# Patient Record
Sex: MALE | Race: BLACK OR AFRICAN AMERICAN | Employment: UNEMPLOYED | URBAN - METROPOLITAN AREA
[De-identification: names, ages, dates, MRNs, and addresses within clinical notes are randomized per-mention and may not be internally consistent; named-entity substitution may affect disease eponyms.]

---

## 2019-10-01 ENCOUNTER — HOSPITAL ENCOUNTER (INPATIENT)
Age: 64
LOS: 3 days | Discharge: HOME | DRG: 246 | End: 2019-10-04
Attending: EMERGENCY MEDICINE | Admitting: FAMILY MEDICINE
Payer: COMMERCIAL

## 2019-10-01 ENCOUNTER — APPOINTMENT (OUTPATIENT)
Dept: GENERAL RADIOLOGY | Age: 64
DRG: 246 | End: 2019-10-01
Attending: EMERGENCY MEDICINE
Payer: COMMERCIAL

## 2019-10-01 ENCOUNTER — APPOINTMENT (OUTPATIENT)
Dept: CT IMAGING | Age: 64
DRG: 246 | End: 2019-10-01
Attending: PHYSICIAN ASSISTANT
Payer: COMMERCIAL

## 2019-10-01 ENCOUNTER — APPOINTMENT (OUTPATIENT)
Dept: NON INVASIVE DIAGNOSTICS | Age: 64
DRG: 246 | End: 2019-10-01
Attending: INTERNAL MEDICINE
Payer: COMMERCIAL

## 2019-10-01 ENCOUNTER — OFFICE VISIT (OUTPATIENT)
Dept: FAMILY MEDICINE CLINIC | Age: 64
End: 2019-10-01

## 2019-10-01 VITALS
TEMPERATURE: 96.8 F | HEIGHT: 68 IN | RESPIRATION RATE: 14 BRPM | HEART RATE: 115 BPM | OXYGEN SATURATION: 97 % | DIASTOLIC BLOOD PRESSURE: 121 MMHG | WEIGHT: 200.6 LBS | SYSTOLIC BLOOD PRESSURE: 168 MMHG | BODY MASS INDEX: 30.4 KG/M2

## 2019-10-01 DIAGNOSIS — Z11.59 ENCOUNTER FOR HEPATITIS C SCREENING TEST FOR LOW RISK PATIENT: ICD-10-CM

## 2019-10-01 DIAGNOSIS — R06.02 SHORTNESS OF BREATH: ICD-10-CM

## 2019-10-01 DIAGNOSIS — R06.02 SHORTNESS OF BREATH: Primary | ICD-10-CM

## 2019-10-01 DIAGNOSIS — R53.1 WEAKNESS: ICD-10-CM

## 2019-10-01 DIAGNOSIS — I50.9 ACUTE CONGESTIVE HEART FAILURE, UNSPECIFIED HEART FAILURE TYPE (HCC): Primary | ICD-10-CM

## 2019-10-01 DIAGNOSIS — R03.0 ELEVATED BLOOD PRESSURE READING: ICD-10-CM

## 2019-10-01 DIAGNOSIS — I10 ESSENTIAL HYPERTENSION: ICD-10-CM

## 2019-10-01 LAB
ALBUMIN SERPL-MCNC: 3.7 G/DL (ref 3.5–5)
ALBUMIN/GLOB SERPL: 0.8 {RATIO} (ref 1.1–2.2)
ALP SERPL-CCNC: 102 U/L (ref 45–117)
ALT SERPL-CCNC: 48 U/L (ref 12–78)
ANION GAP SERPL CALC-SCNC: 9 MMOL/L (ref 5–15)
ARTERIAL PATENCY WRIST A: YES
AST SERPL-CCNC: 27 U/L (ref 15–37)
BASE DEFICIT BLD-SCNC: 1 MMOL/L
BASOPHILS # BLD: 0.1 K/UL (ref 0–0.1)
BASOPHILS # BLD: 0.1 K/UL (ref 0–0.1)
BASOPHILS NFR BLD: 1 % (ref 0–1)
BASOPHILS NFR BLD: 1 % (ref 0–1)
BDY SITE: ABNORMAL
BILIRUB SERPL-MCNC: 2.6 MG/DL (ref 0.2–1)
BNP SERPL-MCNC: 4491 PG/ML
BUN SERPL-MCNC: 14 MG/DL (ref 6–20)
BUN/CREAT SERPL: 10 (ref 12–20)
CALCIUM SERPL-MCNC: 9.2 MG/DL (ref 8.5–10.1)
CHLORIDE SERPL-SCNC: 97 MMOL/L (ref 97–108)
CO2 SERPL-SCNC: 27 MMOL/L (ref 21–32)
CREAT SERPL-MCNC: 1.36 MG/DL (ref 0.7–1.3)
DIFFERENTIAL METHOD BLD: ABNORMAL
DIFFERENTIAL METHOD BLD: ABNORMAL
ECHO AV AREA PEAK VELOCITY: 2.3 CM2
ECHO AV PEAK GRADIENT: 3.2 MMHG
ECHO AV PEAK VELOCITY: 89.25 CM/S
ECHO EST RA PRESSURE: 10 MMHG
ECHO LA AREA 4C: 25.2 CM2
ECHO LA VOL 4C: 82.31 ML (ref 18–58)
ECHO LA VOLUME INDEX A4C: 40.26 ML/M2 (ref 16–28)
ECHO LV E' LATERAL VELOCITY: 6.75 CM/S
ECHO LV E' SEPTAL VELOCITY: 3.8 CM/S
ECHO LV EDV A4C: 152.1 ML
ECHO LV EDV INDEX A4C: 74.4 ML/M2
ECHO LV EJECTION FRACTION A4C: 22 %
ECHO LV ESV A4C: 119.3 ML
ECHO LV ESV INDEX A4C: 58.3 ML/M2
ECHO LV INTERNAL DIMENSION DIASTOLIC MMODE: 5.91 CM
ECHO LV INTERNAL DIMENSION SYSTOLIC MMODE: 5.08 CM
ECHO LV IVSD MMODE: 1.16 CM
ECHO LV IVSS MMODE: 1.35 CM
ECHO LV POSTERIOR WALL DIASTOLIC MMODE: 1.16 CM
ECHO LV POSTERIOR WALL SYSTOLIC MMODE: 1.47 CM
ECHO LVOT DIAM: 2.09 CM
ECHO LVOT PEAK GRADIENT: 1.5 MMHG
ECHO LVOT PEAK VELOCITY: 60.21 CM/S
ECHO MV A VELOCITY: 40.43 CM/S
ECHO MV E DECELERATION TIME (DT): 103.8 MS
ECHO MV E VELOCITY: 91.78 CM/S
ECHO MV E/A RATIO: 2.27
ECHO MV E/E' LATERAL: 13.6
ECHO MV E/E' RATIO (AVERAGED): 18.87
ECHO MV E/E' SEPTAL: 24.15
ECHO MV REGURGITANT RADIUS PISA: 0.91 CM
ECHO PULMONARY ARTERY SYSTOLIC PRESSURE (PASP): 60 MMHG
ECHO PV MAX VELOCITY: 43.39 CM/S
ECHO PV PEAK GRADIENT: 0.8 MMHG
ECHO RA AREA 4C: 24.21 CM2
ECHO RIGHT VENTRICULAR SYSTOLIC PRESSURE (RVSP): 60 MMHG
ECHO RV INTERNAL DIMENSION: 4.24 CM
ECHO TV REGURGITANT MAX VELOCITY: 353.51 CM/S
ECHO TV REGURGITANT PEAK GRADIENT: 50 MMHG
EOSINOPHIL # BLD: 0.1 K/UL (ref 0–0.4)
EOSINOPHIL # BLD: 0.1 K/UL (ref 0–0.4)
EOSINOPHIL NFR BLD: 1 % (ref 0–7)
EOSINOPHIL NFR BLD: 1 % (ref 0–7)
ERYTHROCYTE [DISTWIDTH] IN BLOOD BY AUTOMATED COUNT: 18.1 % (ref 11.5–14.5)
ERYTHROCYTE [DISTWIDTH] IN BLOOD BY AUTOMATED COUNT: 18.7 % (ref 11.5–14.5)
GAS FLOW.O2 O2 DELIVERY SYS: ABNORMAL L/MIN
GLOBULIN SER CALC-MCNC: 4.7 G/DL (ref 2–4)
GLUCOSE BLD STRIP.AUTO-MCNC: 187 MG/DL (ref 65–100)
GLUCOSE SERPL-MCNC: 193 MG/DL (ref 65–100)
HCO3 BLD-SCNC: 22.7 MMOL/L (ref 22–26)
HCT VFR BLD AUTO: 49.3 % (ref 36.6–50.3)
HCT VFR BLD AUTO: 49.9 % (ref 36.6–50.3)
HGB BLD-MCNC: 15.9 G/DL (ref 12.1–17)
HGB BLD-MCNC: 16.1 G/DL (ref 12.1–17)
IMM GRANULOCYTES # BLD AUTO: 0.1 K/UL (ref 0–0.04)
IMM GRANULOCYTES # BLD AUTO: 0.1 K/UL (ref 0–0.04)
IMM GRANULOCYTES NFR BLD AUTO: 1 % (ref 0–0.5)
IMM GRANULOCYTES NFR BLD AUTO: 1 % (ref 0–0.5)
LYMPHOCYTES # BLD: 2 K/UL (ref 0.8–3.5)
LYMPHOCYTES # BLD: 2.2 K/UL (ref 0.8–3.5)
LYMPHOCYTES NFR BLD: 31 % (ref 12–49)
LYMPHOCYTES NFR BLD: 33 % (ref 12–49)
MCH RBC QN AUTO: 21.2 PG (ref 26–34)
MCH RBC QN AUTO: 21.9 PG (ref 26–34)
MCHC RBC AUTO-ENTMCNC: 31.9 G/DL (ref 30–36.5)
MCHC RBC AUTO-ENTMCNC: 32.7 G/DL (ref 30–36.5)
MCV RBC AUTO: 66.4 FL (ref 80–99)
MCV RBC AUTO: 67 FL (ref 80–99)
MONOCYTES # BLD: 1.3 K/UL (ref 0–1)
MONOCYTES # BLD: 1.4 K/UL (ref 0–1)
MONOCYTES NFR BLD: 21 % (ref 5–13)
MONOCYTES NFR BLD: 21 % (ref 5–13)
NEUTS SEG # BLD: 2.7 K/UL (ref 1.8–8)
NEUTS SEG # BLD: 2.8 K/UL (ref 1.8–8)
NEUTS SEG NFR BLD: 43 % (ref 32–75)
NEUTS SEG NFR BLD: 45 % (ref 32–75)
NRBC # BLD: 0 K/UL (ref 0–0.01)
NRBC # BLD: 0 K/UL (ref 0–0.01)
NRBC BLD-RTO: 0 PER 100 WBC
NRBC BLD-RTO: 0 PER 100 WBC
PCO2 BLD: 31.5 MMHG (ref 35–45)
PH BLD: 7.47 [PH] (ref 7.35–7.45)
PLATELET # BLD AUTO: 239 K/UL (ref 150–400)
PLATELET # BLD AUTO: 247 K/UL (ref 150–400)
PMV BLD AUTO: 10.2 FL (ref 8.9–12.9)
PMV BLD AUTO: 9.8 FL (ref 8.9–12.9)
PO2 BLD: 74 MMHG (ref 80–100)
POTASSIUM SERPL-SCNC: 4.2 MMOL/L (ref 3.5–5.1)
PROT SERPL-MCNC: 8.4 G/DL (ref 6.4–8.2)
RBC # BLD AUTO: 7.36 M/UL (ref 4.1–5.7)
RBC # BLD AUTO: 7.51 M/UL (ref 4.1–5.7)
RBC MORPH BLD: ABNORMAL
RBC MORPH BLD: ABNORMAL
SAO2 % BLD: 96 % (ref 92–97)
SERVICE CMNT-IMP: ABNORMAL
SODIUM SERPL-SCNC: 133 MMOL/L (ref 136–145)
SPECIMEN TYPE: ABNORMAL
TOTAL RESP. RATE, ITRR: 33
TROPONIN I SERPL-MCNC: 0.56 NG/ML
WBC # BLD AUTO: 6.4 K/UL (ref 4.1–11.1)
WBC # BLD AUTO: 6.6 K/UL (ref 4.1–11.1)

## 2019-10-01 PROCEDURE — 96374 THER/PROPH/DIAG INJ IV PUSH: CPT

## 2019-10-01 PROCEDURE — 93005 ELECTROCARDIOGRAM TRACING: CPT

## 2019-10-01 PROCEDURE — 77030008543 HC TBNG MON PRSS MRTM -A: Performed by: INTERNAL MEDICINE

## 2019-10-01 PROCEDURE — 77030019698 HC SYR ANGI MDLON MRTM -A: Performed by: INTERNAL MEDICINE

## 2019-10-01 PROCEDURE — 74011250636 HC RX REV CODE- 250/636: Performed by: INTERNAL MEDICINE

## 2019-10-01 PROCEDURE — 77030004549 HC CATH ANGI DX PRF MRTM -A: Performed by: INTERNAL MEDICINE

## 2019-10-01 PROCEDURE — 74011000258 HC RX REV CODE- 258: Performed by: INTERNAL MEDICINE

## 2019-10-01 PROCEDURE — 74011000250 HC RX REV CODE- 250: Performed by: INTERNAL MEDICINE

## 2019-10-01 PROCEDURE — C1769 GUIDE WIRE: HCPCS | Performed by: INTERNAL MEDICINE

## 2019-10-01 PROCEDURE — 74011250636 HC RX REV CODE- 250/636: Performed by: PHYSICIAN ASSISTANT

## 2019-10-01 PROCEDURE — 84484 ASSAY OF TROPONIN QUANT: CPT

## 2019-10-01 PROCEDURE — 74011250637 HC RX REV CODE- 250/637: Performed by: INTERNAL MEDICINE

## 2019-10-01 PROCEDURE — 93306 TTE W/DOPPLER COMPLETE: CPT

## 2019-10-01 PROCEDURE — 77030019569 HC BND COMPR RAD TERU -B: Performed by: INTERNAL MEDICINE

## 2019-10-01 PROCEDURE — 74011250636 HC RX REV CODE- 250/636: Performed by: FAMILY MEDICINE

## 2019-10-01 PROCEDURE — 80053 COMPREHEN METABOLIC PANEL: CPT

## 2019-10-01 PROCEDURE — 71046 X-RAY EXAM CHEST 2 VIEWS: CPT

## 2019-10-01 PROCEDURE — 77030018729 HC ELECTRD DEFIB PAD CARD -B: Performed by: INTERNAL MEDICINE

## 2019-10-01 PROCEDURE — 92928 PRQ TCAT PLMT NTRAC ST 1 LES: CPT | Performed by: INTERNAL MEDICINE

## 2019-10-01 PROCEDURE — 93458 L HRT ARTERY/VENTRICLE ANGIO: CPT | Performed by: INTERNAL MEDICINE

## 2019-10-01 PROCEDURE — 77030019697 HC SYR ANGI INFL MRTM -B: Performed by: INTERNAL MEDICINE

## 2019-10-01 PROCEDURE — C1725 CATH, TRANSLUMIN NON-LASER: HCPCS | Performed by: INTERNAL MEDICINE

## 2019-10-01 PROCEDURE — 99153 MOD SED SAME PHYS/QHP EA: CPT | Performed by: INTERNAL MEDICINE

## 2019-10-01 PROCEDURE — 027237Z DILATION OF CORONARY ARTERY, THREE ARTERIES WITH FOUR OR MORE DRUG-ELUTING INTRALUMINAL DEVICES, PERCUTANEOUS APPROACH: ICD-10-PCS | Performed by: INTERNAL MEDICINE

## 2019-10-01 PROCEDURE — 74011636320 HC RX REV CODE- 636/320: Performed by: INTERNAL MEDICINE

## 2019-10-01 PROCEDURE — 99152 MOD SED SAME PHYS/QHP 5/>YRS: CPT | Performed by: INTERNAL MEDICINE

## 2019-10-01 PROCEDURE — 83880 ASSAY OF NATRIURETIC PEPTIDE: CPT

## 2019-10-01 PROCEDURE — C1887 CATHETER, GUIDING: HCPCS | Performed by: INTERNAL MEDICINE

## 2019-10-01 PROCEDURE — 85347 COAGULATION TIME ACTIVATED: CPT

## 2019-10-01 PROCEDURE — 36415 COLL VENOUS BLD VENIPUNCTURE: CPT

## 2019-10-01 PROCEDURE — B2151ZZ FLUOROSCOPY OF LEFT HEART USING LOW OSMOLAR CONTRAST: ICD-10-PCS | Performed by: INTERNAL MEDICINE

## 2019-10-01 PROCEDURE — 99285 EMERGENCY DEPT VISIT HI MDM: CPT

## 2019-10-01 PROCEDURE — 65660000000 HC RM CCU STEPDOWN

## 2019-10-01 PROCEDURE — 77030010221 HC SPLNT WR POS TELE -B: Performed by: INTERNAL MEDICINE

## 2019-10-01 PROCEDURE — 74011636637 HC RX REV CODE- 636/637: Performed by: FAMILY MEDICINE

## 2019-10-01 PROCEDURE — 36600 WITHDRAWAL OF ARTERIAL BLOOD: CPT

## 2019-10-01 PROCEDURE — 85025 COMPLETE CBC W/AUTO DIFF WBC: CPT

## 2019-10-01 PROCEDURE — C1894 INTRO/SHEATH, NON-LASER: HCPCS | Performed by: INTERNAL MEDICINE

## 2019-10-01 PROCEDURE — B2111ZZ FLUOROSCOPY OF MULTIPLE CORONARY ARTERIES USING LOW OSMOLAR CONTRAST: ICD-10-PCS | Performed by: INTERNAL MEDICINE

## 2019-10-01 PROCEDURE — 82962 GLUCOSE BLOOD TEST: CPT

## 2019-10-01 PROCEDURE — 82803 BLOOD GASES ANY COMBINATION: CPT

## 2019-10-01 PROCEDURE — 4A023N7 MEASUREMENT OF CARDIAC SAMPLING AND PRESSURE, LEFT HEART, PERCUTANEOUS APPROACH: ICD-10-PCS | Performed by: INTERNAL MEDICINE

## 2019-10-01 PROCEDURE — C1874 STENT, COATED/COV W/DEL SYS: HCPCS | Performed by: INTERNAL MEDICINE

## 2019-10-01 DEVICE — STENT RONYX35012UX RESOLUTE ONYX 3.50X12
Type: IMPLANTABLE DEVICE | Status: FUNCTIONAL
Brand: RESOLUTE ONYX™

## 2019-10-01 DEVICE — STENT RONYX30022UX RESOLUTE ONYX 3.00X22
Type: IMPLANTABLE DEVICE | Status: FUNCTIONAL
Brand: RESOLUTE ONYX™

## 2019-10-01 DEVICE — STENT RONYX25022UX RESOLUTE ONYX 2.50X22
Type: IMPLANTABLE DEVICE | Status: FUNCTIONAL
Brand: RESOLUTE ONYX™

## 2019-10-01 DEVICE — STENT RONYX30038UX RESOLUTE ONYX 3.00X38
Type: IMPLANTABLE DEVICE | Status: FUNCTIONAL
Brand: RESOLUTE ONYX™

## 2019-10-01 RX ORDER — GUAIFENESIN 100 MG/5ML
LIQUID (ML) ORAL AS NEEDED
Status: DISCONTINUED | OUTPATIENT
Start: 2019-10-01 | End: 2019-10-01 | Stop reason: HOSPADM

## 2019-10-01 RX ORDER — LIDOCAINE HYDROCHLORIDE 10 MG/ML
INJECTION, SOLUTION EPIDURAL; INFILTRATION; INTRACAUDAL; PERINEURAL AS NEEDED
Status: DISCONTINUED | OUTPATIENT
Start: 2019-10-01 | End: 2019-10-01 | Stop reason: HOSPADM

## 2019-10-01 RX ORDER — FENTANYL CITRATE 50 UG/ML
INJECTION, SOLUTION INTRAMUSCULAR; INTRAVENOUS AS NEEDED
Status: DISCONTINUED | OUTPATIENT
Start: 2019-10-01 | End: 2019-10-01 | Stop reason: HOSPADM

## 2019-10-01 RX ORDER — SODIUM CHLORIDE 0.9 % (FLUSH) 0.9 %
5-40 SYRINGE (ML) INJECTION AS NEEDED
Status: DISCONTINUED | OUTPATIENT
Start: 2019-10-01 | End: 2019-10-04 | Stop reason: HOSPADM

## 2019-10-01 RX ORDER — GUAIFENESIN 100 MG/5ML
81 LIQUID (ML) ORAL DAILY
Status: DISCONTINUED | OUTPATIENT
Start: 2019-10-02 | End: 2019-10-04 | Stop reason: HOSPADM

## 2019-10-01 RX ORDER — FUROSEMIDE 40 MG/1
40 TABLET ORAL
Status: DISCONTINUED | OUTPATIENT
Start: 2019-10-02 | End: 2019-10-01

## 2019-10-01 RX ORDER — FUROSEMIDE 10 MG/ML
INJECTION INTRAMUSCULAR; INTRAVENOUS AS NEEDED
Status: DISCONTINUED | OUTPATIENT
Start: 2019-10-01 | End: 2019-10-01 | Stop reason: HOSPADM

## 2019-10-01 RX ORDER — ATORVASTATIN CALCIUM 40 MG/1
80 TABLET, FILM COATED ORAL
Status: DISCONTINUED | OUTPATIENT
Start: 2019-10-01 | End: 2019-10-04 | Stop reason: HOSPADM

## 2019-10-01 RX ORDER — SODIUM CHLORIDE 0.9 % (FLUSH) 0.9 %
10 SYRINGE (ML) INJECTION
Status: ACTIVE | OUTPATIENT
Start: 2019-10-01 | End: 2019-10-02

## 2019-10-01 RX ORDER — FUROSEMIDE 10 MG/ML
20 INJECTION INTRAMUSCULAR; INTRAVENOUS
Status: COMPLETED | OUTPATIENT
Start: 2019-10-01 | End: 2019-10-01

## 2019-10-01 RX ORDER — ZOLPIDEM TARTRATE 5 MG/1
5 TABLET ORAL
Status: DISCONTINUED | OUTPATIENT
Start: 2019-10-01 | End: 2019-10-04 | Stop reason: HOSPADM

## 2019-10-01 RX ORDER — HEPARIN SODIUM 200 [USP'U]/100ML
INJECTION, SOLUTION INTRAVENOUS
Status: COMPLETED | OUTPATIENT
Start: 2019-10-01 | End: 2019-10-01

## 2019-10-01 RX ORDER — ENOXAPARIN SODIUM 100 MG/ML
40 INJECTION SUBCUTANEOUS EVERY 24 HOURS
Status: DISCONTINUED | OUTPATIENT
Start: 2019-10-01 | End: 2019-10-01

## 2019-10-01 RX ORDER — NAPROXEN 500 MG/1
TABLET ORAL
Qty: 180 TAB | Refills: 3 | Status: ON HOLD | OUTPATIENT
Start: 2019-10-01 | End: 2019-10-01

## 2019-10-01 RX ORDER — SODIUM CHLORIDE 0.9 % (FLUSH) 0.9 %
5-40 SYRINGE (ML) INJECTION EVERY 8 HOURS
Status: DISCONTINUED | OUTPATIENT
Start: 2019-10-01 | End: 2019-10-04 | Stop reason: HOSPADM

## 2019-10-01 RX ORDER — MIDAZOLAM HYDROCHLORIDE 1 MG/ML
INJECTION, SOLUTION INTRAMUSCULAR; INTRAVENOUS AS NEEDED
Status: DISCONTINUED | OUTPATIENT
Start: 2019-10-01 | End: 2019-10-01 | Stop reason: HOSPADM

## 2019-10-01 RX ORDER — LISINOPRIL 40 MG/1
40 TABLET ORAL DAILY
Qty: 90 TAB | Refills: 3 | Status: ON HOLD | OUTPATIENT
Start: 2019-10-01 | End: 2019-10-01

## 2019-10-01 RX ORDER — GUAIFENESIN 100 MG/5ML
81 LIQUID (ML) ORAL DAILY
Status: DISCONTINUED | OUTPATIENT
Start: 2019-10-01 | End: 2019-10-01

## 2019-10-01 RX ORDER — LISINOPRIL 20 MG/1
40 TABLET ORAL DAILY
Status: DISCONTINUED | OUTPATIENT
Start: 2019-10-02 | End: 2019-10-01

## 2019-10-01 RX ORDER — HYDROCHLOROTHIAZIDE 25 MG/1
25 TABLET ORAL DAILY
Qty: 90 TAB | Refills: 3 | Status: ON HOLD | OUTPATIENT
Start: 2019-10-01 | End: 2019-10-01

## 2019-10-01 RX ORDER — HYDROCHLOROTHIAZIDE 25 MG/1
25 TABLET ORAL DAILY
Status: DISCONTINUED | OUTPATIENT
Start: 2019-10-02 | End: 2019-10-01

## 2019-10-01 RX ORDER — HEPARIN SODIUM 1000 [USP'U]/ML
INJECTION, SOLUTION INTRAVENOUS; SUBCUTANEOUS AS NEEDED
Status: DISCONTINUED | OUTPATIENT
Start: 2019-10-01 | End: 2019-10-01 | Stop reason: HOSPADM

## 2019-10-01 RX ORDER — VERAPAMIL HYDROCHLORIDE 2.5 MG/ML
INJECTION, SOLUTION INTRAVENOUS AS NEEDED
Status: DISCONTINUED | OUTPATIENT
Start: 2019-10-01 | End: 2019-10-01 | Stop reason: HOSPADM

## 2019-10-01 RX ORDER — MAGNESIUM SULFATE 100 %
4 CRYSTALS MISCELLANEOUS AS NEEDED
Status: DISCONTINUED | OUTPATIENT
Start: 2019-10-01 | End: 2019-10-04 | Stop reason: HOSPADM

## 2019-10-01 RX ORDER — LOSARTAN POTASSIUM 25 MG/1
25 TABLET ORAL DAILY
Status: DISCONTINUED | OUTPATIENT
Start: 2019-10-02 | End: 2019-10-04 | Stop reason: HOSPADM

## 2019-10-01 RX ORDER — INSULIN LISPRO 100 [IU]/ML
INJECTION, SOLUTION INTRAVENOUS; SUBCUTANEOUS
Status: DISCONTINUED | OUTPATIENT
Start: 2019-10-01 | End: 2019-10-03

## 2019-10-01 RX ORDER — METOPROLOL SUCCINATE 25 MG/1
25 TABLET, EXTENDED RELEASE ORAL DAILY
Status: DISCONTINUED | OUTPATIENT
Start: 2019-10-02 | End: 2019-10-02

## 2019-10-01 RX ORDER — FUROSEMIDE 10 MG/ML
40 INJECTION INTRAMUSCULAR; INTRAVENOUS EVERY 12 HOURS
Status: DISCONTINUED | OUTPATIENT
Start: 2019-10-01 | End: 2019-10-03

## 2019-10-01 RX ORDER — DEXTROSE 50 % IN WATER (D50W) INTRAVENOUS SYRINGE
12.5-25 AS NEEDED
Status: DISCONTINUED | OUTPATIENT
Start: 2019-10-01 | End: 2019-10-04 | Stop reason: HOSPADM

## 2019-10-01 RX ADMIN — Medication 10 ML: at 18:09

## 2019-10-01 RX ADMIN — INSULIN LISPRO 2 UNITS: 100 INJECTION, SOLUTION INTRAVENOUS; SUBCUTANEOUS at 18:07

## 2019-10-01 RX ADMIN — NITROGLYCERIN 1 INCH: 20 OINTMENT TOPICAL at 18:00

## 2019-10-01 RX ADMIN — NITROGLYCERIN 1 INCH: 20 OINTMENT TOPICAL at 22:17

## 2019-10-01 RX ADMIN — Medication 10 ML: at 22:18

## 2019-10-01 RX ADMIN — BIVALIRUDIN 1.75 MG/KG/HR: 250 INJECTION, POWDER, LYOPHILIZED, FOR SOLUTION INTRAVENOUS at 16:41

## 2019-10-01 RX ADMIN — FUROSEMIDE 40 MG: 10 INJECTION, SOLUTION INTRAMUSCULAR; INTRAVENOUS at 18:02

## 2019-10-01 RX ADMIN — ATORVASTATIN CALCIUM 80 MG: 40 TABLET, FILM COATED ORAL at 22:17

## 2019-10-01 RX ADMIN — BIVALIRUDIN 1.75 MG/KG/HR: 250 INJECTION, POWDER, LYOPHILIZED, FOR SOLUTION INTRAVENOUS at 16:19

## 2019-10-01 RX ADMIN — FUROSEMIDE 20 MG: 10 INJECTION, SOLUTION INTRAMUSCULAR; INTRAVENOUS at 12:56

## 2019-10-01 NOTE — ED TRIAGE NOTES
The patient reports high blood pressure, shortness of breath, \"cold I cant get rid of\" and \"when I wake up I breathe real hard and I have to sit up to catch my breath\".

## 2019-10-01 NOTE — Clinical Note
Lesion: Located in the Proximal LAD. Stent inserted. Stent deployed. Multiple inflations used. First inflation pressure = 14 aidee; inflation time: 20 sec. Second inflation pressure: 12 aidee; inflation time: 10 sec.

## 2019-10-01 NOTE — Clinical Note
Lesion: Located in the Distal Cx. Stent inserted. Stent deployed. First inflation pressure = 12 aidee; inflation time: 20 sec.

## 2019-10-01 NOTE — Clinical Note
Lesion located in the Proximal LAD. Balloon inserted. Balloon inflated using multiple inflations inflation technique. Pressure = 12 aidee; Duration = 20 sec. Inflation 2: Pressure: 12 aidee; Duration: 8 sec.

## 2019-10-01 NOTE — ED NOTES
Advised that Dr Lucho Gross will be taking pt directly to cath lab. Belongings including clothing, shoes, hat and cell phone given to girlfriend and sister.   Family updated on situation and sent to cath lab waiting area

## 2019-10-01 NOTE — Clinical Note
Balloon inserted. Balloon inflated using multiple inflations inflation technique. Pressure = 14 aidee; Duration = 15 sec. Inflation 2: Pressure: 14 aidee; Duration: 14 sec.

## 2019-10-01 NOTE — PROGRESS NOTES
HISTORY OF PRESENT ILLNESS  Valery Brooks is a 60 y.o. male. HPI Pt. Comes in for blood pressure check. ONe month hx exertional dyspnea, even walking 50 yards. Can also wake up at night with orthopnea. MOderate cough. Cough minimally productive. No fever, chills. Quit smoking at age 14. Some second hand smoke exposure. No chest pains, edema. Father ded of unknown causes. No family hx of heart disease. NO abdominal pain, bloody stools, melena. Has been off of all meds. ROS    Physical Exam   Constitutional: He appears well-developed and well-nourished. HENT:   Right Ear: External ear normal.   Left Ear: External ear normal.   Mouth/Throat: Oropharynx is clear and moist.   Neck: No thyromegaly present. Cardiovascular: Normal rate, regular rhythm, normal heart sounds and intact distal pulses. Pulmonary/Chest: Effort normal and breath sounds normal. No respiratory distress. He has no wheezes. Decreased breath sounds rll. Abdominal: Soft. Bowel sounds are normal. He exhibits no distension and no mass. There is no tenderness. There is no guarding. Musculoskeletal: Normal range of motion. He exhibits no edema. Lymphadenopathy:     He has no cervical adenopathy. Nursing note and vitals reviewed. ASSESSMENT and PLAN  Orders Placed This Encounter    XR CHEST PA LAT    AMB POC COMPLETE CBC, AUTOMATED    AMB POC EKG ROUTINE W/ 12 LEADS, INTER & REP    naproxen (NAPROSYN) 500 mg tablet    hydroCHLOROthiazide (HYDRODIURIL) 25 mg tablet    lisinopril (PRINIVIL, ZESTRIL) 40 mg tablet     Diagnoses and all orders for this visit:    1. Shortness of breath  -     AMB POC EKG ROUTINE W/ 12 LEADS, INTER & REP  -     XR CHEST PA LAT; Future    2. Essential hypertension    3. Weakness  -     AMB POC COMPLETE CBC, AUTOMATED    4.  Encounter for hepatitis C screening test for low risk patient    Other orders  -     naproxen (NAPROSYN) 500 mg tablet; TAKE 1 TABLET BY MOUTH TWICE DAILY WITH FOOD FOR ARTHIRITIS  -     hydroCHLOROthiazide (HYDRODIURIL) 25 mg tablet; Take 1 Tab by mouth daily. For blood pressure  -     lisinopril (PRINIVIL, ZESTRIL) 40 mg tablet; Take 1 Tab by mouth daily.  For blood pressure

## 2019-10-01 NOTE — CONSULTS
Consult    NAME: Frahana Sorensen   :  1955   MRN:  [id number]     Date/Time:  10/1/2019 3:49 PM    Patient PCP: Latonia Calvert MD  ________________________________________________________________________     Assessment:     1. SOB, cannot exclude [place]  2. Elevated TnI; cannot exclude NSTEMI  3. Severely reduced LVEF w/ anterior and anterolateral HK  4. Hypertension  5. Hyperlipidemia  6. Diabetes  7. Tobacco abuse  8. Six pack of beer daily  9. 1 child, ret'd city [place]        Plan: TnI 0.6  EKG:  ST w/ anterolateral TWI    Echo as above    1. NPO  2. I have recommended diagnostic cath for definitive evaluation of the patient's coronary anatomy and PCI if appropriate. All risks, benefits, and alternatives were discussed and the patient wishes to proceed. 3. ASA now  4. Continue lisinopril  5. Will need a beta blocker  6. Will need a statin  7. Remainder of recs pending outcome of cath  8. If cath is negative, can consider going forward w/ CTA of the chest, for now would hold off    Thank you for this consult and allowing me to take part in this patients care. Please call with questions. [x]        High complexity decision making was performed        Subjective:   CHIEF COMPLAINT: SOB    HISTORY OF PRESENT ILLNESS:     Katie Barrientos is a 60 y.o.  male who \"presents ambulatory to the ED with cc of LILLY, nighttime orthopnea, minimally productive cough x 1 month. Pt states that he can walk about 50 yards but becomes extremely SOB and \"needs to gasp and take breaks\" before he can resume activity. Pt states that he used to take antihypertensive medication, but stopped these medications 7 years ago because \"they weren't doing anything. \" Pt denies any CP, hemoptysis, palpitations, leg swelling, weight gain. \"      We were asked to consult for work up and evaluation of the above problems.      Past Medical History:   Diagnosis Date    DM (diabetes mellitus) ([facility] [address] 3/30/2010  HTN (hypertension) 3/30/2010    Hypercholesterolemia       Past Surgical History:   Procedure Laterality Date    COLONOSCOPY  1-11    diverticulosis- Dr. Sreedhar Bingham     Allergies   Allergen Reactions    Glipizide Other (comments)     dizziness      Meds:  See below  Social History     Tobacco Use    Smoking status: Former Smoker    Smokeless tobacco: Never Used   Substance Use Topics    Alcohol use: Yes     Alcohol/week: 16.7 standard drinks     Types: 20 Cans of beer per week      Family History   Problem Relation Age of Onset    Cancer Father        REVIEW OF SYSTEMS:     []         Unable to obtain  ROS due to ---   [x]         Total of 12 systems reviewed as follows:    Constitutional: negative fever, negative chills, negative weight loss  Eyes:   negative visual changes  ENT:   negative sore throat, tongue or lip swelling  Respiratory:  negative cough, negative dyspnea  Cards:  negative for chest pain, palpitations, lower extremity edema  GI:   negative for nausea, vomiting, diarrhea, and abdominal pain  Genitourinary: negative for frequency, dysuria  Integument:  negative for rash   Hematologic:  negative for easy bruising and gum/nose bleeding  Musculoskel: negative for myalgias,  back pain  Neurological:  negative for headaches, dizziness, vertigo, weakness  Behavl/Psych: negative for feelings of anxiety, depression     Pertinent Positives include :    Objective:      Physical Exam:    Last 24hrs VS reviewed since prior progress note. Most recent are:    Visit Vitals  /87   Pulse (!) 103   Temp 97.7 °F (36.5 °C)   Resp (!) 33   Ht 5' 8\" (1.727 m)   Wt 90.8 kg (200 lb 2.8 oz)   SpO2 96%   BMI 30.44 kg/m²     No intake or output data in the 24 hours ending 10/01/19 8674     General Appearance: Well developed, well nourished, alert & oriented x 3,    no acute distress. Ears/Nose/Mouth/Throat: Pupils equal and round, Hearing grossly normal.  Neck: Supple. JVP within normal limits.  Carotids good upstrokes, with no bruit. Chest: Lungs clear to auscultation bilaterally. Cardiovascular: Regular rate and rhythm, S1S2 normal, no murmur, rubs, gallops. Abdomen: Soft, non-tender, bowel sounds are active. No organomegaly. Extremities: No edema bilaterally. Femoral pulses +2, Distal Pulses +1. Skin: Warm and dry. Neuro: CN II-XII grossly intact, Strength and sensation grossly intact. []         Post-cath site without hematoma, bruit, tenderness, or thrill. Distal pulses intact. Data:      Telemetry:  SR    EKG:  SR, anterolateral TWI  []  No new EKG for review. Prior to Admission medications    Medication Sig Start Date End Date Taking? Authorizing Provider   naproxen (NAPROSYN) 500 mg tablet TAKE 1 TABLET BY MOUTH TWICE DAILY WITH FOOD FOR ARTHIRITIS 10/1/19   Estefania Ellison MD   hydroCHLOROthiazide (HYDRODIURIL) 25 mg tablet Take 1 Tab by mouth daily. For blood pressure 10/1/19   Estefania Ellison MD   lisinopril (PRINIVIL, ZESTRIL) 40 mg tablet Take 1 Tab by mouth daily. For blood pressure 10/1/19   Estefania Ellison MD   verapamil 240 mg CR tablet Take 240 mg by mouth two (2) times a day.  12/18/12   Estefania Ellison MD       Recent Results (from the past 24 hour(s))   EKG, 12 LEAD, INITIAL    Collection Time: 10/01/19 11:29 AM   Result Value Ref Range    Ventricular Rate 113 BPM    Atrial Rate 113 BPM    P-R Interval 152 ms    QRS Duration 84 ms    Q-T Interval 316 ms    QTC Calculation (Bezet) 433 ms    Calculated P Axis 54 degrees    Calculated R Axis 57 degrees    Calculated T Axis 90 degrees    Diagnosis       Sinus tachycardia  Left atrial enlargement  T wave abnormality, consider lateral ischemia  No previous ECGs available     CBC WITH AUTOMATED DIFF    Collection Time: 10/01/19 11:50 AM   Result Value Ref Range    WBC 6.6 4.1 - 11.1 K/uL    RBC 7.36 (H) 4.10 - 5.70 M/uL    HGB 16.1 12.1 - 17.0 g/dL    HCT 49.3 36.6 - 50.3 %    MCV 67.0 (L) 80.0 - 99.0 FL    MCH 21.9 (L) 26.0 - 34.0 PG    MCHC 32.7 30.0 - 36.5 g/dL    RDW 18.1 (H) 11.5 - 14.5 %    PLATELET 926 340 - 700 K/uL    MPV 10.2 8.9 - 12.9 FL    NRBC 0.0 0  WBC    ABSOLUTE NRBC 0.00 0.00 - 0.01 K/uL    NEUTROPHILS 43 32 - 75 %    LYMPHOCYTES 33 12 - 49 %    MONOCYTES 21 (H) 5 - 13 %    EOSINOPHILS 1 0 - 7 %    BASOPHILS 1 0 - 1 %    IMMATURE GRANULOCYTES 1 (H) 0.0 - 0.5 %    ABS. NEUTROPHILS 2.7 1.8 - 8.0 K/UL    ABS. LYMPHOCYTES 2.2 0.8 - 3.5 K/UL    ABS. MONOCYTES 1.4 (H) 0.0 - 1.0 K/UL    ABS. EOSINOPHILS 0.1 0.0 - 0.4 K/UL    ABS. BASOPHILS 0.1 0.0 - 0.1 K/UL    ABS. IMM. GRANS. 0.1 (H) 0.00 - 0.04 K/UL    DF AUTOMATED      RBC COMMENTS MICROCYTOSIS  2+       METABOLIC PANEL, COMPREHENSIVE    Collection Time: 10/01/19 11:50 AM   Result Value Ref Range    Sodium 133 (L) 136 - 145 mmol/L    Potassium 4.2 3.5 - 5.1 mmol/L    Chloride 97 97 - 108 mmol/L    CO2 27 21 - 32 mmol/L    Anion gap 9 5 - 15 mmol/L    Glucose 193 (H) 65 - 100 mg/dL    BUN 14 6 - 20 MG/DL    Creatinine 1.36 (H) 0.70 - 1.30 MG/DL    BUN/Creatinine ratio 10 (L) 12 - 20      GFR est AA >60 >60 ml/min/1.73m2    GFR est non-AA 53 (L) >60 ml/min/1.73m2    Calcium 9.2 8.5 - 10.1 MG/DL    Bilirubin, total 2.6 (H) 0.2 - 1.0 MG/DL    ALT (SGPT) 48 12 - 78 U/L    AST (SGOT) 27 15 - 37 U/L    Alk.  phosphatase 102 45 - 117 U/L    Protein, total 8.4 (H) 6.4 - 8.2 g/dL    Albumin 3.7 3.5 - 5.0 g/dL    Globulin 4.7 (H) 2.0 - 4.0 g/dL    A-G Ratio 0.8 (L) 1.1 - 2.2     TROPONIN I    Collection Time: 10/01/19 11:50 AM   Result Value Ref Range    Troponin-I, Qt. 0.56 (H) <0.05 ng/mL   NT-PRO BNP    Collection Time: 10/01/19 11:50 AM   Result Value Ref Range    NT pro-BNP 4,491 (H) <125 PG/ML   POC G3 - PUL    Collection Time: 10/01/19  2:47 PM   Result Value Ref Range    pH (POC) 7.466 (H) 7.35 - 7.45      pCO2 (POC) 31.5 (L) 35.0 - 45.0 MMHG    pO2 (POC) 74 (L) 80 - 100 MMHG    HCO3 (POC) 22.7 22 - 26 MMOL/L    sO2 (POC) 96 92 - 97 %    Base deficit (POC) 1 mmol/L Site LEFT RADIAL      Device: ROOM AIR      Allens test (POC) YES      Specimen type (POC) ARTERIAL      Total resp.  rate 33     ECHO ADULT COMPLETE    Collection Time: 10/01/19  3:23 PM   Result Value Ref Range    Right Atrial Area 4C 24.21 cm2    LV E' Lateral Velocity 6.75 cm/s    LV E' Septal Velocity 3.80 cm/s    Aortic Valve Systolic Peak Velocity 69.14 cm/s    Aortic Valve Area by Continuity of Peak Velocity 2.3 cm2    AoV PG 3.2 mmHg    LV ES Vol A4C 119.3 mL    LVOT d 2.09 cm    LVOT Peak Velocity 60.21 cm/s    LVOT Peak Gradient 1.5 mmHg    MV A Goran 40.43 cm/s    MV E Goran 91.78 cm/s    MV E/A 2.30     RVIDd 4.24 cm    LV Ejection Fraction MOD 4C 22 %    LA Vol 4C 82.31 (A) 18 - 58 mL    LA Area 4C 25.2 cm2    E/E' lateral 13.60     E/E' septal 24.15     RVSP 60.0 mmHg    LVIDs (M-mode) 5.08 cm    LVIDd (M-mode) 5.91 (A) cm    LVPWs (M-mode) 1.47 cm    LVPWd (M-mode) 1.16 (A) cm    IVSs (M-mode) 1.35 cm    IVSd (M-mode) 1.16 (A) cm    E/E' ratio (averaged) 18.87     LV ED Vol A4C 152.1 mL    Est. RA Pressure 10.0 mmHg    Mitral Valve E Wave Deceleration Time 103.8 ms    Triscuspid Valve Regurgitation Peak Gradient 50.0 mmHg    Pulmonic Valve Max Velocity 43.39 cm/s    TR Max Velocity 353.51 cm/s    PISA MR Rad 0.91 cm    PASP 60.0 mmHg    LA Vol Index 40.26 16 - 28 ml/m2    LVED Vol Index A4C 74.4 mL/m2    LVES Vol Index A4C 58.3 mL/m2    PV peak gradient 0.8 mmHg        Sunny Reyez III, DO

## 2019-10-01 NOTE — ED PROVIDER NOTES
EMERGENCY DEPARTMENT HISTORY AND PHYSICAL EXAM      Date: 10/1/2019  Patient Name: Wing Quach    Please note that this dictation was completed with [employer], the computer voice recognition software. Quite often unanticipated grammatical, syntax, homophones, and other interpretive errors are inadvertently transcribed by the computer software. Please disregard these errors. Please excuse any errors that have escaped final proofreading. History of Presenting Illness     Chief Complaint   Patient presents with    Hypertension    Shortness of Breath       History Provided By: Patient    HPI: Wing Quach, 60 y.o. male with PMHx significant for DM, HTN, HLD, presents ambulatory to the ED with cc of LILLY, nighttime orthopnea, minimally productive cough x 1 month. Pt states that he can walk about 50 yards but becomes extremely SOB and \"needs to gasp and take breaks\" before he can resume activity. Pt states that he used to take antihypertensive medication, but stopped these medications 7 years ago because \"they weren't doing anything. \" Pt denies any CP, hemoptysis, palpitations, leg swelling, weight gain. PCP: Jeanine Hannon MD    There are no other complaints, changes, or physical findings at this time.     Current Facility-Administered Medications   Medication Dose Route Frequency Provider Last Rate Last Dose    sodium chloride (NS) flush 10 mL  10 mL IntraVENous RAD Paul Cid MD   Stopped at 10/01/19 1424    iopamidol (ISOVUE-370) 76 % injection 100 mL  100 mL IntraVENous RAD ONCE Evaline MD Ngozi        sodium chloride (NS) flush 5-40 mL  5-40 mL IntraVENous Q8H Jeanine Hannon MD   10 mL at 10/01/19 1809    sodium chloride (NS) flush 5-40 mL  5-40 mL IntraVENous PRN Jeanine Hannon MD        zolpidem [facility]) tablet 5 mg  5 mg Oral QHS PRN Jeanine Hannon MD        furosemide (LASIX) injection 40 mg  40 mg IntraVENous Q12H Jeanine Hannon MD   40 mg at 10/01/19 1802    insulin lispro (HUMALOG) injection   SubCUTAneous Nkechi Watts MD   2 Units at 10/01/19 1807    glucose chewable tablet 16 g  4 Tab Oral PRN Duarte Mar MD        dextrose (D50W) injection syrg 12.5-25 g  12.5-25 g IntraVENous PRN Duarte Mar MD        glucagon [facility]) injection 1 mg  1 mg IntraMUSCular PRN Duarte Mar MD        nitroglycerin (NITROBID) 2 % ointment 1 Inch  1 Inch Topical Q6H [facility] III, DO   1 Inch at 10/01/19 1800    [START ON 10/2/2019] metoprolol succinate (TOPROL-XL) XL tablet 25 mg  25 mg Oral DAILY Gurnee Miroslava III, DO        atorvastatin (LIPITOR) tablet 80 mg  80 mg Oral QHS CartagenaIsaiah  III, DO        [START ON 10/2/2019] losartan (COZAAR) tablet 25 mg  25 mg Oral DAILY Shaw Miroslava III, DO        sodium chloride (NS) flush 5-40 mL  5-40 mL IntraVENous Q8H Isaiah Cartagena III, DO   10 mL at 10/01/19 1809    sodium chloride (NS) flush 5-40 mL  5-40 mL IntraVENous PRN [facility] III, DO        [START ON 10/2/2019] ticagrelor (BRILINTA) tablet 90 mg  90 mg Oral Q12H Cartagena [facility] III, DO        [START ON 10/2/2019] aspirin chewable tablet 81 mg  81 mg Oral DAILY Gurnee Glenwood III, DO           Past History     Past Medical History:  Past Medical History:   Diagnosis Date    DM (diabetes mellitus) ([facility] [address] 3/30/2010    HTN (hypertension) 3/30/2010    Hypercholesterolemia        Past Surgical History:  Past Surgical History:   Procedure Laterality Date    COLONOSCOPY  1-11    diverticulosis- Dr. Evan Ryan       Family History:  Family History   Problem Relation Age of Onset    Cancer Father        Social History:  Social History     Tobacco Use    Smoking status: Former Smoker    Smokeless tobacco: Never Used   Substance Use Topics    Alcohol use: Yes     Alcohol/week: 16.7 standard drinks     Types: 20 Cans of beer per week    Drug use: No       Allergies:   Allergies   Allergen Reactions    Glipizide Other (comments)     dizziness         Review of Systems   Review of Systems   Constitutional: Negative. Negative for activity change, appetite change, chills and fever. HENT: Negative for rhinorrhea and sore throat. Eyes: Negative for pain and visual disturbance. Respiratory: Positive for cough and shortness of breath. Negative for choking and stridor. +Orthopnea   Cardiovascular: Negative for chest pain and palpitations. Gastrointestinal: Negative for abdominal pain, diarrhea, nausea and vomiting. Musculoskeletal: Negative for arthralgias and myalgias. Skin: Negative for color change, rash and wound. Neurological: Negative for dizziness, numbness and headaches. All other systems reviewed and are negative. Physical Exam   Physical Exam   Constitutional: He is oriented to person, place, and time. He appears well-developed and well-nourished. No distress. 60 y.o. male in NAD  Communicates appropriately and in full sentences   HENT:   Head: Normocephalic and atraumatic. Eyes: Pupils are equal, round, and reactive to light. Conjunctivae are normal. Right eye exhibits no discharge. Left eye exhibits no discharge. Neck: Normal range of motion. Neck supple. No nuchal rigidity   Cardiovascular: Regular rhythm and intact distal pulses. Tachycardia present. Pulmonary/Chest: Tachypnea noted. No respiratory distress. He has no wheezes. He has rales (lower lung bases). Abdominal: Soft. He exhibits no distension. There is no tenderness. Musculoskeletal: Normal range of motion. He exhibits no tenderness or deformity. No neurologic or vascular compromise on exam.    Neurological: He is alert and oriented to person, place, and time. Skin: Skin is warm and dry. No rash noted. He is not diaphoretic. No erythema. Psychiatric: He has a normal mood and affect. Nursing note and vitals reviewed.         Diagnostic Study Results     Labs -     Recent Results (from the past 12 hour(s)) EKG, 12 LEAD, INITIAL    Collection Time: 10/01/19 11:29 AM   Result Value Ref Range    Ventricular Rate 113 BPM    Atrial Rate 113 BPM    P-R Interval 152 ms    QRS Duration 84 ms    Q-T Interval 316 ms    QTC Calculation (Bezet) 433 ms    Calculated P Axis 54 degrees    Calculated R Axis 57 degrees    Calculated T Axis 90 degrees    Diagnosis       Sinus tachycardia  Left atrial enlargement  T wave abnormality, consider lateral ischemia  No previous ECGs available     CBC WITH AUTOMATED DIFF    Collection Time: 10/01/19 11:50 AM   Result Value Ref Range    WBC 6.6 4.1 - 11.1 K/uL    RBC 7.36 (H) 4.10 - 5.70 M/uL    HGB 16.1 12.1 - 17.0 g/dL    HCT 49.3 36.6 - 50.3 %    MCV 67.0 (L) 80.0 - 99.0 FL    MCH 21.9 (L) 26.0 - 34.0 PG    MCHC 32.7 30.0 - 36.5 g/dL    RDW 18.1 (H) 11.5 - 14.5 %    PLATELET 716 296 - 487 K/uL    MPV 10.2 8.9 - 12.9 FL    NRBC 0.0 0  WBC    ABSOLUTE NRBC 0.00 0.00 - 0.01 K/uL    NEUTROPHILS 43 32 - 75 %    LYMPHOCYTES 33 12 - 49 %    MONOCYTES 21 (H) 5 - 13 %    EOSINOPHILS 1 0 - 7 %    BASOPHILS 1 0 - 1 %    IMMATURE GRANULOCYTES 1 (H) 0.0 - 0.5 %    ABS. NEUTROPHILS 2.7 1.8 - 8.0 K/UL    ABS. LYMPHOCYTES 2.2 0.8 - 3.5 K/UL    ABS. MONOCYTES 1.4 (H) 0.0 - 1.0 K/UL    ABS. EOSINOPHILS 0.1 0.0 - 0.4 K/UL    ABS. BASOPHILS 0.1 0.0 - 0.1 K/UL    ABS. IMM.  GRANS. 0.1 (H) 0.00 - 0.04 K/UL    DF AUTOMATED      RBC COMMENTS MICROCYTOSIS  2+       METABOLIC PANEL, COMPREHENSIVE    Collection Time: 10/01/19 11:50 AM   Result Value Ref Range    Sodium 133 (L) 136 - 145 mmol/L    Potassium 4.2 3.5 - 5.1 mmol/L    Chloride 97 97 - 108 mmol/L    CO2 27 21 - 32 mmol/L    Anion gap 9 5 - 15 mmol/L    Glucose 193 (H) 65 - 100 mg/dL    BUN 14 6 - 20 MG/DL    Creatinine 1.36 (H) 0.70 - 1.30 MG/DL    BUN/Creatinine ratio 10 (L) 12 - 20      GFR est AA >60 >60 ml/min/1.73m2    GFR est non-AA 53 (L) >60 ml/min/1.73m2    Calcium 9.2 8.5 - 10.1 MG/DL    Bilirubin, total 2.6 (H) 0.2 - 1.0 MG/DL    ALT (SGPT) 48 12 - 78 U/L    AST (SGOT) 27 15 - 37 U/L    Alk. phosphatase 102 45 - 117 U/L    Protein, total 8.4 (H) 6.4 - 8.2 g/dL    Albumin 3.7 3.5 - 5.0 g/dL    Globulin 4.7 (H) 2.0 - 4.0 g/dL    A-G Ratio 0.8 (L) 1.1 - 2.2     TROPONIN I    Collection Time: 10/01/19 11:50 AM   Result Value Ref Range    Troponin-I, Qt. 0.56 (H) <0.05 ng/mL   NT-PRO BNP    Collection Time: 10/01/19 11:50 AM   Result Value Ref Range    NT pro-BNP 4,491 (H) <125 PG/ML   POC G3 - PUL    Collection Time: 10/01/19  2:47 PM   Result Value Ref Range    pH (POC) 7.466 (H) 7.35 - 7.45      pCO2 (POC) 31.5 (L) 35.0 - 45.0 MMHG    pO2 (POC) 74 (L) 80 - 100 MMHG    HCO3 (POC) 22.7 22 - 26 MMOL/L    sO2 (POC) 96 92 - 97 %    Base deficit (POC) 1 mmol/L    Site LEFT RADIAL      Device: ROOM AIR      Allens test (POC) YES      Specimen type (POC) ARTERIAL      Total resp.  rate 33     ECHO ADULT COMPLETE    Collection Time: 10/01/19  3:23 PM   Result Value Ref Range    Right Atrial Area 4C 24.21 cm2    LV E' Lateral Velocity 6.75 cm/s    LV E' Septal Velocity 3.80 cm/s    Aortic Valve Systolic Peak Velocity 07.55 cm/s    Aortic Valve Area by Continuity of Peak Velocity 2.3 cm2    AoV PG 3.2 mmHg    LV ES Vol A4C 119.3 mL    LVOT d 2.09 cm    LVOT Peak Velocity 60.21 cm/s    LVOT Peak Gradient 1.5 mmHg    MV A Goran 40.43 cm/s    MV E Goran 91.78 cm/s    MV E/A 2.27     RVIDd 4.24 cm    LV Ejection Fraction MOD 4C 22 %    LA Vol 4C 82.31 (A) 18 - 58 mL    LA Area 4C 25.2 cm2    E/E' lateral 13.60     E/E' septal 24.15     RVSP 60.0 mmHg    LVIDs (M-mode) 5.08 cm    LVIDd (M-mode) 5.91 (A) cm    LVPWs (M-mode) 1.47 cm    LVPWd (M-mode) 1.16 (A) cm    IVSs (M-mode) 1.35 cm    IVSd (M-mode) 1.16 (A) cm    E/E' ratio (averaged) 18.87     LV ED Vol A4C 152.1 mL    Est. RA Pressure 10.0 mmHg    Mitral Valve E Wave Deceleration Time 103.8 ms    Triscuspid Valve Regurgitation Peak Gradient 50.0 mmHg    Pulmonic Valve Max Velocity 43.39 cm/s    TR Max Velocity 353.51 cm/s    PISA MR Rad 0.91 cm    PASP 60.0 mmHg    LA Vol Index 40.26 16 - 28 ml/m2    LVED Vol Index A4C 74.4 mL/m2    LVES Vol Index A4C 58.3 mL/m2    PV peak gradient 0.8 mmHg   GLUCOSE, POC    Collection Time: 10/01/19  5:27 PM   Result Value Ref Range    Glucose (POC) 187 (H) 65 - 100 mg/dL    Performed by Sammie Neville    EKG, 12 LEAD, SUBSEQUENT    Collection Time: 10/01/19  5:29 PM   Result Value Ref Range    Ventricular Rate 101 BPM    Atrial Rate 101 BPM    P-R Interval 158 ms    QRS Duration 90 ms    Q-T Interval 386 ms    QTC Calculation (Bezet) 500 ms    Calculated P Axis 57 degrees    Calculated R Axis 28 degrees    Calculated T Axis 134 degrees    Diagnosis       Sinus tachycardia  Left atrial enlargement  T wave abnormality, consider anterolateral ischemia  When compared with ECG of 01-OCT-2019 11:29,  MANUAL COMPARISON REQUIRED, DATA IS UNCONFIRMED     CBC WITH AUTOMATED DIFF    Collection Time: 10/01/19  6:10 PM   Result Value Ref Range    WBC 6.4 4.1 - 11.1 K/uL    RBC 7.51 (H) 4.10 - 5.70 M/uL    HGB 15.9 12.1 - 17.0 g/dL    HCT 49.9 36.6 - 50.3 %    MCV 66.4 (L) 80.0 - 99.0 FL    MCH 21.2 (L) 26.0 - 34.0 PG    MCHC 31.9 30.0 - 36.5 g/dL    RDW 18.7 (H) 11.5 - 14.5 %    PLATELET 924 520 - 984 K/uL    MPV 9.8 8.9 - 12.9 FL    NRBC 0.0 0  WBC    ABSOLUTE NRBC 0.00 0.00 - 0.01 K/uL    NEUTROPHILS 45 32 - 75 %    LYMPHOCYTES 31 12 - 49 %    MONOCYTES 21 (H) 5 - 13 %    EOSINOPHILS 1 0 - 7 %    BASOPHILS 1 0 - 1 %    IMMATURE GRANULOCYTES 1 (H) 0.0 - 0.5 %    ABS. NEUTROPHILS 2.8 1.8 - 8.0 K/UL    ABS. LYMPHOCYTES 2.0 0.8 - 3.5 K/UL    ABS. MONOCYTES 1.3 (H) 0.0 - 1.0 K/UL    ABS. EOSINOPHILS 0.1 0.0 - 0.4 K/UL    ABS. BASOPHILS 0.1 0.0 - 0.1 K/UL    ABS. IMM. GRANS. 0.1 (H) 0.00 - 0.04 K/UL    DF AUTOMATED      RBC COMMENTS MICROCYTOSIS  PRESENT           Radiologic Studies -   XR CHEST PA LAT   Final Result   IMPRESSION:   1. Findings consistent with congestive heart failure.  Follow-up to resolution is   suggested. CT Results  (Last 48 hours)    None        CXR Results  (Last 48 hours)               10/01/19 1220  XR CHEST PA LAT Final result    Impression:  IMPRESSION:   1. Findings consistent with congestive heart failure. Follow-up to resolution is   suggested. Narrative:  Exam:  2 view chest       Indication: Shortness of breath, cough. COMPARISON: 10/1/2019 at 10:14 AM       PA and lateral views demonstrate persistent cardiomegaly. There are bilateral   pleural effusions right greater than left with bibasilar atelectasis. There is   pulmonary vascular congestion and pulmonary edema. These findings are consistent   with congestive heart failure. Visualized osseous structures are unremarkable. 10/01/19 1019  XR CHEST PA LAT Final result    Impression:  IMPRESSION:   1. Findings consistent with congestive heart failure. Follow-up to resolution is   suggested. Dr. Lan Kat office was telephoned. Narrative:  Exam:  2 view chest       Indication: Dyspnea and cough. COMPARISON: None       PA and lateral views demonstrate mild cardiomegaly. There are small bilateral   pleural effusions. There is pulmonary vascular congestion mild pulmonary edema. Is also mild atelectasis at the right lung base. These findings are consistent   with congestive heart failure. Visualized osseous structures are unremarkable. There are post traumatic changes   of the distal clavicle at the level of the left coracoclavicular joint. Medical Decision Making   I am the first provider for this patient. I reviewed the vital signs, available nursing notes, past medical history, past surgical history, family history and social history. Vital Signs-Reviewed the patient's vital signs.   Patient Vitals for the past 12 hrs:   Temp Pulse Resp BP SpO2   10/01/19 1845 -- -- -- (!) 163/113 --   10/01/19 1830 -- (!) 112 -- (!) 158/119 --   10/01/19 1800 -- -- -- Shae Griffin 149/112 --   10/01/19 1745 -- (!) 101 -- (!) 143/107 100 %   10/01/19 1730 -- (!) 101 -- (!) 147/105 94 %   10/01/19 1715 -- -- -- 134/90 --   10/01/19 1707 98.4 °F (36.9 °C) 98 18 (!) 144/102 93 %   10/01/19 1522 -- -- -- 136/87 --   10/01/19 1400 -- (!) 103 (!) 33 126/86 96 %   10/01/19 1330 -- (!) 106 29 (!) 133/91 92 %   10/01/19 1300 -- (!) 105 (!) 34 136/87 96 %   10/01/19 1256 -- (!) 105 -- 150/40 --   10/01/19 1126 97.7 °F (36.5 °C) (!) 114 18 (!) 158/120 99 %         Records Reviewed: Nursing Notes and Old Medical Records    Provider Notes (Medical Decision Making):   DDX: acute CHF, HTN, ACS, arrythmia, PNA, atelectasis, dehydration, electrolyte abNL. 62-year-old male with past medical history of hypertension and diabetes who has not taken medication in over 7 years presents the emergency department from his prior office, Dr. Erendira Davies, with complaints of dyspnea on exertion, shortness of breath, and nighttime orthopnea. On initial exam, patient was tachycardic and tachypneic though he is able to speak in full sentences. Reviewed x-ray from primary care doctor's office which revealed new onset CHF. Will treat patient with IV Lasix. Discussed with attending who suggested possibly getting a CTA of chest based on his symptoms and elevated troponin. Per PCP, cardiology has already been consulted. ECHO ordered. Pt taken to cath lab and admitted by PCP. ED Course:   Initial assessment performed. The patients presenting problems have been discussed, and they are in agreement with the care plan formulated and outlined with them. I have encouraged them to ask questions as they arise throughout their visit.     CRITICAL CARE NOTE :    IMPENDING DETERIORATION -Respiratory and Cardiovascular    ASSOCIATED RISK FACTORS - Metabolic changes and Dehydration    MANAGEMENT- Bedside Assessment    INTERPRETATION -  Xrays, CT Scan, Blood Gases, ECG and Cardiac Output Measures     INTERVENTIONS - hemodynamic mngmt and vascular control    CASE REVIEW - Medical Sub-Specialist and PCP    TREATMENT RESPONSE -Stable    PERFORMED BY - Self    NOTES   :    I have spent 45 minutes of critical care time involved in lab review, consultations with specialist, family decision- making, bedside attention and documentation. During this entire length of time I was immediately available to the patient . Toña Morton [address]    Progress Note:  2:16 PM  Discussed pt with attending. He suggests ordering a CTA. Will continue to monitor. CONSULT NOTE:  3:04 PM  Cheryl Antoine PA-C spoke with Dr. Guevara Bruno. Specialty: Internal Medicine  Discussed pt's hx, disposition, and available diagnostic and imaging results. Reviewed care plans. Consultant states that he will admit the patient, since he saw the patient earlier in his office. He has already discussed the patient with cardiology, Dr. Scott Garcia. 3:04 PM  I have just informed the patient that I discussed their history, physical exam and results with the admitting physician, Dr. Guevara Bruno. I have conveyed that their emergency room evaluation has now finished and that they are going to be admitted to the hospital, and they are now waiting for their admitting physician to evaluate them. We have additionally expressed that while they wait in the ER, we will provide them any other medical care we are capable of. All of their questions have been answered and they verbally state their understanding. Diagnosis     Clinical Impression:   1. Acute congestive heart failure, unspecified heart failure type (HCC)    2. Elevated blood pressure reading    3. Essential hypertension    4. Shortness of breath            This note will not be viewable in [employer].

## 2019-10-01 NOTE — Clinical Note
Lesion: Located in the Proximal LAD. Stent inserted. Stent deployed. First inflation pressure = 14 aidee; inflation time: 20 sec.

## 2019-10-01 NOTE — Clinical Note
TRANSFER - OUT REPORT:  
 
Verbal report given to: jennifer maguire. Report consisted of patient's Situation, Background, Assessment and  
Recommendations(SBAR). Opportunity for questions and clarification was provided. Patient transported with a Registered Nurse and [address] [place] [address] / [facility]. Patient transported to: [facility].

## 2019-10-01 NOTE — PROGRESS NOTES
Chief Complaint   Patient presents with    Establish Care    Sleep Apnea     pt gasping middle of night    Cough     dry    Fatigue    Shortness of Breath     upon exertion     1. Have you been to the ER, urgent care clinic since your last visit? Hospitalized since your last visit? No    2. Have you seen or consulted any other health care providers outside of the [address] [place] [address] since your last visit? Include any pap smears or colon screening.  No     Health Maintenance Due   Topic Date Due    Hepatitis C Screening  1955    Pneumococcal 0-64 years (1 of 1 - PPSV23) 01/14/1963    FOOT EXAM Q1  01/14/1967    MICROALBUMIN Q1  01/14/1967    EYE EXAM RETINAL OR DILATED  01/14/1967    DTaP/Tdap/Td series (1 - Tdap) 01/14/1978    Shingrix Vaccine Age 50> (1 of 2) 01/14/2007    FOBT Q 1 YEAR AGE 50-75  01/14/2007    HEMOGLOBIN A1C Q6M  06/18/2013    LIPID PANEL Q1  12/18/2013

## 2019-10-01 NOTE — PROGRESS NOTES
Brief Procedure Note    Patient: Jeny Cedeno MRN: [id number]  SSN: [id number]    YOB: 1955  Age: 60 y.o. Sex: male      Date of Procedure: 10/1/2019     Pre-procedure Diagnosis: NSTEMi, Ac systolic heart failure    Post-procedure Diagnosis: 3v CAD, severely elevated LVEDP    Procedure: LHC, cors, PTCA/PCI of LAD, RCA, and LCx    Performed By: Ilia Madrigal III, DO     Anesthesia: Moderate Sedation    Estimated Blood Loss: Less than 10 mL      Specimens:  None    Findings: as above    Complications: None    Implants: 4 Jabier SAMANTHA    Recommendations: Continue medical therapy.     Signed By: Nessa Rivero DO     October 1, 2019

## 2019-10-02 ENCOUNTER — HOME HEALTH ADMISSION (OUTPATIENT)
Dept: HOME HEALTH SERVICES | Facility: HOME HEALTH | Age: 64
End: 2019-10-02

## 2019-10-02 LAB
ANION GAP SERPL CALC-SCNC: 9 MMOL/L (ref 5–15)
ATRIAL RATE: 101 BPM
ATRIAL RATE: 113 BPM
BASOPHILS # BLD: 0.1 K/UL (ref 0–0.1)
BASOPHILS NFR BLD: 1 % (ref 0–1)
BUN SERPL-MCNC: 12 MG/DL (ref 6–20)
BUN/CREAT SERPL: 10 (ref 12–20)
CALCIUM SERPL-MCNC: 8.4 MG/DL (ref 8.5–10.1)
CALCULATED P AXIS, ECG09: 54 DEGREES
CALCULATED P AXIS, ECG09: 57 DEGREES
CALCULATED R AXIS, ECG10: 28 DEGREES
CALCULATED R AXIS, ECG10: 57 DEGREES
CALCULATED T AXIS, ECG11: 134 DEGREES
CALCULATED T AXIS, ECG11: 90 DEGREES
CHLORIDE SERPL-SCNC: 96 MMOL/L (ref 97–108)
CO2 SERPL-SCNC: 27 MMOL/L (ref 21–32)
CREAT SERPL-MCNC: 1.21 MG/DL (ref 0.7–1.3)
DIAGNOSIS, 93000: NORMAL
DIAGNOSIS, 93000: NORMAL
DIFFERENTIAL METHOD BLD: ABNORMAL
EOSINOPHIL # BLD: 0.1 K/UL (ref 0–0.4)
EOSINOPHIL NFR BLD: 1 % (ref 0–7)
ERYTHROCYTE [DISTWIDTH] IN BLOOD BY AUTOMATED COUNT: 17.6 % (ref 11.5–14.5)
EST. AVERAGE GLUCOSE BLD GHB EST-MCNC: 160 MG/DL
GLUCOSE BLD STRIP.AUTO-MCNC: 121 MG/DL (ref 65–100)
GLUCOSE BLD STRIP.AUTO-MCNC: 132 MG/DL (ref 65–100)
GLUCOSE BLD STRIP.AUTO-MCNC: 140 MG/DL (ref 65–100)
GLUCOSE SERPL-MCNC: 113 MG/DL (ref 65–100)
HBA1C MFR BLD: 7.2 % (ref 4.2–6.3)
HCT VFR BLD AUTO: 47.4 % (ref 36.6–50.3)
HGB BLD-MCNC: 15.7 G/DL (ref 12.1–17)
IMM GRANULOCYTES # BLD AUTO: 0.1 K/UL (ref 0–0.04)
IMM GRANULOCYTES NFR BLD AUTO: 1 % (ref 0–0.5)
LYMPHOCYTES # BLD: 1.6 K/UL (ref 0.8–3.5)
LYMPHOCYTES NFR BLD: 22 % (ref 12–49)
MCH RBC QN AUTO: 21.4 PG (ref 26–34)
MCHC RBC AUTO-ENTMCNC: 33.1 G/DL (ref 30–36.5)
MCV RBC AUTO: 64.5 FL (ref 80–99)
MONOCYTES # BLD: 1.8 K/UL (ref 0–1)
MONOCYTES NFR BLD: 25 % (ref 5–13)
NEUTS SEG # BLD: 3.5 K/UL (ref 1.8–8)
NEUTS SEG NFR BLD: 50 % (ref 32–75)
NRBC # BLD: 0 K/UL (ref 0–0.01)
NRBC BLD-RTO: 0 PER 100 WBC
P-R INTERVAL, ECG05: 152 MS
P-R INTERVAL, ECG05: 158 MS
PLATELET # BLD AUTO: 227 K/UL (ref 150–400)
PMV BLD AUTO: 10 FL (ref 8.9–12.9)
POTASSIUM SERPL-SCNC: 3.2 MMOL/L (ref 3.5–5.1)
Q-T INTERVAL, ECG07: 316 MS
Q-T INTERVAL, ECG07: 386 MS
QRS DURATION, ECG06: 84 MS
QRS DURATION, ECG06: 90 MS
QTC CALCULATION (BEZET), ECG08: 433 MS
QTC CALCULATION (BEZET), ECG08: 500 MS
RBC # BLD AUTO: 7.35 M/UL (ref 4.1–5.7)
RBC MORPH BLD: ABNORMAL
RBC MORPH BLD: ABNORMAL
SERVICE CMNT-IMP: ABNORMAL
SODIUM SERPL-SCNC: 132 MMOL/L (ref 136–145)
VENTRICULAR RATE, ECG03: 101 BPM
VENTRICULAR RATE, ECG03: 113 BPM
WBC # BLD AUTO: 7.2 K/UL (ref 4.1–11.1)

## 2019-10-02 PROCEDURE — 74011636637 HC RX REV CODE- 636/637: Performed by: FAMILY MEDICINE

## 2019-10-02 PROCEDURE — 65660000000 HC RM CCU STEPDOWN

## 2019-10-02 PROCEDURE — 74011250636 HC RX REV CODE- 250/636: Performed by: FAMILY MEDICINE

## 2019-10-02 PROCEDURE — 85025 COMPLETE CBC W/AUTO DIFF WBC: CPT

## 2019-10-02 PROCEDURE — 80048 BASIC METABOLIC PNL TOTAL CA: CPT

## 2019-10-02 PROCEDURE — 36415 COLL VENOUS BLD VENIPUNCTURE: CPT

## 2019-10-02 PROCEDURE — 83036 HEMOGLOBIN GLYCOSYLATED A1C: CPT

## 2019-10-02 PROCEDURE — 82962 GLUCOSE BLOOD TEST: CPT

## 2019-10-02 PROCEDURE — 74011250637 HC RX REV CODE- 250/637: Performed by: INTERNAL MEDICINE

## 2019-10-02 RX ORDER — METOPROLOL SUCCINATE 50 MG/1
50 TABLET, EXTENDED RELEASE ORAL DAILY
Status: DISCONTINUED | OUTPATIENT
Start: 2019-10-03 | End: 2019-10-04 | Stop reason: HOSPADM

## 2019-10-02 RX ORDER — POTASSIUM CHLORIDE 750 MG/1
40 TABLET, FILM COATED, EXTENDED RELEASE ORAL
Status: COMPLETED | OUTPATIENT
Start: 2019-10-02 | End: 2019-10-02

## 2019-10-02 RX ADMIN — ATORVASTATIN CALCIUM 80 MG: 40 TABLET, FILM COATED ORAL at 23:43

## 2019-10-02 RX ADMIN — NITROGLYCERIN 1 INCH: 20 OINTMENT TOPICAL at 23:42

## 2019-10-02 RX ADMIN — Medication 10 ML: at 23:43

## 2019-10-02 RX ADMIN — NITROGLYCERIN 1 INCH: 20 OINTMENT TOPICAL at 05:07

## 2019-10-02 RX ADMIN — TICAGRELOR 90 MG: 90 TABLET ORAL at 05:07

## 2019-10-02 RX ADMIN — INSULIN LISPRO 2 UNITS: 100 INJECTION, SOLUTION INTRAVENOUS; SUBCUTANEOUS at 08:03

## 2019-10-02 RX ADMIN — TICAGRELOR 90 MG: 90 TABLET ORAL at 17:04

## 2019-10-02 RX ADMIN — FUROSEMIDE 40 MG: 10 INJECTION, SOLUTION INTRAMUSCULAR; INTRAVENOUS at 08:03

## 2019-10-02 RX ADMIN — NITROGLYCERIN 1 INCH: 20 OINTMENT TOPICAL at 11:01

## 2019-10-02 RX ADMIN — Medication 10 ML: at 13:03

## 2019-10-02 RX ADMIN — ASPIRIN 81 MG CHEWABLE TABLET 81 MG: 81 TABLET CHEWABLE at 08:02

## 2019-10-02 RX ADMIN — FUROSEMIDE 40 MG: 10 INJECTION, SOLUTION INTRAMUSCULAR; INTRAVENOUS at 17:04

## 2019-10-02 RX ADMIN — NITROGLYCERIN 1 INCH: 20 OINTMENT TOPICAL at 17:04

## 2019-10-02 RX ADMIN — METOPROLOL SUCCINATE 25 MG: 25 TABLET, EXTENDED RELEASE ORAL at 08:02

## 2019-10-02 RX ADMIN — LOSARTAN POTASSIUM 25 MG: 25 TABLET ORAL at 08:03

## 2019-10-02 RX ADMIN — POTASSIUM CHLORIDE 40 MEQ: 750 TABLET, FILM COATED, EXTENDED RELEASE ORAL at 08:02

## 2019-10-02 RX ADMIN — Medication 10 ML: at 05:07

## 2019-10-02 NOTE — PROGRESS NOTES
Progress Note      10/2/2019 7:04 AM  NAME: Reece Sanford   MRN:  [id number]   Admit Diagnosis: CHF (congestive heart failure) ([facility] [address] [I50.9]      Problem List:     1. Acute systolic heart failure  2. NSTEMI  3. Coronary artery disease s/p cath 10/1/19 w/ 3v CAD s/p 1 SAMANTHA to RCA, 1 SAMANTHA to LCx, and 2 SAMANTHA to LAD  4. Ischemic cardiomyopathy; EF 20-25%  5. Moderate mitral regurgitation  6. Hypertension  7. Hyperlipidemia  8. Diabetes  9. Tobacco abuse  10. EtOH abuse; 6pk daily  11. 1 child, ret'd [facility]/rec     Assessment/Plan:   K 3.2  Na 133  sCr 1.2    1. Continue diuresis as tolerated  2. Replete K  3. Continue NTG paste for now; change to ISMN closer to discharge  4. Continue ASA  5. Continue ticagrelor  6. Continue Topol XL 25mg  7. Continue losartan 25mg  8. Continue statin         [x]       High complexity decision making was performed in this patient at high risk for decompensation with multiple organ involvement. Subjective:     Reece Sanford denies chest pain, dyspnea. Discussed with RN events overnight. Review of Systems:    Symptom Y/N Comments  Symptom Y/N Comments   Fever/Chills N   Chest Pain N    Poor Appetite N   Edema N    Cough N   Abdominal Pain N    Sputum N   Joint Pain N    SOB/LILLY N   Pruritis/Rash N    Nausea/vomit N   Tolerating PT/OT Y    Diarrhea N   Tolerating Diet Y    Constipation N   Other       Could NOT obtain due to:      Objective:      Physical Exam:    Last 24hrs VS reviewed since prior progress note.  Most recent are:    Visit Vitals  /82 (BP 1 Location: Left arm, BP Patient Position: At rest)   Pulse 99   Temp 98 °F (36.7 °C)   Resp 17   Ht 5' 8\" (1.727 m)   Wt 85.3 kg (188 lb 0.8 oz)   SpO2 98%   BMI 28.59 kg/m²       Intake/Output Summary (Last 24 hours) at 10/2/2019 0704  Last data filed at 10/2/2019 7690  Gross per 24 hour   Intake 480 ml   Output 1275 ml   Net -795 ml        General Appearance: Well developed, well nourished, alert & oriented x 3,    no acute distress. Ears/Nose/Mouth/Throat: Hearing grossly normal.  Neck: Supple. Chest: Lungs clear to auscultation bilaterally. Cardiovascular: Regular rate and rhythm, S1S2 normal, no murmur. Abdomen: Soft, non-tender, bowel sounds are active. Extremities: No edema bilaterally. Skin: Warm and dry. [x]         Post-cath site without hematoma, bruit, tenderness, or thrill. Distal pulses intact. PMH/ reviewed - no change compared to H&P    Data Review    Telemetry: st    EKG:   [x]  No new EKG for review    Lab Data Personally Reviewed:    Recent Labs     10/02/19  0457 10/01/19  1810   WBC 7.2 6.4   HGB 15.7 15.9   HCT 47.4 49.9    239     No results for input(s): INR, PTP, APTT, INREXT in the last 72 hours. Recent Labs     10/02/19  0457 10/01/19  1150   * 133*   K 3.2* 4.2   CL 96* 97   CO2 27 27   BUN 12 14   CREA 1.21 1.36*   * 193*   CA 8.4* 9.2     Recent Labs     10/01/19  1150   TROIQ 0.56*     Lab Results   Component Value Date/Time    Cholesterol, total 193 12/18/2012 12:38 PM    HDL Cholesterol 24 (L) 12/18/2012 12:38 PM    LDL, calculated 123 (H) 12/18/2012 12:38 PM    Triglyceride 228 (H) 12/18/2012 12:38 PM       Recent Labs     10/01/19  1150   SGOT 27      TP 8.4*   ALB 3.7   GLOB 4.7*     No results for input(s): PH, PCO2, PO2 in the last 72 hours.     Medications Personally Reviewed:    Current Facility-Administered Medications   Medication Dose Route Frequency    sodium chloride (NS) flush 5-40 mL  5-40 mL IntraVENous Q8H    sodium chloride (NS) flush 5-40 mL  5-40 mL IntraVENous PRN    zolpidem (AMBIEN) tablet 5 mg  5 mg Oral QHS PRN    furosemide (LASIX) injection 40 mg  40 mg IntraVENous Q12H    insulin lispro (HUMALOG) injection   SubCUTAneous TIDAC    glucose chewable tablet 16 g  4 Tab Oral PRN    dextrose (D50W) injection syrg 12.5-25 g  12.5-25 g IntraVENous PRN    glucagon (GLUCAGEN) injection 1 mg  1 mg IntraMUSCular PRN  nitroglycerin (NITROBID) 2 % ointment 1 Inch  1 Inch Topical Q6H    metoprolol succinate (TOPROL-XL) XL tablet 25 mg  25 mg Oral DAILY    atorvastatin (LIPITOR) tablet 80 mg  80 mg Oral QHS    losartan (COZAAR) tablet 25 mg  25 mg Oral DAILY    sodium chloride (NS) flush 5-40 mL  5-40 mL IntraVENous Q8H    sodium chloride (NS) flush 5-40 mL  5-40 mL IntraVENous PRN    ticagrelor (BRILINTA) tablet 90 mg  90 mg Oral Q12H    aspirin chewable tablet 81 mg  81 mg Oral DAILY         Alvy Agata III, DO

## 2019-10-02 NOTE — PROGRESS NOTES
Reason for Admission:  CHF                  RRAT Score: 13 - low/moderate risk             Do you (patient/family) have any concerns for transition/discharge? Pt didn't pinpoint any barriers that concern CM at this time. Pt appears to have adequate familial support in place. Plan for utilizing home health: Pt doesn't appear like he will need [facility] intervention at the time of d/c. Current Advanced Directive/Advance Care Plan:  Pt is a full code status with no advanced care plan on file. Pt identified his sister Mikhail Olvera as his medical decision maker in the event he is unable to make his own medical decisions. Pt was unable to secure contact information for pt's sister; pt's phone is dead and he doesn't have anyone's number memorized. Transition of Care Plan:          * Home with [facility] visit, f/u appts, & family support    > CM will make copy of pt's insurance card and place in chart before d/c    CM met with pt at bedside to conduct initial assessment. Pt was alert & oriented x4, actively enraged in dialogue, and successfully verified demographic information without any issue. Although pt is listed as \"self-pay,\" he reported having insurance coverage provided by [facility]. CM asked pt if he had copy of insurance card; pt asked CM to come back tomorrow to retrieve card as he is \"not sure where it is at the moment. \" Pt is active with his PCP and reported last seeing him 10/1/19. Pt lives with his g/f Sujatha Atkinson) in a two-level apartment on the first floor of a complex; pt reported there are 13 stairs leading to the entrance of his apartment. Pt actively drives but stated his niece Negrita Maine) will be transporting him home at the time of d/c. Pt is independent with ADL's at baseline, reported no DME in the home, and stated he has no barriers related to accessing/paying for medication. Pt's preferred pharmacy is [facility].     Pt reported no past hx of utilizing [facility], [facility], or in-pt rehab services. Pt was unable to provide phone numbers for an emergency contact, his niece Bentley Sheikh), or his sister Katerin Cruz due to his phone being dead. CM will f/u with pt in effort to receive updated contact information to place in his chart. CM informed pt of [facility] service per qualifying diagnosis; pt stated he would like to utilize service at the time of d/c. CM sent request via [facility] to [facility] for [facility] service. 4:27 PM: CM received confirmation that [facility] service was authorized through [facility]; details reflected in pt's AVS.    CM will continue to follow patient for discharge planning needs and arrange for services as deemed necessary. Care Management Interventions  PCP Verified by CM: Yes  Last Visit to PCP: 10/01/19  Mode of Transport at Discharge:  Other (see comment)(pt's niece Bentley Sheikh) will be transporting pt at d/c)  Transition of Care Consult (CM Consult): Discharge Planning  MyChart Signup: No  Discharge Durable Medical Equipment: No  Physical Therapy Consult: No  Occupational Therapy Consult: No  Speech Therapy Consult: No  Current Support Network: Own Home, Other(pt lives with his g/f Cruz Harrington)  Confirm Follow Up Transport: Self  Plan discussed with Pt/Family/Caregiver: Yes  Discharge Location  Discharge Placement: Home with family assistance    Sumeet Tse, [address] [place] [address], [facility]

## 2019-10-02 NOTE — CARDIO/PULMONARY
Cardiac Rehab Note: chart review     Smoking history assessed. Patient is a former smoker. EF 21-25%  on 10/1/2019 per echo    CAD education folder, with heart heathy diet, warning signs, heart facts, catheterization brochure, and out patient cardiac rehab program provided to Oketo Ped on 10/2/2019                                              Educated using teach back method. Reviewed CAD diagnosis definition and purpose of intervention. Discussed risk factors for CAD to include the following: family history, elevated BMI, hyperlipidemia, hypertension, diabetes, and stress. Discussed Heart Healthy/Low Sodium (less than 2000 mg) diet. Reviewed the importance of medication compliance(Brilinta), follow up appointments with cardiologist, signs and symptoms of angina, and what to report to physician after discharge. Emphasized the value of cardiac rehab. Discussed Cardiac Rehab Program format, benefits, and encouraged enrollment to assist with risk modification and management. Pt interested in OP Cardiac Rehab and [facility] sent pt contact information to contact him post discharge as he states he lives closer to that facility. Current inpatient diet: DIET REGULAR     \"Living with Heart Failure\" book with daily weight calendar and s/s of heart failure magnet provided to Jennieby Ped on 10/2/2019                                              Educated using teach back method. Instruction given on s/s of CHF, checking weight every am and calling MD if weight is up 2-3 lbs in a day or 5 lbs in a week (or as directed by the physician), fluid/Na restrictions, s/s of worsening CHF and when to call MD.     Reviewed activity as tolerated with frequent rest periods as needed, taking medications as prescribed(Lasix), and the importance of follow up visits with physician. Oketo Ped verbalized understanding with questions answered.   Rosario Matthews RN

## 2019-10-02 NOTE — PROGRESS NOTES
General Daily Progress Note    Admit Date: 10/1/2019  Hospital day 2    Subjective:     Patient has no complaint of shortness of breath or chest pain. ..   Medication side effects: none    Current Facility-Administered Medications   Medication Dose Route Frequency    potassium chloride SR (KLOR-CON 10) tablet 40 mEq  40 mEq Oral NOW    sodium chloride (NS) flush 5-40 mL  5-40 mL IntraVENous Q8H    sodium chloride (NS) flush 5-40 mL  5-40 mL IntraVENous PRN    zolpidem (AMBIEN) tablet 5 mg  5 mg Oral QHS PRN    furosemide (LASIX) injection 40 mg  40 mg IntraVENous Q12H    insulin lispro (HUMALOG) injection   SubCUTAneous TIDAC    glucose chewable tablet 16 g  4 Tab Oral PRN    dextrose (D50W) injection syrg 12.5-25 g  12.5-25 g IntraVENous PRN    glucagon (GLUCAGEN) injection 1 mg  1 mg IntraMUSCular PRN    nitroglycerin (NITROBID) 2 % ointment 1 Inch  1 Inch Topical Q6H    metoprolol succinate (TOPROL-XL) XL tablet 25 mg  25 mg Oral DAILY    atorvastatin (LIPITOR) tablet 80 mg  80 mg Oral QHS    losartan (COZAAR) tablet 25 mg  25 mg Oral DAILY    sodium chloride (NS) flush 5-40 mL  5-40 mL IntraVENous Q8H    sodium chloride (NS) flush 5-40 mL  5-40 mL IntraVENous PRN    ticagrelor (BRILINTA) tablet 90 mg  90 mg Oral Q12H    aspirin chewable tablet 81 mg  81 mg Oral DAILY        Review of Systems  Pertinent items are noted in HPI. Objective:     Patient Vitals for the past 8 hrs:   BP Temp Pulse Resp SpO2 Weight   10/02/19 0508 -- -- -- -- -- 188 lb 0.8 oz (85.3 kg)   10/02/19 0400 125/82 98 °F (36.7 °C) 99 17 98 % --   10/02/19 0300 (!) 144/98 -- 98 -- -- --   10/02/19 0200 (!) 133/93 -- 95 -- 90 % --   10/02/19 0100 (!) 141/100 -- 97 -- -- --   10/02/19 0030 (!) 142/102 -- (!) 102 -- 97 % --   10/02/19 0000 (!) 141/95 -- 98 -- -- --   10/01/19 2330 114/71 -- 97 -- 95 % --     No intake/output data recorded.   09/30 1901 - 10/02 0700  In: 480 [P.O.:480]  Out: Lei [Urine:1275]    Physical Exam: Lungs: clear to auscultation bilaterally  Heart: regular rate and rhythm, S1, S2 normal, no murmur, click, rub or gallop  Abdomen: soft, non-tender. Bowel sounds normal. No masses,  no organomegaly  Extremities: extremities normal, atraumatic, no cyanosis or edema      ECG: normal sinus rhythm     Data Review   Recent Results (from the past 24 hour(s))   EKG, 12 LEAD, INITIAL    Collection Time: 10/01/19 11:29 AM   Result Value Ref Range    Ventricular Rate 113 BPM    Atrial Rate 113 BPM    P-R Interval 152 ms    QRS Duration 84 ms    Q-T Interval 316 ms    QTC Calculation (Bezet) 433 ms    Calculated P Axis 54 degrees    Calculated R Axis 57 degrees    Calculated T Axis 90 degrees    Diagnosis       Sinus tachycardia  Left atrial enlargement  T wave abnormality, consider lateral ischemia  No previous ECGs available     CBC WITH AUTOMATED DIFF    Collection Time: 10/01/19 11:50 AM   Result Value Ref Range    WBC 6.6 4.1 - 11.1 K/uL    RBC 7.36 (H) 4.10 - 5.70 M/uL    HGB 16.1 12.1 - 17.0 g/dL    HCT 49.3 36.6 - 50.3 %    MCV 67.0 (L) 80.0 - 99.0 FL    MCH 21.9 (L) 26.0 - 34.0 PG    MCHC 32.7 30.0 - 36.5 g/dL    RDW 18.1 (H) 11.5 - 14.5 %    PLATELET 543 451 - 911 K/uL    MPV 10.2 8.9 - 12.9 FL    NRBC 0.0 0  WBC    ABSOLUTE NRBC 0.00 0.00 - 0.01 K/uL    NEUTROPHILS 43 32 - 75 %    LYMPHOCYTES 33 12 - 49 %    MONOCYTES 21 (H) 5 - 13 %    EOSINOPHILS 1 0 - 7 %    BASOPHILS 1 0 - 1 %    IMMATURE GRANULOCYTES 1 (H) 0.0 - 0.5 %    ABS. NEUTROPHILS 2.7 1.8 - 8.0 K/UL    ABS. LYMPHOCYTES 2.2 0.8 - 3.5 K/UL    ABS. MONOCYTES 1.4 (H) 0.0 - 1.0 K/UL    ABS. EOSINOPHILS 0.1 0.0 - 0.4 K/UL    ABS. BASOPHILS 0.1 0.0 - 0.1 K/UL    ABS. IMM.  GRANS. 0.1 (H) 0.00 - 0.04 K/UL    DF AUTOMATED      RBC COMMENTS MICROCYTOSIS  2+       METABOLIC PANEL, COMPREHENSIVE    Collection Time: 10/01/19 11:50 AM   Result Value Ref Range    Sodium 133 (L) 136 - 145 mmol/L    Potassium 4.2 3.5 - 5.1 mmol/L    Chloride 97 97 - 108 mmol/L CO2 27 21 - 32 mmol/L    Anion gap 9 5 - 15 mmol/L    Glucose 193 (H) 65 - 100 mg/dL    BUN 14 6 - 20 MG/DL    Creatinine 1.36 (H) 0.70 - 1.30 MG/DL    BUN/Creatinine ratio 10 (L) 12 - 20      GFR est AA >60 >60 ml/min/1.73m2    GFR est non-AA 53 (L) >60 ml/min/1.73m2    Calcium 9.2 8.5 - 10.1 MG/DL    Bilirubin, total 2.6 (H) 0.2 - 1.0 MG/DL    ALT (SGPT) 48 12 - 78 U/L    AST (SGOT) 27 15 - 37 U/L    Alk. phosphatase 102 45 - 117 U/L    Protein, total 8.4 (H) 6.4 - 8.2 g/dL    Albumin 3.7 3.5 - 5.0 g/dL    Globulin 4.7 (H) 2.0 - 4.0 g/dL    A-G Ratio 0.8 (L) 1.1 - 2.2     TROPONIN I    Collection Time: 10/01/19 11:50 AM   Result Value Ref Range    Troponin-I, Qt. 0.56 (H) <0.05 ng/mL   NT-PRO BNP    Collection Time: 10/01/19 11:50 AM   Result Value Ref Range    NT pro-BNP 4,491 (H) <125 PG/ML   POC G3 - PUL    Collection Time: 10/01/19  2:47 PM   Result Value Ref Range    pH (POC) 7.466 (H) 7.35 - 7.45      pCO2 (POC) 31.5 (L) 35.0 - 45.0 MMHG    pO2 (POC) 74 (L) 80 - 100 MMHG    HCO3 (POC) 22.7 22 - 26 MMOL/L    sO2 (POC) 96 92 - 97 %    Base deficit (POC) 1 mmol/L    Site LEFT RADIAL      Device: ROOM AIR      Allens test (POC) YES      Specimen type (POC) ARTERIAL      Total resp.  rate 33     ECHO ADULT COMPLETE    Collection Time: 10/01/19  3:23 PM   Result Value Ref Range    Right Atrial Area 4C 24.21 cm2    LV E' Lateral Velocity 6.75 cm/s    LV E' Septal Velocity 3.80 cm/s    Aortic Valve Systolic Peak Velocity 84.94 cm/s    Aortic Valve Area by Continuity of Peak Velocity 2.3 cm2    AoV PG 3.2 mmHg    LV ES Vol A4C 119.3 mL    LVOT d 2.09 cm    LVOT Peak Velocity 60.21 cm/s    LVOT Peak Gradient 1.5 mmHg    MV A Goran 40.43 cm/s    MV E Goran 91.78 cm/s    MV E/A 2.27     RVIDd 4.24 cm    LV Ejection Fraction MOD 4C 22 %    LA Vol 4C 82.31 (A) 18 - 58 mL    LA Area 4C 25.2 cm2    E/E' lateral 13.60     E/E' septal 24.15     RVSP 60.0 mmHg    LVIDs (M-mode) 5.08 cm    LVIDd (M-mode) 5.91 (A) cm    LVPWs (M-mode) 1.47 cm    LVPWd (M-mode) 1.16 (A) cm    IVSs (M-mode) 1.35 cm    IVSd (M-mode) 1.16 (A) cm    E/E' ratio (averaged) 18.87     LV ED Vol A4C 152.1 mL    Est. RA Pressure 10.0 mmHg    Mitral Valve E Wave Deceleration Time 103.8 ms    Triscuspid Valve Regurgitation Peak Gradient 50.0 mmHg    Pulmonic Valve Max Velocity 43.39 cm/s    TR Max Velocity 353.51 cm/s    PISA MR Rad 0.91 cm    PASP 60.0 mmHg    LA Vol Index 40.26 16 - 28 ml/m2    LVED Vol Index A4C 74.4 mL/m2    LVES Vol Index A4C 58.3 mL/m2    PV peak gradient 0.8 mmHg   GLUCOSE, POC    Collection Time: 10/01/19  5:27 PM   Result Value Ref Range    Glucose (POC) 187 (H) 65 - 100 mg/dL    Performed by Maco Castaneda    EKG, 12 LEAD, SUBSEQUENT    Collection Time: 10/01/19  5:29 PM   Result Value Ref Range    Ventricular Rate 101 BPM    Atrial Rate 101 BPM    P-R Interval 158 ms    QRS Duration 90 ms    Q-T Interval 386 ms    QTC Calculation (Bezet) 500 ms    Calculated P Axis 57 degrees    Calculated R Axis 28 degrees    Calculated T Axis 134 degrees    Diagnosis       Sinus tachycardia  Left atrial enlargement  T wave abnormality, consider anterolateral ischemia  When compared with ECG of 01-OCT-2019 11:29,  MANUAL COMPARISON REQUIRED, DATA IS UNCONFIRMED     CBC WITH AUTOMATED DIFF    Collection Time: 10/01/19  6:10 PM   Result Value Ref Range    WBC 6.4 4.1 - 11.1 K/uL    RBC 7.51 (H) 4.10 - 5.70 M/uL    HGB 15.9 12.1 - 17.0 g/dL    HCT 49.9 36.6 - 50.3 %    MCV 66.4 (L) 80.0 - 99.0 FL    MCH 21.2 (L) 26.0 - 34.0 PG    MCHC 31.9 30.0 - 36.5 g/dL    RDW 18.7 (H) 11.5 - 14.5 %    PLATELET 237 621 - 274 K/uL    MPV 9.8 8.9 - 12.9 FL    NRBC 0.0 0  WBC    ABSOLUTE NRBC 0.00 0.00 - 0.01 K/uL    NEUTROPHILS 45 32 - 75 %    LYMPHOCYTES 31 12 - 49 %    MONOCYTES 21 (H) 5 - 13 %    EOSINOPHILS 1 0 - 7 %    BASOPHILS 1 0 - 1 %    IMMATURE GRANULOCYTES 1 (H) 0.0 - 0.5 %    ABS. NEUTROPHILS 2.8 1.8 - 8.0 K/UL    ABS.  LYMPHOCYTES 2.0 0.8 - 3.5 K/UL ABS. MONOCYTES 1.3 (H) 0.0 - 1.0 K/UL    ABS. EOSINOPHILS 0.1 0.0 - 0.4 K/UL    ABS. BASOPHILS 0.1 0.0 - 0.1 K/UL    ABS. IMM. GRANS. 0.1 (H) 0.00 - 0.04 K/UL    DF AUTOMATED      RBC COMMENTS MICROCYTOSIS  PRESENT       CBC WITH AUTOMATED DIFF    Collection Time: 10/02/19  4:57 AM   Result Value Ref Range    WBC 7.2 4.1 - 11.1 K/uL    RBC 7.35 (H) 4.10 - 5.70 M/uL    HGB 15.7 12.1 - 17.0 g/dL    HCT 47.4 36.6 - 50.3 %    MCV 64.5 (L) 80.0 - 99.0 FL    MCH 21.4 (L) 26.0 - 34.0 PG    MCHC 33.1 30.0 - 36.5 g/dL    RDW 17.6 (H) 11.5 - 14.5 %    PLATELET 170 506 - 509 K/uL    MPV 10.0 8.9 - 12.9 FL    NRBC 0.0 0  WBC    ABSOLUTE NRBC 0.00 0.00 - 0.01 K/uL    NEUTROPHILS 50 32 - 75 %    LYMPHOCYTES 22 12 - 49 %    MONOCYTES 25 (H) 5 - 13 %    EOSINOPHILS 1 0 - 7 %    BASOPHILS 1 0 - 1 %    IMMATURE GRANULOCYTES 1 (H) 0.0 - 0.5 %    ABS. NEUTROPHILS 3.5 1.8 - 8.0 K/UL    ABS. LYMPHOCYTES 1.6 0.8 - 3.5 K/UL    ABS. MONOCYTES 1.8 (H) 0.0 - 1.0 K/UL    ABS. EOSINOPHILS 0.1 0.0 - 0.4 K/UL    ABS. BASOPHILS 0.1 0.0 - 0.1 K/UL    ABS. IMM. GRANS. 0.1 (H) 0.00 - 0.04 K/UL    DF SMEAR SCANNED      RBC COMMENTS MICROCYTOSIS  2+        RBC COMMENTS HYPOCHROMIA  1+       METABOLIC PANEL, BASIC    Collection Time: 10/02/19  4:57 AM   Result Value Ref Range    Sodium 132 (L) 136 - 145 mmol/L    Potassium 3.2 (L) 3.5 - 5.1 mmol/L    Chloride 96 (L) 97 - 108 mmol/L    CO2 27 21 - 32 mmol/L    Anion gap 9 5 - 15 mmol/L    Glucose 113 (H) 65 - 100 mg/dL    BUN 12 6 - 20 MG/DL    Creatinine 1.21 0.70 - 1.30 MG/DL    BUN/Creatinine ratio 10 (L) 12 - 20      GFR est AA >60 >60 ml/min/1.73m2    GFR est non-AA >60 >60 ml/min/1.73m2    Calcium 8.4 (L) 8.5 - 10.1 MG/DL   GLUCOSE, POC    Collection Time: 10/02/19  7:04 AM   Result Value Ref Range    Glucose (POC) 140 (H) 65 - 100 mg/dL    Performed by Zach Denis            Assessment:     Active Problems:    [facility] (10[facility]1/2019)        Plan:        1.   CAD- sp placement of 4 stents  2. CHF- symptomatically much better  3. Ischemic cardiomyopathy- now on lasix, metoprolol and losartan. 4. Accelerated hypertension- much better. 5. Hypokalemia- being replaced  5. Diabetes- stable- on sliding scale. Check A1C.

## 2019-10-03 LAB
ANION GAP SERPL CALC-SCNC: 11 MMOL/L (ref 5–15)
ANION GAP SERPL CALC-SCNC: 8 MMOL/L (ref 5–15)
BUN SERPL-MCNC: 13 MG/DL (ref 6–20)
BUN SERPL-MCNC: 14 MG/DL (ref 6–20)
BUN/CREAT SERPL: 11 (ref 12–20)
BUN/CREAT SERPL: 11 (ref 12–20)
CALCIUM SERPL-MCNC: 8.5 MG/DL (ref 8.5–10.1)
CALCIUM SERPL-MCNC: 9 MG/DL (ref 8.5–10.1)
CHLORIDE SERPL-SCNC: 93 MMOL/L (ref 97–108)
CHLORIDE SERPL-SCNC: 93 MMOL/L (ref 97–108)
CO2 SERPL-SCNC: 26 MMOL/L (ref 21–32)
CO2 SERPL-SCNC: 27 MMOL/L (ref 21–32)
CREAT SERPL-MCNC: 1.19 MG/DL (ref 0.7–1.3)
CREAT SERPL-MCNC: 1.27 MG/DL (ref 0.7–1.3)
GLUCOSE BLD STRIP.AUTO-MCNC: 130 MG/DL (ref 65–100)
GLUCOSE BLD STRIP.AUTO-MCNC: 134 MG/DL (ref 65–100)
GLUCOSE BLD STRIP.AUTO-MCNC: 165 MG/DL (ref 65–100)
GLUCOSE SERPL-MCNC: 104 MG/DL (ref 65–100)
GLUCOSE SERPL-MCNC: 150 MG/DL (ref 65–100)
MAGNESIUM SERPL-MCNC: 1.7 MG/DL (ref 1.6–2.4)
POTASSIUM SERPL-SCNC: 2.9 MMOL/L (ref 3.5–5.1)
POTASSIUM SERPL-SCNC: 4.5 MMOL/L (ref 3.5–5.1)
SERVICE CMNT-IMP: ABNORMAL
SODIUM SERPL-SCNC: 128 MMOL/L (ref 136–145)
SODIUM SERPL-SCNC: 130 MMOL/L (ref 136–145)

## 2019-10-03 PROCEDURE — 74011250637 HC RX REV CODE- 250/637: Performed by: FAMILY MEDICINE

## 2019-10-03 PROCEDURE — 74011250637 HC RX REV CODE- 250/637: Performed by: INTERNAL MEDICINE

## 2019-10-03 PROCEDURE — 74011250636 HC RX REV CODE- 250/636: Performed by: FAMILY MEDICINE

## 2019-10-03 PROCEDURE — 82962 GLUCOSE BLOOD TEST: CPT

## 2019-10-03 PROCEDURE — 80048 BASIC METABOLIC PNL TOTAL CA: CPT

## 2019-10-03 PROCEDURE — 74011250636 HC RX REV CODE- 250/636: Performed by: INTERNAL MEDICINE

## 2019-10-03 PROCEDURE — 65660000000 HC RM CCU STEPDOWN

## 2019-10-03 PROCEDURE — 36415 COLL VENOUS BLD VENIPUNCTURE: CPT

## 2019-10-03 PROCEDURE — 83735 ASSAY OF MAGNESIUM: CPT

## 2019-10-03 RX ORDER — POTASSIUM CHLORIDE 7.45 MG/ML
10 INJECTION INTRAVENOUS ONCE
Status: COMPLETED | OUTPATIENT
Start: 2019-10-03 | End: 2019-10-03

## 2019-10-03 RX ORDER — POTASSIUM CHLORIDE 750 MG/1
20 TABLET, FILM COATED, EXTENDED RELEASE ORAL 3 TIMES DAILY
Status: DISCONTINUED | OUTPATIENT
Start: 2019-10-03 | End: 2019-10-04 | Stop reason: HOSPADM

## 2019-10-03 RX ORDER — ISOSORBIDE MONONITRATE 30 MG/1
30 TABLET, EXTENDED RELEASE ORAL DAILY
Status: DISCONTINUED | OUTPATIENT
Start: 2019-10-03 | End: 2019-10-04 | Stop reason: HOSPADM

## 2019-10-03 RX ORDER — METFORMIN HYDROCHLORIDE 500 MG/1
500 TABLET ORAL 2 TIMES DAILY WITH MEALS
Status: DISCONTINUED | OUTPATIENT
Start: 2019-10-03 | End: 2019-10-04 | Stop reason: HOSPADM

## 2019-10-03 RX ORDER — POTASSIUM CHLORIDE 750 MG/1
40 TABLET, FILM COATED, EXTENDED RELEASE ORAL
Status: COMPLETED | OUTPATIENT
Start: 2019-10-03 | End: 2019-10-03

## 2019-10-03 RX ORDER — POTASSIUM CHLORIDE 7.45 MG/ML
10 INJECTION INTRAVENOUS
Status: COMPLETED | OUTPATIENT
Start: 2019-10-03 | End: 2019-10-03

## 2019-10-03 RX ORDER — FUROSEMIDE 40 MG/1
80 TABLET ORAL DAILY
Status: DISCONTINUED | OUTPATIENT
Start: 2019-10-03 | End: 2019-10-04 | Stop reason: HOSPADM

## 2019-10-03 RX ADMIN — METFORMIN HYDROCHLORIDE 500 MG: 500 TABLET ORAL at 17:12

## 2019-10-03 RX ADMIN — Medication 10 ML: at 14:00

## 2019-10-03 RX ADMIN — POTASSIUM CHLORIDE 20 MEQ: 750 TABLET, FILM COATED, EXTENDED RELEASE ORAL at 21:29

## 2019-10-03 RX ADMIN — POTASSIUM CHLORIDE 10 MEQ: 10 INJECTION, SOLUTION INTRAVENOUS at 09:00

## 2019-10-03 RX ADMIN — LOSARTAN POTASSIUM 25 MG: 25 TABLET ORAL at 08:49

## 2019-10-03 RX ADMIN — ASPIRIN 81 MG CHEWABLE TABLET 81 MG: 81 TABLET CHEWABLE at 08:49

## 2019-10-03 RX ADMIN — METFORMIN HYDROCHLORIDE 500 MG: 500 TABLET ORAL at 08:49

## 2019-10-03 RX ADMIN — METOPROLOL SUCCINATE 50 MG: 50 TABLET, EXTENDED RELEASE ORAL at 08:49

## 2019-10-03 RX ADMIN — TICAGRELOR 90 MG: 90 TABLET ORAL at 17:12

## 2019-10-03 RX ADMIN — POTASSIUM CHLORIDE 40 MEQ: 750 TABLET, FILM COATED, EXTENDED RELEASE ORAL at 08:48

## 2019-10-03 RX ADMIN — ISOSORBIDE MONONITRATE 30 MG: 30 TABLET, EXTENDED RELEASE ORAL at 08:48

## 2019-10-03 RX ADMIN — TICAGRELOR 90 MG: 90 TABLET ORAL at 05:13

## 2019-10-03 RX ADMIN — Medication 10 ML: at 05:07

## 2019-10-03 RX ADMIN — FUROSEMIDE 80 MG: 40 TABLET ORAL at 08:49

## 2019-10-03 RX ADMIN — POTASSIUM CHLORIDE 10 MEQ: 7.46 INJECTION, SOLUTION INTRAVENOUS at 12:58

## 2019-10-03 RX ADMIN — ATORVASTATIN CALCIUM 80 MG: 40 TABLET, FILM COATED ORAL at 21:29

## 2019-10-03 RX ADMIN — POTASSIUM CHLORIDE 20 MEQ: 750 TABLET, FILM COATED, EXTENDED RELEASE ORAL at 17:12

## 2019-10-03 RX ADMIN — Medication 10 ML: at 21:30

## 2019-10-03 RX ADMIN — POTASSIUM CHLORIDE 10 MEQ: 10 INJECTION, SOLUTION INTRAVENOUS at 10:18

## 2019-10-03 RX ADMIN — NITROGLYCERIN 1 INCH: 20 OINTMENT TOPICAL at 05:13

## 2019-10-03 NOTE — PROGRESS NOTES
Progress Note      10/3/2019 7:04 AM  NAME: Roberto Huff   MRN:  [id number]   Admit Diagnosis: CHF (congestive heart failure) ([facility] [I50.9]      Problem List:     1. Acute systolic heart failure  2. NSTEMI  3. Coronary artery disease s/p cath 10/1/19 w/ 3v CAD s/p 1 SAMANTHA to RCA, 1 SAMANTHA to LCx, and 2 SAMANTHA to LAD  4. Ischemic cardiomyopathy; EF 20-25%  5. Moderate mitral regurgitation  6. Hypertension  7. Hyperlipidemia  8. Diabetes  9. Tobacco abuse  10. EtOH abuse; 6pk daily  11. 1 child, ret'd [facility]/rec     Assessment/Plan:   K 2.8  Na 130  sCr 1.2    Walking halls/up stairs w/o issues    1. Continue diuresis as tolerated; change to 80mg oral lasix daily  2. Replete K; 40 IV/40 PO  3. Stop NTGp, change to ISMN 30mg  4. Continue ASA  5. Continue ticagrelor  6. Continue Topol XL; inc to 50mg  7. Continue losartan 25mg; may need to increase to 50mg  8. Continue statin  9. Cardiac rehab  10. Repeat BMP/Mg @ 3PM today  11. CM to see if can afford ticagrelor  12. Probably home in the AM then follow w/ me in 2 wks         [x]       High complexity decision making was performed in this patient at high risk for decompensation with multiple organ involvement. Subjective:     Roberto Huff denies chest pain, dyspnea. Discussed with RN events overnight. Review of Systems:    Symptom Y/N Comments  Symptom Y/N Comments   Fever/Chills N   Chest Pain N    Poor Appetite N   Edema N    Cough N   Abdominal Pain N    Sputum N   Joint Pain N    SOB/LILLY N   Pruritis/Rash N    Nausea/vomit N   Tolerating PT/OT Y    Diarrhea N   Tolerating Diet Y    Constipation N   Other       Could NOT obtain due to:      Objective:      Physical Exam:    Last 24hrs VS reviewed since prior progress note.  Most recent are:    Visit Vitals  BP (!) 137/91 (BP 1 Location: Left arm, BP Patient Position: At rest)   Pulse (!) 103   Temp 97.8 °F (36.6 °C)   Resp 18   Ht 5' 8\" (1.727 m)   Wt 84.6 kg (186 lb 8.2 oz)   SpO2 97%   BMI 28.36 kg/m²       Intake/Output Summary (Last 24 hours) at 10/3/2019 0746  Last data filed at 10/2/2019 2348  Gross per 24 hour   Intake 1480 ml   Output 2200 ml   Net -720 ml        General Appearance: Well developed, well nourished, alert & oriented x 3,    no acute distress. Ears/Nose/Mouth/Throat: Hearing grossly normal.  Neck: Supple. Chest: Lungs clear to auscultation bilaterally. Cardiovascular: Regular rate and rhythm, S1S2 normal, no murmur. Abdomen: Soft, non-tender, bowel sounds are active. Extremities: No edema bilaterally. Skin: Warm and dry. [x]         Post-cath site without hematoma, bruit, tenderness, or thrill. Distal pulses intact. PMH/ reviewed - no change compared to H&P    Data Review    Telemetry: st    EKG:   [x]  No new EKG for review    Lab Data Personally Reviewed:    Recent Labs     10/02/19  0457 10/01/19  1810   WBC 7.2 6.4   HGB 15.7 15.9   HCT 47.4 49.9    239     No results for input(s): INR, PTP, APTT, INREXT, INREXT in the last 72 hours. Recent Labs     10/03/19  0504 10/02/19  0457 10/01/19  1150   * 132* 133*   K 2.9* 3.2* 4.2   CL 93* 96* 97   CO2 26 27 27   BUN 13 12 14   CREA 1.19 1.21 1.36*   * 113* 193*   CA 9.0 8.4* 9.2     Recent Labs     10/01/19  1150   TROIQ 0.56*     Lab Results   Component Value Date/Time    Cholesterol, total 193 12/18/2012 12:38 PM    HDL Cholesterol 24 (L) 12/18/2012 12:38 PM    LDL, calculated 123 (H) 12/18/2012 12:38 PM    Triglyceride 228 (H) 12/18/2012 12:38 PM       Recent Labs     10/01/19  1150   SGOT 27      TP 8.4*   ALB 3.7   GLOB 4.7*     No results for input(s): PH, PCO2, PO2 in the last 72 hours.     Medications Personally Reviewed:    Current Facility-Administered Medications   Medication Dose Route Frequency    isosorbide mononitrate ER (IMDUR) tablet 30 mg  30 mg Oral DAILY    furosemide (LASIX) tablet 80 mg  80 mg Oral DAILY    potassium chloride SR (KLOR-CON 10) tablet 40 mEq  40 mEq Oral NOW    potassium chloride 10 mEq in 50 ml IVPB  10 mEq IntraVENous Q1H    metoprolol succinate (TOPROL-XL) XL tablet 50 mg  50 mg Oral DAILY    sodium chloride (NS) flush 5-40 mL  5-40 mL IntraVENous Q8H    sodium chloride (NS) flush 5-40 mL  5-40 mL IntraVENous PRN    zolpidem (AMBIEN) tablet 5 mg  5 mg Oral QHS PRN    insulin lispro (HUMALOG) injection   SubCUTAneous TIDAC    glucose chewable tablet 16 g  4 Tab Oral PRN    dextrose (D50W) injection syrg 12.5-25 g  12.5-25 g IntraVENous PRN    glucagon (GLUCAGEN) injection 1 mg  1 mg IntraMUSCular PRN    atorvastatin (LIPITOR) tablet 80 mg  80 mg Oral QHS    losartan (COZAAR) tablet 25 mg  25 mg Oral DAILY    sodium chloride (NS) flush 5-40 mL  5-40 mL IntraVENous Q8H    sodium chloride (NS) flush 5-40 mL  5-40 mL IntraVENous PRN    ticagrelor (BRILINTA) tablet 90 mg  90 mg Oral Q12H    aspirin chewable tablet 81 mg  81 mg Oral DAILY         [facility] III, DO

## 2019-10-03 NOTE — PROGRESS NOTES
Problem: Falls - Risk of  Goal: *Absence of Falls  Description  Document Agnes Chacon Fall Risk and appropriate interventions in the flowsheet.    10/3/2019 0205 by Pacheco Gurrola RN  Outcome: Progressing Towards Goal  Note:   Fall Risk Interventions:            Medication Interventions: Patient to call before getting OOB                10/3/2019 0205 by Pacheco Gurrola RN  Reactivated

## 2019-10-03 NOTE — PROGRESS NOTES
General Daily Progress Note    Admit Date: 10/1/2019  Hospital day 3    Subjective:     Patient has no complaint of shortness of breath or chest pain. Walked in [place] yesterday. ..   Medication side effects: none    Current Facility-Administered Medications   Medication Dose Route Frequency    isosorbide mononitrate ER (IMDUR) tablet 30 mg  30 mg Oral DAILY    furosemide (LASIX) tablet 80 mg  80 mg Oral DAILY    potassium chloride SR (KLOR-CON 10) tablet 40 mEq  40 mEq Oral NOW    potassium chloride 10 mEq in 100 ml IVPB  10 mEq IntraVENous Q1H    metoprolol succinate (TOPROL-XL) XL tablet 50 mg  50 mg Oral DAILY    sodium chloride (NS) flush 5-40 mL  5-40 mL IntraVENous Q8H    sodium chloride (NS) flush 5-40 mL  5-40 mL IntraVENous PRN    zolpidem (AMBIEN) tablet 5 mg  5 mg Oral QHS PRN    insulin lispro (HUMALOG) injection   SubCUTAneous TIDAC    glucose chewable tablet 16 g  4 Tab Oral PRN    dextrose (D50W) injection syrg 12.5-25 g  12.5-25 g IntraVENous PRN    glucagon (GLUCAGEN) injection 1 mg  1 mg IntraMUSCular PRN    atorvastatin (LIPITOR) tablet 80 mg  80 mg Oral QHS    losartan (COZAAR) tablet 25 mg  25 mg Oral DAILY    sodium chloride (NS) flush 5-40 mL  5-40 mL IntraVENous Q8H    sodium chloride (NS) flush 5-40 mL  5-40 mL IntraVENous PRN    ticagrelor (BRILINTA) tablet 90 mg  90 mg Oral Q12H    aspirin chewable tablet 81 mg  81 mg Oral DAILY        Review of Systems  Pertinent items are noted in HPI. Objective:     Patient Vitals for the past 8 hrs:   BP Temp Pulse Resp SpO2 Weight   10/03/19 0734 (!) 137/91 97.8 °F (36.6 °C) (!) 103 18 97 % --   10/03/19 0506 -- -- -- -- -- 186 lb 8.2 oz (84.6 kg)     No intake/output data recorded.   10/01 1901 - 10/03 0700  In: 7925 [P.O.:1720]  Out: 4244 [Urine:2925]    Physical Exam:   Visit Vitals  BP (!) 137/91 (BP 1 Location: Left arm, BP Patient Position: At rest)   Pulse (!) 103   Temp 97.8 °F (36.6 °C)   Resp 18   Ht 5' 8\" (1.727 m)   Wt 186 lb 8.2 oz (84.6 kg)   SpO2 97%   BMI 28.36 kg/m²     Lungs: clear to auscultation bilaterally  Heart: regular rate and rhythm, S1, S2 normal, no murmur, click, rub or gallop  Abdomen: soft, non-tender. Bowel sounds normal. No masses,  no organomegaly  Extremities: extremities normal, atraumatic, no cyanosis or edema      ECG: sinus tachycardia     Data Review   Recent Results (from the past 24 hour(s))   GLUCOSE, POC    Collection Time: 10/02/19 10:49 AM   Result Value Ref Range    Glucose (POC) 132 (H) 65 - 100 mg/dL    Performed by [employer], POC    Collection Time: 10/02/19  4:38 PM   Result Value Ref Range    Glucose (POC) 121 (H) 65 - 100 mg/dL    Performed by [employer]    METABOLIC PANEL, BASIC    Collection Time: 10/03/19  5:04 AM   Result Value Ref Range    Sodium 130 (L) 136 - 145 mmol/L    Potassium 2.9 (L) 3.5 - 5.1 mmol/L    Chloride 93 (L) 97 - 108 mmol/L    CO2 26 21 - 32 mmol/L    Anion gap 11 5 - 15 mmol/L    Glucose 104 (H) 65 - 100 mg/dL    BUN 13 6 - 20 MG/DL    Creatinine 1.19 0.70 - 1.30 MG/DL    BUN/Creatinine ratio 11 (L) 12 - 20      GFR est AA >60 >60 ml/min/1.73m2    GFR est non-AA >60 >60 ml/min/1.73m2    Calcium 9.0 8.5 - 10.1 MG/DL           Assessment:     Active Problems:    [facility]est[facility] ([facility] (10/1/2019)        Plan:     1. CAD- sp placement of 4 stents  2. CHF- symptomatically much better. I/O 1480/2200 yesterday,.   3. Ischemic cardiomyopathy- now on lasix, metoprolol and losartan. 4. Accelerated hypertension- much better. 5. Hypokalemia- will replace  5. Diabetes- stable- on sliding scale. A1C 7.2- start metformin.

## 2019-10-03 NOTE — CARDIO/PULMONARY
Cardiac Rehab Note: chart review     Consult has been acknowledged     Please refer to 10/2 note by Landry Hatfield has been taught and referred to [facility] and they will be calling pt to set up appt.

## 2019-10-04 VITALS
OXYGEN SATURATION: 98 % | HEIGHT: 68 IN | TEMPERATURE: 97.6 F | RESPIRATION RATE: 20 BRPM | DIASTOLIC BLOOD PRESSURE: 101 MMHG | BODY MASS INDEX: 28.3 KG/M2 | SYSTOLIC BLOOD PRESSURE: 119 MMHG | WEIGHT: 186.73 LBS | HEART RATE: 100 BPM

## 2019-10-04 LAB
ANION GAP SERPL CALC-SCNC: 8 MMOL/L (ref 5–15)
BUN SERPL-MCNC: 12 MG/DL (ref 6–20)
BUN/CREAT SERPL: 11 (ref 12–20)
CALCIUM SERPL-MCNC: 8.9 MG/DL (ref 8.5–10.1)
CHLORIDE SERPL-SCNC: 96 MMOL/L (ref 97–108)
CO2 SERPL-SCNC: 25 MMOL/L (ref 21–32)
CREAT SERPL-MCNC: 1.08 MG/DL (ref 0.7–1.3)
GLUCOSE BLD STRIP.AUTO-MCNC: 139 MG/DL (ref 65–100)
GLUCOSE SERPL-MCNC: 125 MG/DL (ref 65–100)
POTASSIUM SERPL-SCNC: 3.6 MMOL/L (ref 3.5–5.1)
SERVICE CMNT-IMP: ABNORMAL
SODIUM SERPL-SCNC: 129 MMOL/L (ref 136–145)

## 2019-10-04 PROCEDURE — 82962 GLUCOSE BLOOD TEST: CPT

## 2019-10-04 PROCEDURE — 36415 COLL VENOUS BLD VENIPUNCTURE: CPT

## 2019-10-04 PROCEDURE — 80048 BASIC METABOLIC PNL TOTAL CA: CPT

## 2019-10-04 PROCEDURE — 74011250637 HC RX REV CODE- 250/637: Performed by: INTERNAL MEDICINE

## 2019-10-04 PROCEDURE — 74011250637 HC RX REV CODE- 250/637: Performed by: FAMILY MEDICINE

## 2019-10-04 RX ORDER — POTASSIUM CHLORIDE 1500 MG/1
20 TABLET, FILM COATED, EXTENDED RELEASE ORAL 2 TIMES DAILY
Qty: 180 TAB | Refills: 3 | Status: SHIPPED | OUTPATIENT
Start: 2019-10-04 | End: 2019-10-07 | Stop reason: SDUPTHER

## 2019-10-04 RX ORDER — FUROSEMIDE 40 MG/1
TABLET ORAL
Qty: 90 TAB | Refills: 3 | Status: SHIPPED | OUTPATIENT
Start: 2019-10-04 | End: 2019-10-09 | Stop reason: SDUPTHER

## 2019-10-04 RX ORDER — LOSARTAN POTASSIUM 25 MG/1
25 TABLET ORAL DAILY
Qty: 90 TAB | Refills: 3 | Status: SHIPPED | OUTPATIENT
Start: 2019-10-04 | End: 2019-10-07 | Stop reason: SDUPTHER

## 2019-10-04 RX ORDER — GUAIFENESIN 100 MG/5ML
81 LIQUID (ML) ORAL DAILY
Qty: 90 TAB | Refills: 3 | Status: SHIPPED | OUTPATIENT
Start: 2019-10-04

## 2019-10-04 RX ORDER — ISOSORBIDE MONONITRATE 30 MG/1
30 TABLET, EXTENDED RELEASE ORAL DAILY
Qty: 90 TAB | Refills: 3 | Status: SHIPPED | OUTPATIENT
Start: 2019-10-04 | End: 2019-10-07 | Stop reason: SDUPTHER

## 2019-10-04 RX ORDER — METOPROLOL SUCCINATE 50 MG/1
50 TABLET, EXTENDED RELEASE ORAL DAILY
Qty: 90 TAB | Refills: 3 | Status: SHIPPED | OUTPATIENT
Start: 2019-10-04 | End: 2019-10-07 | Stop reason: SDUPTHER

## 2019-10-04 RX ORDER — ATORVASTATIN CALCIUM 80 MG/1
80 TABLET, FILM COATED ORAL
Qty: 90 TAB | Refills: 3 | Status: SHIPPED | OUTPATIENT
Start: 2019-10-04 | End: 2021-01-13 | Stop reason: SDUPTHER

## 2019-10-04 RX ORDER — METFORMIN HYDROCHLORIDE 500 MG/1
500 TABLET ORAL 2 TIMES DAILY WITH MEALS
Qty: 180 TAB | Refills: 3 | Status: SHIPPED | OUTPATIENT
Start: 2019-10-04 | End: 2019-10-07 | Stop reason: SDUPTHER

## 2019-10-04 RX ADMIN — FUROSEMIDE 80 MG: 40 TABLET ORAL at 08:56

## 2019-10-04 RX ADMIN — ISOSORBIDE MONONITRATE 30 MG: 30 TABLET, EXTENDED RELEASE ORAL at 08:56

## 2019-10-04 RX ADMIN — LOSARTAN POTASSIUM 25 MG: 25 TABLET ORAL at 08:56

## 2019-10-04 RX ADMIN — TICAGRELOR 90 MG: 90 TABLET ORAL at 05:32

## 2019-10-04 RX ADMIN — Medication 10 ML: at 05:32

## 2019-10-04 RX ADMIN — METOPROLOL SUCCINATE 50 MG: 50 TABLET, EXTENDED RELEASE ORAL at 08:56

## 2019-10-04 RX ADMIN — ASPIRIN 81 MG CHEWABLE TABLET 81 MG: 81 TABLET CHEWABLE at 08:56

## 2019-10-04 RX ADMIN — POTASSIUM CHLORIDE 20 MEQ: 750 TABLET, FILM COATED, EXTENDED RELEASE ORAL at 08:56

## 2019-10-04 RX ADMIN — METFORMIN HYDROCHLORIDE 500 MG: 500 TABLET ORAL at 08:56

## 2019-10-04 NOTE — PROGRESS NOTES
General Daily Progress Note    Admit Date: 10/1/2019  Hospital day 4    Subjective:     Patient has no complaint of shortness of breath. Walking in [place] ok. ..   Medication side effects: none    Current Facility-Administered Medications   Medication Dose Route Frequency    isosorbide mononitrate ER (IMDUR) tablet 30 mg  30 mg Oral DAILY    furosemide (LASIX) tablet 80 mg  80 mg Oral DAILY    potassium chloride SR (KLOR-CON 10) tablet 20 mEq  20 mEq Oral TID    metFORMIN (GLUCOPHAGE) tablet 500 mg  500 mg Oral BID WITH MEALS    metoprolol succinate (TOPROL-XL) XL tablet 50 mg  50 mg Oral DAILY    sodium chloride (NS) flush 5-40 mL  5-40 mL IntraVENous Q8H    sodium chloride (NS) flush 5-40 mL  5-40 mL IntraVENous PRN    zolpidem (AMBIEN) tablet 5 mg  5 mg Oral QHS PRN    glucose chewable tablet 16 g  4 Tab Oral PRN    dextrose (D50W) injection syrg 12.5-25 g  12.5-25 g IntraVENous PRN    glucagon (GLUCAGEN) injection 1 mg  1 mg IntraMUSCular PRN    atorvastatin (LIPITOR) tablet 80 mg  80 mg Oral QHS    losartan (COZAAR) tablet 25 mg  25 mg Oral DAILY    sodium chloride (NS) flush 5-40 mL  5-40 mL IntraVENous Q8H    sodium chloride (NS) flush 5-40 mL  5-40 mL IntraVENous PRN    ticagrelor (BRILINTA) tablet 90 mg  90 mg Oral Q12H    aspirin chewable tablet 81 mg  81 mg Oral DAILY        Review of Systems  Pertinent items are noted in HPI.     Objective:     Patient Vitals for the past 8 hrs:   BP Temp Pulse Resp SpO2 Height Weight   10/04/19 0401 -- -- -- -- -- 5' 8\" (1.727 m) 186 lb 11.7 oz (84.7 kg)   10/04/19 0327 105/72 97.5 °F (36.4 °C) 100 18 97 % -- --   10/03/19 2335 99/64 98.7 °F (37.1 °C) 100 16 97 % -- --     10/03 1901 - 10/04 0700  In: 300 [P.O.:300]  Out: 800 [Urine:800]  10/02 0701 - 10/03 1900  In: 2200 [P.O.:2200]  Out: 2200 [Urine:2200]    Physical Exam:   Visit Vitals  /72 (BP Patient Position: Post activity)   Pulse 100   Temp 97.5 °F (36.4 °C)   Resp 18   Ht 5' 8\" (1.727 m) Wt 186 lb 11.7 oz (84.7 kg)   SpO2 97%   BMI 28.39 kg/m²     Lungs: clear to auscultation bilaterally  Heart: regular rate and rhythm, S1, S2 normal, no murmur, click, rub or gallop  Abdomen: soft, non-tender.  Bowel sounds normal. No masses,  no organomegaly  Extremities: extremities normal, atraumatic, no cyanosis or edema      ECG: normal sinus rhythm     Data Review   Recent Results (from the past 24 hour(s))   GLUCOSE, POC    Collection Time: 10/03/19  7:57 AM   Result Value Ref Range    Glucose (POC) 134 (H) 65 - 100 mg/dL    Performed by Jose Zuniga RN    GLUCOSE, POC    Collection Time: 10/03/19 11:54 AM   Result Value Ref Range    Glucose (POC) 165 (H) 65 - 100 mg/dL    Performed by Jose Zuniga RN    METABOLIC PANEL, BASIC    Collection Time: 10/03/19  3:38 PM   Result Value Ref Range    Sodium 128 (L) 136 - 145 mmol/L    Potassium 4.5 3.5 - 5.1 mmol/L    Chloride 93 (L) 97 - 108 mmol/L    CO2 27 21 - 32 mmol/L    Anion gap 8 5 - 15 mmol/L    Glucose 150 (H) 65 - 100 mg/dL    BUN 14 6 - 20 MG/DL    Creatinine 1.27 0.70 - 1.30 MG/DL    BUN/Creatinine ratio 11 (L) 12 - 20      GFR est AA >60 >60 ml/min/1.73m2    GFR est non-AA 57 (L) >60 ml/min/1.73m2    Calcium 8.5 8.5 - 10.1 MG/DL   MAGNESIUM    Collection Time: 10/03/19  3:38 PM   Result Value Ref Range    Magnesium 1.7 1.6 - 2.4 mg/dL   GLUCOSE, POC    Collection Time: 10/03/19  9:28 PM   Result Value Ref Range    Glucose (POC) 130 (H) 65 - 100 mg/dL    Performed by Lazaro Null    METABOLIC PANEL, BASIC    Collection Time: 10/04/19  3:24 AM   Result Value Ref Range    Sodium 129 (L) 136 - 145 mmol/L    Potassium 3.6 3.5 - 5.1 mmol/L    Chloride 96 (L) 97 - 108 mmol/L    CO2 25 21 - 32 mmol/L    Anion gap 8 5 - 15 mmol/L    Glucose 125 (H) 65 - 100 mg/dL    BUN 12 6 - 20 MG/DL    Creatinine 1.08 0.70 - 1.30 MG/DL    BUN/Creatinine ratio 11 (L) 12 - 20      GFR est AA >60 >60 ml/min/1.73m2    GFR est non-AA >60 >60 ml/min/1.73m2    Calcium 8.9 8.5 - 10.1 MG/DL           Assessment:     Active Problems:    [facility] (10[facility]1/2019)        Plan:     Home this am if ok with Dr. Matty Altman. Have put in some discharge orders and printed up his prescriptions. Will defer to Dr. Matty Altman if he wants to change any of these.

## 2019-10-04 NOTE — PROGRESS NOTES
Orders received to discharge patient. AVS, medications, follow up visits and post hospital stay care reviewed with patient. Patient has no questions at this time. PIV and all monitors removed. Patient taken to front entrance.

## 2019-10-04 NOTE — PROGRESS NOTES
BECCA Plan:    * Home with [facility] visit, f/u appts, & family support  > Pt's insurance has been updated in his file    CM reviewed pt's chart before moving forward with d/c planning. CM noted potential d/c for today. CM sent request to CM specialist to secure f/u appt with pt's PCP; details regarding date/time reflected in pt's AVS. CM conducted room visit to provide pt with discount card for Brilinta. No further CM needs identified. CM notified pt's nurse of d/c. Care Management Interventions  PCP Verified by CM: Yes  Last Visit to PCP: 10/01/19  Mode of Transport at Discharge: Other (see comment)(pt's son [facility]) is transporting pt home at d/c)  Transition of Care Consult (CM Consult):  Other(H2H visit & f/u appts)  MyChart Signup: No  Discharge Durable Medical Equipment: No  Physical Therapy Consult: No  Occupational Therapy Consult: No  Speech Therapy Consult: No  Current Support Network: Own Home, Other(pt lives with his g/f Roman Saldana)  Confirm Follow Up Transport: Self  Plan discussed with Pt/Family/Caregiver: Yes  Discharge Location  Discharge Placement: Home with family assistance(H2H visit & f/u appts)      Mila Villela, [address] [facility], [address] [facility]

## 2019-10-04 NOTE — PROGRESS NOTES
Progress Note      10/4/2019 7:04 AM  NAME: Jeny Cedeno   MRN:  [id number]   Admit Diagnosis: CHF (congestive heart failure) ([facility] [I50.9]      Problem List:     1. Acute systolic heart failure; resolved  2. NSTEMI  3. Coronary artery disease s/p cath 10/1/19 w/ 3v CAD s/p 1 SAMANTHA to RCA, 1 SAMANTHA to LCx, and 2 SAMANTHA to LAD  4. Ischemic cardiomyopathy; EF 20-25%  5. Moderate mitral regurgitation  6. Hypertension  7. Hyperlipidemia  8. Diabetes  9. Tobacco abuse  10. EtOH abuse; 6pk daily  11. 1 child, ret'd [facility] ragland/rec     Assessment/Plan:   K 3.6  Na up to 129  sCr 1.1    Walking halls/up stairs w/o issues. Feels great. 1. Continue lasix 80mg daily  2. Continue ISMN 30mg  3. Continue ASA  4. Continue ticagrelor  5. Continue Topol XL 50mg  6. Continue losartan 25mg  7. Continue statin  8. Cardiac rehab  9. CM to see if can afford ticagrelor. If he cant then will need to switch to plavix. 10. I think he can go home today; he will need labs w/ Dr. Nazia Livingston next week to see where his Na is going. [x]       High complexity decision making was performed in this patient at high risk for decompensation with multiple organ involvement. Subjective:     Jeny Cedeno denies chest pain, dyspnea. Discussed with RN events overnight. Review of Systems:    Symptom Y/N Comments  Symptom Y/N Comments   Fever/Chills N   Chest Pain N    Poor Appetite N   Edema N    Cough N   Abdominal Pain N    Sputum N   Joint Pain N    SOB/LILLY N   Pruritis/Rash N    Nausea/vomit N   Tolerating PT/OT Y    Diarrhea N   Tolerating Diet Y    Constipation N   Other       Could NOT obtain due to:      Objective:      Physical Exam:    Last 24hrs VS reviewed since prior progress note.  Most recent are:    Visit Vitals  BP (!) 119/101 (BP 1 Location: Left arm, BP Patient Position: At rest)   Pulse 100   Temp 97.6 °F (36.4 °C)   Resp 20   Ht 5' 8\" (1.727 m)   Wt 84.7 kg (186 lb 11.7 oz)   SpO2 98%   BMI 28.39 kg/m² Intake/Output Summary (Last 24 hours) at 10/4/2019 8819  Last data filed at 10/3/2019 2335  Gross per 24 hour   Intake 1020 ml   Output 800 ml   Net 220 ml        General Appearance: Well developed, well nourished, alert & oriented x 3,    no acute distress. Ears/Nose/Mouth/Throat: Hearing grossly normal.  Neck: Supple. Chest: Lungs clear to auscultation bilaterally. Cardiovascular: Regular rate and rhythm, S1S2 normal, no murmur. Abdomen: Soft, non-tender, bowel sounds are active. Extremities: No edema bilaterally. Skin: Warm and dry. [x]         Post-cath site without hematoma, bruit, tenderness, or thrill. Distal pulses intact. PMH/ reviewed - no change compared to H&P    Data Review    Telemetry: st    EKG:   [x]  No new EKG for review    Lab Data Personally Reviewed:    Recent Labs     10/02/19  0457 10/01/19  1810   WBC 7.2 6.4   HGB 15.7 15.9   HCT 47.4 49.9    239     No results for input(s): INR, PTP, APTT, INREXT, INREXT in the last 72 hours. Recent Labs     10/04/19  0324 10/03/19  1538 10/03/19  0504   * 128* 130*   K 3.6 4.5 2.9*   CL 96* 93* 93*   CO2 25 27 26   BUN 12 14 13   CREA 1.08 1.27 1.19   * 150* 104*   CA 8.9 8.5 9.0   MG  --  1.7  --      Recent Labs     10/01/19  1150   TROIQ 0.56*     Lab Results   Component Value Date/Time    Cholesterol, total 193 12/18/2012 12:38 PM    HDL Cholesterol 24 (L) 12/18/2012 12:38 PM    LDL, calculated 123 (H) 12/18/2012 12:38 PM    Triglyceride 228 (H) 12/18/2012 12:38 PM       Recent Labs     10/01/19  1150   SGOT 27      TP 8.4*   ALB 3.7   GLOB 4.7*     No results for input(s): PH, PCO2, PO2 in the last 72 hours.     Medications Personally Reviewed:    Current Facility-Administered Medications   Medication Dose Route Frequency    isosorbide mononitrate ER (IMDUR) tablet 30 mg  30 mg Oral DAILY    furosemide (LASIX) tablet 80 mg  80 mg Oral DAILY    potassium chloride SR (KLOR-CON 10) tablet 20 mEq  20 mEq Oral TID    metFORMIN (GLUCOPHAGE) tablet 500 mg  500 mg Oral BID WITH MEALS    metoprolol succinate (TOPROL-XL) XL tablet 50 mg  50 mg Oral DAILY    sodium chloride (NS) flush 5-40 mL  5-40 mL IntraVENous Q8H    sodium chloride (NS) flush 5-40 mL  5-40 mL IntraVENous PRN    zolpidem (AMBIEN) tablet 5 mg  5 mg Oral QHS PRN    glucose chewable tablet 16 g  4 Tab Oral PRN    dextrose (D50W) injection syrg 12.5-25 g  12.5-25 g IntraVENous PRN    glucagon (GLUCAGEN) injection 1 mg  1 mg IntraMUSCular PRN    atorvastatin (LIPITOR) tablet 80 mg  80 mg Oral QHS    losartan (COZAAR) tablet 25 mg  25 mg Oral DAILY    sodium chloride (NS) flush 5-40 mL  5-40 mL IntraVENous Q8H    sodium chloride (NS) flush 5-40 mL  5-40 mL IntraVENous PRN    ticagrelor (BRILINTA) tablet 90 mg  90 mg Oral Q12H    aspirin chewable tablet 81 mg  81 mg Oral DAILY         Mariola Toribio III, DO

## 2019-10-04 NOTE — PROGRESS NOTES
PCP BECCA appt scheduled with Dr. Jolene Randall on 10/9/2019 at 11:15am Appt added to AVS. CATERINA Solares CM Specialist

## 2019-10-05 NOTE — PROGRESS NOTES
Patient prescriptions found by night nursing staff. This RN attempted to reach patient at all 3 phone numbers listed in chart. No answer at any phone number. Will leave voice message for return call.

## 2019-10-07 ENCOUNTER — PATIENT OUTREACH (OUTPATIENT)
Dept: FAMILY MEDICINE CLINIC | Age: 64
End: 2019-10-07

## 2019-10-07 NOTE — PROGRESS NOTES
Hospital Discharge Follow-Up      Date/Time:  10/9/2019 3:32 PM    Patient was admitted to [facility] on 10/1/19 and discharged on 10/4/19 for CHF. The physician discharge summary was not available at the time of outreach. Patient was contacted within 1 business day of discharge. Top Challenges reviewed with the provider   - Pt reported does not have any of new meds prescribed @ d/c w/ exception of Atorvastatin. Also has HCTZ, Lisinopril, & Naproxen. Needs Rxs for new meds. Inform of status of HCTZ, Lisinopril, & Naproxen    - Currently not checking daily wt. Has no scale. Reported would purchase- please ask @ ov    - Pt declined having received any HF education in hosp even though documentation in EMR from Cardiac Rehab nurse on 10/2/19- please discuss Cardiac rehab recommendation    - Per pt Cardio f/u 19    Advance Care Planning:   Does patient have an Advance Directive:  not on file        Method of communication with provider :chart routing    Inpatient RRAT score: 9  Was this a readmission? no   Patient stated reason for the readmission: n/a    Care Transition Nurse (CTN) contacted the patient by telephone to perform post hospital discharge assessment. Verified name and  with patient as identifiers. Provided introduction to self, and explanation of the CTN role. Patient received hospital discharge instructions. CTN reviewed discharge instructions and red flags with patient who verbalized understanding. Parent given an opportunity to ask questions and does not have any further questions or concerns at this time. The patient agrees to contact the PCP office for questions related to their healthcare. CTN provided contact information for future reference.     Disease Specific:   CHF    Patients top risk factors for readmission:  financial, functional cognitive ability, lack of knowledge about disease, medical condition, medication management, multi health system providers    Home Health orders at discharge: [address] [facility]: [facility]  Date of initial visit: TBD    Durable Medical Equipment ordered at discharge: none  [address]: none  Durable Medical Equipment received: none    Medication(s):   New Medications at Discharge:   -Aspirin 81 mg chewable tablet, Take 1 Tab by mouth daily.  - Atorvastatin 80 mg tablet, Take 1 Tab by  mouth nightly.   -Furosemide 40 mg tablet, One daily  - Isosorbide mononitrate ER 30 mg tablet, Take 1 Tab by  mouth daily  -losartan 25 mg tablet  -metFORMIN 500 mg tablet  -metoprolol succinate 50 mg XL tablet  -potassium chloride SR 20 mEq tablet  - ticagrelor 90 mg tablet  Changed Medications at Discharge: none listed on d/c med list  Discontinued Medications at Discharge: none listed on d/c med list      Medication reconciliation was performed with patient, who verbalizes understanding of administration of home medications. There were barriers to obtaining medications identified at this time. Referral to Pharm D needed: TBD     Current Outpatient Medications   Medication Sig    atorvastatin (LIPITOR) 80 mg tablet Take 1 Tab by mouth nightly. For cholesterol (to keep arteries open)    ticagrelor (BRILINTA) 90 mg tablet Take 1 Tab by mouth two (2) times a day. To keep stents open    potassium chloride SR (K-TAB) 20 mEq tablet Take 1 Tab by mouth two (2) times a day.  metoprolol succinate (TOPROL-XL) 50 mg XL tablet Take 1 Tab by mouth daily. For heart    metFORMIN (GLUCOPHAGE) 500 mg tablet Take 1 Tab by mouth two (2) times daily (with meals). For diabetes    losartan (COZAAR) 25 mg tablet Take 1 Tab by mouth daily. For heart    isosorbide mononitrate ER (IMDUR) 30 mg tablet Take 1 Tab by mouth daily. For heart    aspirin 81 mg chewable tablet Take 1 Tab by mouth daily.  furosemide (LASIX) 40 mg tablet One daily for heart failure     No current facility-administered medications for this visit. There are no discontinued medications. [facility] follow up appointment(s):   Future Appointments   Date Time Provider Elier English   10/9/2019 11:15 AM Lennox Schmitt MD [address]      Non-[facility] follow up appointment(s): Cardio w/ Dr Charmayne Dun 11/13/19 as per pt  [facility]:  not discussed during call       Goals      Prevent complications post hospitalization. 10/8/ 19- pt contacted. Pt reported feeling ok. Denied SOB, CP, pedal edema. During phone conversation pt able to speak in full sentences w/ any difficulty. Noaudible SOB. Med rec- pt reported did not have any of new meds listed on d/c summary w/ exception of Atorvastatin. Pt reported has HCTZ, Lisinopril, Naproxen all meds pt was taking prior to hospitalization. Pt reported does not monitor daily wt. Does not have a scale. Can p/u this evening. CTN reviewed technique for daily wt check. CTN attempted to review HF information provided by inpt Cardiac Rehab nurse on 10/2/19. Pt reported hadn't been given information. CTN inquired about Cardiac Rehab. Pt reported hadn't been given any information. CTN inquired about NA intake. Pt reported does not add Na. CTN reviewed foods w/ high Na content. CTN reviewed HF Zones. Inquired which zone pt in at present time. Pt reported \"in the good zone. I guess the green one\". CTN inquired re contact from [address] [place] [address] for [facility] visit. Pt reported no calls. Pt confirmed PCP f/u on 10/11/19. Pt reported Cardio f/u on 11/13/19 w/ Dr Charmayne Dun. Plan- pt to attend scheduled appts. Pt to bring all current med bottles to 10/9/19 PCP f/u. Pt to purchase scale. Pt to start daily wt check w/ log. Pt to monitor Na intake. Pt to attend Cardiac Rehab @ [facility] as per inpt Cardiac Rehab nurse note. CTN noted messages left from [facility] re scheduling [facility] visit. CTN sent e-mail providing best contact # for pt is Cell # [phone]. CTN message to PCP re pt's meds. Next CTN f/u ~ 7-10 days-ID. 10/7/19- attempted to contact pt. Message left-ID.

## 2019-10-07 NOTE — TELEPHONE ENCOUNTER
He needs an Rx written for the ticagrelor 90 mg tablet   Rx written for  Potassium chloride Sr 20 mEq tablet   Rx written for Metoprolol succinate 50 mg    Rx.  Written for MetFormin 500 mg    Rx [facility] for lasartan 25 mg   Rx written for isorbide mononitrate ER 30 mg     Mr Keenan Wesley  [phone]

## 2019-10-08 RX ORDER — LOSARTAN POTASSIUM 25 MG/1
25 TABLET ORAL DAILY
Qty: 90 TAB | Refills: 3 | Status: SHIPPED | OUTPATIENT
Start: 2019-10-08 | End: 2020-02-12

## 2019-10-08 RX ORDER — POTASSIUM CHLORIDE 1500 MG/1
20 TABLET, FILM COATED, EXTENDED RELEASE ORAL 2 TIMES DAILY
Qty: 180 TAB | Refills: 3 | Status: SHIPPED | OUTPATIENT
Start: 2019-10-08 | End: 2021-01-13 | Stop reason: SDUPTHER

## 2019-10-08 RX ORDER — METFORMIN HYDROCHLORIDE 500 MG/1
500 TABLET ORAL 2 TIMES DAILY WITH MEALS
Qty: 180 TAB | Refills: 3 | Status: SHIPPED | OUTPATIENT
Start: 2019-10-08 | End: 2021-01-13 | Stop reason: SDUPTHER

## 2019-10-08 RX ORDER — METOPROLOL SUCCINATE 50 MG/1
50 TABLET, EXTENDED RELEASE ORAL DAILY
Qty: 90 TAB | Refills: 3 | Status: SHIPPED | OUTPATIENT
Start: 2019-10-08 | End: 2020-02-12

## 2019-10-08 RX ORDER — ISOSORBIDE MONONITRATE 30 MG/1
30 TABLET, EXTENDED RELEASE ORAL DAILY
Qty: 90 TAB | Refills: 3 | Status: SHIPPED | OUTPATIENT
Start: 2019-10-08 | End: 2021-01-13 | Stop reason: SDUPTHER

## 2019-10-09 ENCOUNTER — OFFICE VISIT (OUTPATIENT)
Dept: FAMILY MEDICINE CLINIC | Age: 64
End: 2019-10-09

## 2019-10-09 VITALS
OXYGEN SATURATION: 99 % | RESPIRATION RATE: 16 BRPM | HEIGHT: 68 IN | HEART RATE: 92 BPM | BODY MASS INDEX: 30.28 KG/M2 | WEIGHT: 199.8 LBS | SYSTOLIC BLOOD PRESSURE: 122 MMHG | TEMPERATURE: 96.9 F | DIASTOLIC BLOOD PRESSURE: 86 MMHG

## 2019-10-09 DIAGNOSIS — I50.21 ACUTE SYSTOLIC CONGESTIVE HEART FAILURE (HCC): Primary | ICD-10-CM

## 2019-10-09 RX ORDER — LISINOPRIL 40 MG/1
40 TABLET ORAL DAILY
Refills: 3 | COMMUNITY
Start: 2019-10-01 | End: 2019-10-09 | Stop reason: SDUPTHER

## 2019-10-09 RX ORDER — NAPROXEN 500 MG/1
500 TABLET ORAL
Refills: 3 | COMMUNITY
Start: 2019-10-01 | End: 2021-01-13 | Stop reason: ALTCHOICE

## 2019-10-09 RX ORDER — HYDROCHLOROTHIAZIDE 25 MG/1
25 TABLET ORAL DAILY
Refills: 3 | COMMUNITY
Start: 2019-10-01 | End: 2021-01-13 | Stop reason: ALTCHOICE

## 2019-10-09 RX ORDER — FUROSEMIDE 40 MG/1
TABLET ORAL
Qty: 90 TAB | Refills: 3 | Status: SHIPPED | OUTPATIENT
Start: 2019-10-09 | End: 2021-01-13

## 2019-10-09 NOTE — PROGRESS NOTES
Chief Complaint   Patient presents with   [facility] Follow Up     [facility] for [facility]F     Patient jus picked up most of his prescriptions this morning from the pharm. Patient declined flu vaccine. 1. Have you been to the ER, urgent care clinic since your last visit? Hospitalized since your last visit? Yes [facility] 10[facility]1/2019 for CHF    2. Have you seen or consulted any other health care providers outside of the [address] [place] [address] since your last visit? Include any pap smears or colon screening.

## 2019-10-09 NOTE — PROGRESS NOTES
HISTORY OF PRESENT ILLNESS  Ranulfo Villalba is a 60 y.o. male. HPI In for [facility] visit. Was discharged from [facility] on 10-4  After stay for CHF, CAD, accelerated hypertension. Had 4 stents placed. NO chest pains, shortness of breath. hasnt been that active since discharge. Just picked up several meds today. Past Medical History:   Diagnosis Date    Congestive heart failure ([facility] 10/2019    DM (diabetes mellitus) ([facility] 3/30/2010    HTN (hypertension) 3/30/2010    Hypercholesterolemia        Social History     Socioeconomic History    Marital status:      Spouse name: Not on file    Number of children: 2    Years of education: Not on file    Highest education level: Not on file   Occupational History    Occupation: [employer]     Employer: [employer]   Tobacco Use    Smoking status: Former Smoker    Smokeless tobacco: Never Used   Substance and Sexual Activity    Alcohol use: Yes     Alcohol/week: 16.7 standard drinks     Types: 20 Cans of beer per week    Drug use: No    Sexual activity: Yes     Partners: Female   Social History Narrative    Single, 2 children, works for [employer] as [employer]. Retired 2012. Current Outpatient Medications   Medication Sig Dispense Refill    naproxen (NAPROSYN) 500 mg tablet Take 500 mg by mouth two (2) times daily as needed. 3    hydroCHLOROthiazide (HYDRODIURIL) 25 mg tablet Take 25 mg by mouth daily. 3    furosemide (LASIX) 40 mg tablet One daily for heart failure 90 Tab 3    ticagrelor (BRILINTA) 90 mg tablet Take 1 Tab by mouth two (2) times a day. To keep stents open 180 Tab 3    potassium chloride SR (K-TAB) 20 mEq tablet Take 1 Tab by mouth two (2) times a day. 180 Tab 3    metoprolol succinate (TOPROL-XL) 50 mg XL tablet Take 1 Tab by mouth daily. For heart 90 Tab 3    metFORMIN (GLUCOPHAGE) 500 mg tablet Take 1 Tab by mouth two (2) times daily (with meals).  For diabetes 180 Tab 3    losartan (COZAAR) 25 mg tablet Take 1 Tab by mouth daily. For heart 90 Tab 3    isosorbide mononitrate ER (IMDUR) 30 mg tablet Take 1 Tab by mouth daily. For heart 90 Tab 3    aspirin 81 mg chewable tablet Take 1 Tab by mouth daily. 90 Tab 3    atorvastatin (LIPITOR) 80 mg tablet Take 1 Tab by mouth nightly. For cholesterol (to keep arteries open) 90 Tab 3         ROS    Physical Exam   Constitutional: He appears well-developed and well-nourished. HENT:   Right Ear: External ear normal.   Left Ear: External ear normal.   Mouth/Throat: Oropharynx is clear and moist.   Neck: No thyromegaly present. Cardiovascular: Normal rate, regular rhythm, normal heart sounds and intact distal pulses. Pulmonary/Chest: Effort normal and breath sounds normal. No respiratory distress. He has no wheezes. Abdominal: Soft. Bowel sounds are normal. He exhibits no distension and no mass. There is no tenderness. There is no guarding. Musculoskeletal: Normal range of motion. He exhibits no edema. Lymphadenopathy:     He has no cervical adenopathy. Nursing note and vitals reviewed. ASSESSMENT and PLAN  Orders Placed This Encounter    METABOLIC PANEL, BASIC    furosemide (LASIX) 40 mg tablet     Diagnoses and all orders for this visit:    1. Acute systolic congestive heart failure (HCC)  -     METABOLIC PANEL, BASIC    Other orders  -     furosemide (LASIX) 40 mg tablet; One daily for heart failure      Follow-up and Dispositions    · Return in about 3 months (around 1/9/2020). Reviewed meds. dced lisinopril as is on losartan now.

## 2019-10-09 NOTE — DISCHARGE SUMMARY
[address] SUMMARY    Name:  Jeanette Adrian  MR#:  [id number]  :  1955  ACCOUNT #:  [de-identified]  ADMIT DATE:  10/01/2019  DISCHARGE DATE:  10/04/2019    FINAL DIAGNOSES:  1. Congestive heart failure. 2.  Multivessel coronary artery disease, status post placement of four stents. 3.  Ischemic cardiomyopathy. 4.  Accelerated hypertension. 5.  Hypokalemia. 6.  Diabetes. CONSULTATIONS:  Cardiology, Dr. Todd Taylor. PROCEDURES:  Cardiac cath, the patient had 4 stents placed. FOLLOWUP:  Follow up with me in my office next week. DISCHARGE MEDICATIONS:  Brilinta 90 mg twice daily, potassium chloride 20 mEq b.i.d., metoprolol 50 mg daily, metformin 500 mg b.i.d., losartan 25 mg daily, isosorbide 30 mg daily, aspirin 81 mg daily, atorvastatin 80 mg daily, furosemide 40 mg one daily. HISTORY OF PRESENT ILLNESS:  The patient is a very pleasant 72-year-old [facility] male admitted for evaluation and treatment of congestive heart failure. The patient has past history of hypertension, hypercholesterolemia when I had seen him 7 years ago. He stopped taking all those medicines, came in today with one month history of cough, progressive shortness of breath. He denied any fever, chills, chest pain, sputum production, pedal edema, palpitations. The patient is a nonsmoker and no prior history pulmonary disease. Father is  of unknown cause, mother is with hypertension. No known history of coronary artery disease in the family. The patient was seen in the office, pulse 115, respiratory rate was 30. Chest x-rays showed heart failure and right pleural effusion. He was sent to the hospital for further evaluation. PAST MEDICAL HISTORY:  Significant for hypercholesterolemia, hypertension, diabetes. PAST SURGICAL HISTORY:  Includes colonoscopy in  by Dr. Lawerance Dandy and diverticulosis. No other surgeries.     MEDICATIONS:  Seven years ago was on hydrochlorothiazide, lisinopril, verapamil, simvastatin,     HABITS:  Smoking is negative. SOCIAL HISTORY:  The patient is single. He has 2 children. Retired from , worked as a , retired in 2012. REVIEW OF SYSTEMS:  Please see HPI. PHYSICAL EXAMINATION:  GENERAL:  The patient is awake, alert, oriented, pleasant, and cooperative. A 57-year-old [facility] American male. VITAL SIGNS:  Weight 200, blood pressure 168/121, pulse 115, temperature 96.8, O2 saturation 97%, BMI 30.5. NECK:  Supple, without bruits or thyromegaly. LUNGS:  Decreased breath sounds in the right base. CARDIOVASCULAR:  Regular rate. No murmurs, thrills, rubs, or gallop. ABDOMEN:  Soft without masses, tenderness, or organomegaly. EXTREMITIES:  Without edema. Distal pulses are 1+ and symmetrical.  No femoral bruits. HOSPITAL COURSE:  The patient was admitted. Initial troponin level was 0.56, pro-BNP 4491, hemoglobin A1c 11.2, glucose 193, BUN 14, creatinine 1.36. Seen in consultation by Dr. Madai Cornelius. Had an echocardiogram done, this showed mildly dilated left ventricle, ejection fraction of 21%-25%, left ventricular diastolic dysfunction, hypokinetic, multiple segments of hypokinesis, moderately dilated left atrium, mild right atrium. Pulmonary artery systolic pressure of 60. Moderate mitral regurge, trace aortic regurge and significant pericardial effusion. The patient had a cath done the day of admission by Dr. Madai Cornelius, had three vessel coronary artery disease, two stents placed in an LAD, one stent in the left circumflex, and one stent in the right coronary artery. The patient did well postop, instilled with IV Lasix, started on losartan and metoprolol. He was up and walking. The patient was doing much better. He was stable enough to be discharged home on 10/04. DISCHARGE INSTRUCTIONS:  Followup in my office next week. Followup with Dr. Madai Cornelius next week also.         CHRISTO COSTA, MD [id number]  D:  10/08/2019 18:10  T:  10/09/2019 12:57  JOB #:  [id number]

## 2019-10-10 ENCOUNTER — HOME CARE VISIT (OUTPATIENT)
Dept: SCHEDULING | Facility: HOME HEALTH | Age: 64
End: 2019-10-10

## 2019-10-10 LAB
BUN SERPL-MCNC: 15 MG/DL (ref 8–27)
BUN/CREAT SERPL: 12 (ref 10–24)
CALCIUM SERPL-MCNC: 9.5 MG/DL (ref 8.6–10.2)
CHLORIDE SERPL-SCNC: 95 MMOL/L (ref 96–106)
CO2 SERPL-SCNC: 23 MMOL/L (ref 20–29)
CREAT SERPL-MCNC: 1.23 MG/DL (ref 0.76–1.27)
GLUCOSE SERPL-MCNC: 99 MG/DL (ref 65–99)
POTASSIUM SERPL-SCNC: 4.4 MMOL/L (ref 3.5–5.2)
SODIUM SERPL-SCNC: 135 MMOL/L (ref 134–144)

## 2019-10-10 PROCEDURE — G0299 HHS/HOSPICE OF RN EA 15 MIN: HCPCS

## 2019-10-24 ENCOUNTER — HOSPITAL ENCOUNTER (OUTPATIENT)
Dept: CARDIAC REHAB | Age: 64
Discharge: HOME | End: 2019-10-24
Payer: COMMERCIAL

## 2019-10-24 VITALS — BODY MASS INDEX: 28.84 KG/M2 | WEIGHT: 190.31 LBS | HEIGHT: 68 IN

## 2019-10-24 PROCEDURE — 93798 PHYS/QHP OP CAR RHAB W/ECG: CPT

## 2019-10-24 NOTE — CARDIO/PULMONARY
Karen Lilly  1955 presented for cardiac rehab orientation and exercise tolerance test today with a primary diagnosis of NSTEMI and PCI with SAMANTHA X4- LAD x2, LCx, RCA . Pt has history of HTN, however has been non-compliant with his medication for over 5-7 years. LVEF is 21-25% . Cardiac risk factors include: age, gender, HTN, HLD, DM, and sedentary lifestyle. CAD risk factors were reviewed with patient. Pt is not . He has a girlfriend. He has 2 children who live locally. He is a retired  for [address] [place] [address]. Depression score PHQ9 is 0 and this is considered normal. The result was discussed with patient who affirms score to be accurate. Pt states he just never feels depressed or stressed. Patient denied chest pain or SOB during 6 minute walk on treadmill at 2.2 mph, with RPE 11-12. Cardiac rhythm was ST with multiple PVCs. Exercise plan was developed. Pt will attend cardiac rehab 2-3times per week. Cardiac Rehab Book reviewed and given to patient.   Loretta Ulrich

## 2019-10-25 ENCOUNTER — HOME CARE VISIT (OUTPATIENT)
Dept: HOME HEALTH SERVICES | Facility: HOME HEALTH | Age: 64
End: 2019-10-25

## 2019-11-20 LAB — ACT BLD: 356 SECS (ref 79–138)

## 2020-01-08 NOTE — CARDIO/PULMONARY
[address] [place] [address] Cardiopulmonary Rehab:  Pt presented for his Cardiac Rehab Intake appointment on 10/24/19. Education initiated regarding: goals/objectives of cardiac rehab, risk factors, definition of heart failure, nutrition to include alcohol and salt. Pt scheduled 2 future appointments at the end of the Intake session. Pt was a no call/ no show for his exercise appointments. Voice mail messages left to contact department after each missed visit. Additional voice mail messages (when his mailbox allowed) to please contact rehab. No phone messages were returned. Due to the absence of greater than 2 months and no contact with patient, pt has been discharged from [address] [place] [address] Cardiopulmonary Rehab.   Loyda Macias RN

## 2020-02-12 ENCOUNTER — OFFICE VISIT (OUTPATIENT)
Dept: FAMILY MEDICINE CLINIC | Age: 65
End: 2020-02-12

## 2020-02-12 VITALS
DIASTOLIC BLOOD PRESSURE: 87 MMHG | HEART RATE: 110 BPM | HEIGHT: 68 IN | RESPIRATION RATE: 20 BRPM | TEMPERATURE: 97.3 F | SYSTOLIC BLOOD PRESSURE: 171 MMHG | BODY MASS INDEX: 30.13 KG/M2 | OXYGEN SATURATION: 99 % | WEIGHT: 198.8 LBS

## 2020-02-12 DIAGNOSIS — E78.00 HYPERCHOLESTEREMIA: ICD-10-CM

## 2020-02-12 DIAGNOSIS — E11.9 CONTROLLED TYPE 2 DIABETES MELLITUS WITHOUT COMPLICATION, WITHOUT LONG-TERM CURRENT USE OF INSULIN (HCC): ICD-10-CM

## 2020-02-12 DIAGNOSIS — Z12.5 PROSTATE CANCER SCREENING: ICD-10-CM

## 2020-02-12 DIAGNOSIS — I10 ESSENTIAL HYPERTENSION: Primary | ICD-10-CM

## 2020-02-12 DIAGNOSIS — I25.10 CORONARY ARTERY DISEASE INVOLVING NATIVE CORONARY ARTERY OF NATIVE HEART, ANGINA PRESENCE UNSPECIFIED: ICD-10-CM

## 2020-02-12 LAB — HBA1C MFR BLD HPLC: 6.5 %

## 2020-02-12 RX ORDER — LOSARTAN POTASSIUM 50 MG/1
50 TABLET ORAL DAILY
Qty: 90 TAB | Refills: 3 | Status: SHIPPED | OUTPATIENT
Start: 2020-02-12 | End: 2021-01-13

## 2020-02-12 RX ORDER — METOPROLOL SUCCINATE 100 MG/1
100 TABLET, EXTENDED RELEASE ORAL DAILY
Qty: 90 TAB | Refills: 3 | Status: SHIPPED | OUTPATIENT
Start: 2020-02-12 | End: 2021-01-13

## 2020-02-12 NOTE — PROGRESS NOTES
HISTORY OF PRESENT ILLNESS  Subhash Clark is a 63 y.o. male. HPI In for CAD followup. Had 4 stents on 10-1-2019. No chest pains, dyspnea. Sees Dr. Jonas Stauffer next week. Past Medical History:   Diagnosis Date    CAD (coronary artery disease)     NSTEMI with PCI of LAD (2 stents), LCx, RCA    Congestive heart failure ([facility] 10/2019    DM (diabetes mellitus) ([facility] [facility] 3/30/2010    HTN (hypertension) 3/30/2010    Hypercholesterolemia        Social History     Socioeconomic History    Marital status:      Spouse name: Not on file    Number of children: 2    Years of education: Not on file    Highest education level: High school graduate   Occupational History    Occupation:      Employer: [address] [place] [address] Financial resource strain: Not very hard    Food insecurity:     Worry: Never true     Inability: Never true    Transportation needs:     Medical: No     Non-medical: No   Tobacco Use    Smoking status: Former Smoker    Smokeless tobacco: Never Used   Substance and Sexual Activity    Alcohol use:  Yes     Alcohol/week: 16.7 standard drinks     Types: 20 Cans of beer per week     Comment: drinks 6pk/day    Drug use: Yes     Types: Marijuana     Comment: Marijuana     Sexual activity: Yes     Partners: Female   Lifestyle    Physical activity:     Days per week: 0 days     Minutes per session: 0 min    Stress: Not at all   Relationships    Social connections:     Talks on phone: Once a week     Gets together: Once a week     Attends Zoroastrianism service: 1 to 4 times per year     Active member of club or organization: No     Attends meetings of clubs or organizations: Never     Relationship status:    Other Topics Concern     Service No    Blood Transfusions No    Caffeine Concern No    Occupational Exposure No    Hobby Hazards No    Sleep Concern No    Stress Concern No    Weight Concern No    Special Diet No    Back Care No    Exercise No    Bike Helmet No    Seat Belt No    Self-Exams No   Social History Narrative    Single, 2 children, works for [employer] as . Retired 2012. Current Outpatient Medications   Medication Sig Dispense Refill    metoprolol succinate (TOPROL-XL) 100 mg tablet Take 1 Tab by mouth daily. For heart and blood pressure 90 Tab 3    losartan (COZAAR) 50 mg tablet Take 1 Tab by mouth daily. For heart and blood pressure 90 Tab 3    naproxen (NAPROSYN) 500 mg tablet Take 500 mg by mouth two (2) times daily as needed. 3    hydroCHLOROthiazide (HYDRODIURIL) 25 mg tablet Take 25 mg by mouth daily. 3    furosemide (LASIX) 40 mg tablet One daily for heart failure 90 Tab 3    ticagrelor (BRILINTA) 90 mg tablet Take 1 Tab by mouth two (2) times a day. To keep stents open 180 Tab 3    potassium chloride SR (K-TAB) 20 mEq tablet Take 1 Tab by mouth two (2) times a day. 180 Tab 3    metFORMIN (GLUCOPHAGE) 500 mg tablet Take 1 Tab by mouth two (2) times daily (with meals). For diabetes 180 Tab 3    isosorbide mononitrate ER (IMDUR) 30 mg tablet Take 1 Tab by mouth daily. For heart 90 Tab 3    aspirin 81 mg chewable tablet Take 1 Tab by mouth daily. 90 Tab 3    atorvastatin (LIPITOR) 80 mg tablet Take 1 Tab by mouth nightly. For cholesterol (to keep arteries open) 90 Tab 3         ROS    Physical Exam  Vitals signs and nursing note reviewed. Constitutional:       Appearance: He is well-developed. HENT:      Right Ear: External ear normal.      Left Ear: External ear normal.   Neck:      Thyroid: No thyromegaly. Cardiovascular:      Rate and Rhythm: Normal rate and regular rhythm. Heart sounds: Normal heart sounds. Pulmonary:      Effort: Pulmonary effort is normal. No respiratory distress. Breath sounds: Normal breath sounds. No wheezing. Abdominal:      General: Bowel sounds are normal. There is no distension. Palpations: Abdomen is soft. There is no mass. Tenderness:  There is no abdominal tenderness. There is no guarding. Musculoskeletal: Normal range of motion. Lymphadenopathy:      Cervical: No cervical adenopathy. ASSESSMENT and PLAN  Orders Placed This Encounter    CBC WITH AUTOMATED DIFF    METABOLIC PANEL, COMPREHENSIVE    LIPID PANEL    PROSTATE SPECIFIC AG    AMB POC HEMOGLOBIN A1C    metoprolol succinate (TOPROL-XL) 100 mg tablet    losartan (COZAAR) 50 mg tablet     Diagnoses and all orders for this visit:    1. Essential hypertension  -     CBC WITH AUTOMATED DIFF  -     METABOLIC PANEL, COMPREHENSIVE    2. Hypercholesteremia    3. Coronary artery disease involving native coronary artery of native heart, angina presence unspecified  -     LIPID PANEL    4. Prostate cancer screening  -     PSA, DIAGNOSTIC (PROSTATE SPECIFIC AG)    5. Controlled type 2 diabetes mellitus without complication, without long-term current use of insulin (HCC)  -     AMB POC HEMOGLOBIN A1C    Other orders  -     metoprolol succinate (TOPROL-XL) 100 mg tablet; Take 1 Tab by mouth daily. For heart and blood pressure  -     losartan (COZAAR) 50 mg tablet; Take 1 Tab by mouth daily. For heart and blood pressure      Follow-up and Dispositions    · Return in about 3 months (around 5/12/2020).

## 2020-02-12 NOTE — PROGRESS NOTES
Chief Complaint   Patient presents with    Follow-up         1. Have you been to the ER, urgent care clinic since your last visit? Hospitalized since your last visit? No    2. Have you seen or consulted any other health care providers outside of the [address] [place] [address] since your last visit? Include any pap smears or colon screening.  No

## 2020-02-13 LAB
ALBUMIN SERPL-MCNC: 4.6 G/DL (ref 3.8–4.8)
ALBUMIN/GLOB SERPL: 1.4 {RATIO} (ref 1.2–2.2)
ALP SERPL-CCNC: 93 IU/L (ref 39–117)
ALT SERPL-CCNC: 23 IU/L (ref 0–44)
AST SERPL-CCNC: 25 IU/L (ref 0–40)
BASOPHILS # BLD AUTO: 0.1 X10E3/UL (ref 0–0.2)
BASOPHILS NFR BLD AUTO: 1 %
BILIRUB SERPL-MCNC: 0.6 MG/DL (ref 0–1.2)
BUN SERPL-MCNC: 8 MG/DL (ref 8–27)
BUN/CREAT SERPL: 8 (ref 10–24)
CALCIUM SERPL-MCNC: 9.6 MG/DL (ref 8.6–10.2)
CHLORIDE SERPL-SCNC: 91 MMOL/L (ref 96–106)
CHOLEST SERPL-MCNC: 131 MG/DL (ref 100–199)
CO2 SERPL-SCNC: 27 MMOL/L (ref 20–29)
CREAT SERPL-MCNC: 1 MG/DL (ref 0.76–1.27)
EOSINOPHIL # BLD AUTO: 0.1 X10E3/UL (ref 0–0.4)
EOSINOPHIL NFR BLD AUTO: 1 %
ERYTHROCYTE [DISTWIDTH] IN BLOOD BY AUTOMATED COUNT: 18.6 % (ref 11.6–15.4)
GLOBULIN SER CALC-MCNC: 3.4 G/DL (ref 1.5–4.5)
GLUCOSE SERPL-MCNC: 186 MG/DL (ref 65–99)
HCT VFR BLD AUTO: 39.8 % (ref 37.5–51)
HDLC SERPL-MCNC: 38 MG/DL
HGB BLD-MCNC: 13.3 G/DL (ref 13–17.7)
IMM GRANULOCYTES # BLD AUTO: 0.1 X10E3/UL (ref 0–0.1)
IMM GRANULOCYTES NFR BLD AUTO: 1 %
INTERPRETATION, 910389: NORMAL
LDLC SERPL CALC-MCNC: 74 MG/DL (ref 0–99)
LYMPHOCYTES # BLD AUTO: 2.8 X10E3/UL (ref 0.7–3.1)
LYMPHOCYTES NFR BLD AUTO: 42 %
MCH RBC QN AUTO: 22.4 PG (ref 26.6–33)
MCHC RBC AUTO-ENTMCNC: 33.4 G/DL (ref 31.5–35.7)
MCV RBC AUTO: 67 FL (ref 79–97)
MONOCYTES # BLD AUTO: 1.4 X10E3/UL (ref 0.1–0.9)
MONOCYTES NFR BLD AUTO: 20 %
NEUTROPHILS # BLD AUTO: 2.4 X10E3/UL (ref 1.4–7)
NEUTROPHILS NFR BLD AUTO: 35 %
PLATELET # BLD AUTO: 246 X10E3/UL (ref 150–450)
POTASSIUM SERPL-SCNC: 4.1 MMOL/L (ref 3.5–5.2)
PROT SERPL-MCNC: 8 G/DL (ref 6–8.5)
PSA SERPL-MCNC: 0.4 NG/ML (ref 0–4)
RBC # BLD AUTO: 5.94 X10E6/UL (ref 4.14–5.8)
SODIUM SERPL-SCNC: 133 MMOL/L (ref 134–144)
TRIGL SERPL-MCNC: 95 MG/DL (ref 0–149)
VLDLC SERPL CALC-MCNC: 19 MG/DL (ref 5–40)
WBC # BLD AUTO: 6.7 X10E3/UL (ref 3.4–10.8)

## 2021-01-13 ENCOUNTER — OFFICE VISIT (OUTPATIENT)
Dept: FAMILY MEDICINE CLINIC | Age: 66
End: 2021-01-13
Payer: COMMERCIAL

## 2021-01-13 VITALS
TEMPERATURE: 97.6 F | HEIGHT: 68 IN | SYSTOLIC BLOOD PRESSURE: 149 MMHG | RESPIRATION RATE: 16 BRPM | WEIGHT: 197 LBS | HEART RATE: 107 BPM | BODY MASS INDEX: 29.86 KG/M2 | DIASTOLIC BLOOD PRESSURE: 82 MMHG

## 2021-01-13 DIAGNOSIS — I50.23 ACUTE ON CHRONIC SYSTOLIC CONGESTIVE HEART FAILURE (HCC): ICD-10-CM

## 2021-01-13 DIAGNOSIS — E11.9 CONTROLLED TYPE 2 DIABETES MELLITUS WITHOUT COMPLICATION, WITHOUT LONG-TERM CURRENT USE OF INSULIN (HCC): Primary | ICD-10-CM

## 2021-01-13 PROCEDURE — 99213 OFFICE O/P EST LOW 20 MIN: CPT | Performed by: FAMILY MEDICINE

## 2021-01-13 RX ORDER — ATORVASTATIN CALCIUM 80 MG/1
80 TABLET, FILM COATED ORAL
Qty: 90 TAB | Refills: 3 | Status: SHIPPED | OUTPATIENT
Start: 2021-01-13 | End: 2022-03-17 | Stop reason: SDUPTHER

## 2021-01-13 RX ORDER — LOSARTAN POTASSIUM 50 MG/1
25 TABLET ORAL DAILY
Qty: 90 TAB | Refills: 1
Start: 2021-01-13 | End: 2021-01-13 | Stop reason: SDUPTHER

## 2021-01-13 RX ORDER — METOPROLOL SUCCINATE 25 MG/1
25 TABLET, EXTENDED RELEASE ORAL DAILY
Qty: 90 TAB | Refills: 3 | Status: SHIPPED | OUTPATIENT
Start: 2021-01-13 | End: 2021-07-23 | Stop reason: SDUPTHER

## 2021-01-13 RX ORDER — SPIRONOLACTONE 25 MG/1
TABLET ORAL
Qty: 90 TAB | Refills: 3 | Status: SHIPPED | OUTPATIENT
Start: 2021-01-13 | End: 2022-03-17 | Stop reason: SDUPTHER

## 2021-01-13 RX ORDER — FUROSEMIDE 40 MG/1
40 TABLET ORAL 2 TIMES DAILY
Qty: 180 TAB | Refills: 3 | Status: SHIPPED | OUTPATIENT
Start: 2021-01-13 | End: 2021-07-07 | Stop reason: ALTCHOICE

## 2021-01-13 RX ORDER — LOSARTAN POTASSIUM 50 MG/1
25 TABLET ORAL DAILY
Qty: 90 TAB | Refills: 1 | Status: SHIPPED | OUTPATIENT
Start: 2021-01-13 | End: 2021-07-14

## 2021-01-13 RX ORDER — ISOSORBIDE MONONITRATE 30 MG/1
30 TABLET, EXTENDED RELEASE ORAL DAILY
Qty: 90 TAB | Refills: 3 | Status: SHIPPED | OUTPATIENT
Start: 2021-01-13 | End: 2022-03-17 | Stop reason: ALTCHOICE

## 2021-01-13 RX ORDER — FUROSEMIDE 40 MG/1
40 TABLET ORAL 2 TIMES DAILY
Qty: 90 TAB | Refills: 0
Start: 2021-01-13 | End: 2021-01-13 | Stop reason: SDUPTHER

## 2021-01-13 RX ORDER — POTASSIUM CHLORIDE 1500 MG/1
20 TABLET, FILM COATED, EXTENDED RELEASE ORAL 2 TIMES DAILY
Qty: 180 TAB | Refills: 3 | Status: SHIPPED | OUTPATIENT
Start: 2021-01-13 | End: 2022-02-08

## 2021-01-13 RX ORDER — METFORMIN HYDROCHLORIDE 500 MG/1
500 TABLET ORAL 2 TIMES DAILY WITH MEALS
Qty: 180 TAB | Refills: 3 | Status: SHIPPED | OUTPATIENT
Start: 2021-01-13 | End: 2022-02-14

## 2021-01-13 RX ORDER — METOPROLOL SUCCINATE 25 MG/1
25 TABLET, EXTENDED RELEASE ORAL DAILY
Qty: 30 TAB | Refills: 1
Start: 2021-01-13 | End: 2021-01-13 | Stop reason: SDUPTHER

## 2021-01-13 RX ORDER — SPIRONOLACTONE 25 MG/1
TABLET ORAL
COMMUNITY
Start: 2021-01-02 | End: 2021-01-13 | Stop reason: SDUPTHER

## 2021-01-13 NOTE — PROGRESS NOTES
HISTORY OF PRESENT ILLNESS  Georgie Soni is a 63 y.o. male. HPI was in [facility]  to  for dyspnea. Says that breathing is doing much better. Has appt with me, Dr. Maggie Sharif (cardiology) and Dr. Kendrick Abraham (gastro). No chest pains, dyspnea. meds had  prior to admission. Had been off all meds for 2 months prior to admission. Back on meds now. ROS    Physical Exam  Vitals signs and nursing note reviewed. Constitutional:       Appearance: He is well-developed. HENT:      Right Ear: External ear normal.      Left Ear: External ear normal.   Neck:      Thyroid: No thyromegaly. Cardiovascular:      Rate and Rhythm: Normal rate and regular rhythm. Heart sounds: Normal heart sounds. Pulmonary:      Effort: Pulmonary effort is normal. No respiratory distress. Breath sounds: Normal breath sounds. No wheezing. Abdominal:      General: Bowel sounds are normal. There is no distension. Palpations: Abdomen is soft. There is no mass. Tenderness: There is no abdominal tenderness. There is no guarding. Musculoskeletal: Normal range of motion. Lymphadenopathy:      Cervical: No cervical adenopathy. ASSESSMENT and PLAN  Orders Placed This Encounter    DISCONTD: furosemide (LASIX) 40 mg tablet    DISCONTD: losartan (COZAAR) 50 mg tablet    DISCONTD: metoprolol succinate (TOPROL-XL) 25 mg XL tablet    spironolactone (ALDACTONE) 25 mg tablet    losartan (COZAAR) 50 mg tablet    furosemide (LASIX) 40 mg tablet    metoprolol succinate (TOPROL-XL) 25 mg XL tablet    potassium chloride SR (K-TAB) 20 mEq tablet    metFORMIN (GLUCOPHAGE) 500 mg tablet    isosorbide mononitrate ER (IMDUR) 30 mg tablet    atorvastatin (LIPITOR) 80 mg tablet     Diagnoses and all orders for this visit:    1. Controlled type 2 diabetes mellitus without complication, without long-term current use of insulin ([address]    2.  Acute on chronic systolic congestive heart failure ([address]    Other orders  -     spironolactone (ALDACTONE) 25 mg tablet; TAKE 1 TABLET BY MOUTH EVERY MORNING  -     losartan (COZAAR) 50 mg tablet; Take 0.5 Tabs by mouth daily. For heart and blood pressure  -     furosemide (LASIX) 40 mg tablet; Take 1 Tab by mouth two (2) times a day. One daily for heart failure  -     metoprolol succinate (TOPROL-XL) 25 mg XL tablet; Take 1 Tab by mouth daily. For heart and blood pressure  -     potassium chloride SR (K-TAB) 20 mEq tablet; Take 1 Tab by mouth two (2) times a day. -     metFORMIN (GLUCOPHAGE) 500 mg tablet; Take 1 Tab by mouth two (2) times daily (with meals). For diabetes  -     isosorbide mononitrate ER (IMDUR) 30 mg tablet; Take 1 Tab by mouth daily. For heart  -     atorvastatin (LIPITOR) 80 mg tablet; Take 1 Tab by mouth nightly. For cholesterol (to keep arteries open)      Follow-up and Dispositions    · Return in about 6 weeks (around 2/24/2021). Sounds like symptoms were precipitated by not taking meds. complinace on meds stressed.

## 2021-01-13 NOTE — PROGRESS NOTES
Chief Complaint   Patient presents with   [facility] Follow Up     1. Have you been to the ER, urgent care clinic since your last visit? Hospitalized since your last visit? Yes [facility]    2. Have you seen or consulted any other health care providers outside of the [address] [place] [address] since your last visit? Include any pap smears or colon screening.  No

## 2021-01-15 ENCOUNTER — TELEPHONE (OUTPATIENT)
Dept: FAMILY MEDICINE CLINIC | Age: 66
End: 2021-01-15

## 2021-01-15 NOTE — TELEPHONE ENCOUNTER
Lynette sent fax asking for clarification on Furosemide. Lynette would like to know if patient is supposed to be taking 1 or 2 tablets daily.      [phone]    Thanks,  Radha Lehman

## 2021-01-22 ENCOUNTER — TELEPHONE (OUTPATIENT)
Dept: FAMILY MEDICINE CLINIC | Age: 66
End: 2021-01-22

## 2021-01-22 NOTE — TELEPHONE ENCOUNTER
Faxed Treatment and Self Management Plan Request Form to Frieda at [phone], patient will  copy left at .

## 2021-04-19 NOTE — Clinical Note
Lesion located in the Distal Cx. Balloon inserted. Pressure = 10 aidee; Duration = 12 sec. Pt voided 200 cc, PVR 15cc.

## 2021-07-07 ENCOUNTER — TELEPHONE (OUTPATIENT)
Dept: FAMILY MEDICINE CLINIC | Age: 66
End: 2021-07-07

## 2021-07-07 ENCOUNTER — OFFICE VISIT (OUTPATIENT)
Dept: FAMILY MEDICINE CLINIC | Age: 66
End: 2021-07-07
Payer: COMMERCIAL

## 2021-07-07 VITALS
BODY MASS INDEX: 31.37 KG/M2 | SYSTOLIC BLOOD PRESSURE: 114 MMHG | DIASTOLIC BLOOD PRESSURE: 81 MMHG | RESPIRATION RATE: 16 BRPM | HEIGHT: 68 IN | WEIGHT: 207 LBS | TEMPERATURE: 97 F | HEART RATE: 117 BPM | OXYGEN SATURATION: 99 %

## 2021-07-07 DIAGNOSIS — R06.01 ORTHOPNEA: Primary | ICD-10-CM

## 2021-07-07 DIAGNOSIS — R60.0 LOCALIZED EDEMA: ICD-10-CM

## 2021-07-07 DIAGNOSIS — E78.00 HYPERCHOLESTEREMIA: ICD-10-CM

## 2021-07-07 DIAGNOSIS — E11.9 CONTROLLED TYPE 2 DIABETES MELLITUS WITHOUT COMPLICATION, WITHOUT LONG-TERM CURRENT USE OF INSULIN (HCC): ICD-10-CM

## 2021-07-07 DIAGNOSIS — I10 ESSENTIAL HYPERTENSION: ICD-10-CM

## 2021-07-07 LAB — HBA1C MFR BLD HPLC: 6.7 %

## 2021-07-07 PROCEDURE — 83036 HEMOGLOBIN GLYCOSYLATED A1C: CPT | Performed by: FAMILY MEDICINE

## 2021-07-07 PROCEDURE — 99213 OFFICE O/P EST LOW 20 MIN: CPT | Performed by: FAMILY MEDICINE

## 2021-07-07 RX ORDER — FERROUS SULFATE TAB 325 MG (65 MG ELEMENTAL FE) 325 (65 FE) MG
TAB ORAL
COMMUNITY
Start: 2021-06-06 | End: 2022-03-17 | Stop reason: ALTCHOICE

## 2021-07-07 RX ORDER — BUMETANIDE 2 MG/1
2 TABLET ORAL 2 TIMES DAILY
Qty: 60 TABLET | Refills: 5 | Status: SHIPPED | OUTPATIENT
Start: 2021-07-07 | End: 2022-03-17 | Stop reason: SDUPTHER

## 2021-07-07 RX ORDER — BUMETANIDE 1 MG/1
TABLET ORAL 2 TIMES DAILY
COMMUNITY
End: 2021-07-07

## 2021-07-07 NOTE — TELEPHONE ENCOUNTER
----- Message from [facility] sent at 7/7/2021  9:15 AM EDT -----  Regarding: Dr. Cam Garza  Appointment not available    Caller's first and last name and relationship to patient (if not the patient):  Ronen Sutherland (Girlfriend)      Best contact number:  [phone]      Preferred date and time:  ASAP      Scheduled appointment date and time:  7/28/21 at 8:00a.m. Reason for appointment:  Acute visit      Details to clarify the request:  Ronen Sutherland is requesting a call back to see if patient can be seen any sooner due to the swelling and tightness in his right leg. He is reporting no pain but just a lot of tightness and swelling. He was previously seen at the [facility] back in June, and this has been an ongoing issue for him.       [facility]

## 2021-07-07 NOTE — PROGRESS NOTES
Chief Complaint   Patient presents with    Peripheral Edema     1. Have you been to the ER, urgent care clinic since your last visit? Hospitalized since your last visit? Yes [facility] - 6/6/21- lasix stopped and bumex started    2. Have you seen or consulted any other health care providers outside of the [address] [place] [address] since your last visit? Include any pap smears or colon screening. No     Patient here for bilateral edema. Pitting noted on right foot.

## 2021-07-07 NOTE — PROGRESS NOTES
HISTORY OF PRESENT ILLNESS  Harjit Babcock is a 61 y.o. male. HPIIn for followup. Both feet started swelling up one month ago. Breathing has been doing ok. Can wake up at night panting at times, but can usually go back to sleep. Hasnt been that active during the day. Walks 1/2 mile every day, - no dyspnea, chest tightness or dizziness. ROS    Physical Exam  Vitals and nursing note reviewed. Constitutional:       Appearance: He is well-developed. HENT:      Right Ear: External ear normal.      Left Ear: External ear normal.   Neck:      Thyroid: No thyromegaly. Cardiovascular:      Rate and Rhythm: Normal rate and regular rhythm. Heart sounds: Normal heart sounds. Pulmonary:      Effort: Pulmonary effort is normal. No respiratory distress. Breath sounds: Normal breath sounds. No wheezing. Abdominal:      General: Bowel sounds are normal. There is no distension. Palpations: Abdomen is soft. There is no mass. Tenderness: There is no abdominal tenderness. There is no guarding. Musculoskeletal:         General: Normal range of motion. Comments: 2-3+ edema bilaterally   Lymphadenopathy:      Cervical: No cervical adenopathy. ASSESSMENT and PLAN  Orders Placed This Encounter    CBC WITH AUTOMATED DIFF    METABOLIC PANEL, COMPREHENSIVE    LIPID PANEL    AMB POC HEMOGLOBIN A1C    bumetanide (BUMEX) 2 mg tablet     Diagnoses and all orders for this visit:    1. Orthopnea    2. Hypercholesteremia  -     LIPID PANEL; Future    3. Essential hypertension  -     CBC WITH AUTOMATED DIFF; Future  -     METABOLIC PANEL, COMPREHENSIVE; Future    4. Controlled type 2 diabetes mellitus without complication, without long-term current use of insulin (HCC)  -     AMB POC HEMOGLOBIN A1C    5. Localized edema    Other orders  -     bumetanide (BUMEX) 2 mg tablet; Take 1 Tablet by mouth two (2) times a day.  For fluid      Follow-up and Dispositions    · Return in about 1 week (around 7/14/2021). Will actually have take bumex 4 mg in am and 2 mg in pm for 3 days, then decrease to 2 mg bid. To ER if becomes dyspneic.

## 2021-07-07 NOTE — TELEPHONE ENCOUNTER
Unable to speak with girlfriend Debbies NOT ON HIPPA LIST. Patient to be seen in office per schedule today. Silverio Crenshaw

## 2021-07-09 LAB
ALBUMIN SERPL-MCNC: 4.2 G/DL (ref 3.8–4.8)
ALBUMIN/GLOB SERPL: 1.1 {RATIO} (ref 1.2–2.2)
ALP SERPL-CCNC: 129 IU/L (ref 48–121)
ALT SERPL-CCNC: 12 IU/L (ref 0–44)
AST SERPL-CCNC: 14 IU/L (ref 0–40)
BASOPHILS # BLD AUTO: 0.1 X10E3/UL (ref 0–0.2)
BASOPHILS NFR BLD AUTO: 1 %
BILIRUB SERPL-MCNC: 2.2 MG/DL (ref 0–1.2)
BUN SERPL-MCNC: 15 MG/DL (ref 8–27)
BUN/CREAT SERPL: 15 (ref 10–24)
CALCIUM SERPL-MCNC: 9.5 MG/DL (ref 8.6–10.2)
CHLORIDE SERPL-SCNC: 94 MMOL/L (ref 96–106)
CHOLEST SERPL-MCNC: 82 MG/DL (ref 100–199)
CO2 SERPL-SCNC: 24 MMOL/L (ref 20–29)
CREAT SERPL-MCNC: 1.03 MG/DL (ref 0.76–1.27)
EOSINOPHIL # BLD AUTO: 0.1 X10E3/UL (ref 0–0.4)
EOSINOPHIL NFR BLD AUTO: 1 %
ERYTHROCYTE [DISTWIDTH] IN BLOOD BY AUTOMATED COUNT: 26.5 % (ref 11.6–15.4)
GLOBULIN SER CALC-MCNC: 4 G/DL (ref 1.5–4.5)
GLUCOSE SERPL-MCNC: 124 MG/DL (ref 65–99)
HCT VFR BLD AUTO: 42.3 % (ref 37.5–51)
HDLC SERPL-MCNC: 31 MG/DL
HGB BLD-MCNC: 11.6 G/DL (ref 13–17.7)
IMM GRANULOCYTES # BLD AUTO: 0 X10E3/UL (ref 0–0.1)
IMM GRANULOCYTES NFR BLD AUTO: 1 %
IMP & REVIEW OF LAB RESULTS: NORMAL
LDLC SERPL CALC-MCNC: 35 MG/DL (ref 0–99)
LYMPHOCYTES # BLD AUTO: 2.3 X10E3/UL (ref 0.7–3.1)
LYMPHOCYTES NFR BLD AUTO: 38 %
MCH RBC QN AUTO: 17.8 PG (ref 26.6–33)
MCHC RBC AUTO-ENTMCNC: 27.4 G/DL (ref 31.5–35.7)
MCV RBC AUTO: 65 FL (ref 79–97)
MONOCYTES # BLD AUTO: 1.1 X10E3/UL (ref 0.1–0.9)
MONOCYTES NFR BLD AUTO: 17 %
MORPHOLOGY BLD-IMP: ABNORMAL
NEUTROPHILS # BLD AUTO: 2.6 X10E3/UL (ref 1.4–7)
NEUTROPHILS NFR BLD AUTO: 42 %
PLATELET # BLD AUTO: 240 X10E3/UL (ref 150–450)
POTASSIUM SERPL-SCNC: 4.3 MMOL/L (ref 3.5–5.2)
PROT SERPL-MCNC: 8.2 G/DL (ref 6–8.5)
RBC # BLD AUTO: 6.5 X10E6/UL (ref 4.14–5.8)
SODIUM SERPL-SCNC: 133 MMOL/L (ref 134–144)
TRIGL SERPL-MCNC: 76 MG/DL (ref 0–149)
VLDLC SERPL CALC-MCNC: 16 MG/DL (ref 5–40)
WBC # BLD AUTO: 6.2 X10E3/UL (ref 3.4–10.8)

## 2021-07-14 ENCOUNTER — OFFICE VISIT (OUTPATIENT)
Dept: FAMILY MEDICINE CLINIC | Age: 66
End: 2021-07-14
Payer: COMMERCIAL

## 2021-07-14 VITALS
SYSTOLIC BLOOD PRESSURE: 109 MMHG | HEIGHT: 68 IN | RESPIRATION RATE: 16 BRPM | OXYGEN SATURATION: 98 % | BODY MASS INDEX: 29.86 KG/M2 | HEART RATE: 115 BPM | WEIGHT: 197 LBS | TEMPERATURE: 97.8 F | DIASTOLIC BLOOD PRESSURE: 74 MMHG

## 2021-07-14 DIAGNOSIS — I50.23 ACUTE ON CHRONIC SYSTOLIC CONGESTIVE HEART FAILURE (HCC): Primary | ICD-10-CM

## 2021-07-14 DIAGNOSIS — Z12.11 COLON CANCER SCREENING: ICD-10-CM

## 2021-07-14 DIAGNOSIS — D50.9 IRON DEFICIENCY ANEMIA, UNSPECIFIED IRON DEFICIENCY ANEMIA TYPE: ICD-10-CM

## 2021-07-14 DIAGNOSIS — D64.9 ANEMIA, UNSPECIFIED TYPE: ICD-10-CM

## 2021-07-14 PROCEDURE — 99213 OFFICE O/P EST LOW 20 MIN: CPT | Performed by: FAMILY MEDICINE

## 2021-07-14 RX ORDER — LOSARTAN POTASSIUM 50 MG/1
50 TABLET ORAL 2 TIMES DAILY
Qty: 60 TABLET | Refills: 12 | Status: SHIPPED | OUTPATIENT
Start: 2021-07-14 | End: 2022-09-16 | Stop reason: SDUPTHER

## 2021-07-14 NOTE — PROGRESS NOTES
Chief Complaint   Patient presents with    Follow-up     one week      1. Have you been to the ER, urgent care clinic since your last visit? Hospitalized since your last visit? No    2. Have you seen or consulted any other health care providers outside of the [address] [place] [address] since your last visit? Include any pap smears or colon screening.  No

## 2021-07-14 NOTE — PROGRESS NOTES
HISTORY OF PRESENT ILLNESS  Harini Mcghee is a 61 y.o. male. HPI In for followup. Has lost 10 lbs since last seen, and feels that breathing is better. NO chest pains. No orthopnea. Swelling in legs is better. Has appt with cardiology next week. NO abdominal pains, no blood in stools, melena. GERD. Lab last week showed borderline anemia, hypochromic microcytic. GI has just sent him a letter to come in for colonoscopy. ROS    Physical Exam  Vitals and nursing note reviewed. Constitutional:       Appearance: He is well-developed. HENT:      Right Ear: External ear normal.      Left Ear: External ear normal.   Neck:      Thyroid: No thyromegaly. Cardiovascular:      Rate and Rhythm: Normal rate and regular rhythm. Heart sounds: Normal heart sounds. Pulmonary:      Effort: Pulmonary effort is normal. No respiratory distress. Breath sounds: Normal breath sounds. No wheezing. Abdominal:      General: Bowel sounds are normal. There is no distension. Palpations: Abdomen is soft. There is no mass. Tenderness: There is no abdominal tenderness. There is no guarding. Musculoskeletal:         General: Normal range of motion. Lymphadenopathy:      Cervical: No cervical adenopathy. ASSESSMENT and PLAN  Orders Placed This Encounter    CBC WITH AUTOMATED DIFF    METABOLIC PANEL, BASIC    IRON PROFILE    FERRITIN    [facility]    losartan (COZAAR) 50 mg tablet     Diagnoses and all orders for this visit:    1. Acute on chronic systolic congestive heart failure (HCC)  -     METABOLIC PANEL, BASIC; Future    2. Anemia, unspecified type  -     CBC WITH AUTOMATED DIFF; Future  -     IRON PROFILE; Future  -     FERRITIN; Future    3. Colon cancer screening  -     REFERRAL TO GASTROENTEROLOGY    4. Iron deficiency anemia, unspecified iron deficiency anemia type  -     REFERRAL TO GASTROENTEROLOGY    Other orders  -     losartan (COZAAR) 50 mg tablet;  Take 1 Tablet by mouth two (2) times a day. For heart and blood pressure      Follow-up and Dispositions    · Return in about 6 weeks (around 8/25/2021).

## 2021-07-15 LAB
BASOPHILS # BLD AUTO: 0.1 X10E3/UL (ref 0–0.2)
BASOPHILS NFR BLD AUTO: 1 %
BUN SERPL-MCNC: 12 MG/DL (ref 8–27)
BUN/CREAT SERPL: 11 (ref 10–24)
CALCIUM SERPL-MCNC: 9.5 MG/DL (ref 8.6–10.2)
CHLORIDE SERPL-SCNC: 93 MMOL/L (ref 96–106)
CO2 SERPL-SCNC: 26 MMOL/L (ref 20–29)
CREAT SERPL-MCNC: 1.07 MG/DL (ref 0.76–1.27)
EOSINOPHIL # BLD AUTO: 0.1 X10E3/UL (ref 0–0.4)
EOSINOPHIL NFR BLD AUTO: 2 %
ERYTHROCYTE [DISTWIDTH] IN BLOOD BY AUTOMATED COUNT: 26.5 % (ref 11.6–15.4)
FERRITIN SERPL-MCNC: 150 NG/ML (ref 30–400)
GLUCOSE SERPL-MCNC: 133 MG/DL (ref 65–99)
HCT VFR BLD AUTO: 41.4 % (ref 37.5–51)
HGB BLD-MCNC: 12.1 G/DL (ref 13–17.7)
IMM GRANULOCYTES # BLD AUTO: 0.1 X10E3/UL (ref 0–0.1)
IMM GRANULOCYTES NFR BLD AUTO: 1 %
IRON SATN MFR SERPL: 16 % (ref 15–55)
IRON SERPL-MCNC: 65 UG/DL (ref 38–169)
LYMPHOCYTES # BLD AUTO: 2.6 X10E3/UL (ref 0.7–3.1)
LYMPHOCYTES NFR BLD AUTO: 42 %
MCH RBC QN AUTO: 18.8 PG (ref 26.6–33)
MCHC RBC AUTO-ENTMCNC: 29.2 G/DL (ref 31.5–35.7)
MCV RBC AUTO: 65 FL (ref 79–97)
MONOCYTES # BLD AUTO: 0.8 X10E3/UL (ref 0.1–0.9)
MONOCYTES NFR BLD AUTO: 13 %
MORPHOLOGY BLD-IMP: ABNORMAL
NEUTROPHILS # BLD AUTO: 2.6 X10E3/UL (ref 1.4–7)
NEUTROPHILS NFR BLD AUTO: 41 %
PLATELET # BLD AUTO: 282 X10E3/UL (ref 150–450)
POTASSIUM SERPL-SCNC: 3.8 MMOL/L (ref 3.5–5.2)
RBC # BLD AUTO: 6.42 X10E6/UL (ref 4.14–5.8)
SODIUM SERPL-SCNC: 133 MMOL/L (ref 134–144)
TIBC SERPL-MCNC: 407 UG/DL (ref 250–450)
UIBC SERPL-MCNC: 342 UG/DL (ref 111–343)
WBC # BLD AUTO: 6.2 X10E3/UL (ref 3.4–10.8)

## 2021-07-23 ENCOUNTER — HOSPITAL ENCOUNTER (OUTPATIENT)
Dept: NON INVASIVE DIAGNOSTICS | Age: 66
Discharge: HOME | End: 2021-07-23
Payer: COMMERCIAL

## 2021-07-23 VITALS
DIASTOLIC BLOOD PRESSURE: 62 MMHG | OXYGEN SATURATION: 98 % | HEART RATE: 111 BPM | HEIGHT: 69 IN | RESPIRATION RATE: 20 BRPM | WEIGHT: 198 LBS | SYSTOLIC BLOOD PRESSURE: 108 MMHG | BODY MASS INDEX: 29.33 KG/M2

## 2021-07-23 DIAGNOSIS — I48.91 ATRIAL FIBRILLATION, UNSPECIFIED TYPE (HCC): ICD-10-CM

## 2021-07-23 LAB
ANION GAP SERPL CALC-SCNC: 7 MMOL/L (ref 5–15)
BUN SERPL-MCNC: 20 MG/DL (ref 6–20)
BUN/CREAT SERPL: 17 (ref 12–20)
CALCIUM SERPL-MCNC: 9 MG/DL (ref 8.5–10.1)
CHLORIDE SERPL-SCNC: 100 MMOL/L (ref 97–108)
CO2 SERPL-SCNC: 26 MMOL/L (ref 21–32)
CREAT SERPL-MCNC: 1.21 MG/DL (ref 0.7–1.3)
ERYTHROCYTE [DISTWIDTH] IN BLOOD BY AUTOMATED COUNT: 26.3 % (ref 11.5–14.5)
GLUCOSE SERPL-MCNC: 187 MG/DL (ref 65–100)
HCT VFR BLD AUTO: 38.8 % (ref 36.6–50.3)
HGB BLD-MCNC: 12.1 G/DL (ref 12.1–17)
MCH RBC QN AUTO: 19.3 PG (ref 26–34)
MCHC RBC AUTO-ENTMCNC: 31.2 G/DL (ref 30–36.5)
MCV RBC AUTO: 61.8 FL (ref 80–99)
NRBC # BLD: 0 K/UL (ref 0–0.01)
NRBC BLD-RTO: 0 PER 100 WBC
PLATELET # BLD AUTO: 235 K/UL (ref 150–400)
PMV BLD AUTO: ABNORMAL FL (ref 8.9–12.9)
POTASSIUM SERPL-SCNC: 4.9 MMOL/L (ref 3.5–5.1)
RBC # BLD AUTO: 6.28 M/UL (ref 4.1–5.7)
SODIUM SERPL-SCNC: 133 MMOL/L (ref 136–145)
WBC # BLD AUTO: 6.5 K/UL (ref 4.1–11.1)

## 2021-07-23 PROCEDURE — 99152 MOD SED SAME PHYS/QHP 5/>YRS: CPT

## 2021-07-23 PROCEDURE — 80048 BASIC METABOLIC PNL TOTAL CA: CPT

## 2021-07-23 PROCEDURE — 74011250636 HC RX REV CODE- 250/636: Performed by: INTERNAL MEDICINE

## 2021-07-23 PROCEDURE — 36415 COLL VENOUS BLD VENIPUNCTURE: CPT

## 2021-07-23 PROCEDURE — 99153 MOD SED SAME PHYS/QHP EA: CPT

## 2021-07-23 PROCEDURE — 93005 ELECTROCARDIOGRAM TRACING: CPT

## 2021-07-23 PROCEDURE — 77030018729 HC ELECTRD DEFIB PAD CARD -B

## 2021-07-23 PROCEDURE — 85027 COMPLETE CBC AUTOMATED: CPT

## 2021-07-23 PROCEDURE — 74011000250 HC RX REV CODE- 250: Performed by: INTERNAL MEDICINE

## 2021-07-23 PROCEDURE — 93312 ECHO TRANSESOPHAGEAL: CPT

## 2021-07-23 RX ORDER — FENTANYL CITRATE 50 UG/ML
12.5-5 INJECTION, SOLUTION INTRAMUSCULAR; INTRAVENOUS
Status: DISCONTINUED | OUTPATIENT
Start: 2021-07-23 | End: 2021-07-23

## 2021-07-23 RX ORDER — MIDAZOLAM HYDROCHLORIDE 1 MG/ML
.5-2 INJECTION, SOLUTION INTRAMUSCULAR; INTRAVENOUS
Status: DISCONTINUED | OUTPATIENT
Start: 2021-07-23 | End: 2021-07-23

## 2021-07-23 RX ORDER — LIDOCAINE HYDROCHLORIDE 20 MG/ML
15 SOLUTION OROPHARYNGEAL AS NEEDED
Status: DISCONTINUED | OUTPATIENT
Start: 2021-07-23 | End: 2021-07-23

## 2021-07-23 RX ORDER — METOPROLOL SUCCINATE 50 MG/1
75 TABLET, EXTENDED RELEASE ORAL DAILY
Qty: 45 TABLET | Refills: 11 | Status: SHIPPED | OUTPATIENT
Start: 2021-07-23 | End: 2022-09-01 | Stop reason: SDUPTHER

## 2021-07-23 RX ADMIN — BENZOCAINE, BUTAMBEN, AND TETRACAINE HYDROCHLORIDE 1 SPRAY: .028; .004; .004 AEROSOL, SPRAY TOPICAL at 08:47

## 2021-07-23 RX ADMIN — LIDOCAINE HYDROCHLORIDE 15 ML: 20 SOLUTION ORAL at 08:47

## 2021-07-23 RX ADMIN — FENTANYL CITRATE 25 MCG: 50 INJECTION, SOLUTION INTRAMUSCULAR; INTRAVENOUS at 08:55

## 2021-07-23 RX ADMIN — MIDAZOLAM HYDROCHLORIDE 2 MG: 1 INJECTION, SOLUTION INTRAMUSCULAR; INTRAVENOUS at 08:51

## 2021-07-23 RX ADMIN — MIDAZOLAM HYDROCHLORIDE 1 MG: 1 INJECTION, SOLUTION INTRAMUSCULAR; INTRAVENOUS at 08:55

## 2021-07-23 RX ADMIN — MIDAZOLAM HYDROCHLORIDE 1 MG: 1 INJECTION, SOLUTION INTRAMUSCULAR; INTRAVENOUS at 09:08

## 2021-07-23 RX ADMIN — MIDAZOLAM HYDROCHLORIDE 1 MG: 1 INJECTION, SOLUTION INTRAMUSCULAR; INTRAVENOUS at 09:06

## 2021-07-23 RX ADMIN — FENTANYL CITRATE 50 MCG: 50 INJECTION, SOLUTION INTRAMUSCULAR; INTRAVENOUS at 08:51

## 2021-07-23 RX ADMIN — MIDAZOLAM HYDROCHLORIDE 1 MG: 1 INJECTION, SOLUTION INTRAMUSCULAR; INTRAVENOUS at 09:00

## 2021-07-23 NOTE — PROGRESS NOTES
Pt sedated with 4mg Versed and 75mcg Fentanyl for CARLOS (monitored sedation from 0846 to 0920)    Pt sedated with an additional 2mg Versed , given 1 synchronized shock via device [employer] at 35 joules, 1 synchronized shock(s) at 360 Joules, & over drive pacing via device at 140, 150, & 140 SVT converted to ST with PVCs back to SVT at TE=457. (monitored sedation from 0846 to 0920)

## 2021-07-23 NOTE — PROGRESS NOTES
Brief Procedure Note    Patient: oCrbin Cleveland MRN: [id number]  SSN: [id number]    YOB: 1955  Age: 61 y.o.   Sex: male      Date of Procedure: 7/23/2021     Pre-procedure Diagnosis: SVT/AFL    Post-procedure Diagnosis: Sev red LVEF, mild MR, no NBA thrombus, unsucc CV    Procedure: CARLSO/DCCV (external and internal), overdrive pacing    Performed By: Yancy Bills III, DO     Anesthesia: Moderate Sedation    Estimated Blood Loss: Less than 10 mL      Specimens:  None    Findings: as above    Complications: None    Implants: None    Recommendations: Discuss with EP    Signed By: Brett Leal DO     July 23, 2021

## 2021-07-23 NOTE — Clinical Note
ID band present and verified. Family is not present. Niece Giovanna Donahue will  patient [phone].

## 2021-07-23 NOTE — PROGRESS NOTES
Patient arrived to Non-Invasive Cardiology Lab for Out Patient CARLOS/DCC Procedure. Staff introduced to patient. Patient identifiers verified with Name and Date of Birth. Procedure verified with patient. Consent forms reviewed and signed by patient or authorized representative and verified. Allergies verified. Patient informed of procedure and plan of care. Questions answered with review. Patient on cardiac monitor, non-invasive blood pressure, SPO2 monitor. On RA. Patient is A&Ox3. Patient reports no complaints. Patient on stretcher, in low position, with side rails up. Patient instructed to call for assistance as needed.     Breanna Pathak will  patient [phone]

## 2021-07-23 NOTE — PROGRESS NOTES
Discharge instructions reviewed with patient and Breanna Wilson. Allowed adequate time to ask questions, all questions answered. Printed copy of AVS given to patient. All belongings gathered, IV and tele discontinued. Transported via wheelchair to main entrance and into care of family.

## 2021-07-23 NOTE — PROGRESS NOTES
Received report from Darwin Schulz, pt transferred to recovery room on 2L nasal cannula sating 98%. Dr. Vijay Lino discussed case w/ Dr. Best Guerra. Dr. Best Guerra made further changes to pacer settings, HR now 114. Dr. Vijay Lino aware, okay for pt to be discharged once more awake.

## 2021-07-24 LAB
ATRIAL RATE: 147 BPM
CALCULATED P AXIS, ECG09: -10 DEGREES
CALCULATED R AXIS, ECG10: 16 DEGREES
CALCULATED R AXIS, ECG10: 88 DEGREES
CALCULATED T AXIS, ECG11: 168 DEGREES
CALCULATED T AXIS, ECG11: 95 DEGREES
DIAGNOSIS, 93000: NORMAL
DIAGNOSIS, 93000: NORMAL
P-R INTERVAL, ECG05: 166 MS
Q-T INTERVAL, ECG07: 238 MS
Q-T INTERVAL, ECG07: 340 MS
QRS DURATION, ECG06: 84 MS
QRS DURATION, ECG06: 86 MS
QTC CALCULATION (BEZET), ECG08: 372 MS
QTC CALCULATION (BEZET), ECG08: 533 MS
VENTRICULAR RATE, ECG03: 147 BPM
VENTRICULAR RATE, ECG03: 148 BPM

## 2021-10-22 ENCOUNTER — HOSPITAL ENCOUNTER (OUTPATIENT)
Dept: PREADMISSION TESTING | Age: 66
Discharge: HOME | End: 2021-10-22
Payer: COMMERCIAL

## 2021-10-22 PROCEDURE — U0005 INFEC AGEN DETEC AMPLI PROBE: HCPCS

## 2021-10-24 LAB
SARS-COV-2, XPLCVT: NOT DETECTED
SOURCE, COVRS: NORMAL

## 2021-10-27 ENCOUNTER — HOSPITAL ENCOUNTER (OUTPATIENT)
Age: 66
Setting detail: OUTPATIENT SURGERY
Discharge: HOME | End: 2021-10-27
Attending: INTERNAL MEDICINE | Admitting: INTERNAL MEDICINE
Payer: COMMERCIAL

## 2021-10-27 ENCOUNTER — ANESTHESIA (OUTPATIENT)
Dept: ENDOSCOPY | Age: 66
End: 2021-10-27
Payer: COMMERCIAL

## 2021-10-27 ENCOUNTER — ANESTHESIA EVENT (OUTPATIENT)
Dept: ENDOSCOPY | Age: 66
End: 2021-10-27
Payer: COMMERCIAL

## 2021-10-27 VITALS
OXYGEN SATURATION: 100 % | TEMPERATURE: 97.7 F | SYSTOLIC BLOOD PRESSURE: 124 MMHG | HEIGHT: 68 IN | RESPIRATION RATE: 18 BRPM | HEART RATE: 92 BPM | BODY MASS INDEX: 29.4 KG/M2 | DIASTOLIC BLOOD PRESSURE: 76 MMHG | WEIGHT: 194 LBS

## 2021-10-27 PROCEDURE — 88305 TISSUE EXAM BY PATHOLOGIST: CPT

## 2021-10-27 PROCEDURE — 74011250636 HC RX REV CODE- 250/636: Performed by: NURSE ANESTHETIST, CERTIFIED REGISTERED

## 2021-10-27 PROCEDURE — 2709999900 HC NON-CHARGEABLE SUPPLY: Performed by: INTERNAL MEDICINE

## 2021-10-27 PROCEDURE — 74011000250 HC RX REV CODE- 250: Performed by: NURSE ANESTHETIST, CERTIFIED REGISTERED

## 2021-10-27 PROCEDURE — 76060000031 HC ANESTHESIA FIRST 0.5 HR: Performed by: INTERNAL MEDICINE

## 2021-10-27 PROCEDURE — 76040000019: Performed by: INTERNAL MEDICINE

## 2021-10-27 PROCEDURE — 74011250636 HC RX REV CODE- 250/636: Performed by: INTERNAL MEDICINE

## 2021-10-27 PROCEDURE — 77030019988 HC FCPS ENDOSC DISP BSC -B: Performed by: INTERNAL MEDICINE

## 2021-10-27 RX ORDER — SODIUM CHLORIDE 9 MG/ML
25 INJECTION, SOLUTION INTRAVENOUS CONTINUOUS
Status: DISCONTINUED | OUTPATIENT
Start: 2021-10-27 | End: 2021-10-27 | Stop reason: HOSPADM

## 2021-10-27 RX ORDER — NALOXONE HYDROCHLORIDE 0.4 MG/ML
0.4 INJECTION, SOLUTION INTRAMUSCULAR; INTRAVENOUS; SUBCUTANEOUS
Status: DISCONTINUED | OUTPATIENT
Start: 2021-10-27 | End: 2021-10-27 | Stop reason: HOSPADM

## 2021-10-27 RX ORDER — PROPOFOL 10 MG/ML
INJECTION, EMULSION INTRAVENOUS AS NEEDED
Status: DISCONTINUED | OUTPATIENT
Start: 2021-10-27 | End: 2021-10-27 | Stop reason: HOSPADM

## 2021-10-27 RX ORDER — EPINEPHRINE 0.1 MG/ML
1 INJECTION INTRACARDIAC; INTRAVENOUS
Status: DISCONTINUED | OUTPATIENT
Start: 2021-10-27 | End: 2021-10-27 | Stop reason: HOSPADM

## 2021-10-27 RX ORDER — ATROPINE SULFATE 0.1 MG/ML
0.5 INJECTION INTRAVENOUS
Status: DISCONTINUED | OUTPATIENT
Start: 2021-10-27 | End: 2021-10-27 | Stop reason: HOSPADM

## 2021-10-27 RX ORDER — FLUMAZENIL 0.1 MG/ML
0.2 INJECTION INTRAVENOUS
Status: DISCONTINUED | OUTPATIENT
Start: 2021-10-27 | End: 2021-10-27 | Stop reason: HOSPADM

## 2021-10-27 RX ORDER — GLYCOPYRROLATE 0.2 MG/ML
INJECTION INTRAMUSCULAR; INTRAVENOUS AS NEEDED
Status: DISCONTINUED | OUTPATIENT
Start: 2021-10-27 | End: 2021-10-27 | Stop reason: HOSPADM

## 2021-10-27 RX ORDER — SODIUM CHLORIDE 0.9 % (FLUSH) 0.9 %
5-40 SYRINGE (ML) INJECTION EVERY 8 HOURS
Status: DISCONTINUED | OUTPATIENT
Start: 2021-10-27 | End: 2021-10-27 | Stop reason: HOSPADM

## 2021-10-27 RX ORDER — SODIUM CHLORIDE 9 MG/ML
INJECTION, SOLUTION INTRAVENOUS
Status: DISCONTINUED | OUTPATIENT
Start: 2021-10-27 | End: 2021-10-27 | Stop reason: HOSPADM

## 2021-10-27 RX ORDER — SODIUM CHLORIDE 0.9 % (FLUSH) 0.9 %
5-40 SYRINGE (ML) INJECTION AS NEEDED
Status: DISCONTINUED | OUTPATIENT
Start: 2021-10-27 | End: 2021-10-27 | Stop reason: HOSPADM

## 2021-10-27 RX ORDER — ETOMIDATE 2 MG/ML
INJECTION INTRAVENOUS AS NEEDED
Status: DISCONTINUED | OUTPATIENT
Start: 2021-10-27 | End: 2021-10-27

## 2021-10-27 RX ORDER — DEXTROMETHORPHAN/PSEUDOEPHED 2.5-7.5/.8
1.2 DROPS ORAL
Status: DISCONTINUED | OUTPATIENT
Start: 2021-10-27 | End: 2021-10-27 | Stop reason: HOSPADM

## 2021-10-27 RX ORDER — PANTOPRAZOLE SODIUM 40 MG/1
40 TABLET, DELAYED RELEASE ORAL 2 TIMES DAILY
Qty: 60 TABLET | Refills: 3 | Status: SHIPPED | OUTPATIENT
Start: 2021-10-27 | End: 2021-11-26

## 2021-10-27 RX ORDER — LIDOCAINE HYDROCHLORIDE 20 MG/ML
INJECTION, SOLUTION EPIDURAL; INFILTRATION; INTRACAUDAL; PERINEURAL AS NEEDED
Status: DISCONTINUED | OUTPATIENT
Start: 2021-10-27 | End: 2021-10-27 | Stop reason: HOSPADM

## 2021-10-27 RX ADMIN — PROPOFOL 50 MG: 10 INJECTION, EMULSION INTRAVENOUS at 10:42

## 2021-10-27 RX ADMIN — LIDOCAINE HYDROCHLORIDE 100 MG: 20 INJECTION, SOLUTION EPIDURAL; INFILTRATION; INTRACAUDAL; PERINEURAL at 10:43

## 2021-10-27 RX ADMIN — SODIUM CHLORIDE 25 ML/HR: 9 INJECTION, SOLUTION INTRAVENOUS at 10:28

## 2021-10-27 RX ADMIN — PROPOFOL 50 MG: 10 INJECTION, EMULSION INTRAVENOUS at 10:47

## 2021-10-27 RX ADMIN — SODIUM CHLORIDE: 900 INJECTION, SOLUTION INTRAVENOUS at 10:42

## 2021-10-27 RX ADMIN — PROPOFOL 50 MG: 10 INJECTION, EMULSION INTRAVENOUS at 10:44

## 2021-10-27 RX ADMIN — PROPOFOL 50 MG: 10 INJECTION, EMULSION INTRAVENOUS at 10:50

## 2021-10-27 RX ADMIN — GLYCOPYRROLATE 0.2 MG: 0.2 INJECTION, SOLUTION INTRAMUSCULAR; INTRAVENOUS at 10:43

## 2021-10-27 NOTE — ANESTHESIA POSTPROCEDURE EVALUATION
Procedure(s):  ENDOSCOPY (EGD),  ESOPHAGOGASTRODUODENAL (EGD) BIOPSY. total IV anesthesia    Anesthesia Post Evaluation        Patient location during evaluation: PACU  Note status: Adequate. Level of consciousness: responsive to verbal stimuli and sleepy but conscious  Pain management: satisfactory to patient  Airway patency: patent  Anesthetic complications: no  Cardiovascular status: acceptable  Respiratory status: acceptable  Hydration status: acceptable  Comments: +Post-Anesthesia Evaluation and Assessment    Patient: Jordyn Marin MRN: [id number]  SSN: [id number]   YOB: 1955  Age: 61 y.o. Sex: male      Cardiovascular Function/Vital Signs    BP (!) 102/49   Pulse 90   Temp 37.1 °C (98.7 °F)   Resp 12   Ht 5' 8\" (1.727 m)   Wt 88 kg (194 lb)   SpO2 98%   BMI 29.50 kg/m²     Patient is status post Procedure(s):  ENDOSCOPY (EGD),  ESOPHAGOGASTRODUODENAL (EGD) BIOPSY. Nausea/Vomiting: Controlled. Postoperative hydration reviewed and adequate. Pain:  Pain Scale 1: Numeric (0 - 10) (10/27/21 1057)  Pain Intensity 1: 0 (10/27/21 1057)   Managed. Neurological Status: At baseline. Mental Status and Level of Consciousness: Arousable. Pulmonary Status:   O2 Device: Nasal cannula (10/27/21 1058)   Adequate oxygenation and airway patent. Complications related to anesthesia: None    Post-anesthesia assessment completed. No concerns.     Signed By: Bob Councilman, MD    10/27/2021  Post anesthesia nausea and vomiting:  controlled      INITIAL Post-op Vital signs:   Vitals Value Taken Time   /49 10/27/21 1058   Temp     Pulse 90 10/27/21 1058   Resp 12 10/27/21 1058   SpO2 98 % 10/27/21 1058

## 2021-10-27 NOTE — PROCEDURES
NAME:  Tate Parker   :   1955   MRN:   [id number]     Date/Time:  10/27/2021 10:55 AM    Esophagogastroduodenoscopy (EGD) Procedure Note    Procedure: Esophagogastroduodenoscopy with biopsy    Indication: CHARLOTTE (Scheduled for colonoscopy also, but did not  bowel preparation from pharmacy)  Pre-operative Diagnosis: see indication above  Post-operative Diagnosis: see findings below  :  Duarte Stevens MD  Referring Provider:   Ashby Boas, MD    Exam:  Airway: clear, no airway problems anticipated  Heart: RRR, without gallops or rubs  Lungs: clear bilaterally without wheezes, crackles, or rhonchi  Abdomen: soft, nontender, nondistended, bowel sounds present  Mental Status: awake, alert and oriented to person, place and time     Anethesia/Sedation:  MAC anesthesia Propofol  Procedure Details   After informed consent was obtained for the procedure, with all risks and benefits of procedure explained the patient was taken to the endoscopy suite and placed in the left lateral decubitus position. Following sequential administration of sedation as per above, the [id number] gastroscope was inserted into the mouth and advanced under direct vision to third portion of the duodenum. A careful inspection was made as the gastroscope was withdrawn, including a retroflexed view of the proximal stomach; findings and interventions are described below. Findings:   OROPHARYNX: Cords and pyriform recesses normal.   ESOPHAGUS: The esophagus is normal. The proximal, mid, and distal portions are normal. The Z-Line is intact. STOMACH:  -- at least three distinct, chronic appearing ulcers in the stomach with scant oozing, the largest 1.5 cm in the gastric body, and two smaller ones in antrum under 1 cm each. -- rest of the gastric mucosa with diffuse erythema, edema  -- mucosa and ulcer biopsied.   DUODENUM: The bulb, second and third portions are normal.    Therapies:  biopsy of stomach body, antrum    Specimens: gastric    EBL:  None. Complications:   None; patient tolerated the procedure well. Impression:  -- multiple gastric ulcers, question ischemia vs severe gastritis from NSAIDs  -- diffuse edematous gastritis can also be seen with portal hypertensive gastropathy, but appearance was not typical for this.  Biopsied  -- otherwise, normal EGD  -- suspect CHARLOTTE from ulcers and hemorrhagic gastritis, but still needs to get scheduled for colonoscopy    Recommendations:  -- PPI BID  -- await pathology  -- Avoid NSAIDs as possible  -- follow up in office  -- still needs to schedule colonoscopy    Discharge disposition:  Home in the company of  when able to ambulate    Cori Colmenares MD

## 2021-10-27 NOTE — ROUTINE PROCESS
Nevada Hatchet  1955  [id number]    Situation:  Verbal report received from: Karin Llanos  Procedure: Procedure(s):  ENDOSCOPY (EGD),  ESOPHAGOGASTRODUODENAL (EGD) BIOPSY    Background:    Preoperative diagnosis: IRON DEFICIENCY ANEMIA  Postoperative diagnosis: Multiple Gastric Ulcers, Gastritis    :  Dr. Claudia Reilly  Assistant(s): Endoscopy Technician-1: Kwame Kent  Endoscopy RN-1: Chelsea Pacheco    Specimens:   ID Type Source Tests Collected by Time Destination   1 : biopsy Preservative Gastric  Meghana Romero MD 10/27/2021 1048 Pathology     H. Pylori  no    Assessment:  Intra-procedure medications     Anesthesia gave intra-procedure sedation and medications, see anesthesia flow sheet yes    Intravenous fluids: NS@ KVO     Vital signs stable yes    Abdominal assessment: round and soft yes    Recommendation:

## 2021-10-27 NOTE — PERIOP NOTES
Endoscope was pre-cleaned at bedside immediately following procedure by KAROLINE Boudreaux     Medications     glycopyrrolate 0.2 mg/mL (mg)     Date/Time   Rate/Dose/Volume Action Route Admin User Audit   10/27/21  1043  0.2 mg Given IntraVENous Riaz Hench, CRNA              lidocaine (PF) 2% (mg)     Date/Time   Rate/Dose/Volume Action Route Admin User Audit   10/27/21  1043  100 mg Given IntraVENous Riaz Hench, CRNA              propofol 10 mg/mL (mg)     Date/Time   Rate/Dose/Volume Action Route Admin User Audit   10/27/21  1042  50 mg Given IntraVENous Waubay Hench, CRNA     1044  50 mg Given IntraVENous Riaz Hench, CRNA     1047  50 mg Given IntraVENous Riaz Hench, CRNA     1050  50 mg Given IntraVENous Waubay Hench, CRNA              NS (mL)     Date/Time   Rate/Dose/Volume Action Route [address] User Audit   10/27/21  [address] IntraVENous Riaz Hench, CRNA

## 2021-10-27 NOTE — H&P
Gastroenterology Outpatient History and Physical    Patient: Codie Otto    Physician: Jose Pereira MD    Chief Complaint: CHARLOTTE  History of Present Illness: 62 yo M with CHARLOTTE. Here for EGD/Colonoscopy, but he did not drink bowel prep. History:  Past Medical History:   Diagnosis Date    AICD (automatic cardioverter/defibrillator) present 03/2021    put in at [facility].     Arrhythmia     CAD (coronary artery disease)     NSTEMI with PCI of LAD (2 stents), LCx, RCA    Congestive heart failure ([facility] 10/2019    DM (diabetes mellitus) ([facility] [facility] 3/30/2010    HTN (hypertension) 3/30/2010    Hypercholesterolemia       Past Surgical History:   Procedure Laterality Date    COLONOSCOPY  1-11    diverticulosis- Dr. Irwin Course  10/2019    4x stents      Social History     Socioeconomic History    Marital status:      Spouse name: Not on file    Number of children: 2    Years of education: Not on file    Highest education level: High school graduate   Occupational History    Occupation:      Employer: [employer]   Tobacco Use    Smoking status: Never Smoker    Smokeless tobacco: Never Used   Vaping Use    Vaping Use: Never used   Substance and Sexual Activity    Alcohol use: Yes     Alcohol/week: 16.7 standard drinks     Types: 20 Cans of beer per week     Comment: drinks 6pk/day    Drug use: Yes     Types: Marijuana     Comment: last used 10/23/2021    Sexual activity: Yes     Partners: Female   Other Topics Concern     Service No    Blood Transfusions No    Caffeine Concern No    Occupational Exposure No    Hobby Hazards No    Sleep Concern No    Stress Concern No    Weight Concern No    Special Diet No    Back Care No    Exercise No    Bike Helmet No    Seat Belt No    Self-Exams No   Social History Narrative    Single, 2 children, 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      Social Determinants of Health     Financial Resource Strain:     Difficulty of Paying Living Expenses:    Food Insecurity:     Worried About Running Out of Food in the Last Year:     [address] in the Last Year:    Transportation Needs:     Lack of Transportation (Medical):  Lack of Transportation (Non-Medical):    Physical Activity:     Days of Exercise per Week:     Minutes of Exercise per Session:    Stress:     Feeling of Stress :    Social Connections:     Frequency of Communication with Friends and Family:     Frequency of Social Gatherings with Friends and Family:     Attends Orthodoxy Services:     Active Member of Clubs or Organizations:     Attends Club or Organization Meetings:     Marital Status:       Family History   Problem Relation Age of Onset    Hypertension Mother     Cancer Father         unknown      Patient Active Problem List   Diagnosis Code    DM (diabetes mellitus) ([facility] E11.9    HTN (hypertension) I10    CHF [facility]congestive heart failure) ([facility] I50.9       Allergies: Allergies   Allergen Reactions    Glipizide Other (comments)     dizziness     Medications:   Prior to Admission medications    Medication Sig Start Date End Date Taking? Authorizing Provider   metoprolol succinate (TOPROL-XL) 50 mg XL tablet Take 1.5 Tablets by mouth daily. For heart and blood pressure 7/23/21  Yes Sarabjit Cartagena III, DO   losartan (COZAAR) 50 mg tablet Take 1 Tablet by mouth two (2) times a day. For heart and blood pressure 7/14/21  Yes Vishnu Boyd MD   bumetanide (BUMEX) 2 mg tablet Take 1 Tablet by mouth two (2) times a day. For fluid 7/7/21  Yes Vishnu Boyd MD   spironolactone (ALDACTONE) 25 mg tablet TAKE 1 TABLET BY MOUTH EVERY MORNING  Patient taking differently: Take 25 mg by mouth two (2) times a day. 1/13/21  Yes Vishnu Boyd MD   potassium chloride SR (K-TAB) 20 mEq tablet Take 1 Tab by mouth two (2) times a day.  1/13/21  Yes Vishnu Boyd MD   metFORMIN (GLUCOPHAGE) 500 mg tablet Take 1 Tab by mouth two (2) times daily (with meals). For diabetes 1/13/21  Yes Lucio Zamudio MD   isosorbide mononitrate ER (IMDUR) 30 mg tablet Take 1 Tab by mouth daily. For heart 1/13/21  Yes Lucio Zamudio MD   atorvastatin (LIPITOR) 80 mg tablet Take 1 Tab by mouth nightly. For cholesterol (to keep arteries open) 1/13/21  Yes Lucio Zamudio MD   aspirin 81 mg chewable tablet Take 1 Tab by mouth daily. 10/4/19  Yes Lucio Zamudio MD   FeroSuL 325 mg (65 mg iron) tablet TAKE 1 TABLET BY MOUTH EVERY OTHER DAY 6/6/21   Provider, Historical     Physical Exam:   Vital Signs: Blood pressure (!) 164/85, pulse (!) 106, temperature 98.7 °F (37.1 °C), resp. rate 21, height 5' 8\" (1.727 m), weight 88 kg (194 lb), SpO2 100 %.   General: well developed, well nourished   HEENT: unremarkable   Heart: regular rhythm no mumur    Lungs: clear   Abdominal:  benign   Neurological: unremarkable   Extremities: no edema     Findings/Diagnosis: CHARLOTTE  Plan of Care/Planned Procedure: EGD/Colonoscopy with conscious/deep sedation    Signed:  Roger Waters MD 10/27/2021

## 2021-10-27 NOTE — ANESTHESIA PREPROCEDURE EVALUATION
Relevant Problems   CARDIOVASCULAR   (+) CHF (congestive heart failure) (HCC)   (+) HTN (hypertension)      ENDOCRINE   (+) DM (diabetes mellitus) (HCC)       Anesthetic History   No history of anesthetic complications            Review of Systems / Medical History  Patient summary reviewed, nursing notes reviewed and pertinent labs reviewed    Pulmonary  Within defined limits                 Neuro/Psych   Within defined limits           Cardiovascular    Hypertension      CHF  Dysrhythmias   Pacemaker, past MI, CAD and hyperlipidemia    Exercise tolerance: <4 METS  Comments: NSTEMI with PCI of LAD (2 stents), LCx, RCA  AICD     GI/Hepatic/Renal                Endo/Other    Diabetes    Obesity and anemia     Other Findings   Comments: Marijuana use           Physical Exam    Airway  Mallampati: I    Neck ROM: normal range of motion   Mouth opening: Normal     Cardiovascular  Regular rate and rhythm,  S1 and S2 normal,  no murmur, click, rub, or gallop             Dental  No notable dental hx       Pulmonary  Breath sounds clear to auscultation               Abdominal  GI exam deferred       Other Findings            Anesthetic Plan    ASA: 4  Anesthesia type: total IV anesthesia          Induction: Intravenous  Anesthetic plan and risks discussed with: Patient

## 2021-10-27 NOTE — DISCHARGE INSTRUCTIONS
Sabino Benedict  [id number]  1955    EGD DISCHARGE INSTRUCTIONS  Discomfort:  Sore throat- throat lozenges or warm salt water gargle  redness at IV site- apply warm compress to area; if redness or soreness persist- contact your physician  Gaseous discomfort- walking, belching will help relieve any discomfort  You may not operate a vehicle for 12 hours  You may not engage in an occupation involving machinery or appliances for rest of today  You may not drink alcoholic beverages for at least 12 hours  Avoid making any critical decisions for at least 24 hour  DIET  You may have minimal sips at this time-- do not eat or drink for two hours. You may eat and drink after you wake up  You may resume your regular diet - however -  remember your colon is empty and a heavy meal will produce gas. Avoid these foods:  vegetables, fried / greasy foods, carbonated drinks    MEDICATIONS:  (See attached)    ACTIVITY  You may resume your normal daily activities until tomorrow AM;  Spend the remainder of the day resting -  avoid any strenuous activity. CALL M.D. ANY SIGN OF   Increasing pain, nausea, vomiting  Abdominal distension (swelling)  New increased bleeding (oral or rectal)  Fever (chills)  Pain in chest area  Bloody discharge from nose or mouth  Shortness of breath    You should NOT take any Advil, Aspirin, Ibuprofen, Motrin, Aleve, or Goodys for 10 days, ONLY  Tylenol as needed for pain. IMPRESSION:  -- stomach ULCERS and irritation throughout the stomach. This is definitely contributing to low iron.     Follow-up Instructions:   Call Dr. Rain Sy for the results of procedure / biopsy in 7-10 days   Telephone # [phone]  Appointment in 4-6 weeks in office  You need to call and get on the schedule for your colonoscopy     Gio Estrada MD

## 2022-02-08 RX ORDER — POTASSIUM CHLORIDE 1500 MG/1
TABLET, FILM COATED, EXTENDED RELEASE ORAL
Qty: 180 TABLET | Refills: 3 | Status: SHIPPED | OUTPATIENT
Start: 2022-02-08

## 2022-02-14 RX ORDER — METFORMIN HYDROCHLORIDE 500 MG/1
TABLET ORAL
Qty: 180 TABLET | Refills: 3 | Status: SHIPPED | OUTPATIENT
Start: 2022-02-14 | End: 2022-09-27

## 2022-03-17 ENCOUNTER — OFFICE VISIT (OUTPATIENT)
Dept: FAMILY MEDICINE CLINIC | Age: 67
End: 2022-03-17
Payer: COMMERCIAL

## 2022-03-17 VITALS
HEART RATE: 90 BPM | BODY MASS INDEX: 29.1 KG/M2 | SYSTOLIC BLOOD PRESSURE: 117 MMHG | HEIGHT: 68 IN | OXYGEN SATURATION: 98 % | RESPIRATION RATE: 16 BRPM | TEMPERATURE: 97 F | WEIGHT: 192 LBS | DIASTOLIC BLOOD PRESSURE: 77 MMHG

## 2022-03-17 DIAGNOSIS — E78.00 HYPERCHOLESTEROLEMIA: ICD-10-CM

## 2022-03-17 DIAGNOSIS — Z12.11 COLON CANCER SCREENING: ICD-10-CM

## 2022-03-17 DIAGNOSIS — E11.9 DIABETES MELLITUS WITHOUT COMPLICATION (HCC): ICD-10-CM

## 2022-03-17 DIAGNOSIS — I10 ESSENTIAL HYPERTENSION: Primary | ICD-10-CM

## 2022-03-17 DIAGNOSIS — Z12.5 PROSTATE CANCER SCREENING: ICD-10-CM

## 2022-03-17 PROCEDURE — 99213 OFFICE O/P EST LOW 20 MIN: CPT | Performed by: FAMILY MEDICINE

## 2022-03-17 RX ORDER — ATORVASTATIN CALCIUM 80 MG/1
80 TABLET, FILM COATED ORAL
Qty: 90 TABLET | Refills: 3 | Status: SHIPPED | OUTPATIENT
Start: 2022-03-17

## 2022-03-17 RX ORDER — SPIRONOLACTONE 25 MG/1
TABLET ORAL
Qty: 90 TABLET | Refills: 3 | Status: SHIPPED | OUTPATIENT
Start: 2022-03-17

## 2022-03-17 RX ORDER — BUMETANIDE 2 MG/1
2 TABLET ORAL 2 TIMES DAILY
Qty: 60 TABLET | Refills: 12 | Status: SHIPPED | OUTPATIENT
Start: 2022-03-17

## 2022-03-17 NOTE — PROGRESS NOTES
HISTORY OF PRESENT ILLNESS  Samantha Yates is a 74 y.o. male. HPI In for followup of anemia and heart condition. Goes bowling, - no chest tightness, dyspnea. No abdominal pains, melena. Declines covid vaccine. ROS    Physical Exam  Vitals and nursing note reviewed. Constitutional:       Appearance: He is well-developed. HENT:      Right Ear: External ear normal.      Left Ear: External ear normal.   Neck:      Thyroid: No thyromegaly. Cardiovascular:      Rate and Rhythm: Normal rate and regular rhythm. Heart sounds: Normal heart sounds. Pulmonary:      Effort: Pulmonary effort is normal. No respiratory distress. Breath sounds: Normal breath sounds. No wheezing. Abdominal:      General: Bowel sounds are normal. There is no distension. Palpations: Abdomen is soft. There is no mass. Tenderness: There is no abdominal tenderness. There is no guarding. Musculoskeletal:         General: Normal range of motion. Lymphadenopathy:      Cervical: No cervical adenopathy. ASSESSMENT and PLAN  Orders Placed This Encounter    LIPID PANEL    METABOLIC PANEL, COMPREHENSIVE    HEMOGLOBIN A1C WITH EAG    CBC WITH AUTOMATED DIFF    PSA SCREENING (SCREENING)    [facility]    atorvastatin (LIPITOR) 80 mg tablet    spironolactone (ALDACTONE) 25 mg tablet    bumetanide (BUMEX) 2 mg tablet     Diagnoses and all orders for this visit:    1. Essential hypertension  -     METABOLIC PANEL, COMPREHENSIVE; Future  -     CBC WITH AUTOMATED DIFF; Future    2. Colon cancer screening  -     REFERRAL TO GASTROENTEROLOGY    3. Prostate cancer screening  -     PSA SCREENING (SCREENING); Future    4. Hypercholesterolemia  -     LIPID PANEL; Future    5. Diabetes mellitus without complication ([address]  -     HEMOGLOBIN A1C WITH EAG; Future    Other orders  -     atorvastatin (LIPITOR) 80 mg tablet; Take 1 Tablet by mouth nightly.  For cholesterol (to keep arteries open)  -     spironolactone (ALDACTONE) 25 mg tablet; TAKE 1 TABLET BY MOUTH EVERY MORNING  -     bumetanide (BUMEX) 2 mg tablet; Take 1 Tablet by mouth two (2) times a day. For fluid      Follow-up and Dispositions    · Return in about 6 months (around 9/17/2022).

## 2022-03-17 NOTE — PROGRESS NOTES
Ava Pichardo is a 74 y.o. male  1. \"Have you been to the ER, no  urgent care clinic since your last visit?  no  Hospitalized since your last visit? \" no    2. \"Have you seen or consulted any other health care providers outside of the [address] [place] [address] since your last visit? \"  Yes [facility] 2/14/22    3. For patients aged 39-70: Has the patient had a colonoscopy / FIT/ Cologuard?   Need Colonoscopy     Chief Complaint   Patient presents with    Follow-up

## 2022-03-18 LAB
ALBUMIN SERPL-MCNC: 4.4 G/DL (ref 3.8–4.8)
ALBUMIN/GLOB SERPL: 1 {RATIO} (ref 1.2–2.2)
ALP SERPL-CCNC: 110 IU/L (ref 44–121)
ALT SERPL-CCNC: 6 IU/L (ref 0–44)
AST SERPL-CCNC: 15 IU/L (ref 0–40)
BASOPHILS # BLD AUTO: 0.1 X10E3/UL (ref 0–0.2)
BASOPHILS NFR BLD AUTO: 1 %
BILIRUB SERPL-MCNC: 0.9 MG/DL (ref 0–1.2)
BUN SERPL-MCNC: 14 MG/DL (ref 8–27)
BUN/CREAT SERPL: 10 (ref 10–24)
CALCIUM SERPL-MCNC: 9.7 MG/DL (ref 8.6–10.2)
CHLORIDE SERPL-SCNC: 92 MMOL/L (ref 96–106)
CHOLEST SERPL-MCNC: 210 MG/DL (ref 100–199)
CO2 SERPL-SCNC: 24 MMOL/L (ref 20–29)
CREAT SERPL-MCNC: 1.36 MG/DL (ref 0.76–1.27)
EGFR: 58 ML/MIN/1.73
EOSINOPHIL # BLD AUTO: 0.1 X10E3/UL (ref 0–0.4)
EOSINOPHIL NFR BLD AUTO: 1 %
ERYTHROCYTE [DISTWIDTH] IN BLOOD BY AUTOMATED COUNT: 19.6 % (ref 11.6–15.4)
EST. AVERAGE GLUCOSE BLD GHB EST-MCNC: 160 MG/DL
GLOBULIN SER CALC-MCNC: 4.2 G/DL (ref 1.5–4.5)
GLUCOSE SERPL-MCNC: 152 MG/DL (ref 65–99)
HBA1C MFR BLD: 7.2 % (ref 4.8–5.6)
HCT VFR BLD AUTO: 47 % (ref 37.5–51)
HDLC SERPL-MCNC: 39 MG/DL
HGB BLD-MCNC: 15.1 G/DL (ref 13–17.7)
IMM GRANULOCYTES # BLD AUTO: 0.1 X10E3/UL (ref 0–0.1)
IMM GRANULOCYTES NFR BLD AUTO: 1 %
IMP & REVIEW OF LAB RESULTS: NORMAL
LDLC SERPL CALC-MCNC: 146 MG/DL (ref 0–99)
LYMPHOCYTES # BLD AUTO: 2.3 X10E3/UL (ref 0.7–3.1)
LYMPHOCYTES NFR BLD AUTO: 41 %
MCH RBC QN AUTO: 22 PG (ref 26.6–33)
MCHC RBC AUTO-ENTMCNC: 32.1 G/DL (ref 31.5–35.7)
MCV RBC AUTO: 68 FL (ref 79–97)
MONOCYTES # BLD AUTO: 0.9 X10E3/UL (ref 0.1–0.9)
MONOCYTES NFR BLD AUTO: 16 %
NEUTROPHILS # BLD AUTO: 2.2 X10E3/UL (ref 1.4–7)
NEUTROPHILS NFR BLD AUTO: 40 %
PLATELET # BLD AUTO: 304 X10E3/UL (ref 150–450)
POTASSIUM SERPL-SCNC: 4.2 MMOL/L (ref 3.5–5.2)
PROT SERPL-MCNC: 8.6 G/DL (ref 6–8.5)
PSA SERPL-MCNC: 0.4 NG/ML (ref 0–4)
RBC # BLD AUTO: 6.87 X10E6/UL (ref 4.14–5.8)
SODIUM SERPL-SCNC: 135 MMOL/L (ref 134–144)
TRIGL SERPL-MCNC: 135 MG/DL (ref 0–149)
VLDLC SERPL CALC-MCNC: 25 MG/DL (ref 5–40)
WBC # BLD AUTO: 5.6 X10E3/UL (ref 3.4–10.8)

## 2022-03-19 PROBLEM — I50.9 CHF (CONGESTIVE HEART FAILURE) (HCC): Status: ACTIVE | Noted: 2019-10-01

## 2022-03-28 ENCOUNTER — TELEPHONE (OUTPATIENT)
Dept: FAMILY MEDICINE CLINIC | Age: 67
End: 2022-03-28

## 2022-09-01 RX ORDER — METOPROLOL SUCCINATE 50 MG/1
75 TABLET, EXTENDED RELEASE ORAL DAILY
Qty: 45 TABLET | Refills: 11 | Status: SHIPPED | OUTPATIENT
Start: 2022-09-01 | End: 2022-09-16 | Stop reason: SDUPTHER

## 2022-09-01 NOTE — TELEPHONE ENCOUNTER
Barrie benítez  [birth year] stopped by and needs metoprolol er succinate 50 mg  [address] [address] [phone]

## 2022-09-08 ENCOUNTER — TELEPHONE (OUTPATIENT)
Dept: FAMILY MEDICINE CLINIC | Age: 67
End: 2022-09-08

## 2022-09-16 ENCOUNTER — OFFICE VISIT (OUTPATIENT)
Dept: FAMILY MEDICINE CLINIC | Age: 67
End: 2022-09-16
Payer: COMMERCIAL

## 2022-09-16 VITALS
SYSTOLIC BLOOD PRESSURE: 118 MMHG | OXYGEN SATURATION: 98 % | HEART RATE: 90 BPM | DIASTOLIC BLOOD PRESSURE: 80 MMHG | TEMPERATURE: 98.2 F | HEIGHT: 68 IN | BODY MASS INDEX: 30.01 KG/M2 | WEIGHT: 198 LBS | RESPIRATION RATE: 18 BRPM

## 2022-09-16 DIAGNOSIS — I10 ESSENTIAL HYPERTENSION: ICD-10-CM

## 2022-09-16 DIAGNOSIS — I50.23 ACUTE ON CHRONIC SYSTOLIC CONGESTIVE HEART FAILURE (HCC): ICD-10-CM

## 2022-09-16 DIAGNOSIS — E11.9 DIABETES MELLITUS WITHOUT COMPLICATION (HCC): ICD-10-CM

## 2022-09-16 DIAGNOSIS — E78.00 HYPERCHOLESTEROLEMIA: Primary | ICD-10-CM

## 2022-09-16 PROBLEM — N18.30 CHRONIC RENAL DISEASE, STAGE III (HCC): Status: ACTIVE | Noted: 2022-09-16

## 2022-09-16 PROBLEM — E11.22 TYPE 2 DIABETES MELLITUS WITH CHRONIC KIDNEY DISEASE (HCC): Status: ACTIVE | Noted: 2022-09-16

## 2022-09-16 PROCEDURE — 1123F ACP DISCUSS/DSCN MKR DOCD: CPT | Performed by: FAMILY MEDICINE

## 2022-09-16 PROCEDURE — 99214 OFFICE O/P EST MOD 30 MIN: CPT | Performed by: FAMILY MEDICINE

## 2022-09-16 PROCEDURE — 3051F HG A1C>EQUAL 7.0%<8.0%: CPT | Performed by: FAMILY MEDICINE

## 2022-09-16 RX ORDER — LOSARTAN POTASSIUM 50 MG/1
50 TABLET ORAL 2 TIMES DAILY
Qty: 60 TABLET | Refills: 12 | Status: SHIPPED | OUTPATIENT
Start: 2022-09-16

## 2022-09-16 RX ORDER — METOPROLOL SUCCINATE 50 MG/1
75 TABLET, EXTENDED RELEASE ORAL DAILY
Qty: 45 TABLET | Refills: 11 | Status: SHIPPED | OUTPATIENT
Start: 2022-09-16

## 2022-09-16 NOTE — PROGRESS NOTES
Patient identified with two identification factors, Name and Date of Birth. Chief Complaint   Patient presents with    Hypertension     6 month follow-up. Visit Vitals  /80 (BP 1 Location: Right arm, BP Patient Position: Sitting, BP Cuff Size: Adult)   Pulse 90   Temp 98.2 °F (36.8 °C) (Oral)   Resp 18   Ht 5' 8\" (1.727 m)   Wt 198 lb (89.8 kg)   SpO2 98%   BMI 30.11 kg/m²       Health Maintenance Due   Topic    Hepatitis C Screening     COVID-19 Vaccine (1)    Pneumococcal 65+ years (1 - PCV)    Foot Exam Q1     MICROALBUMIN Q1     Eye Exam Retinal or Dilated     DTaP/Tdap/Td series (1 - Tdap)    Colorectal Cancer Screening Combo     Shingrix Vaccine Age 50> (1 of 2)    Flu Vaccine (1)      Fall Risk Assessment, last 12 mths 9/16/2022   Able to walk? Yes   Fall in past 12 months? 0   Do you feel unsteady? 0   Are you worried about falling 0       1. \"Have you been to the ER, urgent care clinic since your last visit? Hospitalized since your last visit? \" No    2. \"Have you seen or consulted any other health care providers outside of the [address] [place] [address] since your last visit? \" No     3. For patients aged 39-70: Has the patient had a colonoscopy / FIT/ Cologuard? Yes - no Care Gap present      If the patient is female:    4. For patients aged 41-77: Has the patient had a mammogram within the past 2 years? NA - based on age or sex      11. For patients aged 21-65: Has the patient had a pap smear?  NA - based on age or sex

## 2022-09-16 NOTE — PROGRESS NOTES
HISTORY OF PRESENT ILLNESS  Vandana Xavier is a 74 y.o. male. HPI In for blood pressure and cholesterol check. No complaints of chest pain, shortness of breath, TIAs, claudication or edema. Not interested in vaccinations. ROS    Physical Exam  Vitals and nursing note reviewed. Constitutional:       Appearance: He is well-developed. HENT:      Right Ear: External ear normal.      Left Ear: External ear normal.   Neck:      Thyroid: No thyromegaly. Cardiovascular:      Rate and Rhythm: Normal rate and regular rhythm. Heart sounds: Normal heart sounds. Comments: Absent distal pulses  Pulmonary:      Effort: Pulmonary effort is normal. No respiratory distress. Breath sounds: Normal breath sounds. No wheezing. Abdominal:      General: Bowel sounds are normal. There is no distension. Palpations: Abdomen is soft. There is no mass. Tenderness: There is no abdominal tenderness. There is no guarding. Musculoskeletal:         General: Normal range of motion. Lymphadenopathy:      Cervical: No cervical adenopathy. ASSESSMENT and PLAN  Orders Placed This Encounter    LIPID PANEL    CBC WITH AUTOMATED DIFF    HEMOGLOBIN A1C WITH EAG    METABOLIC PANEL, COMPREHENSIVE    metoprolol succinate (TOPROL-XL) 50 mg XL tablet    losartan (COZAAR) 50 mg tablet     Diagnoses and all orders for this visit:    1. Hypercholesterolemia  -     LIPID PANEL; Future    2. Essential hypertension  -     CBC WITH AUTOMATED DIFF; Future  -     METABOLIC PANEL, COMPREHENSIVE; Future    3. Diabetes mellitus without complication ([facility] [address]  -     HEMOGLOBIN A1C WITH EAG; Future    4. Acute on chronic systolic congestive heart failure (HCC)    Other orders  -     metoprolol succinate (TOPROL-XL) 50 mg XL tablet; Take 1.5 Tablets by mouth daily. For heart and blood pressure  -     losartan (COZAAR) 50 mg tablet; Take 1 Tablet by mouth two (2) times a day.  For heart and blood pressure      Follow-up and Dispositions Return in about 6 months (around 3/16/2023).

## 2022-09-17 LAB
ALBUMIN SERPL-MCNC: 4.3 G/DL (ref 3.8–4.8)
ALBUMIN/GLOB SERPL: 1.2 {RATIO} (ref 1.2–2.2)
ALP SERPL-CCNC: 145 IU/L (ref 44–121)
ALT SERPL-CCNC: 11 IU/L (ref 0–44)
AST SERPL-CCNC: 16 IU/L (ref 0–40)
BASOPHILS # BLD AUTO: 0 X10E3/UL (ref 0–0.2)
BASOPHILS NFR BLD AUTO: 0 %
BILIRUB SERPL-MCNC: 0.5 MG/DL (ref 0–1.2)
BUN SERPL-MCNC: 5 MG/DL (ref 8–27)
BUN/CREAT SERPL: 4 (ref 10–24)
CALCIUM SERPL-MCNC: 9.9 MG/DL (ref 8.6–10.2)
CHLORIDE SERPL-SCNC: 93 MMOL/L (ref 96–106)
CHOLEST SERPL-MCNC: 186 MG/DL (ref 100–199)
CO2 SERPL-SCNC: 25 MMOL/L (ref 20–29)
CREAT SERPL-MCNC: 1.14 MG/DL (ref 0.76–1.27)
EGFR: 71 ML/MIN/1.73
EOSINOPHIL # BLD AUTO: 0.1 X10E3/UL (ref 0–0.4)
EOSINOPHIL NFR BLD AUTO: 1 %
ERYTHROCYTE [DISTWIDTH] IN BLOOD BY AUTOMATED COUNT: 17.9 % (ref 11.6–15.4)
EST. AVERAGE GLUCOSE BLD GHB EST-MCNC: 186 MG/DL
GLOBULIN SER CALC-MCNC: 3.6 G/DL (ref 1.5–4.5)
GLUCOSE SERPL-MCNC: 183 MG/DL (ref 65–99)
HBA1C MFR BLD: 8.1 % (ref 4.8–5.6)
HCT VFR BLD AUTO: 42.8 % (ref 37.5–51)
HDLC SERPL-MCNC: 28 MG/DL
HGB BLD-MCNC: 13.6 G/DL (ref 13–17.7)
IMM GRANULOCYTES # BLD AUTO: 0.1 X10E3/UL (ref 0–0.1)
IMM GRANULOCYTES NFR BLD AUTO: 1 %
IMP & REVIEW OF LAB RESULTS: NORMAL
LDLC SERPL CALC-MCNC: 121 MG/DL (ref 0–99)
LYMPHOCYTES # BLD AUTO: 3.1 X10E3/UL (ref 0.7–3.1)
LYMPHOCYTES NFR BLD AUTO: 33 %
MCH RBC QN AUTO: 22.1 PG (ref 26.6–33)
MCHC RBC AUTO-ENTMCNC: 31.8 G/DL (ref 31.5–35.7)
MCV RBC AUTO: 70 FL (ref 79–97)
MONOCYTES # BLD AUTO: 1.2 X10E3/UL (ref 0.1–0.9)
MONOCYTES NFR BLD AUTO: 12 %
NEUTROPHILS # BLD AUTO: 5 X10E3/UL (ref 1.4–7)
NEUTROPHILS NFR BLD AUTO: 53 %
PLATELET # BLD AUTO: 325 X10E3/UL (ref 150–450)
POTASSIUM SERPL-SCNC: 4 MMOL/L (ref 3.5–5.2)
PROT SERPL-MCNC: 7.9 G/DL (ref 6–8.5)
RBC # BLD AUTO: 6.16 X10E6/UL (ref 4.14–5.8)
SODIUM SERPL-SCNC: 134 MMOL/L (ref 134–144)
TRIGL SERPL-MCNC: 207 MG/DL (ref 0–149)
VLDLC SERPL CALC-MCNC: 37 MG/DL (ref 5–40)
WBC # BLD AUTO: 9.5 X10E3/UL (ref 3.4–10.8)

## 2022-09-27 RX ORDER — EZETIMIBE 10 MG/1
10 TABLET ORAL DAILY
Qty: 90 TABLET | Refills: 3 | Status: SHIPPED | OUTPATIENT
Start: 2022-09-27

## 2022-09-27 RX ORDER — METFORMIN HYDROCHLORIDE 1000 MG/1
1000 TABLET ORAL 2 TIMES DAILY WITH MEALS
Qty: 180 TABLET | Refills: 5 | Status: SHIPPED | OUTPATIENT
Start: 2022-09-27

## 2023-08-21 ENCOUNTER — OFFICE VISIT (OUTPATIENT)
Age: 68
End: 2023-08-21
Payer: MEDICARE

## 2023-08-21 VITALS
RESPIRATION RATE: 16 BRPM | TEMPERATURE: 97.8 F | HEIGHT: 68 IN | DIASTOLIC BLOOD PRESSURE: 77 MMHG | HEART RATE: 103 BPM | SYSTOLIC BLOOD PRESSURE: 130 MMHG | OXYGEN SATURATION: 93 % | BODY MASS INDEX: 28.19 KG/M2 | WEIGHT: 186 LBS

## 2023-08-21 DIAGNOSIS — E11.9 TYPE 2 DIABETES MELLITUS WITHOUT COMPLICATION, WITHOUT LONG-TERM CURRENT USE OF INSULIN (HCC): ICD-10-CM

## 2023-08-21 DIAGNOSIS — I34.0 SEVERE MITRAL REGURGITATION: ICD-10-CM

## 2023-08-21 DIAGNOSIS — I10 ESSENTIAL (PRIMARY) HYPERTENSION: Primary | ICD-10-CM

## 2023-08-21 DIAGNOSIS — Z12.5 PROSTATE CANCER SCREENING: ICD-10-CM

## 2023-08-21 DIAGNOSIS — E78.00 PURE HYPERCHOLESTEROLEMIA, UNSPECIFIED: ICD-10-CM

## 2023-08-21 DIAGNOSIS — I50.23 ACUTE ON CHRONIC SYSTOLIC CONGESTIVE HEART FAILURE (HCC): ICD-10-CM

## 2023-08-21 PROCEDURE — 1123F ACP DISCUSS/DSCN MKR DOCD: CPT | Performed by: FAMILY MEDICINE

## 2023-08-21 PROCEDURE — 3075F SYST BP GE 130 - 139MM HG: CPT | Performed by: FAMILY MEDICINE

## 2023-08-21 PROCEDURE — 99213 OFFICE O/P EST LOW 20 MIN: CPT | Performed by: FAMILY MEDICINE

## 2023-08-21 PROCEDURE — 3078F DIAST BP <80 MM HG: CPT | Performed by: FAMILY MEDICINE

## 2023-08-21 RX ORDER — SPIRONOLACTONE 25 MG/1
25 TABLET ORAL EVERY MORNING
Qty: 90 TABLET | Refills: 2 | Status: SHIPPED | OUTPATIENT
Start: 2023-08-21

## 2023-08-21 RX ORDER — ATORVASTATIN CALCIUM 80 MG/1
80 TABLET, FILM COATED ORAL DAILY
Qty: 90 TABLET | Refills: 2 | Status: SHIPPED | OUTPATIENT
Start: 2023-08-21

## 2023-08-21 RX ORDER — ASPIRIN 81 MG/1
81 TABLET, CHEWABLE ORAL DAILY
Qty: 90 TABLET | Refills: 2 | Status: SHIPPED | OUTPATIENT
Start: 2023-08-21

## 2023-08-21 RX ORDER — DAPAGLIFLOZIN 10 MG/1
10 TABLET, FILM COATED ORAL DAILY
COMMUNITY
Start: 2023-08-15 | End: 2023-08-21 | Stop reason: SDUPTHER

## 2023-08-21 RX ORDER — BUMETANIDE 2 MG/1
2 TABLET ORAL 2 TIMES DAILY
Qty: 180 TABLET | Refills: 2 | Status: SHIPPED | OUTPATIENT
Start: 2023-08-21

## 2023-08-21 RX ORDER — METOPROLOL SUCCINATE 50 MG/1
50 TABLET, EXTENDED RELEASE ORAL DAILY
Qty: 90 TABLET | Refills: 2 | Status: SHIPPED | OUTPATIENT
Start: 2023-08-21

## 2023-08-21 RX ORDER — DAPAGLIFLOZIN 10 MG/1
10 TABLET, FILM COATED ORAL DAILY
Qty: 90 TABLET | Refills: 2 | Status: SHIPPED | OUTPATIENT
Start: 2023-08-21

## 2023-08-21 RX ORDER — POTASSIUM CHLORIDE 20 MEQ/1
20 TABLET, EXTENDED RELEASE ORAL 2 TIMES DAILY
Qty: 180 TABLET | Refills: 2 | Status: SHIPPED | OUTPATIENT
Start: 2023-08-21

## 2023-08-21 RX ORDER — LOSARTAN POTASSIUM 50 MG/1
50 TABLET ORAL 2 TIMES DAILY
Qty: 180 TABLET | Refills: 2 | Status: SHIPPED | OUTPATIENT
Start: 2023-08-21

## 2023-08-21 RX ORDER — EZETIMIBE 10 MG/1
10 TABLET ORAL DAILY
Qty: 90 TABLET | Refills: 2 | Status: SHIPPED | OUTPATIENT
Start: 2023-08-21

## 2023-08-21 NOTE — PROGRESS NOTES
No chief complaint on file. 1. \"Have you been to the ER, urgent care clinic since your last visit? Hospitalized since your last visit? \" YES - [facility] -Shortness of    2. \"Have you seen or consulted any other health care providers outside of the [address] [place] [address] since your last visit? \" no     3. For patients aged 43-73: Has the patient had a colonoscopy / FIT/ Cologuard? NO      If the patient is female:    4. For patients aged 43-66: Has the patient had a mammogram within the past 2 years? na      5.  For patients aged 21-65: Has the patient had a pap smear? na      Health Maintenance Due   Topic Date Due    COVID-19 Vaccine (1) Never done    Pneumococcal 65+ years Vaccine (1 - PCV) Never done    Diabetic foot exam  Never done    Diabetic Alb to Cr ratio (uACR) test  Never done    Diabetic retinal exam  Never done    Hepatitis C screen  Never done    DTaP/Tdap/Td vaccine (1 - Tdap) Never done    Colorectal Cancer Screen  Never done    Shingles vaccine (1 of 2) Never done    Flu vaccine (1) Never done    A1C test (Diabetic or Prediabetic)  09/16/2023    Lipids  09/16/2023    Depression Screen  09/16/2023    GFR test (Diabetes, CKD 3-4, OR last GFR 15-59)  09/16/2023

## 2023-08-21 NOTE — PROGRESS NOTES
Matilde Harrington (:  1955) is a 79 y.o. male,Established patient, here for evaluation of the following chief complaint(s):  Follow-up         ASSESSMENT/PLAN:  1. Essential (primary) hypertension  -     Comprehensive Metabolic Panel; Future  -     CBC with Auto Differential; Future  2. Type 2 diabetes mellitus without complication, without long-term current use of insulin (HCC)  -     Hemoglobin A1C; Future  3. Pure hypercholesterolemia, unspecified  -     Lipid Panel; Future  4. Prostate cancer screening  -     PSA Screening; Future  5. Acute on chronic systolic congestive heart failure ([address])  6. Severe mitral regurgitation    Strongly encouraged to keep appt with dr. Issac Esquivel. No follow-ups on file. Subjective   SUBJECTIVE/OBJECTIVE:  HPI was in [facility] for 3 days, dischareged one week ago. Admitted with [facility]. Had been admitted for dyspnea on exertion and CHF. Wasn't having any chest pains. Had several stents put in 2019. They wanted to do a cath at [facility] however, pt wanted to wait until he saw Dr. Kiran Manrique on . Has been feelihng fairly good since being at home. Able to climb stairs better. Had been out of meds for 2 months prior to being admitted to [facility]. Has been fairly active since going home, walking, bowling. Review of Systems       Objective   Physical Exam  Cardiovascular:      Rate and Rhythm: Normal rate and regular rhythm. Heart sounds: Normal heart sounds. No murmur heard. Comments: Absent distal pulses. Pulmonary:      Effort: Pulmonary effort is normal.      Breath sounds: Normal breath sounds. Abdominal:      General: Abdomen is flat. Bowel sounds are normal.      Palpations: Abdomen is soft. Musculoskeletal:      Right lower leg: No edema. Left lower leg: No edema. An electronic signature was used to authenticate this note.     --Yvonne Olsen MD

## 2023-08-22 LAB
ALBUMIN SERPL-MCNC: 4.1 G/DL (ref 3.5–5)
ALBUMIN/GLOB SERPL: 0.9 (ref 1.1–2.2)
ALP SERPL-CCNC: 96 U/L (ref 45–117)
ALT SERPL-CCNC: 17 U/L (ref 12–78)
ANION GAP SERPL CALC-SCNC: 4 MMOL/L (ref 5–15)
AST SERPL-CCNC: 16 U/L (ref 15–37)
BASOPHILS # BLD: 0.1 K/UL (ref 0–0.1)
BASOPHILS NFR BLD: 1 % (ref 0–1)
BILIRUB SERPL-MCNC: 1.3 MG/DL (ref 0.2–1)
BUN SERPL-MCNC: 12 MG/DL (ref 6–20)
BUN/CREAT SERPL: 9 (ref 12–20)
CALCIUM SERPL-MCNC: 10.1 MG/DL (ref 8.5–10.1)
CHLORIDE SERPL-SCNC: 99 MMOL/L (ref 97–108)
CHOLEST SERPL-MCNC: 116 MG/DL
CO2 SERPL-SCNC: 29 MMOL/L (ref 21–32)
CREAT SERPL-MCNC: 1.4 MG/DL (ref 0.7–1.3)
DIFFERENTIAL METHOD BLD: ABNORMAL
EOSINOPHIL # BLD: 0.1 K/UL (ref 0–0.4)
EOSINOPHIL NFR BLD: 1 % (ref 0–7)
ERYTHROCYTE [DISTWIDTH] IN BLOOD BY AUTOMATED COUNT: 17.2 % (ref 11.5–14.5)
GLOBULIN SER CALC-MCNC: 4.8 G/DL (ref 2–4)
GLUCOSE SERPL-MCNC: 112 MG/DL (ref 65–100)
HCT VFR BLD AUTO: 43.9 % (ref 36.6–50.3)
HDLC SERPL-MCNC: 43 MG/DL
HDLC SERPL: 2.7 (ref 0–5)
HGB BLD-MCNC: 13.6 G/DL (ref 12.1–17)
IMM GRANULOCYTES # BLD AUTO: 0 K/UL (ref 0–0.04)
IMM GRANULOCYTES NFR BLD AUTO: 1 % (ref 0–0.5)
LDLC SERPL CALC-MCNC: 54.2 MG/DL (ref 0–100)
LYMPHOCYTES # BLD: 2.8 K/UL (ref 0.8–3.5)
LYMPHOCYTES NFR BLD: 41 % (ref 12–49)
MCH RBC QN AUTO: 23.2 PG (ref 26–34)
MCHC RBC AUTO-ENTMCNC: 31 G/DL (ref 30–36.5)
MCV RBC AUTO: 75 FL (ref 80–99)
MONOCYTES # BLD: 0.9 K/UL (ref 0–1)
MONOCYTES NFR BLD: 13 % (ref 5–13)
NEUTS SEG # BLD: 2.9 K/UL (ref 1.8–8)
NEUTS SEG NFR BLD: 43 % (ref 32–75)
NRBC # BLD: 0 K/UL (ref 0–0.01)
NRBC BLD-RTO: 0 PER 100 WBC
PLATELET # BLD AUTO: 361 K/UL (ref 150–400)
PMV BLD AUTO: 10.2 FL (ref 8.9–12.9)
POTASSIUM SERPL-SCNC: 4.2 MMOL/L (ref 3.5–5.1)
PROT SERPL-MCNC: 8.9 G/DL (ref 6.4–8.2)
PSA SERPL-MCNC: 0.5 NG/ML (ref 0.01–4)
RBC # BLD AUTO: 5.85 M/UL (ref 4.1–5.7)
SODIUM SERPL-SCNC: 132 MMOL/L (ref 136–145)
TRIGL SERPL-MCNC: 94 MG/DL
VLDLC SERPL CALC-MCNC: 18.8 MG/DL
WBC # BLD AUTO: 6.9 K/UL (ref 4.1–11.1)

## 2023-08-23 LAB
EST. AVERAGE GLUCOSE BLD GHB EST-MCNC: 131 MG/DL
HBA1C MFR BLD: 6.2 % (ref 4–5.6)

## 2023-09-13 NOTE — TELEPHONE ENCOUNTER
[phone] Patient is requesting a refill on Farxiga 10 mg and Potassium CL 20 MEQ ER. [facility].

## 2023-09-14 RX ORDER — DAPAGLIFLOZIN 10 MG/1
10 TABLET, FILM COATED ORAL DAILY
Qty: 90 TABLET | Refills: 2 | Status: SHIPPED | OUTPATIENT
Start: 2023-09-14

## 2023-09-14 RX ORDER — POTASSIUM CHLORIDE 20 MEQ/1
20 TABLET, EXTENDED RELEASE ORAL 2 TIMES DAILY
Qty: 180 TABLET | Refills: 2 | Status: SHIPPED | OUTPATIENT
Start: 2023-09-14

## 2023-09-19 ENCOUNTER — HOSPITAL ENCOUNTER (OUTPATIENT)
Facility: HOSPITAL | Age: 68
Discharge: HOME | End: 2023-09-19
Attending: INTERNAL MEDICINE | Admitting: INTERNAL MEDICINE
Payer: MEDICARE

## 2023-09-19 VITALS
DIASTOLIC BLOOD PRESSURE: 103 MMHG | BODY MASS INDEX: 27.28 KG/M2 | WEIGHT: 180 LBS | TEMPERATURE: 98 F | HEIGHT: 68 IN | HEART RATE: 86 BPM | OXYGEN SATURATION: 98 % | RESPIRATION RATE: 24 BRPM | SYSTOLIC BLOOD PRESSURE: 141 MMHG

## 2023-09-19 DIAGNOSIS — I25.118 ATHSCL HEART DISEASE OF NATIVE COR ART W OTH ANG PCTRS (HCC): ICD-10-CM

## 2023-09-19 LAB
ECHO BSA: 1.98 M2
GLUCOSE BLD STRIP.AUTO-MCNC: 189 MG/DL (ref 65–117)
SERVICE CMNT-IMP: ABNORMAL

## 2023-09-19 PROCEDURE — 6360000004 HC RX CONTRAST MEDICATION: Performed by: INTERNAL MEDICINE

## 2023-09-19 PROCEDURE — 2709999900 HC NON-CHARGEABLE SUPPLY: Performed by: INTERNAL MEDICINE

## 2023-09-19 PROCEDURE — 99152 MOD SED SAME PHYS/QHP 5/>YRS: CPT | Performed by: INTERNAL MEDICINE

## 2023-09-19 PROCEDURE — C1894 INTRO/SHEATH, NON-LASER: HCPCS | Performed by: INTERNAL MEDICINE

## 2023-09-19 PROCEDURE — 99153 MOD SED SAME PHYS/QHP EA: CPT | Performed by: INTERNAL MEDICINE

## 2023-09-19 PROCEDURE — 6360000002 HC RX W HCPCS: Performed by: INTERNAL MEDICINE

## 2023-09-19 PROCEDURE — C1725 CATH, TRANSLUMIN NON-LASER: HCPCS | Performed by: INTERNAL MEDICINE

## 2023-09-19 PROCEDURE — 2500000003 HC RX 250 WO HCPCS: Performed by: INTERNAL MEDICINE

## 2023-09-19 PROCEDURE — 93458 L HRT ARTERY/VENTRICLE ANGIO: CPT | Performed by: INTERNAL MEDICINE

## 2023-09-19 PROCEDURE — 82962 GLUCOSE BLOOD TEST: CPT

## 2023-09-19 PROCEDURE — C1769 GUIDE WIRE: HCPCS | Performed by: INTERNAL MEDICINE

## 2023-09-19 RX ORDER — HEPARIN SODIUM 10000 [USP'U]/ML
INJECTION, SOLUTION INTRAVENOUS; SUBCUTANEOUS PRN
Status: DISCONTINUED | OUTPATIENT
Start: 2023-09-19 | End: 2023-09-19 | Stop reason: HOSPADM

## 2023-09-19 RX ORDER — LIDOCAINE HYDROCHLORIDE 10 MG/ML
INJECTION, SOLUTION INFILTRATION; PERINEURAL PRN
Status: DISCONTINUED | OUTPATIENT
Start: 2023-09-19 | End: 2023-09-19 | Stop reason: HOSPADM

## 2023-09-19 RX ORDER — ACETAMINOPHEN 325 MG/1
650 TABLET ORAL EVERY 4 HOURS PRN
OUTPATIENT
Start: 2023-09-19

## 2023-09-19 RX ORDER — FENTANYL CITRATE 50 UG/ML
INJECTION, SOLUTION INTRAMUSCULAR; INTRAVENOUS PRN
Status: DISCONTINUED | OUTPATIENT
Start: 2023-09-19 | End: 2023-09-19 | Stop reason: HOSPADM

## 2023-09-19 RX ORDER — HEPARIN SODIUM 1000 [USP'U]/ML
INJECTION, SOLUTION INTRAVENOUS; SUBCUTANEOUS PRN
Status: DISCONTINUED | OUTPATIENT
Start: 2023-09-19 | End: 2023-09-19 | Stop reason: HOSPADM

## 2023-09-19 RX ORDER — ISOSORBIDE MONONITRATE 30 MG/1
30 TABLET, EXTENDED RELEASE ORAL DAILY
COMMUNITY

## 2023-09-19 RX ORDER — VERAPAMIL HYDROCHLORIDE 2.5 MG/ML
INJECTION, SOLUTION INTRAVENOUS PRN
Status: DISCONTINUED | OUTPATIENT
Start: 2023-09-19 | End: 2023-09-19 | Stop reason: HOSPADM

## 2023-09-19 NOTE — PROGRESS NOTES
Cardiac Cath Lab Recovery Arrival Note:      Anna Taylor arrived to Cardiac Cath Lab, Recovery Area. Staff introduced to patient. Patient identifiers verified with NAME and DATE OF BIRTH. Procedure verified with patient. Consent forms reviewed and signed by patient or authorized representative and verified. Allergies verified. Patient and family oriented to department. Patient and family informed of procedure and plan of care. Questions answered with review. Patient prepped for procedure, per orders from physician, prior to arrival.    Patient on cardiac monitor, non-invasive blood pressure, SPO2 monitor. On RA. Patient is A&Ox 4. Patient reports no pain. Patient in stretcher, in low position, with side rails up, call bell within reach, patient instructed to call if assistance as needed. Patient prep in: [address] [place] [address] 3. Patient family has pager # n/a  Family in: [place].    Prep by: Ale Crane RN

## 2023-09-19 NOTE — CARDIO/PULMONARY
Chart reviewed: Patient is 79 y.o. 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

## 2023-09-19 NOTE — PROGRESS NOTES
Patent walked the radford of recovery area. No signs or symptoms of SOB or distress. I have reviewed discharge instructions with the patient. The patient verbalized understanding. Discharge medications reviewed with patient and appropriate educational materials regarding medications and side effects teaching were provided. Site care instructions reviewed with patient. Pain management teaching completed. Patient instructed to make follow up appointments per discharge instructions. Patient belongings packed up and accounted for with patient and family. All patient belongings sent home with patient. Telemetry monitor and wires removed      Patient signed discharge instructions after reviewing them, and duplicate copy placed in chart.

## 2023-11-01 RX ORDER — EZETIMIBE 10 MG/1
10 TABLET ORAL DAILY
Qty: 90 TABLET | Refills: 3 | OUTPATIENT
Start: 2023-11-01

## 2023-11-01 RX ORDER — METOPROLOL SUCCINATE 50 MG/1
TABLET, EXTENDED RELEASE ORAL
Qty: 135 TABLET | Refills: 3 | OUTPATIENT
Start: 2023-11-01

## 2023-11-01 RX ORDER — LOSARTAN POTASSIUM 50 MG/1
TABLET ORAL
Qty: 180 TABLET | Refills: 3 | OUTPATIENT
Start: 2023-11-01

## 2023-11-01 NOTE — TELEPHONE ENCOUNTER
Cozaar, Zetia and Glucophage were sent on 8/21/23 for a 90 d/s with 2 refills        For Pharmacy Admin Tracking Only    Program: Medication Refill  CPA in place:    Recommendation Provided To:    Intervention Detail: New Rx: 3, reason: Patient Preference  Intervention Accepted By:   Nancy Bars Closed?:    Time Spent (min): 5

## 2023-11-07 NOTE — TELEPHONE ENCOUNTER
Last appointment: 8/21/23  Next appointment: 2/21/24    Requested Prescriptions     Pending Prescriptions Disp Refills    metFORMIN (GLUCOPHAGE) 1000 MG tablet [Pharmacy Med Name: METFORMIN 1000MG TABLETS] 180 tablet 3     Sig: TAKE 1 TABLET BY MOUTH TWICE DAILY WITH MEALS         For Pharmacy Admin Tracking Only    Program: Medication Refill  CPA in place:    Recommendation Provided To:    Intervention Detail: New Rx: 1, reason: Patient Preference  Intervention Accepted By:   Vonda Price Closed?:    Time Spent (min): 5

## 2023-11-28 ENCOUNTER — TELEPHONE (OUTPATIENT)
Dept: PHARMACY | Facility: CLINIC | Age: 68
End: 2023-11-28

## 2023-11-28 NOTE — TELEPHONE ENCOUNTER
[facility] CLINICAL PHARMACY: ADHERENCE REVIEW  Identified care gap per [employer]: fills at [facility]: Diabetes adherence    Patient also appears to be prescribed: ACE/ARB and Statin    ASSESSMENT    ACE/ARB ADHERENCE    Insurance Records claims through  23  (Prior Year  [address] = not reported; YTD PDC = 100% - PASSED):   LOSARTAN POT TAB 25 MG last filled on 08.15.23 for 90 day supply. Next refill due: 23    Prescribed si tablet/capsule daily    Per Insurer Portal: last filled on 08.15.23 for 90 day supply. Per med list, pt is now taking LOSARTAN POT TAB 50 MG, Take 1 tablet by mouth 2 times daily. BP Readings from Last 3 Encounters:   23 (!) 141/103   23 130/77   22 118/80     Estimated Creatinine Clearance: 50 mL/min (A) (based on SCr of 1.4 mg/dL (H)). Lab Results   Component Value Date    CREATININE 1.40 (H) 2023     Lab Results   Component Value Date    K 4.2 2023     DIABETES ADHERENCE    Insurance Records claims through  23  (Prior Year  [address] = not reported; YTD [address] [place] [address] = 78%; Potential Fail Date: 23):   METFORMIN TAB 1000 MG last filled on 23 for 90 day supply. Next refill due: 23 -PAST DUE 25 DAYS     Prescribed si tablet/capsule twice daily    Per Insurer Portal: last filled on 23 for 90 day supply. Per [facility]: last picked up on 23 for 90 day supply. will get 90 day supply ready to  since past due. Billed through [place] ([facility] 3 refills remaining. Insurance Records claims through 23 (Prior Year  [address] = not reported; YTD [address] [place] [address] = 78%; Potential Fail Date: 23):   FARXIGA TAB 10 MG last filled on 08.15.23 for 30 day supply. Next refill due: 23 -PAST DUE 25 DAYS     Prescribed si tablet/capsule daily    Per Insurer Portal: last filled on 08.15.23 for 30 day supply. Per [facility]: last picked up on 08.15.23 for 30 day supply.  will get 30 day supply ready to  since

## 2024-02-14 ENCOUNTER — ENROLLMENT (OUTPATIENT)
Dept: PHARMACY | Facility: CLINIC | Age: 69
End: 2024-02-14

## 2024-03-27 ENCOUNTER — TELEPHONE (OUTPATIENT)
Dept: PHARMACY | Facility: CLINIC | Age: 69
End: 2024-03-27

## 2024-03-27 RX ORDER — ATORVASTATIN CALCIUM 80 MG/1
80 TABLET, FILM COATED ORAL DAILY
Qty: 90 TABLET | Refills: 2 | Status: SHIPPED | OUTPATIENT
Start: 2024-03-27

## 2024-03-27 NOTE — TELEPHONE ENCOUNTER
Last office visit with pcp 8/21/23.  Next office visit  with pcp NONE SCHEDULED. Appt reminder letter sent.

## 2024-03-27 NOTE — TELEPHONE ENCOUNTER
to  since past due.    Insurance Records claims through 24 (Prior Year PDC = 83% - PASSED; YTD PDC = 72%; Potential Fail Date: 24):   FARXIGA TAB 10 MG last filled on 24 for 30 day supply. Next refill due: 24    Prescribed si tablet/capsule daily    Per Insurer Portal: last filled on 24 for 30 day supply.     Per [facility]: last picked up on 24 for 30 day supply. Billed through [employer]. 2 refills remaining. will get 30 day supply ready to  since past due. Per [facility] do you roll[facility], Dapagliflozin 10 MG #30 has a $100 co pay. I informed them to bill FARXIGA TAB 10 MG #30, per [place], co pay is $47.00. She is going to have pharmacist change it to brand. [facility] , brand name is $47.00.    Lab Results   Component Value Date    LABA1C 6.2 (H) 2023    LABA1C 8.1 (H) 2022    LABA1C 7.2 (H) 2022       STATIN ADHERENCE    Insurance Records claims through  24  (Prior Year PDC = 100% - PASSED; YTD PDC = FIRST FILL;; Potential Fail Date: 24):   ATORVASTATIN TAB 80 MG last filled on 24 for 90 day supply. Next refill due: 24    Prescribed si tablet/capsule daily    Per Insurer Portal: last filled on 24 for 90 day supply.     Per [facility]: last picked up on 24 for 90 day supply. Billed through [employer]. 0 refills remaining.    Lab Results   Component Value Date    CHOL 116 2023    TRIG 94 2023    HDL 43 2023    LDLCALC 54.2 2023     ALT   Date Value Ref Range Status   2023 17 12 - 78 U/L Final     AST   Date Value Ref Range Status   2023 16 15 - 37 U/L Final     The ASCVD Risk score (Flower MOSER, et al., 2019) failed to calculate for the following reasons:    The valid total cholesterol range is 130 to 320 mg/dL     PLAN  The following are interventions that have been identified:   Patient overdue refilling LOSARTAN 50 MG, METFORMIN TAB 1000 MG & FARXIGA TAB 10 MG  and

## 2024-05-24 RX ORDER — BUMETANIDE 2 MG/1
2 TABLET ORAL 2 TIMES DAILY
Qty: 180 TABLET | Refills: 0 | Status: SHIPPED | OUTPATIENT
Start: 2024-05-24

## 2024-05-24 NOTE — TELEPHONE ENCOUNTER
Last appointment: 8/21/23  Next appointment: no show 3/19/24  Previous refill encounter(s): 8/21/23 #180 with 2 refills    Requested Prescriptions     Pending Prescriptions Disp Refills    bumetanide (BUMEX) 2 MG tablet [Pharmacy Med Name: BUMETANIDE 2MG TABLETS] 180 tablet 0     Sig: Take 1 tablet by mouth 2 times daily Patient needs an appointment for further refills         For Pharmacy Admin Tracking Only    Program: Medication Refill  CPA in place:    Recommendation Provided To:   Intervention Detail: New Rx: 1, reason: Patient Preference  Intervention Accepted By:   Gap Closed?:    Time Spent (min): 5  
No
Satisfactory

## 2024-07-11 NOTE — TELEPHONE ENCOUNTER
Madi Anand MD     Galdino Melissa has been flagged for Adherence by [place].     FARXIGA 10 MG last filled on 5/29/2024 for 30 / 30 day supply. Next refill due: 6/28/2024  0 refills reamin    Prescriber: Madi Anand MD   Pharmacy: [id number] [address] - P [phone] - F [phone]      I have pended refills for your approval . Please let me know if there is anything I can help with.    LOV:8/21/2023  NOV:NA     Thank you,  Lamar Hernandez, PharmD, BCACP  Population Health Pharmacist  [facility]   Department, toll free: [phone] Option 1    Requested Prescriptions     Pending Prescriptions Disp Refills    FARXIGA 10 MG tablet 90 tablet 0     Sig: Take 1 tablet by mouth daily        ================================================================================    POPULATION HEALTH CLINICAL PHARMACY: ADHERENCE REVIEW  Identified care gap per [place] fills with [facility]: Statin adherence    Last Visit:  8/21/2023  Next Visit: NA    Patient also appears to be prescribed:ACE/ARB and DIABETES  Medicare Advant[employer] - [facility]  [facility]    ASSESSMENT  ACE/ARB ADHERENCE    Insurance Records claims through 07/18/24  (Prior Year PDC = 100% - PASSED ; YTD PDC = 100%; Potential Fail Date: 11/18/2024):   LOSARTAN 50 MG last filled on 6/23/2024 for 180 / 90 day supply. Next refill due: 9/23/2024    Per [employer]  Medication List/ Patient profile/ Care everywhere (Epic):   1 refills remaining.  Picked up on 6/28/2024  BP Readings from Last 3 Encounters:   09/19/23 (!) 141/103   08/21/23 130/77   09/16/22 118/80      Lab Results   Component Value Date/Time    CREATININE 1.40 08/21/2023 12:14 PM    LABGLOM 55 08/21/2023 12:14 PM    LABGLOM 71 09/16/2022 12:00 AM    K 4.2 08/21/2023 12:14 PM       DIABETES ADHERENCE    Insurance Records claims through 07/18/24 (Prior Year PDC = 83% - PASSED ; YTD PDC = 87%; Potential Fail Date: 11/11/2024):   FARXIGA 10 MG

## 2024-07-18 RX ORDER — DAPAGLIFLOZIN 10 MG/1
10 TABLET, FILM COATED ORAL DAILY
Qty: 90 TABLET | Refills: 0 | Status: SHIPPED | OUTPATIENT
Start: 2024-07-18

## 2024-09-03 RX ORDER — BUMETANIDE 2 MG/1
2 TABLET ORAL 2 TIMES DAILY
Qty: 180 TABLET | Refills: 0 | Status: SHIPPED | OUTPATIENT
Start: 2024-09-03

## 2024-09-03 NOTE — TELEPHONE ENCOUNTER
Last office visit with pcp 8/21/23.  Next office visit  with pcp NONE SCHEDULED.  APPT LETTER SENT.

## 2024-09-17 RX ORDER — EZETIMIBE 10 MG/1
10 TABLET ORAL DAILY
Qty: 90 TABLET | Refills: 0 | Status: SHIPPED | OUTPATIENT
Start: 2024-09-17

## 2024-09-23 RX ORDER — LOSARTAN POTASSIUM 50 MG/1
50 TABLET ORAL 2 TIMES DAILY
Qty: 180 TABLET | Refills: 0 | Status: SHIPPED | OUTPATIENT
Start: 2024-09-23

## 2024-09-26 RX ORDER — POTASSIUM CHLORIDE 1500 MG/1
20 TABLET, EXTENDED RELEASE ORAL 2 TIMES DAILY
Qty: 180 TABLET | Refills: 0 | Status: SHIPPED | OUTPATIENT
Start: 2024-09-26

## 2024-10-15 ENCOUNTER — TELEPHONE (OUTPATIENT)
Dept: PHARMACY | Facility: CLINIC | Age: 69
End: 2024-10-15

## 2024-10-15 NOTE — TELEPHONE ENCOUNTER
[facility] CLINICAL PHARMACY: ADHERENCE REVIEW  [facility]    Last Visit:  8/21/2023  Next Visit: NA    Patient also appears to be prescribed:ACE/ARB and DIABETES  [facility]    ASSESSMENT  ACE/ARB ADHERENCE    Insurance Records claims through 10/15/24  (Prior Year PDC = 100% - PASSED ; YTD PDC = 100%; Potential Fail Date: 2025):     LOSARTAN 50 MG  last filled on 9/23/2024 for 180 / 90 day supply. Next refill due: 12/22/2024    Prescriber: AIDA NELSON   [x] SRINIVASAN [] Outside Provider     Per Medication List/ Patient profile/ Care everywhere (Epic):  0 refills remaining.    [] Crital fill [] CURRENT refill [] FUTURE refill [] RX CHANGE    BP Readings from Last 3 Encounters:   09/19/23 (!) 141/103   08/21/23 130/77   09/16/22 118/80      Lab Results   Component Value Date/Time    CREATININE 1.40 08/21/2023 12:14 PM    LABGLOM 55 08/21/2023 12:14 PM    LABGLOM 71 09/16/2022 12:00 AM    K 4.2 08/21/2023 12:14 PM       DIABETES ADHERENCE    Insurance Records claims through 10/15/24 (Prior Year PDC = 83% - PASSED ; YTD PDC = 91%; Potential Fail Date: 2025):     FARXIGA 10 MG last filled on 5/29/2024 for 30 / 30 day supply. Next refill due: 6/28/2024    METFORMIN 1000 MG  last filled on 9/22/2024 for 180 / 90 day supply. Next refill due: 12/21/2024    Prescriber: AIDA NELSON   [seymour] SRINIVASAN [] Outside Provider     Per Medication List/ Patient profile/ Care everywhere (Epic):  0 refills remaining.    [] Crital fill [] CURRENT refill [] FUTURE refill [] RX CHANGE []SUPD      Hemoglobin A1C   Date Value Ref Range Status   08/21/2023 6.2 (H) 4.0 - 5.6 % Final      NOTE: A1c <9%      STATIN ADHERENCE    Insurance Records claims through 10/15/24 (Prior Year PDC = 100% - PASSED ; YTD PDC = 75%; Potential Fail Date: 10/20/2024):     ATORVASTATIN 80 MG last filled on 7/18/2024 for 90 / 90 day supply. Next refill due:

## 2024-12-11 ENCOUNTER — OFFICE VISIT (OUTPATIENT)
Age: 69
End: 2024-12-11
Payer: MEDICARE

## 2024-12-11 VITALS
HEIGHT: 68 IN | DIASTOLIC BLOOD PRESSURE: 50 MMHG | OXYGEN SATURATION: 95 % | HEART RATE: 80 BPM | WEIGHT: 185.8 LBS | BODY MASS INDEX: 28.16 KG/M2 | TEMPERATURE: 97 F | RESPIRATION RATE: 16 BRPM | SYSTOLIC BLOOD PRESSURE: 110 MMHG

## 2024-12-11 DIAGNOSIS — L30.9 ECZEMA, UNSPECIFIED TYPE: ICD-10-CM

## 2024-12-11 DIAGNOSIS — I49.9 IRREGULAR HEART RATE: ICD-10-CM

## 2024-12-11 DIAGNOSIS — N18.31 TYPE 2 DIABETES MELLITUS WITH STAGE 3A CHRONIC KIDNEY DISEASE, WITHOUT LONG-TERM CURRENT USE OF INSULIN (HCC): ICD-10-CM

## 2024-12-11 DIAGNOSIS — I25.118 ATHSCL HEART DISEASE OF NATIVE COR ART W OTH ANG PCTRS (HCC): ICD-10-CM

## 2024-12-11 DIAGNOSIS — I50.23 ACUTE ON CHRONIC SYSTOLIC CONGESTIVE HEART FAILURE (HCC): ICD-10-CM

## 2024-12-11 DIAGNOSIS — Z00.00 ENCOUNTER FOR MEDICARE ANNUAL WELLNESS EXAM: Primary | ICD-10-CM

## 2024-12-11 DIAGNOSIS — I10 ESSENTIAL HYPERTENSION: ICD-10-CM

## 2024-12-11 DIAGNOSIS — E11.22 TYPE 2 DIABETES MELLITUS WITH STAGE 3A CHRONIC KIDNEY DISEASE, WITHOUT LONG-TERM CURRENT USE OF INSULIN (HCC): ICD-10-CM

## 2024-12-11 DIAGNOSIS — Z12.11 COLON CANCER SCREENING: ICD-10-CM

## 2024-12-11 DIAGNOSIS — N18.31 STAGE 3A CHRONIC KIDNEY DISEASE (HCC): ICD-10-CM

## 2024-12-11 DIAGNOSIS — E78.00 HYPERCHOLESTEROLEMIA: ICD-10-CM

## 2024-12-11 DIAGNOSIS — Z12.5 PROSTATE CANCER SCREENING: ICD-10-CM

## 2024-12-11 PROCEDURE — 93005 ELECTROCARDIOGRAM TRACING: CPT | Performed by: FAMILY MEDICINE

## 2024-12-11 PROCEDURE — 99213 OFFICE O/P EST LOW 20 MIN: CPT | Performed by: FAMILY MEDICINE

## 2024-12-11 RX ORDER — PREDNISONE 20 MG/1
TABLET ORAL
Qty: 10 TABLET | Refills: 0 | Status: SHIPPED | OUTPATIENT
Start: 2024-12-11

## 2024-12-11 RX ORDER — TRIAMCINOLONE ACETONIDE 1 MG/G
CREAM TOPICAL
Qty: 454 G | Refills: 2 | Status: SHIPPED | OUTPATIENT
Start: 2024-12-11

## 2024-12-11 SDOH — ECONOMIC STABILITY: FOOD INSECURITY: WITHIN THE PAST 12 MONTHS, YOU WORRIED THAT YOUR FOOD WOULD RUN OUT BEFORE YOU GOT MONEY TO BUY MORE.: NEVER TRUE

## 2024-12-11 SDOH — ECONOMIC STABILITY: INCOME INSECURITY: HOW HARD IS IT FOR YOU TO PAY FOR THE VERY BASICS LIKE FOOD, HOUSING, MEDICAL CARE, AND HEATING?: SOMEWHAT HARD

## 2024-12-11 SDOH — ECONOMIC STABILITY: FOOD INSECURITY: WITHIN THE PAST 12 MONTHS, THE FOOD YOU BOUGHT JUST DIDN'T LAST AND YOU DIDN'T HAVE MONEY TO GET MORE.: NEVER TRUE

## 2024-12-11 ASSESSMENT — LIFESTYLE VARIABLES
HOW OFTEN DURING THE LAST YEAR HAVE YOU BEEN UNABLE TO REMEMBER WHAT HAPPENED THE NIGHT BEFORE BECAUSE YOU HAD BEEN DRINKING: NEVER
HOW OFTEN DURING THE LAST YEAR HAVE YOU FOUND THAT YOU WERE NOT ABLE TO STOP DRINKING ONCE YOU HAD STARTED: NEVER
HOW OFTEN DURING THE LAST YEAR HAVE YOU NEEDED AN ALCOHOLIC DRINK FIRST THING IN THE MORNING TO GET YOURSELF GOING AFTER A NIGHT OF HEAVY DRINKING: NEVER
HAS A RELATIVE, FRIEND, DOCTOR, OR ANOTHER HEALTH PROFESSIONAL EXPRESSED CONCERN ABOUT YOUR DRINKING OR SUGGESTED YOU CUT DOWN: NO
HAS A RELATIVE, FRIEND, DOCTOR, OR ANOTHER HEALTH PROFESSIONAL EXPRESSED CONCERN ABOUT YOUR DRINKING OR SUGGESTED YOU CUT DOWN: NO
HOW OFTEN DURING THE LAST YEAR HAVE YOU BEEN UNABLE TO REMEMBER WHAT HAPPENED THE NIGHT BEFORE BECAUSE YOU HAD BEEN DRINKING: NEVER
HAVE YOU OR SOMEONE ELSE BEEN INJURED AS A RESULT OF YOUR DRINKING: NO
HOW OFTEN DURING THE LAST YEAR HAVE YOU FAILED TO DO WHAT WAS NORMALLY EXPECTED FROM YOU BECAUSE OF DRINKING: NEVER
HOW OFTEN DO YOU HAVE A DRINK CONTAINING ALCOHOL: 2-3 TIMES A WEEK
HOW OFTEN DURING THE LAST YEAR HAVE YOU NEEDED AN ALCOHOLIC DRINK FIRST THING IN THE MORNING TO GET YOURSELF GOING AFTER A NIGHT OF HEAVY DRINKING: NEVER
HOW OFTEN DURING THE LAST YEAR HAVE YOU FAILED TO DO WHAT WAS NORMALLY EXPECTED FROM YOU BECAUSE OF DRINKING: NEVER
HOW OFTEN DURING THE LAST YEAR HAVE YOU HAD A FEELING OF GUILT OR REMORSE AFTER DRINKING: NEVER
HOW OFTEN DURING THE LAST YEAR HAVE YOU HAD A FEELING OF GUILT OR REMORSE AFTER DRINKING: NEVER
HOW MANY STANDARD DRINKS CONTAINING ALCOHOL DO YOU HAVE ON A TYPICAL DAY: 1 OR 2
HOW MANY STANDARD DRINKS CONTAINING ALCOHOL DO YOU HAVE ON A TYPICAL DAY: 1 OR 2
HOW OFTEN DO YOU HAVE A DRINK CONTAINING ALCOHOL: 2-3 TIMES A WEEK
HOW OFTEN DURING THE LAST YEAR HAVE YOU FOUND THAT YOU WERE NOT ABLE TO STOP DRINKING ONCE YOU HAD STARTED: NEVER
HAVE YOU OR SOMEONE ELSE BEEN INJURED AS A RESULT OF YOUR DRINKING: NO

## 2024-12-11 ASSESSMENT — PATIENT HEALTH QUESTIONNAIRE - PHQ9
SUM OF ALL RESPONSES TO PHQ QUESTIONS 1-9: 0
SUM OF ALL RESPONSES TO PHQ9 QUESTIONS 1 & 2: 0
SUM OF ALL RESPONSES TO PHQ QUESTIONS 1-9: 0
1. LITTLE INTEREST OR PLEASURE IN DOING THINGS: NOT AT ALL
SUM OF ALL RESPONSES TO PHQ QUESTIONS 1-9: 0
2. FEELING DOWN, DEPRESSED OR HOPELESS: NOT AT ALL
SUM OF ALL RESPONSES TO PHQ QUESTIONS 1-9: 0

## 2024-12-11 NOTE — PROGRESS NOTES
Galdino Melissa (:  1955) is a 69 y.o. 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 Continue folowup with Dr. Walker.          Type 2 diabetes mellitus with stage 3a chronic kidney disease, without long-term current use of insulin (HCC)       Orders:    AMB POC HEMOGLOBIN A1C    AMB POC GLUCOSE BLOOD, BY GLUCOSE MONITORING DEVICE    Stage 3a chronic kidney disease (HCC)            Colon cancer screening       Orders:    Brittney Sharma MD, Gastroenterology, [place]    Hypercholesterolemia       Orders:    Lipid Panel; Future    Lipid Panel; Future    Lipid Panel    Lipid Panel    Prostate cancer screening       Orders:    PSA Screening; Future    PSA Screening    Irregular heart rate       Orders:    EKG 12 lead; Future    EKG 12 lead    Essential hypertension       Orders:    CBC with Auto Differential; Future    Comprehensive Metabolic Panel; Future    Comprehensive Metabolic Panel    CBC with Auto Differential    Eczema, unspecified type       Orders:    triamcinolone (KENALOG) 0.1 % cream; Apply topically 2 times daily.    predniSONE (DELTASONE) 20 MG tablet; 2 tabs po daily- days 1-4, then 1 tab po daily days 5-8    Encounter for Medicare annual wellness exam              No follow-ups on file.       Subjective   HPI has had a rash for several moths, mainly on margaret and legs, mildly pruritic at times. Breathing doing well. No chest pains. Has appt with cardiology in Feb. Needs blood pressure and cholesterol checked.   Also needs medicare wellness exam. Sees Dr. Walker. Needs colonoscopy. Declines all immunizations. Would want praneeth Christopher to be his mPOA if he became incapacitated.   Review of Systems   HENT:  Negative for hearing loss.    Neurological:

## 2024-12-11 NOTE — PROGRESS NOTES
Chief Complaint   Patient presents with    Medicare AWV       \"Have you been to the ER, urgent care clinic since your last visit?  Hospitalized since your last visit?\"    NO    “Have you seen or consulted any other health care providers outside of [facility] since your last visit?”    NO            Click Here for Release of Records Request       Vitals:    24 1136   BP: (!) 110/50   Pulse: 80   Resp: 16   Temp: 97 °F (36.1 °C)   SpO2: 95%      Health Maintenance Due   Topic Date Due    Pneumococcal 65+ years Vaccine (1 of 2 - PCV) Never done    Diabetic foot exam  Never done    Diabetic Alb to Cr ratio (uACR) test  Never done    Diabetic retinal exam  Never done    Hepatitis C screen  Never done    DTaP/Tdap/Td vaccine (1 - Tdap) Never done    Shingles vaccine (1 of 2) Never done    Respiratory Syncytial Virus (RSV) Pregnant or age 60 yrs+ (1 - Risk 60-74 years 1-dose series) Never done    A1C test (Diabetic or Prediabetic)  2024    Lipids  2024    GFR test (Diabetes, CKD 3-4, OR last GFR 15-59)  2024    Depression Screen  2025        The patient, Galdino Melissa, identity was verified by name and .

## 2024-12-12 LAB
ALBUMIN SERPL-MCNC: 3.6 G/DL (ref 3.5–5)
ALBUMIN/GLOB SERPL: 0.8 (ref 1.1–2.2)
ALP SERPL-CCNC: 93 U/L (ref 45–117)
ALT SERPL-CCNC: 10 U/L (ref 12–78)
ANION GAP SERPL CALC-SCNC: 7 MMOL/L (ref 2–12)
AST SERPL-CCNC: 15 U/L (ref 15–37)
BASOPHILS # BLD: 0.1 K/UL (ref 0–0.1)
BASOPHILS NFR BLD: 1 % (ref 0–1)
BILIRUB SERPL-MCNC: 1.1 MG/DL (ref 0.2–1)
BUN SERPL-MCNC: 16 MG/DL (ref 6–20)
BUN/CREAT SERPL: 11 (ref 12–20)
CALCIUM SERPL-MCNC: 9.4 MG/DL (ref 8.5–10.1)
CHLORIDE SERPL-SCNC: 104 MMOL/L (ref 97–108)
CHOLEST SERPL-MCNC: 72 MG/DL
CO2 SERPL-SCNC: 27 MMOL/L (ref 21–32)
CREAT SERPL-MCNC: 1.52 MG/DL (ref 0.7–1.3)
DIFFERENTIAL METHOD BLD: ABNORMAL
EOSINOPHIL # BLD: 1.1 K/UL (ref 0–0.4)
EOSINOPHIL NFR BLD: 12 % (ref 0–7)
ERYTHROCYTE [DISTWIDTH] IN BLOOD BY AUTOMATED COUNT: 18.8 % (ref 11.5–14.5)
GLOBULIN SER CALC-MCNC: 4.3 G/DL (ref 2–4)
GLUCOSE SERPL-MCNC: 98 MG/DL (ref 65–100)
HCT VFR BLD AUTO: 37.5 % (ref 36.6–50.3)
HDLC SERPL-MCNC: 31 MG/DL
HDLC SERPL: 2.3 (ref 0–5)
HGB BLD-MCNC: 11.7 G/DL (ref 12.1–17)
IMM GRANULOCYTES # BLD AUTO: 0 K/UL (ref 0–0.04)
IMM GRANULOCYTES NFR BLD AUTO: 0 % (ref 0–0.5)
LDLC SERPL CALC-MCNC: 27.8 MG/DL (ref 0–100)
LYMPHOCYTES # BLD: 2.4 K/UL (ref 0.8–3.5)
LYMPHOCYTES NFR BLD: 26 % (ref 12–49)
MCH RBC QN AUTO: 21.4 PG (ref 26–34)
MCHC RBC AUTO-ENTMCNC: 31.2 G/DL (ref 30–36.5)
MCV RBC AUTO: 68.7 FL (ref 80–99)
MONOCYTES # BLD: 1.3 K/UL (ref 0–1)
MONOCYTES NFR BLD: 14 % (ref 5–13)
NEUTS SEG # BLD: 4.5 K/UL (ref 1.8–8)
NEUTS SEG NFR BLD: 47 % (ref 32–75)
NRBC # BLD: 0 K/UL (ref 0–0.01)
NRBC BLD-RTO: 0 PER 100 WBC
PLATELET # BLD AUTO: 302 K/UL (ref 150–400)
PMV BLD AUTO: 10.1 FL (ref 8.9–12.9)
POTASSIUM SERPL-SCNC: 3.8 MMOL/L (ref 3.5–5.1)
PROT SERPL-MCNC: 7.9 G/DL (ref 6.4–8.2)
PSA SERPL-MCNC: 0.6 NG/ML (ref 0.01–4)
RBC # BLD AUTO: 5.46 M/UL (ref 4.1–5.7)
RBC MORPH BLD: ABNORMAL
SODIUM SERPL-SCNC: 138 MMOL/L (ref 136–145)
TRIGL SERPL-MCNC: 66 MG/DL
VLDLC SERPL CALC-MCNC: 13.2 MG/DL
WBC # BLD AUTO: 9.4 K/UL (ref 4.1–11.1)

## 2024-12-16 RX ORDER — EZETIMIBE 10 MG/1
10 TABLET ORAL DAILY
Qty: 90 TABLET | Refills: 3 | Status: SHIPPED | OUTPATIENT
Start: 2024-12-16

## 2024-12-23 RX ORDER — LOSARTAN POTASSIUM 50 MG/1
50 TABLET ORAL 2 TIMES DAILY
Qty: 180 TABLET | Refills: 3 | Status: SHIPPED | OUTPATIENT
Start: 2024-12-23

## 2024-12-23 NOTE — TELEPHONE ENCOUNTER
Last appointment: 12/11/24  Next appointment: 6/11/25  Previous refill encounter(s): 9/23/24 #180    Requested Prescriptions     Pending Prescriptions Disp Refills    losartan (COZAAR) 50 MG tablet [Pharmacy Med Name: LOSARTAN 50MG TABLETS] 180 tablet 3     Sig: TAKE 1 TABLET BY MOUTH TWICE DAILY         For Pharmacy Admin Tracking Only    Program: Medication Refill  CPA in place:    Recommendation Provided To:   Intervention Detail: New Rx: 1, reason: Patient Preference  Intervention Accepted By:   Gap Closed?:    Time Spent (min): 5

## 2025-01-20 ENCOUNTER — OFFICE VISIT (OUTPATIENT)
Age: 70
End: 2025-01-20
Payer: MEDICARE

## 2025-01-20 VITALS
HEIGHT: 68 IN | DIASTOLIC BLOOD PRESSURE: 73 MMHG | SYSTOLIC BLOOD PRESSURE: 102 MMHG | BODY MASS INDEX: 29.19 KG/M2 | WEIGHT: 192.6 LBS | RESPIRATION RATE: 16 BRPM

## 2025-01-20 DIAGNOSIS — I49.9 IRREGULAR HEART RATE: ICD-10-CM

## 2025-01-20 DIAGNOSIS — N18.31 TYPE 2 DIABETES MELLITUS WITH STAGE 3A CHRONIC KIDNEY DISEASE, WITHOUT LONG-TERM CURRENT USE OF INSULIN (HCC): ICD-10-CM

## 2025-01-20 DIAGNOSIS — E78.00 HYPERCHOLESTEROLEMIA: ICD-10-CM

## 2025-01-20 DIAGNOSIS — R06.02 SHORTNESS OF BREATH: ICD-10-CM

## 2025-01-20 DIAGNOSIS — N18.31 TYPE 2 DIABETES MELLITUS WITH STAGE 3A CHRONIC KIDNEY DISEASE, WITHOUT LONG-TERM CURRENT USE OF INSULIN (HCC): Primary | ICD-10-CM

## 2025-01-20 DIAGNOSIS — E11.22 TYPE 2 DIABETES MELLITUS WITH STAGE 3A CHRONIC KIDNEY DISEASE, WITHOUT LONG-TERM CURRENT USE OF INSULIN (HCC): ICD-10-CM

## 2025-01-20 DIAGNOSIS — E11.22 TYPE 2 DIABETES MELLITUS WITH STAGE 3A CHRONIC KIDNEY DISEASE, WITHOUT LONG-TERM CURRENT USE OF INSULIN (HCC): Primary | ICD-10-CM

## 2025-01-20 DIAGNOSIS — I48.3 TYPICAL ATRIAL FLUTTER (HCC): ICD-10-CM

## 2025-01-20 LAB
GLUCOSE, POC: 131 MG/DL
HBA1C MFR BLD: 7.1 %

## 2025-01-20 PROCEDURE — 99214 OFFICE O/P EST MOD 30 MIN: CPT | Performed by: FAMILY MEDICINE

## 2025-01-20 PROCEDURE — 83036 HEMOGLOBIN GLYCOSYLATED A1C: CPT | Performed by: FAMILY MEDICINE

## 2025-01-20 PROCEDURE — 82962 GLUCOSE BLOOD TEST: CPT | Performed by: FAMILY MEDICINE

## 2025-01-20 RX ORDER — BUMETANIDE 2 MG/1
2 TABLET ORAL 2 TIMES DAILY
Qty: 180 TABLET | Refills: 3 | Status: SHIPPED | OUTPATIENT
Start: 2025-01-20

## 2025-01-20 RX ORDER — SPIRONOLACTONE 25 MG/1
25 TABLET ORAL EVERY MORNING
Qty: 90 TABLET | Refills: 3 | Status: SHIPPED | OUTPATIENT
Start: 2025-01-20

## 2025-01-20 RX ORDER — POTASSIUM CHLORIDE 1500 MG/1
20 TABLET, EXTENDED RELEASE ORAL 2 TIMES DAILY
Qty: 180 TABLET | Refills: 3 | Status: SHIPPED | OUTPATIENT
Start: 2025-01-20

## 2025-01-20 RX ORDER — DAPAGLIFLOZIN 10 MG/1
10 TABLET, FILM COATED ORAL DAILY
Qty: 90 TABLET | Refills: 3 | Status: SHIPPED | OUTPATIENT
Start: 2025-01-20

## 2025-01-20 RX ORDER — METOPROLOL SUCCINATE 50 MG/1
50 TABLET, EXTENDED RELEASE ORAL DAILY
Qty: 90 TABLET | Refills: 3 | Status: SHIPPED | OUTPATIENT
Start: 2025-01-20

## 2025-01-20 SDOH — ECONOMIC STABILITY: FOOD INSECURITY: WITHIN THE PAST 12 MONTHS, YOU WORRIED THAT YOUR FOOD WOULD RUN OUT BEFORE YOU GOT MONEY TO BUY MORE.: NEVER TRUE

## 2025-01-20 SDOH — ECONOMIC STABILITY: FOOD INSECURITY: WITHIN THE PAST 12 MONTHS, THE FOOD YOU BOUGHT JUST DIDN'T LAST AND YOU DIDN'T HAVE MONEY TO GET MORE.: NEVER TRUE

## 2025-01-20 ASSESSMENT — LIFESTYLE VARIABLES
HOW OFTEN DURING THE LAST YEAR HAVE YOU FOUND THAT YOU WERE NOT ABLE TO STOP DRINKING ONCE YOU HAD STARTED: NEVER
HOW OFTEN DURING THE LAST YEAR HAVE YOU BEEN UNABLE TO REMEMBER WHAT HAPPENED THE NIGHT BEFORE BECAUSE YOU HAD BEEN DRINKING: NEVER
HAS A RELATIVE, FRIEND, DOCTOR, OR ANOTHER HEALTH PROFESSIONAL EXPRESSED CONCERN ABOUT YOUR DRINKING OR SUGGESTED YOU CUT DOWN: NO
HOW MANY STANDARD DRINKS CONTAINING ALCOHOL DO YOU HAVE ON A TYPICAL DAY: 1 OR 2
HAVE YOU OR SOMEONE ELSE BEEN INJURED AS A RESULT OF YOUR DRINKING: NO
HOW OFTEN DO YOU HAVE A DRINK CONTAINING ALCOHOL: 2-3 TIMES A WEEK
HOW OFTEN DURING THE LAST YEAR HAVE YOU NEEDED AN ALCOHOLIC DRINK FIRST THING IN THE MORNING TO GET YOURSELF GOING AFTER A NIGHT OF HEAVY DRINKING: NEVER
HOW OFTEN DURING THE LAST YEAR HAVE YOU HAD A FEELING OF GUILT OR REMORSE AFTER DRINKING: NEVER
HOW OFTEN DURING THE LAST YEAR HAVE YOU FAILED TO DO WHAT WAS NORMALLY EXPECTED FROM YOU BECAUSE OF DRINKING: NEVER

## 2025-01-20 ASSESSMENT — PATIENT HEALTH QUESTIONNAIRE - PHQ9
SUM OF ALL RESPONSES TO PHQ QUESTIONS 1-9: 0
SUM OF ALL RESPONSES TO PHQ QUESTIONS 1-9: 0
2. FEELING DOWN, DEPRESSED OR HOPELESS: NOT AT ALL
SUM OF ALL RESPONSES TO PHQ QUESTIONS 1-9: 0
1. LITTLE INTEREST OR PLEASURE IN DOING THINGS: NOT AT ALL
SUM OF ALL RESPONSES TO PHQ QUESTIONS 1-9: 0
SUM OF ALL RESPONSES TO PHQ9 QUESTIONS 1 & 2: 0

## 2025-01-20 NOTE — ASSESSMENT & PLAN NOTE
Orders:    AMB POC GLUCOSE BLOOD, BY GLUCOSE MONITORING DEVICE    AMB POC HEMOGLOBIN A1C    HM DIABETES FOOT EXAM    Albumin/Creatinine Ratio, Urine; Future    metFORMIN (GLUCOPHAGE) 1000 MG tablet; Take 1 tablet by mouth with breakfast and with evening meal    FARXIGA 10 MG tablet; Take 1 tablet by mouth daily For diabetes and heart.

## 2025-01-20 NOTE — PROGRESS NOTES
Chief Complaint   Patient presents with    Medicare AWV    Foot Swelling     Bilateral pedal edema       \"Have you been to the ER, urgent care clinic since your last visit?  Hospitalized since your last visit?\"        “Have you seen or consulted any other health care providers outside of [facility] since your last visit?”                Click Here for Release of Records Request       There were no vitals filed for this visit.   Health Maintenance Due   Topic Date Due    Pneumococcal 65+ years Vaccine (1 of 2 - PCV) Never done    Diabetic foot exam  Never done    Diabetic Alb to Cr ratio (uACR) test  Never done    Hepatitis C screen  Never done    DTaP/Tdap/Td vaccine (1 - Tdap) Never done    Shingles vaccine (1 of 2) Never done    Respiratory Syncytial Virus (RSV) Pregnant or age 60 yrs+ (1 - Risk 60-74 years 1-dose series) Never done    A1C test (Diabetic or Prediabetic)  2024    Annual Wellness Visit (Medicare Advantage)  2025        The patient, Galdino Melissa, identity was verified by name and .

## 2025-01-20 NOTE — PROGRESS NOTES
Galdino Melissa (:  1955) is a 70 y.o. male,Established patient, here for evaluation of the following chief complaint(s):  Medicare AWV and Foot Swelling (Bilateral pedal edema)         Assessment & Plan  Type 2 diabetes mellitus with stage 3a chronic kidney disease, without long-term current use of insulin (HCC)       Orders:    AMB POC GLUCOSE BLOOD, BY GLUCOSE MONITORING DEVICE    AMB POC HEMOGLOBIN A1C    HM DIABETES FOOT EXAM    Albumin/Creatinine Ratio, Urine; Future    metFORMIN (GLUCOPHAGE) 1000 MG tablet; Take 1 tablet by mouth with breakfast and with evening meal    FARXIGA 10 MG tablet; Take 1 tablet by mouth daily For diabetes and heart.    Irregular heart rate       Orders:    AMB POC EKG ROUTINE W/ 12 LEADS, SCREEN (-INITIAL PREV EXAM)    Comprehensive Metabolic Panel; Future    Hypercholesterolemia       Orders:    Lipid Panel; Future    Shortness of breath       Orders:    CBC with Auto Differential; Future    Typical atrial flutter (HCC)       Orders:    bumetanide (BUMEX) 2 MG tablet; Take 1 tablet by mouth 2 times daily For swelling in feet    metoprolol succinate (TOPROL XL) 50 MG extended release tablet; Take 1 tablet by mouth daily To slow heart rate down.    apixaban (ELIQUIS) 5 MG TABS tablet; Take 1 tablet by mouth 2 times daily To prevent stroke.    potassium chloride (KLOR-CON M) 20 MEQ extended release tablet; Take 1 tablet by mouth 2 times daily    spironolactone (ALDACTONE) 25 MG tablet; Take 1 tablet by mouth every morning    TOBIAS - Rickie Walker III, DO Cardiology, [place]      Return in about 4 weeks (around 2025).       Subjective   HPI Has been haviong swelling in both feet for the last 4 weeks. Gets tired walking thru house. No chest pains. No orthopnea PND. Also has insomnia. No chest tightness, cough, fever, chills, abdominal pain.     Review of Systems       Objective   Physical Exam  Cardiovascular:      Rate and Rhythm: Normal rate and regular

## 2025-01-20 NOTE — PROGRESS NOTES
Chief Complaint   Patient presents with    Medicare AWV    Foot Swelling     Bilateral pedal edema       \"Have you been to the ER, urgent care clinic since your last visit?  Hospitalized since your last visit?\"    NO    “Have you seen or consulted any other health care providers outside of [facility] since your last visit?”    NO            Click Here for Release of Records Request       Vitals:    25 1422   BP: 102/73   Resp: 16      Health Maintenance Due   Topic Date Due    Pneumococcal 65+ years Vaccine (1 of 2 - PCV) Never done    Diabetic foot exam  Never done    Diabetic Alb to Cr ratio (uACR) test  Never done    Hepatitis C screen  Never done    DTaP/Tdap/Td vaccine (1 - Tdap) Never done    Shingles vaccine (1 of 2) Never done    Respiratory Syncytial Virus (RSV) Pregnant or age 60 yrs+ (1 - Risk 60-74 years 1-dose series) Never done    A1C test (Diabetic or Prediabetic)  2024    Annual Wellness Visit (Medicare Advantage)  2025        The patient, Galdino Melissa, identity was verified by name and .

## 2025-01-21 LAB
ALBUMIN SERPL-MCNC: 3.5 G/DL (ref 3.5–5)
ALBUMIN/GLOB SERPL: 0.8 (ref 1.1–2.2)
ALP SERPL-CCNC: 110 U/L (ref 45–117)
ALT SERPL-CCNC: 34 U/L (ref 12–78)
ANION GAP SERPL CALC-SCNC: 14 MMOL/L (ref 2–12)
AST SERPL-CCNC: 33 U/L (ref 15–37)
BASOPHILS # BLD: 0.08 K/UL (ref 0–0.1)
BASOPHILS NFR BLD: 1.2 % (ref 0–1)
BILIRUB SERPL-MCNC: 2.7 MG/DL (ref 0.2–1)
BUN SERPL-MCNC: 43 MG/DL (ref 6–20)
BUN/CREAT SERPL: 16 (ref 12–20)
CALCIUM SERPL-MCNC: 9.4 MG/DL (ref 8.5–10.1)
CHLORIDE SERPL-SCNC: 95 MMOL/L (ref 97–108)
CHOLEST SERPL-MCNC: 57 MG/DL
CO2 SERPL-SCNC: 19 MMOL/L (ref 21–32)
CREAT SERPL-MCNC: 2.76 MG/DL (ref 0.7–1.3)
CREAT UR-MCNC: 74.6 MG/DL
DIFFERENTIAL METHOD BLD: ABNORMAL
EOSINOPHIL # BLD: 0.1 K/UL (ref 0–0.4)
EOSINOPHIL NFR BLD: 1.4 % (ref 0–7)
ERYTHROCYTE [DISTWIDTH] IN BLOOD BY AUTOMATED COUNT: 15.5 % (ref 11.5–14.5)
GLOBULIN SER CALC-MCNC: 4.6 G/DL (ref 2–4)
GLUCOSE SERPL-MCNC: 108 MG/DL (ref 65–100)
HCT VFR BLD AUTO: 35.7 % (ref 36.6–50.3)
HDLC SERPL-MCNC: 32 MG/DL
HDLC SERPL: 1.8 (ref 0–5)
HGB BLD-MCNC: 11.1 G/DL (ref 12.1–17)
IMM GRANULOCYTES # BLD AUTO: 0.03 K/UL (ref 0–0.04)
IMM GRANULOCYTES NFR BLD AUTO: 0.4 % (ref 0–0.5)
LDLC SERPL CALC-MCNC: 10.6 MG/DL (ref 0–100)
LYMPHOCYTES # BLD: 2.43 K/UL (ref 0.8–3.5)
LYMPHOCYTES NFR BLD: 35.2 % (ref 12–49)
MCH RBC QN AUTO: 19.4 PG (ref 26–34)
MCHC RBC AUTO-ENTMCNC: 31.1 G/DL (ref 30–36.5)
MCV RBC AUTO: 62.3 FL (ref 80–99)
MICROALBUMIN UR-MCNC: 4.26 MG/DL
MICROALBUMIN/CREAT UR-RTO: 57 MG/G (ref 0–30)
MONOCYTES # BLD: 1.33 K/UL (ref 0–1)
MONOCYTES NFR BLD: 19.3 % (ref 5–13)
NEUTS SEG # BLD: 2.93 K/UL (ref 1.8–8)
NEUTS SEG NFR BLD: 42.5 % (ref 32–75)
NRBC # BLD: 0.06 K/UL (ref 0–0.01)
NRBC BLD-RTO: 0.9 PER 100 WBC
PLATELET # BLD AUTO: 323 K/UL (ref 150–400)
PMV BLD AUTO: 10.4 FL (ref 8.9–12.9)
POTASSIUM SERPL-SCNC: 5.5 MMOL/L (ref 3.5–5.1)
PROT SERPL-MCNC: 8.1 G/DL (ref 6.4–8.2)
RBC # BLD AUTO: 5.73 M/UL (ref 4.1–5.7)
RBC MORPH BLD: ABNORMAL
SODIUM SERPL-SCNC: 128 MMOL/L (ref 136–145)
SPECIMEN HOLD: NORMAL
TRIGL SERPL-MCNC: 72 MG/DL
VLDLC SERPL CALC-MCNC: 14.4 MG/DL
WBC # BLD AUTO: 6.9 K/UL (ref 4.1–11.1)

## 2025-01-28 ENCOUNTER — TELEPHONE (OUTPATIENT)
Age: 70
End: 2025-01-28

## 2025-01-28 NOTE — TELEPHONE ENCOUNTER
Pc with pt. To dc spironolactone and potassium, recheck in 3 weeks. Will repeat bmp and also check iron profile and ferritin.

## 2025-02-18 PROBLEM — I50.23 ACUTE ON CHRONIC SYSTOLIC CONGESTIVE HEART FAILURE (HCC): Status: RESOLVED | Noted: 2024-12-11 | Resolved: 2025-02-18

## 2025-02-20 ENCOUNTER — APPOINTMENT (OUTPATIENT)
Facility: HOSPITAL | Age: 70
DRG: 291 | End: 2025-02-20
Payer: MEDICARE

## 2025-02-20 ENCOUNTER — HOSPITAL ENCOUNTER (INPATIENT)
Facility: HOSPITAL | Age: 70
LOS: 10 days | Discharge: ANOTHER HOSPITAL | DRG: 291 | End: 2025-03-02
Attending: STUDENT IN AN ORGANIZED HEALTH CARE EDUCATION/TRAINING PROGRAM | Admitting: STUDENT IN AN ORGANIZED HEALTH CARE EDUCATION/TRAINING PROGRAM
Payer: MEDICARE

## 2025-02-20 ENCOUNTER — OFFICE VISIT (OUTPATIENT)
Age: 70
End: 2025-02-20
Payer: MEDICARE

## 2025-02-20 VITALS
HEIGHT: 68 IN | WEIGHT: 204 LBS | DIASTOLIC BLOOD PRESSURE: 73 MMHG | RESPIRATION RATE: 16 BRPM | OXYGEN SATURATION: 90 % | BODY MASS INDEX: 30.92 KG/M2 | HEART RATE: 98 BPM | SYSTOLIC BLOOD PRESSURE: 110 MMHG

## 2025-02-20 DIAGNOSIS — E11.59 TYPE 2 DIABETES MELLITUS WITH OTHER CIRCULATORY COMPLICATION, WITHOUT LONG-TERM CURRENT USE OF INSULIN (HCC): ICD-10-CM

## 2025-02-20 DIAGNOSIS — I48.92 ATRIAL FLUTTER, UNSPECIFIED TYPE (HCC): ICD-10-CM

## 2025-02-20 DIAGNOSIS — N17.9 SEPSIS WITH ACUTE RENAL FAILURE AND SEPTIC SHOCK, DUE TO UNSPECIFIED ORGANISM, UNSPECIFIED ACUTE RENAL FAILURE TYPE (HCC): ICD-10-CM

## 2025-02-20 DIAGNOSIS — I25.5 ISCHEMIC CARDIOMYOPATHY: ICD-10-CM

## 2025-02-20 DIAGNOSIS — R65.21 SEPSIS WITH ACUTE RENAL FAILURE AND SEPTIC SHOCK, DUE TO UNSPECIFIED ORGANISM, UNSPECIFIED ACUTE RENAL FAILURE TYPE (HCC): ICD-10-CM

## 2025-02-20 DIAGNOSIS — N18.31 STAGE 3A CHRONIC KIDNEY DISEASE (HCC): ICD-10-CM

## 2025-02-20 DIAGNOSIS — R00.0 TACHYCARDIA: ICD-10-CM

## 2025-02-20 DIAGNOSIS — A41.9 SEPSIS WITH ACUTE RENAL FAILURE AND SEPTIC SHOCK, DUE TO UNSPECIFIED ORGANISM, UNSPECIFIED ACUTE RENAL FAILURE TYPE (HCC): ICD-10-CM

## 2025-02-20 DIAGNOSIS — R07.9 CHEST PAIN, UNSPECIFIED TYPE: ICD-10-CM

## 2025-02-20 DIAGNOSIS — I50.9 ACUTE ON CHRONIC CONGESTIVE HEART FAILURE, UNSPECIFIED HEART FAILURE TYPE (HCC): Primary | ICD-10-CM

## 2025-02-20 DIAGNOSIS — I50.23 ACUTE ON CHRONIC SYSTOLIC CONGESTIVE HEART FAILURE (HCC): Primary | ICD-10-CM

## 2025-02-20 PROBLEM — I50.20 HFREF (HEART FAILURE WITH REDUCED EJECTION FRACTION) (HCC): Status: ACTIVE | Noted: 2025-02-20

## 2025-02-20 LAB
ALBUMIN SERPL-MCNC: 3.4 G/DL (ref 3.5–5)
ALBUMIN/GLOB SERPL: 0.7 (ref 1.1–2.2)
ALP SERPL-CCNC: 96 U/L (ref 45–117)
ALT SERPL-CCNC: 24 U/L (ref 12–78)
ANION GAP SERPL CALC-SCNC: 12 MMOL/L (ref 2–12)
AST SERPL-CCNC: 30 U/L (ref 15–37)
BASOPHILS # BLD: 0.08 K/UL (ref 0–0.1)
BASOPHILS NFR BLD: 1.1 % (ref 0–1)
BILIRUB SERPL-MCNC: 3.1 MG/DL (ref 0.2–1)
BUN SERPL-MCNC: 49 MG/DL (ref 6–20)
BUN/CREAT SERPL: 17 (ref 12–20)
CALCIUM SERPL-MCNC: 9 MG/DL (ref 8.5–10.1)
CHLORIDE SERPL-SCNC: 92 MMOL/L (ref 97–108)
CO2 SERPL-SCNC: 23 MMOL/L (ref 21–32)
CREAT SERPL-MCNC: 2.95 MG/DL (ref 0.7–1.3)
DIFFERENTIAL METHOD BLD: ABNORMAL
EOSINOPHIL # BLD: 0.11 K/UL (ref 0–0.4)
EOSINOPHIL NFR BLD: 1.5 % (ref 0–7)
ERYTHROCYTE [DISTWIDTH] IN BLOOD BY AUTOMATED COUNT: 17.7 % (ref 11.5–14.5)
GLOBULIN SER CALC-MCNC: 4.8 G/DL (ref 2–4)
GLUCOSE BLD STRIP.AUTO-MCNC: 153 MG/DL (ref 65–117)
GLUCOSE SERPL-MCNC: 112 MG/DL (ref 65–100)
HCT VFR BLD AUTO: 33.8 % (ref 36.6–50.3)
HGB BLD-MCNC: 10.6 G/DL (ref 12.1–17)
IMM GRANULOCYTES # BLD AUTO: 0.05 K/UL (ref 0–0.04)
IMM GRANULOCYTES NFR BLD AUTO: 0.7 % (ref 0–0.5)
LYMPHOCYTES # BLD: 2.39 K/UL (ref 0.8–3.5)
LYMPHOCYTES NFR BLD: 31.9 % (ref 12–49)
MCH RBC QN AUTO: 18.3 PG (ref 26–34)
MCHC RBC AUTO-ENTMCNC: 31.4 G/DL (ref 30–36.5)
MCV RBC AUTO: 58.3 FL (ref 80–99)
MONOCYTES # BLD: 1.59 K/UL (ref 0–1)
MONOCYTES NFR BLD: 21.2 % (ref 5–13)
NEUTS SEG # BLD: 3.27 K/UL (ref 1.8–8)
NEUTS SEG NFR BLD: 43.6 % (ref 32–75)
NRBC # BLD: 0.09 K/UL (ref 0–0.01)
NRBC BLD-RTO: 1.2 PER 100 WBC
NT PRO BNP: 8504 PG/ML
PLATELET # BLD AUTO: 248 K/UL (ref 150–400)
POTASSIUM SERPL-SCNC: 5.2 MMOL/L (ref 3.5–5.1)
PROT SERPL-MCNC: 8.2 G/DL (ref 6.4–8.2)
RBC # BLD AUTO: 5.8 M/UL (ref 4.1–5.7)
RBC MORPH BLD: ABNORMAL
SERVICE CMNT-IMP: ABNORMAL
SODIUM SERPL-SCNC: 127 MMOL/L (ref 136–145)
TROPONIN I SERPL HS-MCNC: 43 NG/L (ref 0–76)
WBC # BLD AUTO: 7.5 K/UL (ref 4.1–11.1)

## 2025-02-20 PROCEDURE — 3051F HG A1C>EQUAL 7.0%<8.0%: CPT | Performed by: FAMILY MEDICINE

## 2025-02-20 PROCEDURE — 36415 COLL VENOUS BLD VENIPUNCTURE: CPT

## 2025-02-20 PROCEDURE — 85025 COMPLETE CBC W/AUTO DIFF WBC: CPT

## 2025-02-20 PROCEDURE — 84484 ASSAY OF TROPONIN QUANT: CPT

## 2025-02-20 PROCEDURE — 99214 OFFICE O/P EST MOD 30 MIN: CPT | Performed by: FAMILY MEDICINE

## 2025-02-20 PROCEDURE — 3074F SYST BP LT 130 MM HG: CPT | Performed by: FAMILY MEDICINE

## 2025-02-20 PROCEDURE — 1100000003 HC PRIVATE W/ TELEMETRY

## 2025-02-20 PROCEDURE — 3078F DIAST BP <80 MM HG: CPT | Performed by: FAMILY MEDICINE

## 2025-02-20 PROCEDURE — 1123F ACP DISCUSS/DSCN MKR DOCD: CPT | Performed by: FAMILY MEDICINE

## 2025-02-20 PROCEDURE — 93005 ELECTROCARDIOGRAM TRACING: CPT | Performed by: STUDENT IN AN ORGANIZED HEALTH CARE EDUCATION/TRAINING PROGRAM

## 2025-02-20 PROCEDURE — 87636 SARSCOV2 & INF A&B AMP PRB: CPT

## 2025-02-20 PROCEDURE — 83880 ASSAY OF NATRIURETIC PEPTIDE: CPT

## 2025-02-20 PROCEDURE — 80053 COMPREHEN METABOLIC PANEL: CPT

## 2025-02-20 PROCEDURE — 1159F MED LIST DOCD IN RCRD: CPT | Performed by: FAMILY MEDICINE

## 2025-02-20 PROCEDURE — 71046 X-RAY EXAM CHEST 2 VIEWS: CPT

## 2025-02-20 PROCEDURE — 99285 EMERGENCY DEPT VISIT HI MDM: CPT

## 2025-02-20 PROCEDURE — 82962 GLUCOSE BLOOD TEST: CPT

## 2025-02-20 RX ORDER — SODIUM CHLORIDE 0.9 % (FLUSH) 0.9 %
5-40 SYRINGE (ML) INJECTION PRN
Status: DISCONTINUED | OUTPATIENT
Start: 2025-02-20 | End: 2025-03-02 | Stop reason: HOSPADM

## 2025-02-20 RX ORDER — ATORVASTATIN CALCIUM 40 MG/1
80 TABLET, FILM COATED ORAL DAILY
Status: DISCONTINUED | OUTPATIENT
Start: 2025-02-21 | End: 2025-02-21

## 2025-02-20 RX ORDER — SPIRONOLACTONE 25 MG/1
25 TABLET ORAL DAILY
Status: DISCONTINUED | OUTPATIENT
Start: 2025-02-21 | End: 2025-03-02 | Stop reason: HOSPADM

## 2025-02-20 RX ORDER — CALCIUM CARBONATE 500 MG/1
500 TABLET, CHEWABLE ORAL 3 TIMES DAILY PRN
Status: DISCONTINUED | OUTPATIENT
Start: 2025-02-20 | End: 2025-03-02 | Stop reason: HOSPADM

## 2025-02-20 RX ORDER — SODIUM CHLORIDE 9 MG/ML
INJECTION, SOLUTION INTRAVENOUS PRN
Status: DISCONTINUED | OUTPATIENT
Start: 2025-02-20 | End: 2025-03-02 | Stop reason: HOSPADM

## 2025-02-20 RX ORDER — BUMETANIDE 0.25 MG/ML
1 INJECTION, SOLUTION INTRAMUSCULAR; INTRAVENOUS 2 TIMES DAILY
Status: DISCONTINUED | OUTPATIENT
Start: 2025-02-21 | End: 2025-02-21

## 2025-02-20 RX ORDER — ASPIRIN 81 MG/1
81 TABLET, CHEWABLE ORAL DAILY
Status: DISCONTINUED | OUTPATIENT
Start: 2025-02-21 | End: 2025-03-02 | Stop reason: HOSPADM

## 2025-02-20 RX ORDER — METOPROLOL SUCCINATE 50 MG/1
50 TABLET, EXTENDED RELEASE ORAL DAILY
Status: DISCONTINUED | OUTPATIENT
Start: 2025-02-21 | End: 2025-03-02 | Stop reason: HOSPADM

## 2025-02-20 RX ORDER — ONDANSETRON 4 MG/1
4 TABLET, ORALLY DISINTEGRATING ORAL EVERY 8 HOURS PRN
Status: DISCONTINUED | OUTPATIENT
Start: 2025-02-20 | End: 2025-03-02 | Stop reason: HOSPADM

## 2025-02-20 RX ORDER — ACETAMINOPHEN 650 MG/1
650 SUPPOSITORY RECTAL EVERY 6 HOURS PRN
Status: DISCONTINUED | OUTPATIENT
Start: 2025-02-20 | End: 2025-03-02 | Stop reason: HOSPADM

## 2025-02-20 RX ORDER — BUMETANIDE 0.25 MG/ML
1 INJECTION, SOLUTION INTRAMUSCULAR; INTRAVENOUS ONCE
Status: COMPLETED | OUTPATIENT
Start: 2025-02-20 | End: 2025-02-21

## 2025-02-20 RX ORDER — BUMETANIDE 1 MG/1
2 TABLET ORAL 2 TIMES DAILY
Status: DISCONTINUED | OUTPATIENT
Start: 2025-02-20 | End: 2025-02-23

## 2025-02-20 RX ORDER — EZETIMIBE 10 MG/1
10 TABLET ORAL DAILY
Status: DISCONTINUED | OUTPATIENT
Start: 2025-02-21 | End: 2025-03-02 | Stop reason: HOSPADM

## 2025-02-20 RX ORDER — ACETAMINOPHEN 325 MG/1
650 TABLET ORAL EVERY 6 HOURS PRN
Status: DISCONTINUED | OUTPATIENT
Start: 2025-02-20 | End: 2025-02-21 | Stop reason: SDUPTHER

## 2025-02-20 RX ORDER — POLYETHYLENE GLYCOL 3350 17 G/17G
17 POWDER, FOR SOLUTION ORAL DAILY PRN
Status: DISCONTINUED | OUTPATIENT
Start: 2025-02-20 | End: 2025-03-02 | Stop reason: HOSPADM

## 2025-02-20 RX ORDER — SODIUM CHLORIDE 0.9 % (FLUSH) 0.9 %
5-40 SYRINGE (ML) INJECTION EVERY 12 HOURS SCHEDULED
Status: DISCONTINUED | OUTPATIENT
Start: 2025-02-20 | End: 2025-03-02 | Stop reason: HOSPADM

## 2025-02-20 RX ORDER — DEXTROSE MONOHYDRATE 100 MG/ML
INJECTION, SOLUTION INTRAVENOUS CONTINUOUS PRN
Status: DISCONTINUED | OUTPATIENT
Start: 2025-02-20 | End: 2025-03-02 | Stop reason: HOSPADM

## 2025-02-20 RX ORDER — TRIAMCINOLONE ACETONIDE 1 MG/G
CREAM TOPICAL 2 TIMES DAILY
Status: DISCONTINUED | OUTPATIENT
Start: 2025-02-20 | End: 2025-03-02 | Stop reason: HOSPADM

## 2025-02-20 RX ORDER — TRAMADOL HYDROCHLORIDE 50 MG/1
50 TABLET ORAL EVERY 6 HOURS PRN
Status: DISCONTINUED | OUTPATIENT
Start: 2025-02-20 | End: 2025-03-02 | Stop reason: HOSPADM

## 2025-02-20 RX ORDER — LOSARTAN POTASSIUM 25 MG/1
50 TABLET ORAL 2 TIMES DAILY
Status: DISCONTINUED | OUTPATIENT
Start: 2025-02-20 | End: 2025-03-02 | Stop reason: HOSPADM

## 2025-02-20 RX ORDER — ACETAMINOPHEN 325 MG/1
650 TABLET ORAL EVERY 6 HOURS PRN
Status: DISCONTINUED | OUTPATIENT
Start: 2025-02-20 | End: 2025-03-02 | Stop reason: HOSPADM

## 2025-02-20 RX ORDER — INSULIN LISPRO 100 [IU]/ML
0-8 INJECTION, SOLUTION INTRAVENOUS; SUBCUTANEOUS
Status: DISCONTINUED | OUTPATIENT
Start: 2025-02-20 | End: 2025-02-25

## 2025-02-20 RX ORDER — ISOSORBIDE MONONITRATE 30 MG/1
30 TABLET, EXTENDED RELEASE ORAL DAILY
Status: DISCONTINUED | OUTPATIENT
Start: 2025-02-21 | End: 2025-03-02 | Stop reason: HOSPADM

## 2025-02-20 RX ORDER — POTASSIUM CHLORIDE 1500 MG/1
20 TABLET, EXTENDED RELEASE ORAL DAILY
Status: DISCONTINUED | OUTPATIENT
Start: 2025-02-21 | End: 2025-02-28

## 2025-02-20 RX ORDER — ONDANSETRON 2 MG/ML
4 INJECTION INTRAMUSCULAR; INTRAVENOUS EVERY 6 HOURS PRN
Status: DISCONTINUED | OUTPATIENT
Start: 2025-02-20 | End: 2025-03-02 | Stop reason: HOSPADM

## 2025-02-20 RX ORDER — GLUCAGON 1 MG/ML
1 KIT INJECTION PRN
Status: DISCONTINUED | OUTPATIENT
Start: 2025-02-20 | End: 2025-03-02 | Stop reason: HOSPADM

## 2025-02-20 ASSESSMENT — PAIN - FUNCTIONAL ASSESSMENT: PAIN_FUNCTIONAL_ASSESSMENT: NONE - DENIES PAIN

## 2025-02-20 ASSESSMENT — PAIN SCALES - GENERAL: PAINLEVEL_OUTOF10: 0

## 2025-02-20 NOTE — PROGRESS NOTES
Chief Complaint   Patient presents with    Follow-up     4 week follow up       \"Have you been to the ER, urgent care clinic since your last visit?  Hospitalized since your last visit?\"    NO    “Have you seen or consulted any other health care providers outside of [facility] since your last visit?”    NO            Click Here for Release of Records Request       Vitals:    25 1542   BP: 110/73   Pulse: 98   Resp: 16   SpO2: 90%      Health Maintenance Due   Topic Date Due    Hepatitis C screen  Never done    DTaP/Tdap/Td vaccine (1 - Tdap) Never done    Pneumococcal 50+ years Vaccine (1 of 2 - PCV) Never done    Shingles vaccine (1 of 2) Never done    Respiratory Syncytial Virus (RSV) Pregnant or age 60 yrs+ (1 - Risk 60-74 years 1-dose series) Never done        The patient, Galdino Melissa, identity was verified by name and .

## 2025-02-20 NOTE — PROGRESS NOTES
Galdino Melissa (:  1955) is a 70 y.o. male,Established patient, here for evaluation of the following chief complaint(s):  Follow-up (4 week follow up)         Assessment & Plan  Acute on chronic systolic congestive heart failure (HCC)    Pt referred to ER. I think he needs admission.          Ischemic cardiomyopathy            Atrial flutter, unspecified type (HCC)            Stage 3a chronic kidney disease (HCC)            Type 2 diabetes mellitus with other circulatory complication, without long-term current use of insulin (HCC)              No follow-ups on file.       Subjective   HPI in for followup. Was last seen 4 weeks ago  with some dyspnea and increasing pedal edema.. Has been on Bumex 2 mg bid- some increased voiding, but swellinhg in legs hasn't gone down. Has appt with Dr. Walker . Wakes up at night short of breath, sits on side of bed. If lies back down gets short of breath again.also gets short of breath walking thru house.   Currently on bumex 4 mg daily and has gained 12 lbs in last month on this dose. Has gained 19 lbs since December. Also on losartan 50 mg /day, farxiga 10mg daily, metoprolol xl 50 mg daily. Has hx of ischemic cardiomyopathy with EF of 20-35%. Followed by  in the past. No chest pain, cough, fever, chills.     Review of Systems       Objective   Physical Exam  Cardiovascular:      Rate and Rhythm: Normal rate and regular rhythm.      Heart sounds: Normal heart sounds. No murmur heard.     Comments: Pulse 100. Systolic bp 100.   Pulmonary:      Effort: Pulmonary effort is normal.      Breath sounds: Normal breath sounds.      Comments: Rales R base  Abdominal:      General: Abdomen is flat. Bowel sounds are normal.      Palpations: Abdomen is soft.   Musculoskeletal:      Right lower leg: No edema.      Left lower leg: No edema.      Comments: 2-3 + pedal edema to the  knees.                   An electronic signature was used to authenticate this

## 2025-02-21 ENCOUNTER — APPOINTMENT (OUTPATIENT)
Facility: HOSPITAL | Age: 70
DRG: 291 | End: 2025-02-21
Payer: MEDICARE

## 2025-02-21 LAB
25(OH)D3 SERPL-MCNC: <9 NG/ML (ref 30–100)
ANION GAP SERPL CALC-SCNC: 12 MMOL/L (ref 2–12)
APPEARANCE UR: CLEAR
BACTERIA URNS QL MICRO: NEGATIVE /HPF
BASOPHILS # BLD: 0.06 K/UL (ref 0–0.1)
BASOPHILS NFR BLD: 1 % (ref 0–1)
BILIRUB UR QL: NEGATIVE
BUN SERPL-MCNC: 49 MG/DL (ref 6–20)
BUN/CREAT SERPL: 18 (ref 12–20)
CALCIUM SERPL-MCNC: 8.8 MG/DL (ref 8.5–10.1)
CHLORIDE SERPL-SCNC: 94 MMOL/L (ref 97–108)
CHOLEST SERPL-MCNC: <50 MG/DL
CO2 SERPL-SCNC: 22 MMOL/L (ref 21–32)
COLOR UR: ABNORMAL
CREAT SERPL-MCNC: 2.72 MG/DL (ref 0.7–1.3)
DIFFERENTIAL METHOD BLD: ABNORMAL
EOSINOPHIL # BLD: 0.16 K/UL (ref 0–0.4)
EOSINOPHIL NFR BLD: 2.7 % (ref 0–7)
EPITH CASTS URNS QL MICRO: ABNORMAL /LPF
ERYTHROCYTE [DISTWIDTH] IN BLOOD BY AUTOMATED COUNT: 16.8 % (ref 11.5–14.5)
EST. AVERAGE GLUCOSE BLD GHB EST-MCNC: 180 MG/DL
FLUAV RNA SPEC QL NAA+PROBE: NOT DETECTED
FLUBV RNA SPEC QL NAA+PROBE: NOT DETECTED
GLUCOSE BLD STRIP.AUTO-MCNC: 113 MG/DL (ref 65–117)
GLUCOSE BLD STRIP.AUTO-MCNC: 124 MG/DL (ref 65–117)
GLUCOSE BLD STRIP.AUTO-MCNC: 125 MG/DL (ref 65–117)
GLUCOSE SERPL-MCNC: 99 MG/DL (ref 65–100)
GLUCOSE UR STRIP.AUTO-MCNC: 250 MG/DL
HBA1C MFR BLD: 7.9 % (ref 4–5.6)
HCT VFR BLD AUTO: 29.7 % (ref 36.6–50.3)
HDLC SERPL-MCNC: 27 MG/DL
HDLC SERPL: NORMAL (ref 0–5)
HGB BLD-MCNC: 9.5 G/DL (ref 12.1–17)
HGB UR QL STRIP: NEGATIVE
HYALINE CASTS URNS QL MICRO: ABNORMAL /LPF (ref 0–5)
IMM GRANULOCYTES # BLD AUTO: 0.04 K/UL (ref 0–0.04)
IMM GRANULOCYTES NFR BLD AUTO: 0.7 % (ref 0–0.5)
KETONES UR QL STRIP.AUTO: NEGATIVE MG/DL
LDLC SERPL CALC-MCNC: NORMAL MG/DL (ref 0–100)
LEUKOCYTE ESTERASE UR QL STRIP.AUTO: NEGATIVE
LYMPHOCYTES # BLD: 1.85 K/UL (ref 0.8–3.5)
LYMPHOCYTES NFR BLD: 31.4 % (ref 12–49)
MCH RBC QN AUTO: 18.4 PG (ref 26–34)
MCHC RBC AUTO-ENTMCNC: 32 G/DL (ref 30–36.5)
MCV RBC AUTO: 57.7 FL (ref 80–99)
MONOCYTES # BLD: 0.88 K/UL (ref 0–1)
MONOCYTES NFR BLD: 14.9 % (ref 5–13)
NEUTS SEG # BLD: 2.91 K/UL (ref 1.8–8)
NEUTS SEG NFR BLD: 49.3 % (ref 32–75)
NITRITE UR QL STRIP.AUTO: NEGATIVE
NRBC # BLD: 0.05 K/UL (ref 0–0.01)
NRBC BLD-RTO: 0.8 PER 100 WBC
PH UR STRIP: 5 (ref 5–8)
PLATELET # BLD AUTO: 215 K/UL (ref 150–400)
POTASSIUM SERPL-SCNC: 4.4 MMOL/L (ref 3.5–5.1)
PROT UR STRIP-MCNC: NEGATIVE MG/DL
RBC # BLD AUTO: 5.15 M/UL (ref 4.1–5.7)
RBC #/AREA URNS HPF: ABNORMAL /HPF (ref 0–5)
RBC MORPH BLD: ABNORMAL
SARS-COV-2 RNA RESP QL NAA+PROBE: NOT DETECTED
SERVICE CMNT-IMP: ABNORMAL
SERVICE CMNT-IMP: ABNORMAL
SERVICE CMNT-IMP: NORMAL
SODIUM SERPL-SCNC: 128 MMOL/L (ref 136–145)
SOURCE: NORMAL
SP GR UR REFRACTOMETRY: 1.01
T4 FREE SERPL-MCNC: 1.9 NG/DL (ref 0.8–1.5)
TRIGL SERPL-MCNC: 62 MG/DL
TSH SERPL DL<=0.05 MIU/L-ACNC: 1.44 UIU/ML (ref 0.36–3.74)
URINE CULTURE IF INDICATED: ABNORMAL
UROBILINOGEN UR QL STRIP.AUTO: 1 EU/DL (ref 0.2–1)
VIT B12 SERPL-MCNC: 1286 PG/ML (ref 193–986)
VLDLC SERPL CALC-MCNC: NORMAL MG/DL
WBC # BLD AUTO: 5.9 K/UL (ref 4.1–11.1)
WBC URNS QL MICRO: ABNORMAL /HPF (ref 0–4)

## 2025-02-21 PROCEDURE — 6360000002 HC RX W HCPCS: Performed by: INTERNAL MEDICINE

## 2025-02-21 PROCEDURE — 6360000002 HC RX W HCPCS: Performed by: NURSE PRACTITIONER

## 2025-02-21 PROCEDURE — 6370000000 HC RX 637 (ALT 250 FOR IP): Performed by: STUDENT IN AN ORGANIZED HEALTH CARE EDUCATION/TRAINING PROGRAM

## 2025-02-21 PROCEDURE — 1100000003 HC PRIVATE W/ TELEMETRY

## 2025-02-21 PROCEDURE — 80048 BASIC METABOLIC PNL TOTAL CA: CPT

## 2025-02-21 PROCEDURE — 2500000003 HC RX 250 WO HCPCS: Performed by: NURSE PRACTITIONER

## 2025-02-21 PROCEDURE — 76770 US EXAM ABDO BACK WALL COMP: CPT

## 2025-02-21 PROCEDURE — 83036 HEMOGLOBIN GLYCOSYLATED A1C: CPT

## 2025-02-21 PROCEDURE — 81001 URINALYSIS AUTO W/SCOPE: CPT

## 2025-02-21 PROCEDURE — 84439 ASSAY OF FREE THYROXINE: CPT

## 2025-02-21 PROCEDURE — 80061 LIPID PANEL: CPT

## 2025-02-21 PROCEDURE — 36415 COLL VENOUS BLD VENIPUNCTURE: CPT

## 2025-02-21 PROCEDURE — 51798 US URINE CAPACITY MEASURE: CPT

## 2025-02-21 PROCEDURE — 82962 GLUCOSE BLOOD TEST: CPT

## 2025-02-21 PROCEDURE — 82607 VITAMIN B-12: CPT

## 2025-02-21 PROCEDURE — 84443 ASSAY THYROID STIM HORMONE: CPT

## 2025-02-21 PROCEDURE — 6370000000 HC RX 637 (ALT 250 FOR IP): Performed by: NURSE PRACTITIONER

## 2025-02-21 PROCEDURE — 82306 VITAMIN D 25 HYDROXY: CPT

## 2025-02-21 PROCEDURE — 85025 COMPLETE CBC W/AUTO DIFF WBC: CPT

## 2025-02-21 RX ORDER — ATORVASTATIN CALCIUM 40 MG/1
80 TABLET, FILM COATED ORAL NIGHTLY
Status: DISCONTINUED | OUTPATIENT
Start: 2025-02-21 | End: 2025-03-02 | Stop reason: HOSPADM

## 2025-02-21 RX ORDER — BUMETANIDE 0.25 MG/ML
2 INJECTION, SOLUTION INTRAMUSCULAR; INTRAVENOUS 2 TIMES DAILY
Status: DISCONTINUED | OUTPATIENT
Start: 2025-02-21 | End: 2025-02-23

## 2025-02-21 RX ADMIN — APIXABAN 5 MG: 5 TABLET, FILM COATED ORAL at 01:43

## 2025-02-21 RX ADMIN — EZETIMIBE 10 MG: 10 TABLET ORAL at 09:13

## 2025-02-21 RX ADMIN — ATORVASTATIN CALCIUM 80 MG: 40 TABLET, FILM COATED ORAL at 20:30

## 2025-02-21 RX ADMIN — APIXABAN 5 MG: 5 TABLET, FILM COATED ORAL at 09:13

## 2025-02-21 RX ADMIN — SODIUM CHLORIDE, PRESERVATIVE FREE 10 ML: 5 INJECTION INTRAVENOUS at 20:30

## 2025-02-21 RX ADMIN — BUMETANIDE 1 MG: 0.25 INJECTION INTRAMUSCULAR; INTRAVENOUS at 09:14

## 2025-02-21 RX ADMIN — ASPIRIN 81 MG: 81 TABLET, CHEWABLE ORAL at 09:13

## 2025-02-21 RX ADMIN — METOPROLOL SUCCINATE 50 MG: 50 TABLET, EXTENDED RELEASE ORAL at 09:13

## 2025-02-21 RX ADMIN — BUMETANIDE 1 MG: 0.25 INJECTION INTRAMUSCULAR; INTRAVENOUS at 01:43

## 2025-02-21 RX ADMIN — BUMETANIDE 2 MG: 0.25 INJECTION INTRAMUSCULAR; INTRAVENOUS at 17:10

## 2025-02-21 RX ADMIN — SODIUM CHLORIDE, PRESERVATIVE FREE 10 ML: 5 INJECTION INTRAVENOUS at 01:47

## 2025-02-21 RX ADMIN — APIXABAN 5 MG: 5 TABLET, FILM COATED ORAL at 20:30

## 2025-02-21 RX ADMIN — SODIUM CHLORIDE, PRESERVATIVE FREE 10 ML: 5 INJECTION INTRAVENOUS at 09:17

## 2025-02-21 RX ADMIN — ISOSORBIDE MONONITRATE 30 MG: 30 TABLET, EXTENDED RELEASE ORAL at 09:13

## 2025-02-21 ASSESSMENT — PAIN SCALES - GENERAL
PAINLEVEL_OUTOF10: 0
PAINLEVEL_OUTOF10: 0

## 2025-02-21 ASSESSMENT — LIFESTYLE VARIABLES
HOW MANY STANDARD DRINKS CONTAINING ALCOHOL DO YOU HAVE ON A TYPICAL DAY: 1 OR 2
HOW OFTEN DO YOU HAVE A DRINK CONTAINING ALCOHOL: 2-3 TIMES A WEEK

## 2025-02-21 ASSESSMENT — PAIN - FUNCTIONAL ASSESSMENT: PAIN_FUNCTIONAL_ASSESSMENT: 0-10

## 2025-02-21 NOTE — ED NOTES
TRANSFER - OUT REPORT:    Verbal SBAR report given to David BAILEY on Galdino Melissa being transferred to [facility]326 for continuation of care.     Report consisted of patient's Situation, Background, Assessment and Recommendations(SBAR). Opportunity for questions and clarification were provided. This RN noted orders that still needed completion.     Information from the following report(s) Nurse Handoff Report, Index, ED Encounter Summary, ED SBAR, Adult Overview, Surgery Report, Intake/Output, MAR, Recent Results, Quality Measures, Neuro Assessment, and Event Log was reviewed with the receiving nurse.     [place] Fall Assessment:    Presents to emergency department  because of falls (Syncope, seizure, or loss of consciousness): No  Age > 70: No  Altered Mental Status, Intoxication with alcohol or substance confusion (Disorientation, impaired judgment, poor safety awaremess, or inability to follow instructions): No  Impaired Mobility: Ambulates or transfers with assistive devices or assistance; Unable to ambulate or transer.: No  Nursing Judgement: No          Lines:   Peripheral IV 02/20/25 Left Antecubital (Active)   Site Assessment Clean, dry & intact 02/20/25 1751   Line Status Blood return noted;Flushed;Specimen collected 02/20/25 1751   Line Care Connections checked and tightened;Line pulled back 02/20/25 1751   Phlebitis Assessment No symptoms 02/20/25 1751   Infiltration Assessment 0 02/20/25 1751   Alcohol Cap Used No 02/20/25 1751   Dressing Status New dressing applied;Clean, dry & intact 02/20/25 1751   Dressing Type Transparent 02/20/25 1751   Dressing Intervention New 02/20/25 1751          Patient transported with:  Monitor      Vitals:    02/21/25 1100   BP: 104/74   Pulse: 88   Resp: 19   Temp:    SpO2: 98%

## 2025-02-21 NOTE — ED NOTES
This RN assumed care of pt at this time. Received SBAR report from Becca BAILEY.     Pt lying comfortably in bed, bed locked & in lowest position, call bell & urinal w/in reach, attached to continuous monitoring x3 and emergency equipment at bedside. Pt A&Ox4 and reports no complaints att aside from requesting another blanket in which was provided to pt.

## 2025-02-21 NOTE — H&P
Hospitalist Admission Note    NAME:   Galdino Melissa   : 1955   MRN: [id number]     Date/Time: 2025 10:40 PM    Patient PCP: Madi Anand MD    ______________________________________________________________________  Given the patient's current clinical presentation, I have a high level of concern for decompensation if discharged from the emergency department.  Complex decision making was performed, which includes reviewing the patient's available past medical records, laboratory results, and x-ray films.       My assessment of this patient's clinical condition and my plan of care is as follows.    Assessment / Plan:    Heart failure exacerbation  HFrEF   SOB   EDWIGE on CKD  Cardiorenal syndrome     CXR    IMPRESSION:  No acute intrathoracic process.    Chemistry - pro BNP 8, 504  -Received Bumex IV x 1 in ED  -Admitting sodium 127, creatinine 2.95 baseline 1.5  -Troponin 43  --> repeat  -Hematology unremarkable  -Test for COVID influenza/AB  -consult cardiology   -consult nephrology   -strict I/O  -daily wts (standing scale )  -consult cardiac navigator / cardiac rehab     HTN  T2DM  CKD stage III  Vitals per routine  Resume Imdur  Resume metoprolol  Resume Bumex  -hold home p.o. Bumex for now  -Hold Cozaar  -Hold Aldactone  -hold Jardiance/ Substituted for Farxiga  -Hold metformin  -Initiate sliding scale insulin/hypoglycemia protocol  Check A1c      Hyperlipidemia  -Apixaban ordered for stroke prevention-newly added per PCP  -Resume Lipitor  -Resume aspirin  -Resume Zetia  -Check lipid panel    Medical Decision Making:   I personally reviewed labs: y  I personally reviewed imaging:y  I personally reviewed EKG:y  Toxic drug monitoring: y  Discussed case with: ED provider. After discussion I am in agreement that acuity of patient's medical condition necessitates hospital stay.      Code Status: full code  DVT Prophylaxis: heparin  Baseline: lives independently    Subjective:   CHIEF COMPLAINT: I was

## 2025-02-22 LAB
ALBUMIN SERPL-MCNC: 3.1 G/DL (ref 3.5–5)
ANION GAP SERPL CALC-SCNC: 13 MMOL/L (ref 2–12)
BASOPHILS # BLD: 0.05 K/UL (ref 0–0.1)
BASOPHILS NFR BLD: 0.8 % (ref 0–1)
BUN SERPL-MCNC: 57 MG/DL (ref 6–20)
BUN/CREAT SERPL: 21 (ref 12–20)
CALCIUM SERPL-MCNC: 8.9 MG/DL (ref 8.5–10.1)
CHLORIDE SERPL-SCNC: 93 MMOL/L (ref 97–108)
CO2 SERPL-SCNC: 22 MMOL/L (ref 21–32)
CREAT SERPL-MCNC: 2.73 MG/DL (ref 0.7–1.3)
DIFFERENTIAL METHOD BLD: ABNORMAL
EKG ATRIAL RATE: 106 BPM
EKG DIAGNOSIS: NORMAL
EKG P AXIS: 33 DEGREES
EKG P-R INTERVAL: 178 MS
EKG Q-T INTERVAL: 332 MS
EKG QRS DURATION: 78 MS
EKG QTC CALCULATION (BAZETT): 441 MS
EKG R AXIS: 121 DEGREES
EKG T AXIS: -6 DEGREES
EKG VENTRICULAR RATE: 106 BPM
EOSINOPHIL # BLD: 0.06 K/UL (ref 0–0.4)
EOSINOPHIL NFR BLD: 0.9 % (ref 0–7)
ERYTHROCYTE [DISTWIDTH] IN BLOOD BY AUTOMATED COUNT: 17.5 % (ref 11.5–14.5)
GLUCOSE BLD STRIP.AUTO-MCNC: 107 MG/DL (ref 65–117)
GLUCOSE BLD STRIP.AUTO-MCNC: 114 MG/DL (ref 65–117)
GLUCOSE BLD STRIP.AUTO-MCNC: 129 MG/DL (ref 65–117)
GLUCOSE BLD STRIP.AUTO-MCNC: 141 MG/DL (ref 65–117)
GLUCOSE BLD STRIP.AUTO-MCNC: 95 MG/DL (ref 65–117)
GLUCOSE SERPL-MCNC: 93 MG/DL (ref 65–100)
HCT VFR BLD AUTO: 32.6 % (ref 36.6–50.3)
HGB BLD-MCNC: 10.2 G/DL (ref 12.1–17)
IMM GRANULOCYTES # BLD AUTO: 0.04 K/UL (ref 0–0.04)
IMM GRANULOCYTES NFR BLD AUTO: 0.6 % (ref 0–0.5)
LYMPHOCYTES # BLD: 2.11 K/UL (ref 0.8–3.5)
LYMPHOCYTES NFR BLD: 32.9 % (ref 12–49)
MCH RBC QN AUTO: 18.1 PG (ref 26–34)
MCHC RBC AUTO-ENTMCNC: 31.3 G/DL (ref 30–36.5)
MCV RBC AUTO: 57.9 FL (ref 80–99)
MONOCYTES # BLD: 1.07 K/UL (ref 0–1)
MONOCYTES NFR BLD: 16.7 % (ref 5–13)
NEUTS SEG # BLD: 3.07 K/UL (ref 1.8–8)
NEUTS SEG NFR BLD: 48.1 % (ref 32–75)
NRBC # BLD: 0.08 K/UL (ref 0–0.01)
NRBC BLD-RTO: 1.3 PER 100 WBC
PHOSPHATE SERPL-MCNC: 5.2 MG/DL (ref 2.6–4.7)
PLATELET # BLD AUTO: 252 K/UL (ref 150–400)
PMV BLD AUTO: 10.2 FL (ref 8.9–12.9)
POTASSIUM SERPL-SCNC: 4.9 MMOL/L (ref 3.5–5.1)
RBC # BLD AUTO: 5.63 M/UL (ref 4.1–5.7)
RBC MORPH BLD: ABNORMAL
SERVICE CMNT-IMP: ABNORMAL
SERVICE CMNT-IMP: ABNORMAL
SERVICE CMNT-IMP: NORMAL
SODIUM SERPL-SCNC: 128 MMOL/L (ref 136–145)
WBC # BLD AUTO: 6.4 K/UL (ref 4.1–11.1)

## 2025-02-22 PROCEDURE — 82962 GLUCOSE BLOOD TEST: CPT

## 2025-02-22 PROCEDURE — 6370000000 HC RX 637 (ALT 250 FOR IP): Performed by: INTERNAL MEDICINE

## 2025-02-22 PROCEDURE — 80069 RENAL FUNCTION PANEL: CPT

## 2025-02-22 PROCEDURE — 1100000003 HC PRIVATE W/ TELEMETRY

## 2025-02-22 PROCEDURE — 6370000000 HC RX 637 (ALT 250 FOR IP): Performed by: STUDENT IN AN ORGANIZED HEALTH CARE EDUCATION/TRAINING PROGRAM

## 2025-02-22 PROCEDURE — 36415 COLL VENOUS BLD VENIPUNCTURE: CPT

## 2025-02-22 PROCEDURE — 6360000002 HC RX W HCPCS: Performed by: INTERNAL MEDICINE

## 2025-02-22 PROCEDURE — 2500000003 HC RX 250 WO HCPCS: Performed by: NURSE PRACTITIONER

## 2025-02-22 PROCEDURE — 6370000000 HC RX 637 (ALT 250 FOR IP): Performed by: NURSE PRACTITIONER

## 2025-02-22 PROCEDURE — 85025 COMPLETE CBC W/AUTO DIFF WBC: CPT

## 2025-02-22 RX ADMIN — APIXABAN 5 MG: 5 TABLET, FILM COATED ORAL at 09:42

## 2025-02-22 RX ADMIN — METOPROLOL SUCCINATE 50 MG: 50 TABLET, EXTENDED RELEASE ORAL at 09:42

## 2025-02-22 RX ADMIN — EZETIMIBE 10 MG: 10 TABLET ORAL at 09:42

## 2025-02-22 RX ADMIN — ASPIRIN 81 MG: 81 TABLET, CHEWABLE ORAL at 09:42

## 2025-02-22 RX ADMIN — ATORVASTATIN CALCIUM 80 MG: 40 TABLET, FILM COATED ORAL at 21:12

## 2025-02-22 RX ADMIN — SODIUM CHLORIDE, PRESERVATIVE FREE 10 ML: 5 INJECTION INTRAVENOUS at 21:13

## 2025-02-22 RX ADMIN — ISOSORBIDE MONONITRATE 30 MG: 30 TABLET, EXTENDED RELEASE ORAL at 09:42

## 2025-02-22 RX ADMIN — EMPAGLIFLOZIN 10 MG: 10 TABLET, FILM COATED ORAL at 09:42

## 2025-02-22 RX ADMIN — SODIUM CHLORIDE, PRESERVATIVE FREE 10 ML: 5 INJECTION INTRAVENOUS at 09:43

## 2025-02-22 RX ADMIN — APIXABAN 5 MG: 5 TABLET, FILM COATED ORAL at 21:12

## 2025-02-22 RX ADMIN — BUMETANIDE 2 MG: 0.25 INJECTION INTRAMUSCULAR; INTRAVENOUS at 09:42

## 2025-02-22 RX ADMIN — TRIAMCINOLONE ACETONIDE: 1 CREAM TOPICAL at 21:13

## 2025-02-22 RX ADMIN — BUMETANIDE 2 MG: 0.25 INJECTION INTRAMUSCULAR; INTRAVENOUS at 17:55

## 2025-02-23 LAB
ALBUMIN SERPL-MCNC: 3.2 G/DL (ref 3.5–5)
ANION GAP SERPL CALC-SCNC: 11 MMOL/L (ref 2–12)
BASOPHILS # BLD: 0.06 K/UL (ref 0–0.1)
BASOPHILS NFR BLD: 0.9 % (ref 0–1)
BUN SERPL-MCNC: 60 MG/DL (ref 6–20)
BUN/CREAT SERPL: 21 (ref 12–20)
CALCIUM SERPL-MCNC: 8.8 MG/DL (ref 8.5–10.1)
CHLORIDE SERPL-SCNC: 91 MMOL/L (ref 97–108)
CO2 SERPL-SCNC: 24 MMOL/L (ref 21–32)
CREAT SERPL-MCNC: 2.84 MG/DL (ref 0.7–1.3)
DIFFERENTIAL METHOD BLD: ABNORMAL
EOSINOPHIL # BLD: 0.07 K/UL (ref 0–0.4)
EOSINOPHIL NFR BLD: 1.1 % (ref 0–7)
ERYTHROCYTE [DISTWIDTH] IN BLOOD BY AUTOMATED COUNT: 17.8 % (ref 11.5–14.5)
GLUCOSE BLD STRIP.AUTO-MCNC: 105 MG/DL (ref 65–117)
GLUCOSE BLD STRIP.AUTO-MCNC: 130 MG/DL (ref 65–117)
GLUCOSE BLD STRIP.AUTO-MCNC: 144 MG/DL (ref 65–117)
GLUCOSE BLD STRIP.AUTO-MCNC: 156 MG/DL (ref 65–117)
GLUCOSE SERPL-MCNC: 104 MG/DL (ref 65–100)
HCT VFR BLD AUTO: 32.4 % (ref 36.6–50.3)
HGB BLD-MCNC: 10 G/DL (ref 12.1–17)
IMM GRANULOCYTES # BLD AUTO: 0.05 K/UL (ref 0–0.04)
IMM GRANULOCYTES NFR BLD AUTO: 0.8 % (ref 0–0.5)
LYMPHOCYTES # BLD: 2.14 K/UL (ref 0.8–3.5)
LYMPHOCYTES NFR BLD: 32.4 % (ref 12–49)
MCH RBC QN AUTO: 17.8 PG (ref 26–34)
MCHC RBC AUTO-ENTMCNC: 30.9 G/DL (ref 30–36.5)
MCV RBC AUTO: 57.5 FL (ref 80–99)
MONOCYTES # BLD: 1.19 K/UL (ref 0–1)
MONOCYTES NFR BLD: 18 % (ref 5–13)
NEUTS SEG # BLD: 3.09 K/UL (ref 1.8–8)
NEUTS SEG NFR BLD: 46.8 % (ref 32–75)
NRBC # BLD: 0.16 K/UL (ref 0–0.01)
NRBC BLD-RTO: 2.4 PER 100 WBC
OSMOLALITY SERPL: 298 MOSM/KG H2O
PHOSPHATE SERPL-MCNC: 5.5 MG/DL (ref 2.6–4.7)
PLATELET # BLD AUTO: 222 K/UL (ref 150–400)
POTASSIUM SERPL-SCNC: 4.4 MMOL/L (ref 3.5–5.1)
RBC # BLD AUTO: 5.63 M/UL (ref 4.1–5.7)
RBC MORPH BLD: ABNORMAL
SERVICE CMNT-IMP: ABNORMAL
SERVICE CMNT-IMP: NORMAL
SODIUM SERPL-SCNC: 126 MMOL/L (ref 136–145)
SODIUM SERPL-SCNC: 126 MMOL/L (ref 136–145)
SODIUM SERPL-SCNC: 128 MMOL/L (ref 136–145)
WBC # BLD AUTO: 6.6 K/UL (ref 4.1–11.1)

## 2025-02-23 PROCEDURE — 84295 ASSAY OF SERUM SODIUM: CPT

## 2025-02-23 PROCEDURE — 6370000000 HC RX 637 (ALT 250 FOR IP): Performed by: NURSE PRACTITIONER

## 2025-02-23 PROCEDURE — 6370000000 HC RX 637 (ALT 250 FOR IP): Performed by: INTERNAL MEDICINE

## 2025-02-23 PROCEDURE — 6370000000 HC RX 637 (ALT 250 FOR IP): Performed by: STUDENT IN AN ORGANIZED HEALTH CARE EDUCATION/TRAINING PROGRAM

## 2025-02-23 PROCEDURE — 2500000003 HC RX 250 WO HCPCS: Performed by: NURSE PRACTITIONER

## 2025-02-23 PROCEDURE — 82962 GLUCOSE BLOOD TEST: CPT

## 2025-02-23 PROCEDURE — 83930 ASSAY OF BLOOD OSMOLALITY: CPT

## 2025-02-23 PROCEDURE — 6360000002 HC RX W HCPCS: Performed by: INTERNAL MEDICINE

## 2025-02-23 PROCEDURE — 80069 RENAL FUNCTION PANEL: CPT

## 2025-02-23 PROCEDURE — 36415 COLL VENOUS BLD VENIPUNCTURE: CPT

## 2025-02-23 PROCEDURE — 1100000000 HC RM PRIVATE

## 2025-02-23 PROCEDURE — 85025 COMPLETE CBC W/AUTO DIFF WBC: CPT

## 2025-02-23 RX ORDER — TOLVAPTAN 15 MG/1
15 TABLET ORAL ONCE
Status: COMPLETED | OUTPATIENT
Start: 2025-02-23 | End: 2025-02-23

## 2025-02-23 RX ADMIN — BUMETANIDE 2 MG: 0.25 INJECTION INTRAMUSCULAR; INTRAVENOUS at 09:04

## 2025-02-23 RX ADMIN — METOPROLOL SUCCINATE 50 MG: 50 TABLET, EXTENDED RELEASE ORAL at 09:03

## 2025-02-23 RX ADMIN — TOLVAPTAN 15 MG: 15 TABLET ORAL at 16:15

## 2025-02-23 RX ADMIN — TRIAMCINOLONE ACETONIDE: 1 CREAM TOPICAL at 20:49

## 2025-02-23 RX ADMIN — ASPIRIN 81 MG: 81 TABLET, CHEWABLE ORAL at 09:03

## 2025-02-23 RX ADMIN — EZETIMIBE 10 MG: 10 TABLET ORAL at 09:03

## 2025-02-23 RX ADMIN — SODIUM CHLORIDE, PRESERVATIVE FREE 10 ML: 5 INJECTION INTRAVENOUS at 09:05

## 2025-02-23 RX ADMIN — APIXABAN 5 MG: 5 TABLET, FILM COATED ORAL at 09:04

## 2025-02-23 RX ADMIN — SODIUM CHLORIDE, PRESERVATIVE FREE 10 ML: 5 INJECTION INTRAVENOUS at 20:50

## 2025-02-23 RX ADMIN — ISOSORBIDE MONONITRATE 30 MG: 30 TABLET, EXTENDED RELEASE ORAL at 09:04

## 2025-02-23 RX ADMIN — TRIAMCINOLONE ACETONIDE: 1 CREAM TOPICAL at 09:06

## 2025-02-23 RX ADMIN — ATORVASTATIN CALCIUM 80 MG: 40 TABLET, FILM COATED ORAL at 20:49

## 2025-02-23 RX ADMIN — EMPAGLIFLOZIN 10 MG: 10 TABLET, FILM COATED ORAL at 09:03

## 2025-02-23 RX ADMIN — APIXABAN 5 MG: 5 TABLET, FILM COATED ORAL at 20:49

## 2025-02-24 DIAGNOSIS — I50.22 CHRONIC SYSTOLIC HEART FAILURE (HCC): Primary | ICD-10-CM

## 2025-02-24 PROBLEM — E11.65 TYPE 2 DIABETES MELLITUS WITH HYPERGLYCEMIA, WITHOUT LONG-TERM CURRENT USE OF INSULIN (HCC): Status: ACTIVE | Noted: 2025-02-24

## 2025-02-24 LAB
ALBUMIN SERPL-MCNC: 3 G/DL (ref 3.5–5)
ANION GAP SERPL CALC-SCNC: 12 MMOL/L (ref 2–12)
BASOPHILS # BLD: 0.05 K/UL (ref 0–0.1)
BASOPHILS NFR BLD: 0.9 % (ref 0–1)
BUN SERPL-MCNC: 63 MG/DL (ref 6–20)
BUN/CREAT SERPL: 22 (ref 12–20)
CALCIUM SERPL-MCNC: 8.9 MG/DL (ref 8.5–10.1)
CHLORIDE SERPL-SCNC: 95 MMOL/L (ref 97–108)
CO2 SERPL-SCNC: 21 MMOL/L (ref 21–32)
CREAT SERPL-MCNC: 2.88 MG/DL (ref 0.7–1.3)
DIFFERENTIAL METHOD BLD: ABNORMAL
EOSINOPHIL # BLD: 0.07 K/UL (ref 0–0.4)
EOSINOPHIL NFR BLD: 1.2 % (ref 0–7)
ERYTHROCYTE [DISTWIDTH] IN BLOOD BY AUTOMATED COUNT: 16.6 % (ref 11.5–14.5)
GLUCOSE BLD STRIP.AUTO-MCNC: 125 MG/DL (ref 65–117)
GLUCOSE BLD STRIP.AUTO-MCNC: 147 MG/DL (ref 65–117)
GLUCOSE BLD STRIP.AUTO-MCNC: 186 MG/DL (ref 65–117)
GLUCOSE BLD STRIP.AUTO-MCNC: 197 MG/DL (ref 65–117)
GLUCOSE SERPL-MCNC: 122 MG/DL (ref 65–100)
HCT VFR BLD AUTO: 31.2 % (ref 36.6–50.3)
HGB BLD-MCNC: 9.9 G/DL (ref 12.1–17)
IMM GRANULOCYTES # BLD AUTO: 0.05 K/UL (ref 0–0.04)
IMM GRANULOCYTES NFR BLD AUTO: 0.9 % (ref 0–0.5)
LYMPHOCYTES # BLD: 1.84 K/UL (ref 0.8–3.5)
LYMPHOCYTES NFR BLD: 32.3 % (ref 12–49)
MCH RBC QN AUTO: 18.3 PG (ref 26–34)
MCHC RBC AUTO-ENTMCNC: 31.7 G/DL (ref 30–36.5)
MCV RBC AUTO: 57.8 FL (ref 80–99)
MONOCYTES # BLD: 1.08 K/UL (ref 0–1)
MONOCYTES NFR BLD: 18.9 % (ref 5–13)
NEUTS SEG # BLD: 2.61 K/UL (ref 1.8–8)
NEUTS SEG NFR BLD: 45.8 % (ref 32–75)
NRBC # BLD: 0.22 K/UL (ref 0–0.01)
NRBC BLD-RTO: 3.9 PER 100 WBC
PHOSPHATE SERPL-MCNC: 5.4 MG/DL (ref 2.6–4.7)
PLATELET # BLD AUTO: 187 K/UL (ref 150–400)
POTASSIUM SERPL-SCNC: 4.3 MMOL/L (ref 3.5–5.1)
RBC # BLD AUTO: 5.4 M/UL (ref 4.1–5.7)
RBC MORPH BLD: ABNORMAL
SERVICE CMNT-IMP: ABNORMAL
SODIUM SERPL-SCNC: 128 MMOL/L (ref 136–145)
SODIUM SERPL-SCNC: 128 MMOL/L (ref 136–145)
WBC # BLD AUTO: 5.7 K/UL (ref 4.1–11.1)

## 2025-02-24 PROCEDURE — 2500000003 HC RX 250 WO HCPCS: Performed by: NURSE PRACTITIONER

## 2025-02-24 PROCEDURE — 82962 GLUCOSE BLOOD TEST: CPT

## 2025-02-24 PROCEDURE — 80069 RENAL FUNCTION PANEL: CPT

## 2025-02-24 PROCEDURE — 85025 COMPLETE CBC W/AUTO DIFF WBC: CPT

## 2025-02-24 PROCEDURE — 6370000000 HC RX 637 (ALT 250 FOR IP): Performed by: STUDENT IN AN ORGANIZED HEALTH CARE EDUCATION/TRAINING PROGRAM

## 2025-02-24 PROCEDURE — 1100000000 HC RM PRIVATE

## 2025-02-24 PROCEDURE — 99221 1ST HOSP IP/OBS SF/LOW 40: CPT

## 2025-02-24 PROCEDURE — 6370000000 HC RX 637 (ALT 250 FOR IP): Performed by: INTERNAL MEDICINE

## 2025-02-24 PROCEDURE — 36415 COLL VENOUS BLD VENIPUNCTURE: CPT

## 2025-02-24 PROCEDURE — 6370000000 HC RX 637 (ALT 250 FOR IP): Performed by: NURSE PRACTITIONER

## 2025-02-24 PROCEDURE — 84295 ASSAY OF SERUM SODIUM: CPT

## 2025-02-24 RX ORDER — BUMETANIDE 1 MG/1
2 TABLET ORAL 2 TIMES DAILY
Status: DISCONTINUED | OUTPATIENT
Start: 2025-02-24 | End: 2025-02-25

## 2025-02-24 RX ADMIN — EZETIMIBE 10 MG: 10 TABLET ORAL at 10:10

## 2025-02-24 RX ADMIN — INSULIN LISPRO 2 UNITS: 100 INJECTION, SOLUTION INTRAVENOUS; SUBCUTANEOUS at 17:57

## 2025-02-24 RX ADMIN — EMPAGLIFLOZIN 10 MG: 10 TABLET, FILM COATED ORAL at 10:10

## 2025-02-24 RX ADMIN — SODIUM CHLORIDE, PRESERVATIVE FREE 10 ML: 5 INJECTION INTRAVENOUS at 22:08

## 2025-02-24 RX ADMIN — INSULIN LISPRO 2 UNITS: 100 INJECTION, SOLUTION INTRAVENOUS; SUBCUTANEOUS at 22:07

## 2025-02-24 RX ADMIN — SODIUM CHLORIDE, PRESERVATIVE FREE 10 ML: 5 INJECTION INTRAVENOUS at 10:10

## 2025-02-24 RX ADMIN — ISOSORBIDE MONONITRATE 30 MG: 30 TABLET, EXTENDED RELEASE ORAL at 10:10

## 2025-02-24 RX ADMIN — APIXABAN 5 MG: 5 TABLET, FILM COATED ORAL at 22:07

## 2025-02-24 RX ADMIN — METOPROLOL SUCCINATE 50 MG: 50 TABLET, EXTENDED RELEASE ORAL at 10:10

## 2025-02-24 RX ADMIN — TRIAMCINOLONE ACETONIDE: 1 CREAM TOPICAL at 22:10

## 2025-02-24 RX ADMIN — APIXABAN 5 MG: 5 TABLET, FILM COATED ORAL at 10:10

## 2025-02-24 RX ADMIN — TRIAMCINOLONE ACETONIDE: 1 CREAM TOPICAL at 10:13

## 2025-02-24 RX ADMIN — ASPIRIN 81 MG: 81 TABLET, CHEWABLE ORAL at 10:10

## 2025-02-24 RX ADMIN — ATORVASTATIN CALCIUM 80 MG: 40 TABLET, FILM COATED ORAL at 22:07

## 2025-02-24 NOTE — CARDIO/PULMONARY
Chart reviewed: Patient is 70 y.o. male admitted with Tachycardia [R00.0]  HFrEF (heart failure with reduced ejection fraction) ([facility]) [I50.20]  Acute on chronic congestive heart failure, unspecified heart failure type (HCC) [I50.9].   EF 25-30 in 2021  Education: Living with Heart Failure Booklet given to Galdino Melissa.      Educated using teach back method. Discussed diagnosis definition and assessed patient understanding. Reviewed importance of daily weight monitoring and Low Sodium diet (5827-5301 mg. daily).  Encouraged activity and rest periods within symptom limitations and as ordered by physician.  Reviewed purpose of medication, potential side effects, compliance, and what to do if dose is missed.  Discussed importance of reporting signs and symptoms of exacerbation, and when to report them to the doctor, to prevent re-hospitalization.  Galdino Melissa was encouraged to keep all appointments with doctor.     Discussed the Cardiac Rehab Program, benefits, format, and encouraged enrollment, when eligible. Patient is interested and we will follow up, by phone, once eligible.    EF will need to be confirmed prior to program scheduling. Patient indicated he had no mobility issues.

## 2025-02-25 ENCOUNTER — APPOINTMENT (OUTPATIENT)
Facility: HOSPITAL | Age: 70
DRG: 291 | End: 2025-02-25
Payer: MEDICARE

## 2025-02-25 LAB
ALBUMIN SERPL-MCNC: 2.9 G/DL (ref 3.5–5)
ALBUMIN SERPL-MCNC: 3 G/DL (ref 3.5–5)
ALBUMIN/GLOB SERPL: 0.7 (ref 1.1–2.2)
ALP SERPL-CCNC: 123 U/L (ref 45–117)
ALT SERPL-CCNC: 57 U/L (ref 12–78)
ANION GAP SERPL CALC-SCNC: 14 MMOL/L (ref 2–12)
ANION GAP SERPL CALC-SCNC: 16 MMOL/L (ref 2–12)
ARTERIAL PATENCY WRIST A: POSITIVE
ARTERIAL PATENCY WRIST A: YES
AST SERPL-CCNC: 82 U/L (ref 15–37)
BASE DEFICIT BLD-SCNC: 2.9 MMOL/L
BASE DEFICIT BLDA-SCNC: 12.8 MMOL/L
BASOPHILS # BLD: 0.04 K/UL (ref 0–0.1)
BASOPHILS # BLD: 0.05 K/UL (ref 0–0.1)
BASOPHILS NFR BLD: 0.2 % (ref 0–1)
BASOPHILS NFR BLD: 0.6 % (ref 0–1)
BDY SITE: ABNORMAL
BDY SITE: ABNORMAL
BILIRUB SERPL-MCNC: 3.6 MG/DL (ref 0.2–1)
BUN SERPL-MCNC: 65 MG/DL (ref 6–20)
BUN SERPL-MCNC: 70 MG/DL (ref 6–20)
BUN/CREAT SERPL: 21 (ref 12–20)
BUN/CREAT SERPL: 23 (ref 12–20)
CALCIUM SERPL-MCNC: 8.5 MG/DL (ref 8.5–10.1)
CALCIUM SERPL-MCNC: 8.7 MG/DL (ref 8.5–10.1)
CHLORIDE SERPL-SCNC: 92 MMOL/L (ref 97–108)
CHLORIDE SERPL-SCNC: 94 MMOL/L (ref 97–108)
CO2 SERPL-SCNC: 21 MMOL/L (ref 21–32)
CO2 SERPL-SCNC: 22 MMOL/L (ref 21–32)
CREAT SERPL-MCNC: 3.03 MG/DL (ref 0.7–1.3)
CREAT SERPL-MCNC: 3.08 MG/DL (ref 0.7–1.3)
DIFFERENTIAL METHOD BLD: ABNORMAL
DIFFERENTIAL METHOD BLD: ABNORMAL
EOSINOPHIL # BLD: 0.02 K/UL (ref 0–0.4)
EOSINOPHIL # BLD: 0.02 K/UL (ref 0–0.4)
EOSINOPHIL NFR BLD: 0.1 % (ref 0–7)
EOSINOPHIL NFR BLD: 0.3 % (ref 0–7)
ERYTHROCYTE [DISTWIDTH] IN BLOOD BY AUTOMATED COUNT: 18.1 % (ref 11.5–14.5)
ERYTHROCYTE [DISTWIDTH] IN BLOOD BY AUTOMATED COUNT: 19.3 % (ref 11.5–14.5)
GAS FLOW.O2 O2 DELIVERY SYS: ABNORMAL
GLOBULIN SER CALC-MCNC: 3.9 G/DL (ref 2–4)
GLUCOSE BLD STRIP.AUTO-MCNC: 72 MG/DL (ref 65–117)
GLUCOSE BLD STRIP.AUTO-MCNC: 79 MG/DL (ref 65–117)
GLUCOSE BLD STRIP.AUTO-MCNC: 86 MG/DL (ref 65–117)
GLUCOSE SERPL-MCNC: 70 MG/DL (ref 65–100)
GLUCOSE SERPL-MCNC: 71 MG/DL (ref 65–100)
HCO3 BLD-SCNC: 20 MMOL/L (ref 21–28)
HCO3 BLDA-SCNC: 10 MMOL/L (ref 22–26)
HCT VFR BLD AUTO: 32.8 % (ref 36.6–50.3)
HCT VFR BLD AUTO: 36.5 % (ref 36.6–50.3)
HGB BLD-MCNC: 10.3 G/DL (ref 12.1–17)
HGB BLD-MCNC: 10.9 G/DL (ref 12.1–17)
IMM GRANULOCYTES # BLD AUTO: 0.06 K/UL (ref 0–0.04)
IMM GRANULOCYTES # BLD AUTO: 0.14 K/UL (ref 0–0.04)
IMM GRANULOCYTES NFR BLD AUTO: 0.8 % (ref 0–0.5)
IMM GRANULOCYTES NFR BLD AUTO: 0.8 % (ref 0–0.5)
INR PPP: 2.1 (ref 0.9–1.1)
IPAP/PIP/HIGH PEEP: 12
LACTATE SERPL-SCNC: 12 MMOL/L (ref 0.4–2)
LACTATE SERPL-SCNC: 5.3 MMOL/L (ref 0.4–2)
LACTATE SERPL-SCNC: 7.7 MMOL/L (ref 0.4–2)
LYMPHOCYTES # BLD: 0.21 K/UL (ref 0.8–3.5)
LYMPHOCYTES # BLD: 2.07 K/UL (ref 0.8–3.5)
LYMPHOCYTES NFR BLD: 1.2 % (ref 12–49)
LYMPHOCYTES NFR BLD: 25.9 % (ref 12–49)
MAGNESIUM SERPL-MCNC: 2.3 MG/DL (ref 1.6–2.4)
MCH RBC QN AUTO: 18 PG (ref 26–34)
MCH RBC QN AUTO: 18 PG (ref 26–34)
MCHC RBC AUTO-ENTMCNC: 29.9 G/DL (ref 30–36.5)
MCHC RBC AUTO-ENTMCNC: 31.4 G/DL (ref 30–36.5)
MCV RBC AUTO: 57.2 FL (ref 80–99)
MCV RBC AUTO: 60.3 FL (ref 80–99)
MONOCYTES # BLD: 0.57 K/UL (ref 0–1)
MONOCYTES # BLD: 2.37 K/UL (ref 0–1)
MONOCYTES NFR BLD: 29.6 % (ref 5–13)
MONOCYTES NFR BLD: 3.2 % (ref 5–13)
NEUTS SEG # BLD: 16.82 K/UL (ref 1.8–8)
NEUTS SEG # BLD: 3.43 K/UL (ref 1.8–8)
NEUTS SEG NFR BLD: 42.8 % (ref 32–75)
NEUTS SEG NFR BLD: 94.5 % (ref 32–75)
NRBC # BLD: 0.47 K/UL (ref 0–0.01)
NRBC # BLD: 0.68 K/UL (ref 0–0.01)
NRBC BLD-RTO: 3.8 PER 100 WBC
NRBC BLD-RTO: 5.9 PER 100 WBC
O2/TOTAL GAS SETTING VFR VENT: 50 %
PCO2 BLD: 28.5 MMHG (ref 35–48)
PCO2 BLDA: 19 MMHG (ref 35–45)
PEEP RESPIRATORY: 6 CMH2O
PH BLD: 7.45 (ref 7.35–7.45)
PH BLDA: 7.35 (ref 7.35–7.45)
PHOSPHATE SERPL-MCNC: 5.1 MG/DL (ref 2.6–4.7)
PHOSPHATE SERPL-MCNC: 5.8 MG/DL (ref 2.6–4.7)
PLATELET # BLD AUTO: 202 K/UL (ref 150–400)
PLATELET # BLD AUTO: 225 K/UL (ref 150–400)
PO2 BLD: 215 MMHG (ref 83–108)
PO2 BLDA: 198 MMHG (ref 80–100)
POTASSIUM SERPL-SCNC: 4.4 MMOL/L (ref 3.5–5.1)
POTASSIUM SERPL-SCNC: 4.5 MMOL/L (ref 3.5–5.1)
PROT SERPL-MCNC: 6.8 G/DL (ref 6.4–8.2)
PROTHROMBIN TIME: 20.8 SEC (ref 9.2–11.2)
RBC # BLD AUTO: 5.73 M/UL (ref 4.1–5.7)
RBC # BLD AUTO: 6.05 M/UL (ref 4.1–5.7)
RBC MORPH BLD: ABNORMAL
SAO2 % BLD: 99 % (ref 92–97)
SAO2 % BLD: 99.8 % (ref 92–97)
SAO2% DEVICE SAO2% SENSOR NAME: ABNORMAL
SERVICE CMNT-IMP: NORMAL
SODIUM SERPL-SCNC: 127 MMOL/L (ref 136–145)
SODIUM SERPL-SCNC: 132 MMOL/L (ref 136–145)
SPECIMEN SITE: ABNORMAL
SPECIMEN TYPE: ABNORMAL
VENTILATION MODE VENT: ABNORMAL
WBC # BLD AUTO: 17.8 K/UL (ref 4.1–11.1)
WBC # BLD AUTO: 8 K/UL (ref 4.1–11.1)

## 2025-02-25 PROCEDURE — 82962 GLUCOSE BLOOD TEST: CPT

## 2025-02-25 PROCEDURE — 85025 COMPLETE CBC W/AUTO DIFF WBC: CPT

## 2025-02-25 PROCEDURE — 5A09457 ASSISTANCE WITH RESPIRATORY VENTILATION, 24-96 CONSECUTIVE HOURS, CONTINUOUS POSITIVE AIRWAY PRESSURE: ICD-10-PCS | Performed by: INTERNAL MEDICINE

## 2025-02-25 PROCEDURE — 84100 ASSAY OF PHOSPHORUS: CPT

## 2025-02-25 PROCEDURE — 6360000002 HC RX W HCPCS: Performed by: HOSPITALIST

## 2025-02-25 PROCEDURE — 99231 SBSQ HOSP IP/OBS SF/LOW 25: CPT

## 2025-02-25 PROCEDURE — 82803 BLOOD GASES ANY COMBINATION: CPT

## 2025-02-25 PROCEDURE — P9047 ALBUMIN (HUMAN), 25%, 50ML: HCPCS | Performed by: NURSE PRACTITIONER

## 2025-02-25 PROCEDURE — 6370000000 HC RX 637 (ALT 250 FOR IP): Performed by: INTERNAL MEDICINE

## 2025-02-25 PROCEDURE — 94660 CPAP INITIATION&MGMT: CPT

## 2025-02-25 PROCEDURE — 6370000000 HC RX 637 (ALT 250 FOR IP): Performed by: STUDENT IN AN ORGANIZED HEALTH CARE EDUCATION/TRAINING PROGRAM

## 2025-02-25 PROCEDURE — 6370000000 HC RX 637 (ALT 250 FOR IP): Performed by: NURSE PRACTITIONER

## 2025-02-25 PROCEDURE — 71045 X-RAY EXAM CHEST 1 VIEW: CPT

## 2025-02-25 PROCEDURE — 36415 COLL VENOUS BLD VENIPUNCTURE: CPT

## 2025-02-25 PROCEDURE — 6360000002 HC RX W HCPCS: Performed by: NURSE PRACTITIONER

## 2025-02-25 PROCEDURE — 85610 PROTHROMBIN TIME: CPT

## 2025-02-25 PROCEDURE — 6360000002 HC RX W HCPCS

## 2025-02-25 PROCEDURE — 2500000003 HC RX 250 WO HCPCS: Performed by: NURSE PRACTITIONER

## 2025-02-25 PROCEDURE — 83735 ASSAY OF MAGNESIUM: CPT

## 2025-02-25 PROCEDURE — 2000000000 HC ICU R&B

## 2025-02-25 PROCEDURE — 2580000003 HC RX 258: Performed by: INTERNAL MEDICINE

## 2025-02-25 PROCEDURE — 80069 RENAL FUNCTION PANEL: CPT

## 2025-02-25 PROCEDURE — 36600 WITHDRAWAL OF ARTERIAL BLOOD: CPT

## 2025-02-25 PROCEDURE — 80053 COMPREHEN METABOLIC PANEL: CPT

## 2025-02-25 PROCEDURE — 2500000003 HC RX 250 WO HCPCS: Performed by: HOSPITALIST

## 2025-02-25 PROCEDURE — 2580000003 HC RX 258: Performed by: HOSPITALIST

## 2025-02-25 PROCEDURE — 83605 ASSAY OF LACTIC ACID: CPT

## 2025-02-25 PROCEDURE — 94640 AIRWAY INHALATION TREATMENT: CPT

## 2025-02-25 RX ORDER — IPRATROPIUM BROMIDE AND ALBUTEROL SULFATE 2.5; .5 MG/3ML; MG/3ML
1 SOLUTION RESPIRATORY (INHALATION)
Status: DISCONTINUED | OUTPATIENT
Start: 2025-02-25 | End: 2025-02-26

## 2025-02-25 RX ORDER — FUROSEMIDE 10 MG/ML
40 INJECTION INTRAMUSCULAR; INTRAVENOUS ONCE
Status: COMPLETED | OUTPATIENT
Start: 2025-02-25 | End: 2025-02-25

## 2025-02-25 RX ORDER — 0.9 % SODIUM CHLORIDE 0.9 %
250 INTRAVENOUS SOLUTION INTRAVENOUS ONCE
Status: COMPLETED | OUTPATIENT
Start: 2025-02-25 | End: 2025-02-25

## 2025-02-25 RX ORDER — FUROSEMIDE 10 MG/ML
40 INJECTION INTRAMUSCULAR; INTRAVENOUS ONCE
Status: DISCONTINUED | OUTPATIENT
Start: 2025-02-25 | End: 2025-02-25 | Stop reason: SDUPTHER

## 2025-02-25 RX ORDER — ALBUMIN (HUMAN) 12.5 G/50ML
25 SOLUTION INTRAVENOUS ONCE
Status: COMPLETED | OUTPATIENT
Start: 2025-02-25 | End: 2025-02-25

## 2025-02-25 RX ORDER — ALBUMIN (HUMAN) 12.5 G/50ML
25 SOLUTION INTRAVENOUS ONCE
Status: DISCONTINUED | OUTPATIENT
Start: 2025-02-25 | End: 2025-02-25

## 2025-02-25 RX ORDER — INSULIN LISPRO 100 [IU]/ML
0-4 INJECTION, SOLUTION INTRAVENOUS; SUBCUTANEOUS
Status: DISCONTINUED | OUTPATIENT
Start: 2025-02-25 | End: 2025-02-28

## 2025-02-25 RX ORDER — FUROSEMIDE 10 MG/ML
INJECTION INTRAMUSCULAR; INTRAVENOUS
Status: COMPLETED
Start: 2025-02-25 | End: 2025-02-25

## 2025-02-25 RX ORDER — SODIUM CHLORIDE 9 MG/ML
INJECTION, SOLUTION INTRAVENOUS CONTINUOUS
Status: DISCONTINUED | OUTPATIENT
Start: 2025-02-25 | End: 2025-02-25

## 2025-02-25 RX ADMIN — APIXABAN 5 MG: 5 TABLET, FILM COATED ORAL at 10:29

## 2025-02-25 RX ADMIN — FUROSEMIDE 40 MG: 10 INJECTION INTRAMUSCULAR; INTRAVENOUS at 15:27

## 2025-02-25 RX ADMIN — SODIUM CHLORIDE, PRESERVATIVE FREE 10 ML: 5 INJECTION INTRAVENOUS at 10:34

## 2025-02-25 RX ADMIN — ALBUMIN (HUMAN) 25 G: 0.25 INJECTION, SOLUTION INTRAVENOUS at 20:13

## 2025-02-25 RX ADMIN — IPRATROPIUM BROMIDE AND ALBUTEROL SULFATE 1 DOSE: 2.5; .5 SOLUTION RESPIRATORY (INHALATION) at 17:14

## 2025-02-25 RX ADMIN — ASPIRIN 81 MG: 81 TABLET, CHEWABLE ORAL at 10:28

## 2025-02-25 RX ADMIN — SODIUM CHLORIDE: 0.9 INJECTION, SOLUTION INTRAVENOUS at 11:31

## 2025-02-25 RX ADMIN — EMPAGLIFLOZIN 10 MG: 10 TABLET, FILM COATED ORAL at 10:29

## 2025-02-25 RX ADMIN — APIXABAN 5 MG: 5 TABLET, FILM COATED ORAL at 21:47

## 2025-02-25 RX ADMIN — SODIUM CHLORIDE 250 ML: 0.9 INJECTION, SOLUTION INTRAVENOUS at 18:40

## 2025-02-25 RX ADMIN — IPRATROPIUM BROMIDE AND ALBUTEROL SULFATE 1 DOSE: 2.5; .5 SOLUTION RESPIRATORY (INHALATION) at 19:58

## 2025-02-25 RX ADMIN — VANCOMYCIN HYDROCHLORIDE 2000 MG: 10 INJECTION, POWDER, LYOPHILIZED, FOR SOLUTION INTRAVENOUS at 20:45

## 2025-02-25 RX ADMIN — FUROSEMIDE 40 MG: 10 INJECTION, SOLUTION INTRAMUSCULAR; INTRAVENOUS at 15:27

## 2025-02-25 RX ADMIN — EZETIMIBE 10 MG: 10 TABLET ORAL at 10:28

## 2025-02-25 RX ADMIN — TRIAMCINOLONE ACETONIDE: 1 CREAM TOPICAL at 11:32

## 2025-02-25 RX ADMIN — SODIUM BICARBONATE 150 MEQ: 84 INJECTION, SOLUTION INTRAVENOUS at 17:43

## 2025-02-25 RX ADMIN — ATORVASTATIN CALCIUM 80 MG: 40 TABLET, FILM COATED ORAL at 21:47

## 2025-02-25 NOTE — SIGNIFICANT EVENT
Notified by nursing, pt desating to 60s.  Stat ABG, CXR, IV lasix, Dced IVF.  Notified nephro and ICU.  BP soft. Pt mentation okay, endorses SOB.   Lungs clear on CXR.   Pt has been on Eliquis. Hb stable.  D/w ICU, appropriate for transfer.

## 2025-02-25 NOTE — SIGNIFICANT EVENT
[facility]  RAPID RESPONSE TEAM   RN NOTE       Overhead rapid response called to room # 2408/01  @ 1520 for patient Galdino Melissa , MRN# [id number]      S- Reason for rapid response: SOB  Per primary RN:   Background: admitted for CHF and low sodium levels.      O/A- Focused Assessment:   Cardiac- sinus tachy hr in 120s, tremors present, difficult to obtain bp due shaking/tremors. afebrile  Respiratory- NRB 15L, difficult to obtain  [place]- awake and alert.   GI//Skin mild edema on lower extremities   Lines/drain- PIV x1  Labs/chart reviewed-  Yes.  Worsening renal function noted.       Interventions:      Dr. Krishnamurthy at bedside, orders received for the following Interventions:   - EKG- sinus/noisy/artifact due to shaking  - CXR  - abg  - bipap  - CCU consult  - 1x PIV  - lasix 40 mg IVP  - Nephro for possbile HD  -      P- Outcome:   Patient transferred to room 2310 waiting for CCU bed to be cleaned. CCU RN at bedside.   Patient has been transferred to CVICU room 2408.       Patient Disposition:   Patient transferred to higher level of care following RRT?: Yes.  RRT End Time: 1627  Patient/family education provided as applicable.        Thank you for this RRT call.   Care Collaborated with primary RN, CRN, RT, and attending MD.   Please refer to the flowsheet for detailed vital signs during this event.   See MD notes for details and plan of care.     Code Status: Full Code   Attending MD: Raghu Manzo, BSN, RN, CCRN, TCRN  Rapid Response Team  Ext [phone]

## 2025-02-26 ENCOUNTER — APPOINTMENT (OUTPATIENT)
Facility: HOSPITAL | Age: 70
DRG: 291 | End: 2025-02-26
Payer: MEDICARE

## 2025-02-26 LAB
ANION GAP SERPL CALC-SCNC: 13 MMOL/L (ref 2–12)
ANION GAP SERPL CALC-SCNC: 26 MMOL/L (ref 2–12)
APTT PPP: 32.1 SEC (ref 22.1–31)
APTT PPP: >130 SEC (ref 22.1–31)
ARTERIAL PATENCY WRIST A: ABNORMAL
ARTERIAL PATENCY WRIST A: POSITIVE
BASE DEFICIT BLD-SCNC: 12.7 MMOL/L
BASE DEFICIT BLDV-SCNC: 11.8 MMOL/L
BASOPHILS # BLD: 0 K/UL (ref 0–0.1)
BASOPHILS NFR BLD: 0 % (ref 0–1)
BDY SITE: ABNORMAL
BDY SITE: ABNORMAL
BUN SERPL-MCNC: 50 MG/DL (ref 6–20)
BUN SERPL-MCNC: 71 MG/DL (ref 6–20)
BUN/CREAT SERPL: 14 (ref 12–20)
BUN/CREAT SERPL: 23 (ref 12–20)
CALCIUM SERPL-MCNC: 8.9 MG/DL (ref 8.5–10.1)
CALCIUM SERPL-MCNC: <5 MG/DL (ref 8.5–10.1)
CHLORIDE SERPL-SCNC: 94 MMOL/L (ref 97–108)
CHLORIDE SERPL-SCNC: 95 MMOL/L (ref 97–108)
CO2 SERPL-SCNC: 23 MMOL/L (ref 21–32)
CO2 SERPL-SCNC: 7 MMOL/L (ref 21–32)
COHGB MFR BLD: 0.3 % (ref 1–2)
CREAT SERPL-MCNC: 3.15 MG/DL (ref 0.7–1.3)
CREAT SERPL-MCNC: 3.6 MG/DL (ref 0.7–1.3)
DIFFERENTIAL METHOD BLD: ABNORMAL
EOSINOPHIL # BLD: 0 K/UL (ref 0–0.4)
EOSINOPHIL NFR BLD: 0 % (ref 0–7)
ERYTHROCYTE [DISTWIDTH] IN BLOOD BY AUTOMATED COUNT: 16.7 % (ref 11.5–14.5)
ERYTHROCYTE [DISTWIDTH] IN BLOOD BY AUTOMATED COUNT: 17.2 % (ref 11.5–14.5)
ERYTHROCYTE [DISTWIDTH] IN BLOOD BY AUTOMATED COUNT: 17.6 % (ref 11.5–14.5)
GAS FLOW.O2 O2 DELIVERY SYS: ABNORMAL
GAS FLOW.O2 O2 DELIVERY SYS: ABNORMAL
GLUCOSE BLD STRIP.AUTO-MCNC: 117 MG/DL (ref 65–117)
GLUCOSE BLD STRIP.AUTO-MCNC: 206 MG/DL (ref 65–117)
GLUCOSE BLD STRIP.AUTO-MCNC: 66 MG/DL (ref 65–117)
GLUCOSE BLD STRIP.AUTO-MCNC: 72 MG/DL (ref 65–117)
GLUCOSE SERPL-MCNC: 103 MG/DL (ref 65–100)
GLUCOSE SERPL-MCNC: 105 MG/DL (ref 65–100)
HCO3 BLD-SCNC: 11.4 MMOL/L (ref 21–28)
HCO3 BLDV-SCNC: 14.8 MMOL/L (ref 23–28)
HCT VFR BLD AUTO: 29.3 % (ref 36.6–50.3)
HCT VFR BLD AUTO: 30.5 % (ref 36.6–50.3)
HCT VFR BLD AUTO: 31.6 % (ref 36.6–50.3)
HGB BLD-MCNC: 9.1 G/DL (ref 12.1–17)
HGB BLD-MCNC: 9.4 G/DL (ref 12.1–17)
HGB BLD-MCNC: 9.9 G/DL (ref 12.1–17)
IMM GRANULOCYTES # BLD AUTO: 0 K/UL (ref 0–0.04)
IMM GRANULOCYTES NFR BLD AUTO: 0 % (ref 0–0.5)
INR PPP: 2.3 (ref 0.9–1.1)
IPAP/PIP/HIGH PEEP: 12
IPAP/PIP/HIGH PEEP: 14
LACTATE SERPL-SCNC: 11.5 MMOL/L (ref 0.4–2)
LACTATE SERPL-SCNC: 5.3 MMOL/L (ref 0.4–2)
LACTATE SERPL-SCNC: 5.7 MMOL/L (ref 0.4–2)
LACTATE SERPL-SCNC: 7.2 MMOL/L (ref 0.4–2)
LYMPHOCYTES # BLD: 1.04 K/UL (ref 0.8–3.5)
LYMPHOCYTES NFR BLD: 4 % (ref 12–49)
MAGNESIUM SERPL-MCNC: 1.3 MG/DL (ref 1.6–2.4)
MAGNESIUM SERPL-MCNC: 2.3 MG/DL (ref 1.6–2.4)
MCH RBC QN AUTO: 17.9 PG (ref 26–34)
MCH RBC QN AUTO: 18.1 PG (ref 26–34)
MCH RBC QN AUTO: 18.2 PG (ref 26–34)
MCHC RBC AUTO-ENTMCNC: 29.8 G/DL (ref 30–36.5)
MCHC RBC AUTO-ENTMCNC: 31.3 G/DL (ref 30–36.5)
MCHC RBC AUTO-ENTMCNC: 32.1 G/DL (ref 30–36.5)
MCV RBC AUTO: 55.9 FL (ref 80–99)
MCV RBC AUTO: 57.9 FL (ref 80–99)
MCV RBC AUTO: 60.9 FL (ref 80–99)
METHGB MFR BLD: 1.9 % (ref 0–1.4)
MONOCYTES # BLD: 1.82 K/UL (ref 0–1)
MONOCYTES NFR BLD: 7 % (ref 5–13)
NEUTS BAND NFR BLD MANUAL: 1 %
NEUTS SEG # BLD: 23.14 K/UL (ref 1.8–8)
NEUTS SEG NFR BLD: 88 % (ref 32–75)
NRBC # BLD: 0.16 K/UL (ref 0–0.01)
NRBC # BLD: 0.19 K/UL (ref 0–0.01)
NRBC # BLD: 0.96 K/UL (ref 0–0.01)
NRBC BLD-RTO: 0.6 PER 100 WBC
NRBC BLD-RTO: 0.7 PER 100 WBC
NRBC BLD-RTO: 3.7 PER 100 WBC
NT PRO BNP: ABNORMAL PG/ML
O2/TOTAL GAS SETTING VFR VENT: 35 %
O2/TOTAL GAS SETTING VFR VENT: 50 %
OXYHGB MFR BLD: 19.3 % (ref 94–97)
PCO2 BLD: 21.6 MMHG (ref 35–48)
PCO2 BLDV: 35.2 MMHG (ref 41–51)
PEEP RESPIRATORY: 5 CMH2O
PEEP RESPIRATORY: 7 CMH2O
PH BLD: 7.33 (ref 7.35–7.45)
PH BLDV: 7.23 (ref 7.32–7.42)
PHOSPHATE SERPL-MCNC: 5.3 MG/DL (ref 2.6–4.7)
PHOSPHATE SERPL-MCNC: 5.9 MG/DL (ref 2.6–4.7)
PLATELET # BLD AUTO: 157 K/UL (ref 150–400)
PLATELET # BLD AUTO: 167 K/UL (ref 150–400)
PLATELET # BLD AUTO: 172 K/UL (ref 150–400)
PO2 BLD: 262 MMHG (ref 83–108)
PO2 BLDV: <27 MMHG (ref 25–40)
POTASSIUM SERPL-SCNC: 4 MMOL/L (ref 3.5–5.1)
POTASSIUM SERPL-SCNC: 4.9 MMOL/L (ref 3.5–5.1)
PROTHROMBIN TIME: 22.8 SEC (ref 9.2–11.2)
RBC # BLD AUTO: 5.01 M/UL (ref 4.1–5.7)
RBC # BLD AUTO: 5.24 M/UL (ref 4.1–5.7)
RBC # BLD AUTO: 5.46 M/UL (ref 4.1–5.7)
RBC MORPH BLD: ABNORMAL
SAO2 % BLD: 20 % (ref 95–99)
SAO2 % BLD: 99.9 % (ref 92–97)
SERVICE CMNT-IMP: ABNORMAL
SERVICE CMNT-IMP: ABNORMAL
SERVICE CMNT-IMP: NORMAL
SODIUM SERPL-SCNC: 128 MMOL/L (ref 136–145)
SODIUM SERPL-SCNC: 130 MMOL/L (ref 136–145)
SPECIMEN SITE: ABNORMAL
SPECIMEN TYPE: ABNORMAL
SPECIMEN TYPE: ABNORMAL
THERAPEUTIC RANGE: ABNORMAL SECS (ref 58–77)
THERAPEUTIC RANGE: ABNORMAL SECS (ref 58–77)
TROPONIN I SERPL HS-MCNC: 76 NG/L (ref 0–76)
VANCOMYCIN SERPL-MCNC: 7.9 UG/ML
VENTILATION MODE VENT: ABNORMAL
VENTILATION MODE VENT: ABNORMAL
WBC # BLD AUTO: 25.9 K/UL (ref 4.1–11.1)
WBC # BLD AUTO: 26 K/UL (ref 4.1–11.1)
WBC # BLD AUTO: 27.7 K/UL (ref 4.1–11.1)

## 2025-02-26 PROCEDURE — 82803 BLOOD GASES ANY COMBINATION: CPT

## 2025-02-26 PROCEDURE — 71045 X-RAY EXAM CHEST 1 VIEW: CPT

## 2025-02-26 PROCEDURE — 85730 THROMBOPLASTIN TIME PARTIAL: CPT

## 2025-02-26 PROCEDURE — 6360000002 HC RX W HCPCS: Performed by: NURSE PRACTITIONER

## 2025-02-26 PROCEDURE — 6370000000 HC RX 637 (ALT 250 FOR IP): Performed by: NURSE PRACTITIONER

## 2025-02-26 PROCEDURE — 84100 ASSAY OF PHOSPHORUS: CPT

## 2025-02-26 PROCEDURE — 2000000000 HC ICU R&B

## 2025-02-26 PROCEDURE — 6370000000 HC RX 637 (ALT 250 FOR IP)

## 2025-02-26 PROCEDURE — 87077 CULTURE AEROBIC IDENTIFY: CPT

## 2025-02-26 PROCEDURE — 85025 COMPLETE CBC W/AUTO DIFF WBC: CPT

## 2025-02-26 PROCEDURE — 82375 ASSAY CARBOXYHB QUANT: CPT

## 2025-02-26 PROCEDURE — 74176 CT ABD & PELVIS W/O CONTRAST: CPT

## 2025-02-26 PROCEDURE — 85610 PROTHROMBIN TIME: CPT

## 2025-02-26 PROCEDURE — 36415 COLL VENOUS BLD VENIPUNCTURE: CPT

## 2025-02-26 PROCEDURE — 80202 ASSAY OF VANCOMYCIN: CPT

## 2025-02-26 PROCEDURE — 84484 ASSAY OF TROPONIN QUANT: CPT

## 2025-02-26 PROCEDURE — 87040 BLOOD CULTURE FOR BACTERIA: CPT

## 2025-02-26 PROCEDURE — 2500000003 HC RX 250 WO HCPCS

## 2025-02-26 PROCEDURE — 83880 ASSAY OF NATRIURETIC PEPTIDE: CPT

## 2025-02-26 PROCEDURE — 2580000003 HC RX 258: Performed by: NURSE PRACTITIONER

## 2025-02-26 PROCEDURE — 2700000000 HC OXYGEN THERAPY PER DAY

## 2025-02-26 PROCEDURE — 99222 1ST HOSP IP/OBS MODERATE 55: CPT | Performed by: SURGERY

## 2025-02-26 PROCEDURE — 83050 HGB METHEMOGLOBIN QUAN: CPT

## 2025-02-26 PROCEDURE — 36600 WITHDRAWAL OF ARTERIAL BLOOD: CPT

## 2025-02-26 PROCEDURE — 83605 ASSAY OF LACTIC ACID: CPT

## 2025-02-26 PROCEDURE — 6360000002 HC RX W HCPCS: Performed by: HOSPITALIST

## 2025-02-26 PROCEDURE — 80048 BASIC METABOLIC PNL TOTAL CA: CPT

## 2025-02-26 PROCEDURE — 82962 GLUCOSE BLOOD TEST: CPT

## 2025-02-26 PROCEDURE — 85027 COMPLETE CBC AUTOMATED: CPT

## 2025-02-26 PROCEDURE — 99231 SBSQ HOSP IP/OBS SF/LOW 25: CPT

## 2025-02-26 PROCEDURE — 2500000003 HC RX 250 WO HCPCS: Performed by: NURSE PRACTITIONER

## 2025-02-26 PROCEDURE — 87186 SC STD MICRODIL/AGAR DIL: CPT

## 2025-02-26 PROCEDURE — 83735 ASSAY OF MAGNESIUM: CPT

## 2025-02-26 PROCEDURE — 87154 CUL TYP ID BLD PTHGN 6+ TRGT: CPT

## 2025-02-26 PROCEDURE — 94660 CPAP INITIATION&MGMT: CPT

## 2025-02-26 RX ORDER — HEPARIN SODIUM 1000 [USP'U]/ML
40 INJECTION, SOLUTION INTRAVENOUS; SUBCUTANEOUS PRN
Status: DISCONTINUED | OUTPATIENT
Start: 2025-02-26 | End: 2025-03-02 | Stop reason: HOSPADM

## 2025-02-26 RX ORDER — NOREPINEPHRINE BITARTRATE 0.06 MG/ML
.5-2 INJECTION, SOLUTION INTRAVENOUS CONTINUOUS
Status: ACTIVE | OUTPATIENT
Start: 2025-02-26 | End: 2025-02-27

## 2025-02-26 RX ORDER — POTASSIUM CHLORIDE 1.5 G/1.58G
20 POWDER, FOR SOLUTION ORAL DAILY
COMMUNITY

## 2025-02-26 RX ORDER — SODIUM CHLORIDE, SODIUM LACTATE, POTASSIUM CHLORIDE, AND CALCIUM CHLORIDE .6; .31; .03; .02 G/100ML; G/100ML; G/100ML; G/100ML
500 INJECTION, SOLUTION INTRAVENOUS ONCE
Status: COMPLETED | OUTPATIENT
Start: 2025-02-26 | End: 2025-02-26

## 2025-02-26 RX ORDER — IPRATROPIUM BROMIDE AND ALBUTEROL SULFATE 2.5; .5 MG/3ML; MG/3ML
1 SOLUTION RESPIRATORY (INHALATION) EVERY 4 HOURS PRN
Status: DISCONTINUED | OUTPATIENT
Start: 2025-02-26 | End: 2025-03-02 | Stop reason: HOSPADM

## 2025-02-26 RX ORDER — NOREPINEPHRINE BITARTRATE 0.06 MG/ML
INJECTION, SOLUTION INTRAVENOUS
Status: COMPLETED
Start: 2025-02-26 | End: 2025-02-26

## 2025-02-26 RX ORDER — VANCOMYCIN 1.75 G/350ML
1250 INJECTION, SOLUTION INTRAVENOUS ONCE
Status: COMPLETED | OUTPATIENT
Start: 2025-02-26 | End: 2025-02-26

## 2025-02-26 RX ORDER — HEPARIN SODIUM 10000 [USP'U]/100ML
5-30 INJECTION, SOLUTION INTRAVENOUS CONTINUOUS
Status: DISCONTINUED | OUTPATIENT
Start: 2025-02-26 | End: 2025-03-02 | Stop reason: HOSPADM

## 2025-02-26 RX ORDER — DOBUTAMINE HYDROCHLORIDE 200 MG/100ML
2.5-2 INJECTION INTRAVENOUS CONTINUOUS
Status: DISCONTINUED | OUTPATIENT
Start: 2025-02-26 | End: 2025-02-27

## 2025-02-26 RX ORDER — SPIRONOLACTONE 25 MG/1
25 TABLET ORAL DAILY
COMMUNITY

## 2025-02-26 RX ORDER — HEPARIN SODIUM 1000 [USP'U]/ML
80 INJECTION, SOLUTION INTRAVENOUS; SUBCUTANEOUS PRN
Status: DISCONTINUED | OUTPATIENT
Start: 2025-02-26 | End: 2025-03-02 | Stop reason: HOSPADM

## 2025-02-26 RX ORDER — HEPARIN SODIUM 1000 [USP'U]/ML
80 INJECTION, SOLUTION INTRAVENOUS; SUBCUTANEOUS ONCE
Status: COMPLETED | OUTPATIENT
Start: 2025-02-26 | End: 2025-02-26

## 2025-02-26 RX ADMIN — NOREPINEPHRINE BITARTRATE 3 MCG/MIN: 0.06 INJECTION, SOLUTION INTRAVENOUS at 20:59

## 2025-02-26 RX ADMIN — INSULIN LISPRO 1 UNITS: 100 INJECTION, SOLUTION INTRAVENOUS; SUBCUTANEOUS at 17:38

## 2025-02-26 RX ADMIN — VANCOMYCIN 1250 MG: 1.75 INJECTION, SOLUTION INTRAVENOUS at 20:36

## 2025-02-26 RX ADMIN — PIPERACILLIN AND TAZOBACTAM 3375 MG: 3; .375 INJECTION, POWDER, LYOPHILIZED, FOR SOLUTION INTRAVENOUS at 23:24

## 2025-02-26 RX ADMIN — HEPARIN SODIUM 7500 UNITS: 1000 INJECTION INTRAVENOUS; SUBCUTANEOUS at 11:30

## 2025-02-26 RX ADMIN — SODIUM CHLORIDE, PRESERVATIVE FREE 10 ML: 5 INJECTION INTRAVENOUS at 11:48

## 2025-02-26 RX ADMIN — DOBUTAMINE HYDROCHLORIDE 2.5 MCG/KG/MIN: 200 INJECTION INTRAVENOUS at 22:17

## 2025-02-26 RX ADMIN — SODIUM CHLORIDE, POTASSIUM CHLORIDE, SODIUM LACTATE AND CALCIUM CHLORIDE 500 ML: 600; 310; 30; 20 INJECTION, SOLUTION INTRAVENOUS at 04:21

## 2025-02-26 RX ADMIN — PIPERACILLIN AND TAZOBACTAM 3375 MG: 3; .375 INJECTION, POWDER, LYOPHILIZED, FOR SOLUTION INTRAVENOUS at 14:01

## 2025-02-26 RX ADMIN — HEPARIN SODIUM AND DEXTROSE 18 UNITS/KG/HR: 10000; 5 INJECTION INTRAVENOUS at 11:30

## 2025-02-26 RX ADMIN — TRIAMCINOLONE ACETONIDE: 1 CREAM TOPICAL at 21:43

## 2025-02-26 RX ADMIN — TRIAMCINOLONE ACETONIDE: 1 CREAM TOPICAL at 11:47

## 2025-02-26 RX ADMIN — NOREPINEPHRINE BITARTRATE 3 MCG/MIN: 64 SOLUTION INTRAVENOUS at 20:59

## 2025-02-26 RX ADMIN — PIPERACILLIN AND TAZOBACTAM 4500 MG: 4; .5 INJECTION, POWDER, LYOPHILIZED, FOR SOLUTION INTRAVENOUS; PARENTERAL at 08:12

## 2025-02-26 RX ADMIN — METOPROLOL SUCCINATE 50 MG: 50 TABLET, EXTENDED RELEASE ORAL at 13:51

## 2025-02-27 ENCOUNTER — APPOINTMENT (OUTPATIENT)
Facility: HOSPITAL | Age: 70
DRG: 291 | End: 2025-02-27
Payer: MEDICARE

## 2025-02-27 LAB
ACB COMPLEX DNA BLD POS QL NAA+NON-PROBE: NOT DETECTED
ACCESSION NUMBER, LLC1M: ABNORMAL
ALBUMIN SERPL-MCNC: 2.9 G/DL (ref 3.5–5)
ANION GAP SERPL CALC-SCNC: 17 MMOL/L (ref 2–12)
ANION GAP SERPL CALC-SCNC: 21 MMOL/L (ref 2–12)
ANION GAP SERPL CALC-SCNC: 22 MMOL/L (ref 2–12)
APTT PPP: 52.6 SEC (ref 22.1–31)
APTT PPP: 85 SEC (ref 22.1–31)
APTT PPP: >130 SEC (ref 22.1–31)
B FRAGILIS DNA BLD POS QL NAA+NON-PROBE: NOT DETECTED
BASE DEFICIT BLDV-SCNC: 10.7 MMOL/L
BDY SITE: ABNORMAL
BIOFIRE TEST COMMENT: ABNORMAL
BUN SERPL-MCNC: 74 MG/DL (ref 6–20)
BUN SERPL-MCNC: 76 MG/DL (ref 6–20)
BUN SERPL-MCNC: 77 MG/DL (ref 6–20)
BUN/CREAT SERPL: 19 (ref 12–20)
BUN/CREAT SERPL: 20 (ref 12–20)
BUN/CREAT SERPL: 21 (ref 12–20)
C ALBICANS DNA BLD POS QL NAA+NON-PROBE: NOT DETECTED
C AURIS DNA BLD POS QL NAA+NON-PROBE: NOT DETECTED
C GATTII+NEOFOR DNA BLD POS QL NAA+N-PRB: NOT DETECTED
C GLABRATA DNA BLD POS QL NAA+NON-PROBE: NOT DETECTED
C KRUSEI DNA BLD POS QL NAA+NON-PROBE: NOT DETECTED
C PARAP DNA BLD POS QL NAA+NON-PROBE: NOT DETECTED
C TROPICLS DNA BLD POS QL NAA+NON-PROBE: NOT DETECTED
CALCIUM SERPL-MCNC: 8.5 MG/DL (ref 8.5–10.1)
CALCIUM SERPL-MCNC: 8.5 MG/DL (ref 8.5–10.1)
CALCIUM SERPL-MCNC: 9 MG/DL (ref 8.5–10.1)
CHLORIDE SERPL-SCNC: 91 MMOL/L (ref 97–108)
CHLORIDE SERPL-SCNC: 92 MMOL/L (ref 97–108)
CHLORIDE SERPL-SCNC: 93 MMOL/L (ref 97–108)
CO2 SERPL-SCNC: 14 MMOL/L (ref 21–32)
CO2 SERPL-SCNC: 16 MMOL/L (ref 21–32)
CO2 SERPL-SCNC: 21 MMOL/L (ref 21–32)
CREAT SERPL-MCNC: 3.68 MG/DL (ref 0.7–1.3)
CREAT SERPL-MCNC: 3.84 MG/DL (ref 0.7–1.3)
CREAT SERPL-MCNC: 3.85 MG/DL (ref 0.7–1.3)
E CLOAC COMP DNA BLD POS NAA+NON-PROBE: NOT DETECTED
E COLI DNA BLD POS QL NAA+NON-PROBE: NOT DETECTED
E FAECALIS DNA BLD POS QL NAA+NON-PROBE: NOT DETECTED
E FAECIUM DNA BLD POS QL NAA+NON-PROBE: NOT DETECTED
ECHO AO ROOT DIAM: 3.3 CM
ECHO AO ROOT INDEX: 1.59 CM/M2
ECHO AV AREA PEAK VELOCITY: 3.1 CM2
ECHO AV AREA VTI: 3.1 CM2
ECHO AV AREA/BSA PEAK VELOCITY: 1.5 CM2/M2
ECHO AV AREA/BSA VTI: 1.5 CM2/M2
ECHO AV MEAN GRADIENT: 5 MMHG
ECHO AV MEAN VELOCITY: 1 M/S
ECHO AV PEAK GRADIENT: 7 MMHG
ECHO AV PEAK VELOCITY: 1.3 M/S
ECHO AV VELOCITY RATIO: 1.23
ECHO AV VTI: 20.6 CM
ECHO BSA: 2.12 M2
ECHO LA DIAMETER INDEX: 2.71 CM/M2
ECHO LA DIAMETER: 5.6 CM
ECHO LA TO AORTIC ROOT RATIO: 1.7
ECHO LV EF PHYSICIAN: 25 %
ECHO LV FRACTIONAL SHORTENING: 15 % (ref 28–44)
ECHO LV INTERNAL DIMENSION DIASTOLE INDEX: 2.9 CM/M2
ECHO LV INTERNAL DIMENSION DIASTOLIC: 6 CM (ref 4.2–5.9)
ECHO LV INTERNAL DIMENSION SYSTOLIC INDEX: 2.46 CM/M2
ECHO LV INTERNAL DIMENSION SYSTOLIC: 5.1 CM
ECHO LV IVSD: 0.9 CM (ref 0.6–1)
ECHO LV MASS 2D: 215.7 G (ref 88–224)
ECHO LV MASS INDEX 2D: 104.2 G/M2 (ref 49–115)
ECHO LV POSTERIOR WALL DIASTOLIC: 0.9 CM (ref 0.6–1)
ECHO LV RELATIVE WALL THICKNESS RATIO: 0.3
ECHO LVOT AREA: 2.5 CM2
ECHO LVOT AV VTI INDEX: 1.18
ECHO LVOT DIAM: 1.8 CM
ECHO LVOT MEAN GRADIENT: 4 MMHG
ECHO LVOT PEAK GRADIENT: 10 MMHG
ECHO LVOT PEAK VELOCITY: 1.6 M/S
ECHO LVOT STROKE VOLUME INDEX: 30 ML/M2
ECHO LVOT SV: 62.1 ML
ECHO LVOT VTI: 24.4 CM
ECHO MV A VELOCITY: 0.44 M/S
ECHO MV AREA VTI: 2.3 CM2
ECHO MV E DECELERATION TIME (DT): 144.5 MS
ECHO MV E VELOCITY: 1.52 M/S
ECHO MV E/A RATIO: 3.45
ECHO MV LVOT VTI INDEX: 1.09
ECHO MV MAX VELOCITY: 1.5 M/S
ECHO MV MEAN GRADIENT: 5 MMHG
ECHO MV MEAN VELOCITY: 1 M/S
ECHO MV PEAK GRADIENT: 9 MMHG
ECHO MV REGURGITANT PEAK GRADIENT: 0 MMHG
ECHO MV REGURGITANT PEAK VELOCITY: 0 M/S
ECHO MV VTI: 26.5 CM
ECHO PV MAX VELOCITY: 0.7 M/S
ECHO PV MEAN GRADIENT: 1 MMHG
ECHO PV MEAN VELOCITY: 0.5 M/S
ECHO PV PEAK GRADIENT: 2 MMHG
ECHO RV INTERNAL DIMENSION: 3.6 CM
ECHO RV TAPSE: 1.2 CM (ref 1.7–?)
ECHO TV REGURGITANT MAX VELOCITY: 2.31 M/S
ECHO TV REGURGITANT PEAK GRADIENT: 22 MMHG
ENTEROBACTERALES DNA BLD POS NAA+N-PRB: NOT DETECTED
ERYTHROCYTE [DISTWIDTH] IN BLOOD BY AUTOMATED COUNT: 17.1 % (ref 11.5–14.5)
GAS FLOW.O2 O2 DELIVERY SYS: 6 L/MIN
GLUCOSE BLD STRIP.AUTO-MCNC: 155 MG/DL (ref 65–117)
GLUCOSE BLD STRIP.AUTO-MCNC: 157 MG/DL (ref 65–117)
GLUCOSE BLD STRIP.AUTO-MCNC: 227 MG/DL (ref 65–117)
GLUCOSE BLD STRIP.AUTO-MCNC: 90 MG/DL (ref 65–117)
GLUCOSE SERPL-MCNC: 129 MG/DL (ref 65–100)
GLUCOSE SERPL-MCNC: 170 MG/DL (ref 65–100)
GLUCOSE SERPL-MCNC: 179 MG/DL (ref 65–100)
GP B STREP DNA BLD POS QL NAA+NON-PROBE: NOT DETECTED
HAEM INFLU DNA BLD POS QL NAA+NON-PROBE: NOT DETECTED
HCO3 BLDV-SCNC: 15 MMOL/L (ref 23–28)
HCT VFR BLD AUTO: 26.7 % (ref 36.6–50.3)
HGB BLD-MCNC: 8.3 G/DL (ref 12.1–17)
K OXYTOCA DNA BLD POS QL NAA+NON-PROBE: NOT DETECTED
KLEBSIELLA SP DNA BLD POS QL NAA+NON-PRB: NOT DETECTED
KLEBSIELLA SP DNA BLD POS QL NAA+NON-PRB: NOT DETECTED
L MONOCYTOG DNA BLD POS QL NAA+NON-PROBE: NOT DETECTED
LACTATE SERPL-SCNC: 12.3 MMOL/L (ref 0.4–2)
LACTATE SERPL-SCNC: 2.4 MMOL/L (ref 0.4–2)
LACTATE SERPL-SCNC: 3.4 MMOL/L (ref 0.4–2)
LACTATE SERPL-SCNC: 6.3 MMOL/L (ref 0.4–2)
MAGNESIUM SERPL-MCNC: 2.7 MG/DL (ref 1.6–2.4)
MAGNESIUM SERPL-MCNC: 2.8 MG/DL (ref 1.6–2.4)
MCH RBC QN AUTO: 18 PG (ref 26–34)
MCHC RBC AUTO-ENTMCNC: 31.1 G/DL (ref 30–36.5)
MCV RBC AUTO: 57.8 FL (ref 80–99)
MECA+MECC+MREJ ISLT/SPM QL: NOT DETECTED
N MEN DNA BLD POS QL NAA+NON-PROBE: NOT DETECTED
NRBC # BLD: 0.22 K/UL (ref 0–0.01)
NRBC BLD-RTO: 0.9 PER 100 WBC
P AERUGINOSA DNA BLD POS NAA+NON-PROBE: NOT DETECTED
PCO2 BLDV: 31.9 MMHG (ref 41–51)
PH BLDV: 7.29 (ref 7.32–7.42)
PHOSPHATE SERPL-MCNC: 7.5 MG/DL (ref 2.6–4.7)
PHOSPHATE SERPL-MCNC: 7.8 MG/DL (ref 2.6–4.7)
PLATELET # BLD AUTO: 152 K/UL (ref 150–400)
PO2 BLDV: 33 MMHG (ref 25–40)
POTASSIUM SERPL-SCNC: 4.4 MMOL/L (ref 3.5–5.1)
POTASSIUM SERPL-SCNC: 4.9 MMOL/L (ref 3.5–5.1)
POTASSIUM SERPL-SCNC: 5.9 MMOL/L (ref 3.5–5.1)
PROTEUS SP DNA BLD POS QL NAA+NON-PROBE: NOT DETECTED
RBC # BLD AUTO: 4.62 M/UL (ref 4.1–5.7)
RESISTANT GENE TARGETS: ABNORMAL
S AUREUS DNA BLD POS QL NAA+NON-PROBE: DETECTED
S AUREUS+CONS DNA BLD POS NAA+NON-PROBE: DETECTED
S EPIDERMIDIS DNA BLD POS QL NAA+NON-PRB: NOT DETECTED
S LUGDUNENSIS DNA BLD POS QL NAA+NON-PRB: NOT DETECTED
S MALTOPHILIA DNA BLD POS QL NAA+NON-PRB: NOT DETECTED
S MARCESCENS DNA BLD POS NAA+NON-PROBE: NOT DETECTED
S PNEUM DNA BLD POS QL NAA+NON-PROBE: NOT DETECTED
S PYO DNA BLD POS QL NAA+NON-PROBE: NOT DETECTED
SALMONELLA DNA BLD POS QL NAA+NON-PROBE: NOT DETECTED
SAO2% DEVICE SAO2% SENSOR NAME: ABNORMAL
SERVICE CMNT-IMP: ABNORMAL
SERVICE CMNT-IMP: NORMAL
SODIUM SERPL-SCNC: 127 MMOL/L (ref 136–145)
SODIUM SERPL-SCNC: 129 MMOL/L (ref 136–145)
SODIUM SERPL-SCNC: 131 MMOL/L (ref 136–145)
SPECIMEN SITE: ABNORMAL
STREPTOCOCCUS DNA BLD POS NAA+NON-PROBE: NOT DETECTED
THERAPEUTIC RANGE: ABNORMAL SECS (ref 58–77)
WBC # BLD AUTO: 24.3 K/UL (ref 4.1–11.1)

## 2025-02-27 PROCEDURE — 2580000003 HC RX 258: Performed by: NURSE PRACTITIONER

## 2025-02-27 PROCEDURE — 83735 ASSAY OF MAGNESIUM: CPT

## 2025-02-27 PROCEDURE — 85730 THROMBOPLASTIN TIME PARTIAL: CPT

## 2025-02-27 PROCEDURE — 80069 RENAL FUNCTION PANEL: CPT

## 2025-02-27 PROCEDURE — 51798 US URINE CAPACITY MEASURE: CPT

## 2025-02-27 PROCEDURE — 2700000000 HC OXYGEN THERAPY PER DAY

## 2025-02-27 PROCEDURE — 85027 COMPLETE CBC AUTOMATED: CPT

## 2025-02-27 PROCEDURE — 6360000002 HC RX W HCPCS: Performed by: NURSE PRACTITIONER

## 2025-02-27 PROCEDURE — 2500000003 HC RX 250 WO HCPCS: Performed by: NURSE PRACTITIONER

## 2025-02-27 PROCEDURE — 80048 BASIC METABOLIC PNL TOTAL CA: CPT

## 2025-02-27 PROCEDURE — 93306 TTE W/DOPPLER COMPLETE: CPT

## 2025-02-27 PROCEDURE — 87040 BLOOD CULTURE FOR BACTERIA: CPT

## 2025-02-27 PROCEDURE — 83605 ASSAY OF LACTIC ACID: CPT

## 2025-02-27 PROCEDURE — 6360000002 HC RX W HCPCS: Performed by: HOSPITALIST

## 2025-02-27 PROCEDURE — 6370000000 HC RX 637 (ALT 250 FOR IP): Performed by: STUDENT IN AN ORGANIZED HEALTH CARE EDUCATION/TRAINING PROGRAM

## 2025-02-27 PROCEDURE — 6370000000 HC RX 637 (ALT 250 FOR IP)

## 2025-02-27 PROCEDURE — 6370000000 HC RX 637 (ALT 250 FOR IP): Performed by: INTERNAL MEDICINE

## 2025-02-27 PROCEDURE — 82962 GLUCOSE BLOOD TEST: CPT

## 2025-02-27 PROCEDURE — 2000000000 HC ICU R&B

## 2025-02-27 PROCEDURE — 36415 COLL VENOUS BLD VENIPUNCTURE: CPT

## 2025-02-27 PROCEDURE — 99231 SBSQ HOSP IP/OBS SF/LOW 25: CPT | Performed by: INTERNAL MEDICINE

## 2025-02-27 PROCEDURE — 82803 BLOOD GASES ANY COMBINATION: CPT

## 2025-02-27 PROCEDURE — 6370000000 HC RX 637 (ALT 250 FOR IP): Performed by: NURSE PRACTITIONER

## 2025-02-27 RX ORDER — SODIUM BICARBONATE IN D5W 150/1000ML
PLASTIC BAG, INJECTION (ML) INTRAVENOUS CONTINUOUS
Status: DISCONTINUED | OUTPATIENT
Start: 2025-02-27 | End: 2025-02-27

## 2025-02-27 RX ORDER — NOREPINEPHRINE BITARTRATE 0.06 MG/ML
.5-2 INJECTION, SOLUTION INTRAVENOUS CONTINUOUS
Status: ACTIVE | OUTPATIENT
Start: 2025-02-27 | End: 2025-02-28

## 2025-02-27 RX ORDER — DOBUTAMINE HYDROCHLORIDE 200 MG/100ML
2.5-2 INJECTION INTRAVENOUS CONTINUOUS
Status: DISCONTINUED | OUTPATIENT
Start: 2025-02-27 | End: 2025-02-28

## 2025-02-27 RX ORDER — NOREPINEPHRINE BITARTRATE 0.06 MG/ML
.5-2 INJECTION, SOLUTION INTRAVENOUS CONTINUOUS
Status: DISCONTINUED | OUTPATIENT
Start: 2025-02-27 | End: 2025-02-27

## 2025-02-27 RX ADMIN — PIPERACILLIN AND TAZOBACTAM 3375 MG: 3; .375 INJECTION, POWDER, LYOPHILIZED, FOR SOLUTION INTRAVENOUS at 15:59

## 2025-02-27 RX ADMIN — SODIUM BICARBONATE: 84 INJECTION, SOLUTION INTRAVENOUS at 02:08

## 2025-02-27 RX ADMIN — SODIUM BICARBONATE: 84 INJECTION, SOLUTION INTRAVENOUS at 12:26

## 2025-02-27 RX ADMIN — SODIUM BICARBONATE 100 MEQ: 84 INJECTION, SOLUTION INTRAVENOUS at 00:57

## 2025-02-27 RX ADMIN — ATORVASTATIN CALCIUM 80 MG: 40 TABLET, FILM COATED ORAL at 22:15

## 2025-02-27 RX ADMIN — PIPERACILLIN AND TAZOBACTAM 3375 MG: 3; .375 INJECTION, POWDER, LYOPHILIZED, FOR SOLUTION INTRAVENOUS at 23:58

## 2025-02-27 RX ADMIN — EZETIMIBE 10 MG: 10 TABLET ORAL at 08:36

## 2025-02-27 RX ADMIN — SODIUM BICARBONATE: 84 INJECTION, SOLUTION INTRAVENOUS at 23:46

## 2025-02-27 RX ADMIN — TRIAMCINOLONE ACETONIDE: 1 CREAM TOPICAL at 08:37

## 2025-02-27 RX ADMIN — SODIUM CHLORIDE, PRESERVATIVE FREE 40 ML: 5 INJECTION INTRAVENOUS at 19:00

## 2025-02-27 RX ADMIN — ASPIRIN 81 MG: 81 TABLET, CHEWABLE ORAL at 08:37

## 2025-02-27 RX ADMIN — ISOSORBIDE MONONITRATE 30 MG: 30 TABLET, EXTENDED RELEASE ORAL at 08:37

## 2025-02-27 RX ADMIN — INSULIN LISPRO 1 UNITS: 100 INJECTION, SOLUTION INTRAVENOUS; SUBCUTANEOUS at 22:22

## 2025-02-27 RX ADMIN — DOBUTAMINE HYDROCHLORIDE 5 MCG/KG/MIN: 200 INJECTION INTRAVENOUS at 19:00

## 2025-02-27 RX ADMIN — TRIAMCINOLONE ACETONIDE: 1 CREAM TOPICAL at 22:16

## 2025-02-27 RX ADMIN — SODIUM CHLORIDE: 0.9 INJECTION, SOLUTION INTRAVENOUS at 08:32

## 2025-02-27 RX ADMIN — PIPERACILLIN AND TAZOBACTAM 3375 MG: 3; .375 INJECTION, POWDER, LYOPHILIZED, FOR SOLUTION INTRAVENOUS at 08:34

## 2025-02-27 RX ADMIN — HEPARIN SODIUM AND DEXTROSE 12 UNITS/KG/HR: 10000; 5 INJECTION INTRAVENOUS at 06:24

## 2025-02-27 RX ADMIN — EMPAGLIFLOZIN 10 MG: 10 TABLET, FILM COATED ORAL at 08:37

## 2025-02-27 RX ADMIN — SODIUM CHLORIDE, PRESERVATIVE FREE 10 ML: 5 INJECTION INTRAVENOUS at 08:37

## 2025-02-27 RX ADMIN — DOBUTAMINE HYDROCHLORIDE 5 MCG/KG/MIN: 200 INJECTION INTRAVENOUS at 00:57

## 2025-02-27 ASSESSMENT — PAIN SCALES - GENERAL
PAINLEVEL_OUTOF10: 0

## 2025-02-28 PROBLEM — R41.82 ACUTE ALTERATION IN MENTAL STATUS: Status: ACTIVE | Noted: 2025-02-28

## 2025-02-28 PROBLEM — R55 CONVULSIVE SYNCOPE: Status: ACTIVE | Noted: 2025-02-28

## 2025-02-28 LAB
ALBUMIN SERPL-MCNC: 2.6 G/DL (ref 3.5–5)
ANION GAP SERPL CALC-SCNC: 11 MMOL/L (ref 2–12)
ANION GAP SERPL CALC-SCNC: 16 MMOL/L (ref 2–12)
APTT PPP: 104.5 SEC (ref 22.1–31)
APTT PPP: 30 SEC (ref 22.1–31)
APTT PPP: 71 SEC (ref 22.1–31)
APTT PPP: >130 SEC (ref 22.1–31)
ARTERIAL PATENCY WRIST A: YES
BASE EXCESS BLDA CALC-SCNC: 2.4 MMOL/L
BASOPHILS # BLD: 0 K/UL (ref 0–0.1)
BASOPHILS NFR BLD: 0 % (ref 0–1)
BDY SITE: ABNORMAL
BUN SERPL-MCNC: 75 MG/DL (ref 6–20)
BUN SERPL-MCNC: 80 MG/DL (ref 6–20)
BUN/CREAT SERPL: 17 (ref 12–20)
BUN/CREAT SERPL: 20 (ref 12–20)
CALCIUM SERPL-MCNC: 7.8 MG/DL (ref 8.5–10.1)
CALCIUM SERPL-MCNC: 8 MG/DL (ref 8.5–10.1)
CHLORIDE SERPL-SCNC: 85 MMOL/L (ref 97–108)
CHLORIDE SERPL-SCNC: 89 MMOL/L (ref 97–108)
CO2 SERPL-SCNC: 25 MMOL/L (ref 21–32)
CO2 SERPL-SCNC: 29 MMOL/L (ref 21–32)
CREAT SERPL-MCNC: 4.04 MG/DL (ref 0.7–1.3)
CREAT SERPL-MCNC: 4.43 MG/DL (ref 0.7–1.3)
DIFFERENTIAL METHOD BLD: ABNORMAL
EKG ATRIAL RATE: 254 BPM
EKG DIAGNOSIS: NORMAL
EKG P AXIS: 115 DEGREES
EKG Q-T INTERVAL: 306 MS
EKG QRS DURATION: 86 MS
EKG QTC CALCULATION (BAZETT): 444 MS
EKG R AXIS: 34 DEGREES
EKG T AXIS: -80 DEGREES
EKG VENTRICULAR RATE: 127 BPM
EOSINOPHIL # BLD: 0.17 K/UL (ref 0–0.4)
EOSINOPHIL NFR BLD: 1 % (ref 0–7)
ERYTHROCYTE [DISTWIDTH] IN BLOOD BY AUTOMATED COUNT: 16.8 % (ref 11.5–14.5)
ERYTHROCYTE [DISTWIDTH] IN BLOOD BY AUTOMATED COUNT: 17.1 % (ref 11.5–14.5)
GLUCOSE BLD STRIP.AUTO-MCNC: 176 MG/DL (ref 65–117)
GLUCOSE BLD STRIP.AUTO-MCNC: 185 MG/DL (ref 65–117)
GLUCOSE BLD STRIP.AUTO-MCNC: 220 MG/DL (ref 65–117)
GLUCOSE BLD STRIP.AUTO-MCNC: 292 MG/DL (ref 65–117)
GLUCOSE SERPL-MCNC: 123 MG/DL (ref 65–100)
GLUCOSE SERPL-MCNC: 257 MG/DL (ref 65–100)
HCO3 BLDA-SCNC: 25 MMOL/L (ref 22–26)
HCT VFR BLD AUTO: 23.1 % (ref 36.6–50.3)
HCT VFR BLD AUTO: 25.7 % (ref 36.6–50.3)
HEMOCCULT STL QL: POSITIVE
HGB BLD-MCNC: 7.7 G/DL (ref 12.1–17)
HGB BLD-MCNC: 8.3 G/DL (ref 12.1–17)
HISTORY CHECK: NORMAL
IMM GRANULOCYTES # BLD AUTO: 0 K/UL (ref 0–0.04)
IMM GRANULOCYTES NFR BLD AUTO: 0 % (ref 0–0.5)
LACTATE SERPL-SCNC: 1.3 MMOL/L (ref 0.4–2)
LACTATE SERPL-SCNC: 2.2 MMOL/L (ref 0.4–2)
LACTATE SERPL-SCNC: 4 MMOL/L (ref 0.4–2)
LACTATE SERPL-SCNC: 4.3 MMOL/L (ref 0.4–2)
LYMPHOCYTES # BLD: 1.57 K/UL (ref 0.8–3.5)
LYMPHOCYTES NFR BLD: 9 % (ref 12–49)
MAGNESIUM SERPL-MCNC: 2.5 MG/DL (ref 1.6–2.4)
MAGNESIUM SERPL-MCNC: 2.6 MG/DL (ref 1.6–2.4)
MCH RBC QN AUTO: 18 PG (ref 26–34)
MCH RBC QN AUTO: 18.3 PG (ref 26–34)
MCHC RBC AUTO-ENTMCNC: 32.3 G/DL (ref 30–36.5)
MCHC RBC AUTO-ENTMCNC: 33.3 G/DL (ref 30–36.5)
MCV RBC AUTO: 55 FL (ref 80–99)
MCV RBC AUTO: 55.6 FL (ref 80–99)
MONOCYTES # BLD: 1.22 K/UL (ref 0–1)
MONOCYTES NFR BLD: 7 % (ref 5–13)
NEUTS SEG # BLD: 14.44 K/UL (ref 1.8–8)
NEUTS SEG NFR BLD: 83 % (ref 32–75)
NRBC # BLD: 0.36 K/UL (ref 0–0.01)
NRBC # BLD: 0.69 K/UL (ref 0–0.01)
NRBC BLD-RTO: 1.8 PER 100 WBC
NRBC BLD-RTO: 4 PER 100 WBC
PCO2 BLDA: 33 MMHG (ref 35–45)
PH BLDA: 7.5 (ref 7.35–7.45)
PHOSPHATE SERPL-MCNC: 5 MG/DL (ref 2.6–4.7)
PHOSPHATE SERPL-MCNC: 6 MG/DL (ref 2.6–4.7)
PLATELET # BLD AUTO: 142 K/UL (ref 150–400)
PLATELET # BLD AUTO: 144 K/UL (ref 150–400)
PO2 BLDA: 105 MMHG (ref 80–100)
POTASSIUM SERPL-SCNC: 3.4 MMOL/L (ref 3.5–5.1)
POTASSIUM SERPL-SCNC: 3.5 MMOL/L (ref 3.5–5.1)
RBC # BLD AUTO: 4.2 M/UL (ref 4.1–5.7)
RBC # BLD AUTO: 4.62 M/UL (ref 4.1–5.7)
RBC MORPH BLD: ABNORMAL
SAO2 % BLD: 98 % (ref 92–97)
SAO2% DEVICE SAO2% SENSOR NAME: ABNORMAL
SERVICE CMNT-IMP: ABNORMAL
SODIUM SERPL-SCNC: 126 MMOL/L (ref 136–145)
SODIUM SERPL-SCNC: 129 MMOL/L (ref 136–145)
SPECIMEN SITE: ABNORMAL
THERAPEUTIC RANGE: ABNORMAL SECS (ref 58–77)
THERAPEUTIC RANGE: NORMAL SECS (ref 58–77)
WBC # BLD AUTO: 17.4 K/UL (ref 4.1–11.1)
WBC # BLD AUTO: 19.8 K/UL (ref 4.1–11.1)

## 2025-02-28 PROCEDURE — 86923 COMPATIBILITY TEST ELECTRIC: CPT

## 2025-02-28 PROCEDURE — 2580000003 HC RX 258: Performed by: INTERNAL MEDICINE

## 2025-02-28 PROCEDURE — 36415 COLL VENOUS BLD VENIPUNCTURE: CPT

## 2025-02-28 PROCEDURE — 86900 BLOOD TYPING SEROLOGIC ABO: CPT

## 2025-02-28 PROCEDURE — 51798 US URINE CAPACITY MEASURE: CPT

## 2025-02-28 PROCEDURE — 85025 COMPLETE CBC W/AUTO DIFF WBC: CPT

## 2025-02-28 PROCEDURE — 80048 BASIC METABOLIC PNL TOTAL CA: CPT

## 2025-02-28 PROCEDURE — 85027 COMPLETE CBC AUTOMATED: CPT

## 2025-02-28 PROCEDURE — 6360000002 HC RX W HCPCS: Performed by: NURSE PRACTITIONER

## 2025-02-28 PROCEDURE — 6360000002 HC RX W HCPCS: Performed by: INTERNAL MEDICINE

## 2025-02-28 PROCEDURE — 6370000000 HC RX 637 (ALT 250 FOR IP): Performed by: INTERNAL MEDICINE

## 2025-02-28 PROCEDURE — 2500000003 HC RX 250 WO HCPCS: Performed by: INTERNAL MEDICINE

## 2025-02-28 PROCEDURE — 93005 ELECTROCARDIOGRAM TRACING: CPT | Performed by: NURSE PRACTITIONER

## 2025-02-28 PROCEDURE — 83605 ASSAY OF LACTIC ACID: CPT

## 2025-02-28 PROCEDURE — 83735 ASSAY OF MAGNESIUM: CPT

## 2025-02-28 PROCEDURE — 36600 WITHDRAWAL OF ARTERIAL BLOOD: CPT

## 2025-02-28 PROCEDURE — 82272 OCCULT BLD FECES 1-3 TESTS: CPT

## 2025-02-28 PROCEDURE — 2000000000 HC ICU R&B

## 2025-02-28 PROCEDURE — 2580000003 HC RX 258: Performed by: NURSE PRACTITIONER

## 2025-02-28 PROCEDURE — 6360000002 HC RX W HCPCS: Performed by: HOSPITALIST

## 2025-02-28 PROCEDURE — P9016 RBC LEUKOCYTES REDUCED: HCPCS

## 2025-02-28 PROCEDURE — 84100 ASSAY OF PHOSPHORUS: CPT

## 2025-02-28 PROCEDURE — 86901 BLOOD TYPING SEROLOGIC RH(D): CPT

## 2025-02-28 PROCEDURE — 99223 1ST HOSP IP/OBS HIGH 75: CPT | Performed by: INTERNAL MEDICINE

## 2025-02-28 PROCEDURE — 86850 RBC ANTIBODY SCREEN: CPT

## 2025-02-28 PROCEDURE — 2700000000 HC OXYGEN THERAPY PER DAY

## 2025-02-28 PROCEDURE — 99231 SBSQ HOSP IP/OBS SF/LOW 25: CPT

## 2025-02-28 PROCEDURE — 82962 GLUCOSE BLOOD TEST: CPT

## 2025-02-28 PROCEDURE — 2580000003 HC RX 258: Performed by: HOSPITALIST

## 2025-02-28 PROCEDURE — 2500000003 HC RX 250 WO HCPCS: Performed by: NURSE PRACTITIONER

## 2025-02-28 PROCEDURE — 30233N1 TRANSFUSION OF NONAUTOLOGOUS RED BLOOD CELLS INTO PERIPHERAL VEIN, PERCUTANEOUS APPROACH: ICD-10-PCS | Performed by: INTERNAL MEDICINE

## 2025-02-28 PROCEDURE — 95717 EEG PHYS/QHP 2-12 HR W/O VID: CPT | Performed by: PSYCHIATRY & NEUROLOGY

## 2025-02-28 PROCEDURE — 6370000000 HC RX 637 (ALT 250 FOR IP): Performed by: NURSE PRACTITIONER

## 2025-02-28 PROCEDURE — 6370000000 HC RX 637 (ALT 250 FOR IP): Performed by: STUDENT IN AN ORGANIZED HEALTH CARE EDUCATION/TRAINING PROGRAM

## 2025-02-28 PROCEDURE — 80069 RENAL FUNCTION PANEL: CPT

## 2025-02-28 PROCEDURE — 82803 BLOOD GASES ANY COMBINATION: CPT

## 2025-02-28 PROCEDURE — XX20X89 MONITORING OF BRAIN ELECTRICAL ACTIVITY, COMPUTER-AIDED DETECTION AND NOTIFICATION, NEW TECHNOLOGY GROUP 9: ICD-10-PCS | Performed by: PSYCHIATRY & NEUROLOGY

## 2025-02-28 PROCEDURE — 85730 THROMBOPLASTIN TIME PARTIAL: CPT

## 2025-02-28 PROCEDURE — 36430 TRANSFUSION BLD/BLD COMPNT: CPT

## 2025-02-28 RX ORDER — 0.9 % SODIUM CHLORIDE 0.9 %
500 INTRAVENOUS SOLUTION INTRAVENOUS ONCE
Status: COMPLETED | OUTPATIENT
Start: 2025-02-28 | End: 2025-02-28

## 2025-02-28 RX ORDER — SODIUM CHLORIDE, SODIUM LACTATE, POTASSIUM CHLORIDE, CALCIUM CHLORIDE 600; 310; 30; 20 MG/100ML; MG/100ML; MG/100ML; MG/100ML
INJECTION, SOLUTION INTRAVENOUS CONTINUOUS
Status: DISCONTINUED | OUTPATIENT
Start: 2025-02-28 | End: 2025-03-01

## 2025-02-28 RX ORDER — POTASSIUM CHLORIDE 7.45 MG/ML
10 INJECTION INTRAVENOUS
Status: DISCONTINUED | OUTPATIENT
Start: 2025-02-28 | End: 2025-02-28

## 2025-02-28 RX ORDER — POTASSIUM CHLORIDE 1500 MG/1
20 TABLET, EXTENDED RELEASE ORAL ONCE
Status: COMPLETED | OUTPATIENT
Start: 2025-02-28 | End: 2025-02-28

## 2025-02-28 RX ORDER — DOBUTAMINE HYDROCHLORIDE 200 MG/100ML
5 INJECTION INTRAVENOUS CONTINUOUS
Status: DISCONTINUED | OUTPATIENT
Start: 2025-02-28 | End: 2025-03-02 | Stop reason: HOSPADM

## 2025-02-28 RX ORDER — NOREPINEPHRINE BITARTRATE 0.06 MG/ML
.5-2 INJECTION, SOLUTION INTRAVENOUS CONTINUOUS
Status: ACTIVE | OUTPATIENT
Start: 2025-02-28 | End: 2025-03-01

## 2025-02-28 RX ORDER — DOBUTAMINE HYDROCHLORIDE 200 MG/100ML
2.5-2 INJECTION INTRAVENOUS CONTINUOUS
Status: DISCONTINUED | OUTPATIENT
Start: 2025-02-28 | End: 2025-02-28

## 2025-02-28 RX ORDER — INSULIN LISPRO 100 [IU]/ML
0-4 INJECTION, SOLUTION INTRAVENOUS; SUBCUTANEOUS
Status: DISCONTINUED | OUTPATIENT
Start: 2025-02-28 | End: 2025-03-02 | Stop reason: HOSPADM

## 2025-02-28 RX ORDER — SODIUM CHLORIDE 9 MG/ML
INJECTION, SOLUTION INTRAVENOUS PRN
Status: DISCONTINUED | OUTPATIENT
Start: 2025-02-28 | End: 2025-03-02 | Stop reason: HOSPADM

## 2025-02-28 RX ADMIN — ASPIRIN 81 MG: 81 TABLET, CHEWABLE ORAL at 10:05

## 2025-02-28 RX ADMIN — HEPARIN SODIUM AND DEXTROSE 13 UNITS/KG/HR: 10000; 5 INJECTION INTRAVENOUS at 06:38

## 2025-02-28 RX ADMIN — AMIODARONE HYDROCHLORIDE 150 MG: 1.5 INJECTION, SOLUTION INTRAVENOUS at 12:34

## 2025-02-28 RX ADMIN — SODIUM CHLORIDE, PRESERVATIVE FREE 10 ML: 5 INJECTION INTRAVENOUS at 08:45

## 2025-02-28 RX ADMIN — AMIODARONE HYDROCHLORIDE 0.5 MG/MIN: 50 INJECTION, SOLUTION INTRAVENOUS at 19:08

## 2025-02-28 RX ADMIN — PIPERACILLIN AND TAZOBACTAM 3375 MG: 3; .375 INJECTION, POWDER, LYOPHILIZED, FOR SOLUTION INTRAVENOUS at 16:29

## 2025-02-28 RX ADMIN — EZETIMIBE 10 MG: 10 TABLET ORAL at 10:05

## 2025-02-28 RX ADMIN — SODIUM CHLORIDE, PRESERVATIVE FREE 10 ML: 5 INJECTION INTRAVENOUS at 21:21

## 2025-02-28 RX ADMIN — ISOSORBIDE MONONITRATE 30 MG: 30 TABLET, EXTENDED RELEASE ORAL at 10:05

## 2025-02-28 RX ADMIN — DOBUTAMINE HYDROCHLORIDE 5 MCG/KG/MIN: 200 INJECTION INTRAVENOUS at 23:13

## 2025-02-28 RX ADMIN — ATORVASTATIN CALCIUM 80 MG: 40 TABLET, FILM COATED ORAL at 21:21

## 2025-02-28 RX ADMIN — INSULIN LISPRO 2 UNITS: 100 INJECTION, SOLUTION INTRAVENOUS; SUBCUTANEOUS at 17:07

## 2025-02-28 RX ADMIN — SODIUM CHLORIDE 500 ML: 0.9 INJECTION, SOLUTION INTRAVENOUS at 18:11

## 2025-02-28 RX ADMIN — VANCOMYCIN HYDROCHLORIDE 500 MG: 500 INJECTION, POWDER, LYOPHILIZED, FOR SOLUTION INTRAVENOUS at 08:44

## 2025-02-28 RX ADMIN — INSULIN LISPRO 1 UNITS: 100 INJECTION, SOLUTION INTRAVENOUS; SUBCUTANEOUS at 21:40

## 2025-02-28 RX ADMIN — TRIAMCINOLONE ACETONIDE: 1 CREAM TOPICAL at 10:46

## 2025-02-28 RX ADMIN — POTASSIUM CHLORIDE 20 MEQ: 1500 TABLET, EXTENDED RELEASE ORAL at 10:09

## 2025-02-28 RX ADMIN — TRIAMCINOLONE ACETONIDE: 1 CREAM TOPICAL at 21:24

## 2025-02-28 RX ADMIN — PIPERACILLIN AND TAZOBACTAM 3375 MG: 3; .375 INJECTION, POWDER, LYOPHILIZED, FOR SOLUTION INTRAVENOUS at 08:02

## 2025-02-28 RX ADMIN — SODIUM CHLORIDE, POTASSIUM CHLORIDE, SODIUM LACTATE AND CALCIUM CHLORIDE: 600; 310; 30; 20 INJECTION, SOLUTION INTRAVENOUS at 11:44

## 2025-02-28 RX ADMIN — AMIODARONE HYDROCHLORIDE 0.5 MG/MIN: 50 INJECTION, SOLUTION INTRAVENOUS at 22:23

## 2025-02-28 RX ADMIN — AMIODARONE HYDROCHLORIDE 1 MG/MIN: 50 INJECTION, SOLUTION INTRAVENOUS at 12:48

## 2025-02-28 ASSESSMENT — PAIN SCALES - GENERAL: PAINLEVEL_OUTOF10: 0

## 2025-02-28 NOTE — CARE COORDINATION
Transition of Care Plan:    RUR: 18% \"medium risk\"  Prior Level of Functioning: Independent with ADL's  Disposition: Home with significant other   BRITTANI: 2/28  If SNF or IPR: Date FOC offered: N/A  Follow up appointments: PCP/Specialists as indicated  DME needed: None   Transportation at discharge: Niece to transport   IM/IMM Medicare/ letter given: 2nd IM needed prior to d/c  Is patient a [place] and connected with [facility]? No  Caregiver Contact: Loco Law (niece), [phone]  Discharge Caregiver contacted prior to discharge? To be contacted   Care Conference needed? Not at this time   Barriers to discharge: Diuresis, clinical improvement     1551 PM: Chart reviewed. CM following for d/c planning. Pt to return home with significant other upon time of medical clearance. CM to continue to follow.    BRANDON Hayward  Care Management  [facility]   [phone]    
Transition of Care Plan:    RUR: 19% \"medium risk\"  Prior Level of Functioning: Independent with ADL's  Disposition: Home with significant other   BRITTANI: 3/5  If SNF or IPR: Date FOC offered: N/A  Follow up appointments: PCP/Specialists as indicated  DME needed: None   Transportation at discharge: Niece to transport   IM/IMM Medicare/ letter given: 2nd IM needed prior to d/c  Is patient a [place] and connected with [facility]? No  Caregiver Contact: Loco Law (niece), [phone]  Discharge Caregiver contacted prior to discharge? To be contacted   Care Conference needed? Not at this time   Barriers to discharge: Diuresis, clinical improvement     1555 PM: Chart reviewed. CM following for d/c planning. Pt to return home with significant other upon time of medical clearance. CM to continue to follow.    BRANDON Hayward  Care Management  [facility]   [phone]  
rails   Entrance Stairs - Number of Steps 3   Home Equipment None   Active  Yes   Mode of Transportation Car   Occupation Retired   Discharge Planning   Type of Residence House   Current Services Prior To Admission None   Potential Assistance Needed N/A   Patient expects to be discharged to: House   Services At/After Discharge   Transition of Care Consult (CM Consult) N/A   Services At/After Discharge None    Resource Information Provided? No   Mode of Transport at Discharge Other (see comment)  (The patient's niece will transport him home on d/c)   Confirm Follow Up Transport Self

## 2025-02-28 NOTE — SIGNIFICANT EVENT
Earlier in evening, patient had 3 spells where he was noticed to be non responding and staring at roof. Longest episode about 30 seconds. Meanwhile, he was noted to have large melanotic stool followed by BP drop and he was given LR bolus 500 ml and back on levophed at 5 mcg. He is on heparin drip for possible SMA thrombus, bowel ischemia.   FOBT +ve  Lactate 4.3, went up from 1.3   PTT this AM was 104.5 at 9;30     I suspect GI bleed with ongoing heparin drip is causing hypovolemic shock.   Will stop heparin drip   Give one unit of PRBCs  Wean levo for MAP > 60 and SBP > 95

## 2025-02-28 NOTE — CONSULTS
Palliative Medicine  Patient Name: Galdino Melissa  YOB: 1955  MRN: [id number]  Age: 70 y.o.  Gender: male    Date of Initial Consult: 2/28/2025  Date of Service: 2/28/2025  Time: 12:39 PM  Provider: Damaris Avilez MD  Hospital Day: 9  Admit Date: 2/20/2025  Referring Provider: Dr. Brantley      Reasons for Consultation:  Goals of Care    HISTORY OF PRESENT ILLNESS (HPI):   Galdino Melissa is a 70 y.o. male with a past medical history of CHF, CKD stage III, type II DM, hypertension, who was admitted on 2/20/2025 from home with a diagnosis of acute congestive heart failure with circulatory shock, suspected mesenteric ischemia, cardiorenal syndrome, acute hypoxemic respiratory failure, currently receiving optimal medical management in the ICU.   Echo on 2/27/2025-EF 25%, on heparin GTT, vancomycin, dobutamine, off vasopressors today.  Holding off on cath because he has positive blood cultures.    Psychosocial: Galdino lives at home with his girlfriend Carmel.  He has 2 grown children but would prefer to be to be his medical power of .  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      PALLIATIVE DIAGNOSES:    Acute on chronic heart failure  Sepsis  Cardiorenal syndrome    ASSESSMENT AND PLAN:   Met with patient who is sitting up in recliner, feeling and looking a lot better today than before.  There is indication of medical improvement, dobutamine being de-escalated.  Cardiac cath on hold pending treatment of infection.  He is not on oxygen at this time.  Goals of care-overall improving, when discussed DNR, he very clearly said he wants CPR and any and all aggressive interventions necessary to prolong life.  Will remain full code for now  AMD-does not want his children as medical power of , wants to name his girlfriend Carmel but wants to talk with her first.  I will ask our  for hospital  to help complete AMD.  Given that 
Vascular Surgery Consult Note  2/26/2025    Subjective:     Galdino Melissa is a 70 y.o. male with a pmhx significant for CAD, HTN, HLD, HFrEF, SVT, ICD, CKD, DM, and alcohol use.  He does not smoke.  He was taking was taking aspirin, Eliquis, and a statin prior to presenting.    The patient was admitted to the hospital on 2/20/25 for acute hypoxic respiratory failure, decompensated congestive heart failure, and cardiorenal syndrome.  He has an admission for the same.  He was transferred to the unit yesterday with hypotension and lactic acidosis.  His diuresis was held and he gained 10 lbs.  He had a CT of the C/A/P wo contrast for evaluation of possible infection that was suggestive of a possible SMA thrombosis.  We have been asked to evaluate. General surgery has been consulted as well.  His creatinine is 3.15.  Nephrology is following.     He denies abdominal pain, nausea, or vomiting.      Past medical history  Coronary artery disease   Hypertension  Hyperlipidemia   Ischemic cardiomyopathy   Heart failure with reduced ejection fraction  Supraventricular tachycardia  Presence of an automatic cardioverter/defibrillator  Chronic renal disease stage IIIa  Diabetes mellitis   Alcohol use  Diverticulosis     Past procedural history  Cardiac stenting x4  Placement of automatic cardioverter/defibrillator    Family history  Father: Cancer  Mother: Hypertension     Social history  He has never smoked  He drinks alcohol regularly     Home medications   bumetanide (BUMEX) 2 MG tablet Take 1 tablet by mouth 2 times daily For swelling in feet   isosorbide mononitrate (IMDUR) 30 MG extended release tablet Take 1 tablet by mouth daily   aspirin 81 MG chewable tablet Take 1 tablet by mouth daily   metoprolol succinate (TOPROL XL) 50 MG extended release tablet Take 1 tablet by mouth daily To slow heart rate down.   apixaban (ELIQUIS) 5 MG TABS tablet Take 1 tablet by mouth 2 times daily To prevent stroke.   metFORMIN (GLUCOPHAGE) 
system was performed today. Pertinent positives and negatives are mentioned in the HPI. The reminder of the ROS is negative and noncontributory.    Past Medical History:   Diagnosis Date    Acute on chronic systolic congestive heart failure (HCC) 12/11/2024    AICD (automatic cardioverter/defibrillator) present 03/2021    put in at [facility].     Arrhythmia     CAD (coronary artery disease)     NSTEMI with PCI of LAD (2 stents), LCx, RCA    Congestive heart failure (HCC) 10/2019    DM (diabetes mellitus) (HCC) 3/30/2010    HTN (hypertension) 3/30/2010    Hypercholesterolemia       Past Surgical History:   Procedure Laterality Date    CARDIAC CATHETERIZATION  10/2019    4x stents    CARDIAC PROCEDURE N/A 9/19/2023    Left and right heart cath / coronary angiography performed by Rickie Walker III, DO at [facility] CARDIAC CATH LAB    COLONOSCOPY  1-11    diverticulosis- Dr. Londono    PACEMAKER      UPPER GI ENDOSCOPY,BIOPSY  10/27/2021           Prior to Admission medications    Medication Sig Start Date End Date Taking? Authorizing Provider   bumetanide (BUMEX) 2 MG tablet Take 1 tablet by mouth 2 times daily For swelling in feet 1/20/25  Yes Madi Anand MD   isosorbide mononitrate (IMDUR) 30 MG extended release tablet Take 1 tablet by mouth daily   Yes Provider, MD Daren   aspirin 81 MG chewable tablet Take 1 tablet by mouth daily 8/21/23  Yes Madi Anand MD   metoprolol succinate (TOPROL XL) 50 MG extended release tablet Take 1 tablet by mouth daily To slow heart rate down. 1/20/25   Madi Anand MD   apixaban (ELIQUIS) 5 MG TABS tablet Take 1 tablet by mouth 2 times daily To prevent stroke. 1/20/25   Madi Anand MD   metFORMIN (GLUCOPHAGE) 1000 MG tablet Take 1 tablet by mouth with breakfast and with evening meal 1/20/25   Madi Anand MD   FARXIGA 10 MG tablet Take 1 tablet by mouth daily For diabetes and heart. 1/20/25   Madi Anand MD   losartan (COZAAR) 50 MG tablet TAKE 1 
Thyroid problems  Cardiovascular:   [] Stroke   [] Calf pain when walking   [] Chest pain   [] Rapid heart beat       [] Heart attack   [] Stents   [x] Congestive heart failure   [] Leg swelling   [] Murmur  Respiratory:   [] Sleep apnea   [] Cough/congestion   [] Shortness of breath   [] Wheezing/asthma      [] COPD/emphysema  Gastrointestinal:   [] Frequent indigestion   [] Trouble swallowing   [] Nausea   [] Vomiting   [] Bloating   [] Abd pain       [] Diarrhea   [] Constipation   [] Blood in stool   [] Ulcers   [] Intestinal disease   [] Hepatitis    Genitourinary:   [] Painful urination   [] Difficulty urinating   [] Frequent urination       [] Enlarged prostate   [] Vasectomy   [] Blood in urine   [] Dialysis  Musculoskeletal:  [] Muscle aches   [] Back pain   [] Joint pain  Neurologic:    [] Seizures   [] Dizziness   [] Numbness  Hematologic:   [] Nosebleeds   [] Easy bruising   [] Anemia   [] Easy bleeding  Psychiatric:    [] Depression   [] Anxiety   [] Bipolar disorder   [] Schizophrenia                                  []     Unable to obtain  ROS due to  []     mental status change  []     sedated   []     intubated    LABS:  Recent Labs     02/26/25  0006 02/26/25  1114   WBC 26.0* 27.7*   HGB 9.9* 9.4*   HCT 31.6* 29.3*    167     Recent Labs     02/25/25  0417 02/25/25  1832 02/26/25  0006   * 132* 130*   K 4.4 4.5 4.0   CL 92* 94* 94*   CO2 21 22 23   BUN 65* 70* 71*   MG  --  2.3 2.3   PHOS 5.1* 5.8* 5.9*     Recent Labs     02/25/25  1832   GLOB 3.9     Recent Labs     02/25/25  1638 02/26/25  1114   INR 2.1* 2.3*   APTT  --  32.1*         Signed By: Bronson Rojo MD     February 26, 2025        
deterioration.        Joycelyn Toney MD   Critical Care Medicine  [facility]  [phone]  2/25/2025        
sodium chloride flush 0.9 % injection 5-40 mL  5-40 mL IntraVENous PRN Kristen Morales ACNP        0.9 % sodium chloride infusion   IntraVENous PRN Kristen Morales ACNP        ondansetron (ZOFRAN-ODT) disintegrating tablet 4 mg  4 mg Oral Q8H PRN Kristen Morales ACNP        Or    ondansetron (ZOFRAN) injection 4 mg  4 mg IntraVENous Q6H PRN Kristen Morales ACNP        polyethylene glycol (GLYCOLAX) packet 17 g  17 g Oral Daily PRN Kristen Morales ACNP        acetaminophen (TYLENOL) tablet 650 mg  650 mg Oral Q6H PRN Kristen Morales ACNP        Or    acetaminophen (TYLENOL) suppository 650 mg  650 mg Rectal Q6H PRN Kristen Morales ACNP        glucose chewable tablet 16 g  4 tablet Oral PRN Kristen Morales ACNP        dextrose bolus 10% 125 mL  125 mL IntraVENous PRN Kristen Morales ACNP        Or    dextrose bolus 10% 250 mL  250 mL IntraVENous PRN Kristen Morales ACNP        glucagon injection 1 mg  1 mg SubCUTAneous PRN Kristen Morales ACNP        dextrose 10 % infusion   IntraVENous Continuous PRN Kristen Morales ACNP        insulin lispro (HUMALOG,ADMELOG) injection vial 0-8 Units  0-8 Units SubCUTAneous 4x Daily AC & HS Kristen Morales ACNP        calcium carbonate (TUMS) chewable tablet 500 mg  500 mg Oral TID PRN Kristen Morales ACNP        melatonin tablet 6 mg  6 mg Oral Nightly PRN Kristen Morales ACNP        traMADol (ULTRAM) tablet 50 mg  50 mg Oral Q6H PRN Kristen Morales ACNP                Magdaleno Sun MD  Clinical Cardiac Electrophysiology  [facility]  2/21/2025

## 2025-02-28 NOTE — DIABETES MGMT
[facility]  DIABETES MANAGEMENT CONSULT    Consulted by Provider for advanced nursing evaluation and care for inpatient blood glucose management.    Evaluation and Action Plan   Galdino Melissa is a 70 year old male patient with a hx of Type 2 DM. The patient was taking oral pills for diabetes at home but unsure of the name. His girlfriend \" handles that\". The patient seems to have a poor understanding of his diabetes dx and would likely benefit from outpatient diabetes education. He is admitted after experiencing SOB and bilateral edema in legs. Acute HF. The patient also found to have decreased renal function. He was started on Jardiance and his BG's are stable. I recommend continuing this regimen. Likely continue Jardiance only at discharge.   Patient can likely be discharged on Jardiance only at discharge. Corrective scale switched to low dose to prevent hypoglycemia.   Bg's running below goal today. The patient is now started on a clear diet. I suspect that once the patient is eating again his. BG's will stabilize. His daily Jardiance was held today. It can likely be resumed tomorrow.   Blood glucose pattern        Management Rationale Action Plan   Medication   Corrective insulin Using low dose sensitivity based on weight    Additional orders  Continue Jardiance  D/c Metformin       Diabetes Discharge Plan   Medication  TBD   Referral  []        Outpatient diabetes education   Additional orders            Initial Presentation   Galdino Melissa is a 70 y.o. male who presented to the ED on   LAB: glucose 112. Creatine 2.95. GFR 22. A1c 7.9%  Narrative & Impression  CLINICAL HISTORY: SOB  INDICATION:   SOB  COMPARISON: 2019     FINDINGS:  PA and lateral views of the chest are obtained.  The cardiopericardial silhouette is prominent with cardiac pacer.. There is no  pleural effusion, pneumothorax or focal consolidation present.     IMPRESSION:  No acute intrathoracic process.    Narrative & 
interpretive errors are inadvertently transcribed by the computer software.  Please disregard these errors.  Please excuse any errors that have escaped final proofreading.    Jane VALERA  Diabetes Clinical Nurse Specialist  [facility]      
the computer voice recognition software.  Quite often unanticipated grammatical, syntax, homophones, and other interpretive errors are inadvertently transcribed by the computer software.  Please disregard these errors.  Please excuse any errors that have escaped final proofreading.      
Medication History  Diabetes drug class Diabetes drug name Additional Comments   Biguanide  Meformin      Diabetes self-management practices:   Eating pattern   []  Not eating a carbohydrate-controlled meal plan     Physical activity pattern   [x]  Not employing a physical activity program to control BG    Monitoring pattern   [x]  Not testing BGs sufficiently to inform self-management adjustments      Taking medications pattern - patient reports that girlfriend administers his medication  [x] Consistent administration  [x] Affordable    Social determinants of health impacting diabetes self-management practices    Concerned that you need to know more about how to stay healthy with diabetes    Overall evaluation:    [x] Not achieving A1c target with drug therapy & self-care practices    Subjective   ”I don't know what I take.”     Objective   Physical exam  General Overweight male in no acute distress. Conversant and cooperative  Neuro  Alert, oriented   Vital Signs   Vitals:    02/24/25 0808   BP: 109/70   Pulse: 88   Resp: 16   Temp: 97.6 °F (36.4 °C)   SpO2:          Laboratory  Recent Labs     02/22/25  0402 02/23/25  0441 02/24/25  0439   WBC 6.4 6.6 5.7   HGB 10.2* 10.0* 9.9*   HCT 32.6* 32.4* 31.2*   MCV 57.9* 57.5* 57.8*    222 187     Recent Labs     02/22/25  0402 02/23/25  0441 02/23/25  1552 02/23/25  2207 02/24/25  0439 02/24/25  1026   * 126*   < > 126* 128* 128*   K 4.9 4.4  --   --  4.3  --    CL 93* 91*  --   --  95*  --    CO2 22 24  --   --  21  --    PHOS 5.2* 5.5*  --   --  5.4*  --    BUN 57* 60*  --   --  63*  --    CREATININE 2.73* 2.84*  --   --  2.88*  --     < > = values in this interval not displayed.     Lab Results   Component Value Date    ALT 24 02/20/2025    AST 30 02/20/2025    ALKPHOS 96 02/20/2025    BILITOT 3.1 (H) 02/20/2025     Lab Results   Component Value Date    TSH 1.44 02/21/2025         Factors impacting BG management  Factor Dose Comments 
impacting BG management  Factor Dose Comments   Nutrition:  Standard meals   60 grams/meal    Pain PRN acetaminophen  PRN tramadol    Kidney function EDWIGE    Liver function Normal          Assessment and Nursing Intervention   Nursing Diagnosis Risk for unstable blood glucose pattern   Nursing Intervention Domain 5250 Decision-making Support   Nursing Interventions Examined current inpatient diabetes/blood glucose control   Explored factors facilitating and impeding inpatient management  Explored corrective strategies with patient and responsible inpatient provider   Informed patient of rational for insulin strategy while hospitalized     Nursing Diagnosis 02922 Ineffective Health Management   Nursing Intervention Domain 5250 Decision-making Support   Nursing Interventions Identified diabetes self-management practices impeding diabetes control  Discussed diabetes survival skills related to  Healthy Plate eating plan; given handouts  Role of physical activity in improving insulin sensitivity and action  Procedure for blood glucose monitoring & options for low-cost products  Medications plan at discharge     Billing Code(s)   [] 79580 IP subsequent hospital care - 50 minutes   [] 16170 IP subsequent hospital care - 35 minutes   [x] 56409 IP subsequent hospital care - 25 minutes   [] 45325 IP initial hospital care - 40 minutes     Before making these care recommendations, I personally reviewed the hospitalization record, including notes, laboratory & diagnostic data and current medications, and examined the patient at the bedside.  Total minutes: 25 minutes    DANIELLE Woods - CNS   Diabetes Clinical Nurse Specialist   [facility]  Access via [employer]

## 2025-03-01 LAB
ABO + RH BLD: NORMAL
ALBUMIN SERPL-MCNC: 2.6 G/DL (ref 3.5–5)
ANION GAP SERPL CALC-SCNC: 11 MMOL/L (ref 2–12)
ANION GAP SERPL CALC-SCNC: 12 MMOL/L (ref 2–12)
APTT PPP: 29.4 SEC (ref 22.1–31)
BACTERIA SPEC CULT: ABNORMAL
BLD PROD TYP BPU: NORMAL
BLOOD BANK BLOOD PRODUCT EXPIRATION DATE: NORMAL
BLOOD BANK DISPENSE STATUS: NORMAL
BLOOD BANK ISBT PRODUCT BLOOD TYPE: 6200
BLOOD BANK PRODUCT CODE: NORMAL
BLOOD BANK UNIT TYPE AND RH: NORMAL
BLOOD GROUP ANTIBODIES SERPL: NORMAL
BPU ID: NORMAL
BUN SERPL-MCNC: 76 MG/DL (ref 6–20)
BUN SERPL-MCNC: 77 MG/DL (ref 6–20)
BUN/CREAT SERPL: 17 (ref 12–20)
BUN/CREAT SERPL: 17 (ref 12–20)
CALCIUM SERPL-MCNC: 8 MG/DL (ref 8.5–10.1)
CALCIUM SERPL-MCNC: 8.2 MG/DL (ref 8.5–10.1)
CHLORIDE SERPL-SCNC: 85 MMOL/L (ref 97–108)
CHLORIDE SERPL-SCNC: 86 MMOL/L (ref 97–108)
CO2 SERPL-SCNC: 27 MMOL/L (ref 21–32)
CO2 SERPL-SCNC: 29 MMOL/L (ref 21–32)
CREAT SERPL-MCNC: 4.54 MG/DL (ref 0.7–1.3)
CREAT SERPL-MCNC: 4.58 MG/DL (ref 0.7–1.3)
CROSSMATCH RESULT: NORMAL
EKG ATRIAL RATE: 122 BPM
EKG DIAGNOSIS: NORMAL
EKG P AXIS: 117 DEGREES
EKG P-R INTERVAL: 160 MS
EKG Q-T INTERVAL: 358 MS
EKG QRS DURATION: 94 MS
EKG QTC CALCULATION (BAZETT): 510 MS
EKG R AXIS: 104 DEGREES
EKG T AXIS: -58 DEGREES
EKG VENTRICULAR RATE: 122 BPM
GLUCOSE BLD STRIP.AUTO-MCNC: 158 MG/DL (ref 65–117)
GLUCOSE BLD STRIP.AUTO-MCNC: 169 MG/DL (ref 65–117)
GLUCOSE BLD STRIP.AUTO-MCNC: 174 MG/DL (ref 65–117)
GLUCOSE BLD STRIP.AUTO-MCNC: 183 MG/DL (ref 65–117)
GLUCOSE SERPL-MCNC: 123 MG/DL (ref 65–100)
GLUCOSE SERPL-MCNC: 129 MG/DL (ref 65–100)
HCT VFR BLD AUTO: 27.3 % (ref 36.6–50.3)
HGB BLD-MCNC: 9 G/DL (ref 12.1–17)
LACTATE SERPL-SCNC: 2.9 MMOL/L (ref 0.4–2)
PHOSPHATE SERPL-MCNC: 5.1 MG/DL (ref 2.6–4.7)
POTASSIUM SERPL-SCNC: 4.1 MMOL/L (ref 3.5–5.1)
POTASSIUM SERPL-SCNC: 4.3 MMOL/L (ref 3.5–5.1)
SERVICE CMNT-IMP: ABNORMAL
SODIUM SERPL-SCNC: 125 MMOL/L (ref 136–145)
SODIUM SERPL-SCNC: 125 MMOL/L (ref 136–145)
SPECIMEN EXP DATE BLD: NORMAL
THERAPEUTIC RANGE: NORMAL SECS (ref 58–77)
UNIT DIVISION: 0
UNIT ISSUE DATE/TIME: NORMAL
VANCOMYCIN SERPL-MCNC: 21.7 UG/ML

## 2025-03-01 PROCEDURE — 80069 RENAL FUNCTION PANEL: CPT

## 2025-03-01 PROCEDURE — 85014 HEMATOCRIT: CPT

## 2025-03-01 PROCEDURE — 6360000002 HC RX W HCPCS: Performed by: INTERNAL MEDICINE

## 2025-03-01 PROCEDURE — 85730 THROMBOPLASTIN TIME PARTIAL: CPT

## 2025-03-01 PROCEDURE — 93005 ELECTROCARDIOGRAM TRACING: CPT | Performed by: INTERNAL MEDICINE

## 2025-03-01 PROCEDURE — 2500000003 HC RX 250 WO HCPCS: Performed by: NURSE PRACTITIONER

## 2025-03-01 PROCEDURE — 6370000000 HC RX 637 (ALT 250 FOR IP): Performed by: INTERNAL MEDICINE

## 2025-03-01 PROCEDURE — 2580000003 HC RX 258: Performed by: NURSE PRACTITIONER

## 2025-03-01 PROCEDURE — 82962 GLUCOSE BLOOD TEST: CPT

## 2025-03-01 PROCEDURE — 80202 ASSAY OF VANCOMYCIN: CPT

## 2025-03-01 PROCEDURE — 2580000003 HC RX 258: Performed by: INTERNAL MEDICINE

## 2025-03-01 PROCEDURE — 2000000000 HC ICU R&B

## 2025-03-01 PROCEDURE — 83605 ASSAY OF LACTIC ACID: CPT

## 2025-03-01 PROCEDURE — 6370000000 HC RX 637 (ALT 250 FOR IP): Performed by: NURSE PRACTITIONER

## 2025-03-01 PROCEDURE — 51798 US URINE CAPACITY MEASURE: CPT

## 2025-03-01 PROCEDURE — 80048 BASIC METABOLIC PNL TOTAL CA: CPT

## 2025-03-01 PROCEDURE — 36415 COLL VENOUS BLD VENIPUNCTURE: CPT

## 2025-03-01 PROCEDURE — 87040 BLOOD CULTURE FOR BACTERIA: CPT

## 2025-03-01 PROCEDURE — 6370000000 HC RX 637 (ALT 250 FOR IP): Performed by: STUDENT IN AN ORGANIZED HEALTH CARE EDUCATION/TRAINING PROGRAM

## 2025-03-01 PROCEDURE — 85018 HEMOGLOBIN: CPT

## 2025-03-01 PROCEDURE — 6360000002 HC RX W HCPCS: Performed by: NURSE PRACTITIONER

## 2025-03-01 RX ORDER — BUMETANIDE 0.25 MG/ML
2 INJECTION, SOLUTION INTRAMUSCULAR; INTRAVENOUS 3 TIMES DAILY
Status: DISCONTINUED | OUTPATIENT
Start: 2025-03-01 | End: 2025-03-02 | Stop reason: HOSPADM

## 2025-03-01 RX ORDER — OXYMETAZOLINE HYDROCHLORIDE 0.05 G/100ML
2 SPRAY NASAL 2 TIMES DAILY
Status: DISCONTINUED | OUTPATIENT
Start: 2025-03-01 | End: 2025-03-02 | Stop reason: HOSPADM

## 2025-03-01 RX ADMIN — OXYMETAZOLINE HYDROCHLORIDE 2 SPRAY: 0.5 SPRAY NASAL at 09:15

## 2025-03-01 RX ADMIN — TRIAMCINOLONE ACETONIDE: 1 CREAM TOPICAL at 20:46

## 2025-03-01 RX ADMIN — PIPERACILLIN AND TAZOBACTAM 3375 MG: 3; .375 INJECTION, POWDER, LYOPHILIZED, FOR SOLUTION INTRAVENOUS at 09:11

## 2025-03-01 RX ADMIN — OXYMETAZOLINE HYDROCHLORIDE 2 SPRAY: 0.5 SPRAY NASAL at 20:44

## 2025-03-01 RX ADMIN — DOBUTAMINE HYDROCHLORIDE 5 MCG/KG/MIN: 200 INJECTION INTRAVENOUS at 16:44

## 2025-03-01 RX ADMIN — SODIUM CHLORIDE, POTASSIUM CHLORIDE, SODIUM LACTATE AND CALCIUM CHLORIDE: 600; 310; 30; 20 INJECTION, SOLUTION INTRAVENOUS at 06:11

## 2025-03-01 RX ADMIN — OXYMETAZOLINE HYDROCHLORIDE 2 SPRAY: 0.5 SPRAY NASAL at 04:00

## 2025-03-01 RX ADMIN — BUMETANIDE 2 MG: 0.25 INJECTION INTRAMUSCULAR; INTRAVENOUS at 20:43

## 2025-03-01 RX ADMIN — ASPIRIN 81 MG: 81 TABLET, CHEWABLE ORAL at 09:11

## 2025-03-01 RX ADMIN — ATORVASTATIN CALCIUM 80 MG: 40 TABLET, FILM COATED ORAL at 20:43

## 2025-03-01 RX ADMIN — BUMETANIDE 2 MG: 0.25 INJECTION INTRAMUSCULAR; INTRAVENOUS at 13:33

## 2025-03-01 RX ADMIN — SODIUM CHLORIDE, PRESERVATIVE FREE 10 ML: 5 INJECTION INTRAVENOUS at 09:16

## 2025-03-01 RX ADMIN — PIPERACILLIN AND TAZOBACTAM 3375 MG: 3; .375 INJECTION, POWDER, LYOPHILIZED, FOR SOLUTION INTRAVENOUS at 00:11

## 2025-03-01 RX ADMIN — EZETIMIBE 10 MG: 10 TABLET ORAL at 09:11

## 2025-03-01 RX ADMIN — PIPERACILLIN AND TAZOBACTAM 3375 MG: 3; .375 INJECTION, POWDER, LYOPHILIZED, FOR SOLUTION INTRAVENOUS at 20:59

## 2025-03-01 RX ADMIN — TRIAMCINOLONE ACETONIDE: 1 CREAM TOPICAL at 09:16

## 2025-03-01 RX ADMIN — INSULIN LISPRO 1 UNITS: 100 INJECTION, SOLUTION INTRAVENOUS; SUBCUTANEOUS at 18:14

## 2025-03-01 RX ADMIN — AMIODARONE HYDROCHLORIDE 0.5 MG/MIN: 50 INJECTION, SOLUTION INTRAVENOUS at 14:02

## 2025-03-01 RX ADMIN — ISOSORBIDE MONONITRATE 30 MG: 30 TABLET, EXTENDED RELEASE ORAL at 09:11

## 2025-03-01 RX ADMIN — SODIUM CHLORIDE, PRESERVATIVE FREE 40 ML: 5 INJECTION INTRAVENOUS at 20:00

## 2025-03-01 NOTE — PROCEDURES
PROCEDURE NOTE  Date: 2/28/2025   Name: Galdino Melissa  YOB: 1955    Procedures        Test Date: February 28, 2025    History: Patient was acute altered mental status, staring spells, possible seizures, rapid EEG to rule out seizures rule out cortical abnormality or encephalopathy.    Medications: See chart    Patient consent: Correct patient identified    Description of procedure: This EEG was obtained using a 10 lead, 8 channel system positioned circumferentially without any parasagittal coverage (rapid EEG). Computer selected EEG is reviewed as well as background features and all clinically significant events. Clarity algorithm with NTAP was uttilized and implemented to provide analysis of underlying activity and seizure detection used to facilitate reading.    Description of recording: This is a rapid EEG on patient to evaluate for possible seizures with patient having staring spells, and this study began on February 28, 2025 at approximately 5:47 PM and ended on 08/28/2025 approximately 50 6 PM for total time of study of 3 hours and 9 minutes.  During this time the patient had a moderate amount of movement muscle and electrode and 60 Hz electrical activity seen on the recording, and moderate generalized slowing seen throughout the recording, and the patient appeared to be in a state of sleep with some sleep spindles seen in the central head regions.  But there were no clear areas of focal slowing, spike or spike and wave discharge, recorded to graphic or dysrhythmic spells of any type seen.    Impression: This is a moderately abnormal electroencephalogram increase in generalized slow activity throughout, in a generalized fashion, but showing no clear areas of focal abnormality, no clear spike or spike and wave discharges seen, and no recorded electrographic spells of any type.  Clinical correlation recommended, and correlation with standard 16 channel EEG recording may be of further diagnostic

## 2025-03-01 NOTE — CONSENT
Informed Consent for Blood Component Transfusion Note    I have discussed with the patient the rationale for blood component transfusion; its benefits in treating or preventing fatigue, organ damage, or death; and its risk which includes mild transfusion reactions, rare risk of blood borne infection, or more serious but rare reactions. I have discussed the alternatives to transfusion, including the risk and consequences of not receiving transfusion. The patient had an opportunity to ask questions and had agreed to proceed with transfusion of blood components.    Electronically signed by DANIELLE Patel NP on 2/28/25 at 11:18 PM EST  
none

## 2025-03-02 ENCOUNTER — HOSPITAL ENCOUNTER (INPATIENT)
Facility: HOSPITAL | Age: 70
LOS: 5 days | End: 2025-03-07
Attending: INTERNAL MEDICINE | Admitting: INTERNAL MEDICINE
Payer: MEDICARE

## 2025-03-02 ENCOUNTER — APPOINTMENT (OUTPATIENT)
Facility: HOSPITAL | Age: 70
DRG: 291 | End: 2025-03-02
Payer: MEDICARE

## 2025-03-02 VITALS
RESPIRATION RATE: 31 BRPM | BODY MASS INDEX: 34.62 KG/M2 | DIASTOLIC BLOOD PRESSURE: 59 MMHG | HEIGHT: 68 IN | WEIGHT: 228.4 LBS | HEART RATE: 108 BPM | OXYGEN SATURATION: 99 % | SYSTOLIC BLOOD PRESSURE: 87 MMHG | TEMPERATURE: 95.2 F

## 2025-03-02 DIAGNOSIS — I50.23 ACUTE ON CHRONIC SYSTOLIC HEART FAILURE (HCC): Primary | ICD-10-CM

## 2025-03-02 DIAGNOSIS — R01.1 HEART MURMUR: ICD-10-CM

## 2025-03-02 DIAGNOSIS — I50.9 CONGESTIVE HEART FAILURE, UNSPECIFIED HF CHRONICITY, UNSPECIFIED HEART FAILURE TYPE (HCC): ICD-10-CM

## 2025-03-02 DIAGNOSIS — I50.20 HFREF (HEART FAILURE WITH REDUCED EJECTION FRACTION) (HCC): ICD-10-CM

## 2025-03-02 LAB
ALBUMIN SERPL-MCNC: 2.5 G/DL (ref 3.5–5)
ALBUMIN SERPL-MCNC: 2.5 G/DL (ref 3.5–5)
ALBUMIN/GLOB SERPL: 0.7 (ref 1.1–2.2)
ALBUMIN/GLOB SERPL: 0.7 (ref 1.1–2.2)
ALP SERPL-CCNC: 105 U/L (ref 45–117)
ALP SERPL-CCNC: 108 U/L (ref 45–117)
ALT SERPL-CCNC: 101 U/L (ref 12–78)
ALT SERPL-CCNC: 102 U/L (ref 12–78)
ANION GAP SERPL CALC-SCNC: 13 MMOL/L (ref 2–12)
ANION GAP SERPL CALC-SCNC: 20 MMOL/L (ref 2–12)
AST SERPL-CCNC: 82 U/L (ref 15–37)
AST SERPL-CCNC: 88 U/L (ref 15–37)
BASE DEFICIT BLD-SCNC: 1.4 MMOL/L
BILIRUB DIRECT SERPL-MCNC: 1.4 MG/DL (ref 0–0.2)
BILIRUB DIRECT SERPL-MCNC: 2.1 MG/DL (ref 0–0.2)
BILIRUB SERPL-MCNC: 3.5 MG/DL (ref 0.2–1)
BILIRUB SERPL-MCNC: 4.2 MG/DL (ref 0.2–1)
BUN SERPL-MCNC: 80 MG/DL (ref 6–20)
BUN SERPL-MCNC: 87 MG/DL (ref 6–20)
BUN/CREAT SERPL: 15 (ref 12–20)
BUN/CREAT SERPL: 15 (ref 12–20)
CA-I BLD-SCNC: 1.03 MMOL/L (ref 1.12–1.32)
CALCIUM SERPL-MCNC: 8.3 MG/DL (ref 8.5–10.1)
CALCIUM SERPL-MCNC: 8.6 MG/DL (ref 8.5–10.1)
CHLORIDE SERPL-SCNC: 86 MMOL/L (ref 97–108)
CHLORIDE SERPL-SCNC: 87 MMOL/L (ref 97–108)
CO2 SERPL-SCNC: 20 MMOL/L (ref 21–32)
CO2 SERPL-SCNC: 26 MMOL/L (ref 21–32)
CREAT SERPL-MCNC: 5.28 MG/DL (ref 0.7–1.3)
CREAT SERPL-MCNC: 5.83 MG/DL (ref 0.7–1.3)
ERYTHROCYTE [DISTWIDTH] IN BLOOD BY AUTOMATED COUNT: 19.9 % (ref 11.5–14.5)
ERYTHROCYTE [DISTWIDTH] IN BLOOD BY AUTOMATED COUNT: 20 % (ref 11.5–14.5)
GLOBULIN SER CALC-MCNC: 3.7 G/DL (ref 2–4)
GLOBULIN SER CALC-MCNC: 3.8 G/DL (ref 2–4)
GLUCOSE BLD STRIP.AUTO-MCNC: 181 MG/DL (ref 65–117)
GLUCOSE BLD STRIP.AUTO-MCNC: 190 MG/DL (ref 65–117)
GLUCOSE SERPL-MCNC: 149 MG/DL (ref 65–100)
GLUCOSE SERPL-MCNC: 158 MG/DL (ref 65–100)
HCO3 BLD-SCNC: 21.7 MMOL/L (ref 21–28)
HCT VFR BLD AUTO: 27.6 % (ref 36.6–50.3)
HCT VFR BLD AUTO: 29 % (ref 36.6–50.3)
HGB BLD-MCNC: 9 G/DL (ref 12.1–17)
HGB BLD-MCNC: 9.2 G/DL (ref 12.1–17)
INR PPP: 1.5 (ref 0.9–1.1)
LACTATE SERPL-SCNC: 4.3 MMOL/L (ref 0.4–2)
LACTATE SERPL-SCNC: 6.4 MMOL/L (ref 0.4–2)
LACTATE SERPL-SCNC: NORMAL MMOL/L (ref 0.4–2)
MAGNESIUM SERPL-MCNC: 2.6 MG/DL (ref 1.6–2.4)
MAGNESIUM SERPL-MCNC: 2.7 MG/DL (ref 1.6–2.4)
MCH RBC QN AUTO: 18.4 PG (ref 26–34)
MCH RBC QN AUTO: 18.4 PG (ref 26–34)
MCHC RBC AUTO-ENTMCNC: 31.7 G/DL (ref 30–36.5)
MCHC RBC AUTO-ENTMCNC: 32.6 G/DL (ref 30–36.5)
MCV RBC AUTO: 56.3 FL (ref 80–99)
MCV RBC AUTO: 58 FL (ref 80–99)
NRBC # BLD: 0.74 K/UL (ref 0–0.01)
NRBC # BLD: 0.81 K/UL (ref 0–0.01)
NRBC BLD-RTO: 6.4 PER 100 WBC
NRBC BLD-RTO: 6.9 PER 100 WBC
PCO2 BLD: 30.3 MMHG (ref 35–48)
PH BLD: 7.46 (ref 7.35–7.45)
PHOSPHATE SERPL-MCNC: 5.6 MG/DL (ref 2.6–4.7)
PHOSPHATE SERPL-MCNC: 7.4 MG/DL (ref 2.6–4.7)
PLATELET # BLD AUTO: 133 K/UL (ref 150–400)
PLATELET # BLD AUTO: 134 K/UL (ref 150–400)
PO2 BLD: 78 MMHG (ref 83–108)
POTASSIUM SERPL-SCNC: 4 MMOL/L (ref 3.5–5.1)
POTASSIUM SERPL-SCNC: 4.1 MMOL/L (ref 3.5–5.1)
PROT SERPL-MCNC: 6.2 G/DL (ref 6.4–8.2)
PROT SERPL-MCNC: 6.3 G/DL (ref 6.4–8.2)
PROTHROMBIN TIME: 15.7 SEC (ref 9.2–11.2)
RBC # BLD AUTO: 4.9 M/UL (ref 4.1–5.7)
RBC # BLD AUTO: 5 M/UL (ref 4.1–5.7)
SAO2 % BLD: 96.4 % (ref 92–97)
SERVICE CMNT-IMP: ABNORMAL
SERVICE CMNT-IMP: ABNORMAL
SODIUM SERPL-SCNC: 126 MMOL/L (ref 136–145)
SODIUM SERPL-SCNC: 126 MMOL/L (ref 136–145)
SPECIMEN TYPE: ABNORMAL
UFH PPP CHRO-ACNC: 0.36 IU/ML
WBC # BLD AUTO: 10.8 K/UL (ref 4.1–11.1)
WBC # BLD AUTO: 12.7 K/UL (ref 4.1–11.1)

## 2025-03-02 PROCEDURE — 84100 ASSAY OF PHOSPHORUS: CPT

## 2025-03-02 PROCEDURE — 80048 BASIC METABOLIC PNL TOTAL CA: CPT

## 2025-03-02 PROCEDURE — 3E033XZ INTRODUCTION OF VASOPRESSOR INTO PERIPHERAL VEIN, PERCUTANEOUS APPROACH: ICD-10-PCS | Performed by: INTERNAL MEDICINE

## 2025-03-02 PROCEDURE — 82962 GLUCOSE BLOOD TEST: CPT

## 2025-03-02 PROCEDURE — 37799 UNLISTED PX VASCULAR SURGERY: CPT

## 2025-03-02 PROCEDURE — 83605 ASSAY OF LACTIC ACID: CPT

## 2025-03-02 PROCEDURE — 2500000003 HC RX 250 WO HCPCS: Performed by: INTERNAL MEDICINE

## 2025-03-02 PROCEDURE — 36415 COLL VENOUS BLD VENIPUNCTURE: CPT

## 2025-03-02 PROCEDURE — 51798 US URINE CAPACITY MEASURE: CPT

## 2025-03-02 PROCEDURE — 82803 BLOOD GASES ANY COMBINATION: CPT

## 2025-03-02 PROCEDURE — 6360000002 HC RX W HCPCS: Performed by: NURSE PRACTITIONER

## 2025-03-02 PROCEDURE — 2500000003 HC RX 250 WO HCPCS

## 2025-03-02 PROCEDURE — 85520 HEPARIN ASSAY: CPT

## 2025-03-02 PROCEDURE — 2010000000 HC RM ICU SURGICAL

## 2025-03-02 PROCEDURE — 85610 PROTHROMBIN TIME: CPT

## 2025-03-02 PROCEDURE — 2580000003 HC RX 258: Performed by: INTERNAL MEDICINE

## 2025-03-02 PROCEDURE — 80076 HEPATIC FUNCTION PANEL: CPT

## 2025-03-02 PROCEDURE — 85027 COMPLETE CBC AUTOMATED: CPT

## 2025-03-02 PROCEDURE — 6360000002 HC RX W HCPCS: Performed by: INTERNAL MEDICINE

## 2025-03-02 PROCEDURE — 87040 BLOOD CULTURE FOR BACTERIA: CPT

## 2025-03-02 PROCEDURE — 94660 CPAP INITIATION&MGMT: CPT

## 2025-03-02 PROCEDURE — 83735 ASSAY OF MAGNESIUM: CPT

## 2025-03-02 PROCEDURE — 6370000000 HC RX 637 (ALT 250 FOR IP): Performed by: INTERNAL MEDICINE

## 2025-03-02 PROCEDURE — 6370000000 HC RX 637 (ALT 250 FOR IP): Performed by: NURSE PRACTITIONER

## 2025-03-02 PROCEDURE — 2500000003 HC RX 250 WO HCPCS: Performed by: NURSE PRACTITIONER

## 2025-03-02 PROCEDURE — 93005 ELECTROCARDIOGRAM TRACING: CPT | Performed by: INTERNAL MEDICINE

## 2025-03-02 PROCEDURE — 03HY32Z INSERTION OF MONITORING DEVICE INTO UPPER ARTERY, PERCUTANEOUS APPROACH: ICD-10-PCS | Performed by: INTERNAL MEDICINE

## 2025-03-02 PROCEDURE — 74018 RADEX ABDOMEN 1 VIEW: CPT

## 2025-03-02 PROCEDURE — 36620 INSERTION CATHETER ARTERY: CPT

## 2025-03-02 PROCEDURE — 2580000003 HC RX 258: Performed by: NURSE PRACTITIONER

## 2025-03-02 RX ORDER — CALCIUM CHLORIDE 100 MG/ML
1000 INJECTION INTRAVENOUS; INTRAVENTRICULAR ONCE
Status: COMPLETED | OUTPATIENT
Start: 2025-03-02 | End: 2025-03-02

## 2025-03-02 RX ORDER — PROCHLORPERAZINE EDISYLATE 5 MG/ML
10 INJECTION INTRAMUSCULAR; INTRAVENOUS EVERY 6 HOURS PRN
Status: DISCONTINUED | OUTPATIENT
Start: 2025-03-02 | End: 2025-03-07

## 2025-03-02 RX ORDER — SODIUM CHLORIDE 0.9 % (FLUSH) 0.9 %
5-40 SYRINGE (ML) INJECTION EVERY 12 HOURS SCHEDULED
Status: DISCONTINUED | OUTPATIENT
Start: 2025-03-02 | End: 2025-03-07

## 2025-03-02 RX ORDER — VANCOMYCIN 1.75 GRAM/500 ML IN 0.9 % SODIUM CHLORIDE INTRAVENOUS
1750 ONCE
Status: DISCONTINUED | OUTPATIENT
Start: 2025-03-02 | End: 2025-03-02

## 2025-03-02 RX ORDER — DOBUTAMINE HYDROCHLORIDE 200 MG/100ML
2.5-1 INJECTION INTRAVENOUS CONTINUOUS
Status: DISCONTINUED | OUTPATIENT
Start: 2025-03-02 | End: 2025-03-08 | Stop reason: HOSPADM

## 2025-03-02 RX ORDER — DEXTROSE MONOHYDRATE 100 MG/ML
INJECTION, SOLUTION INTRAVENOUS CONTINUOUS PRN
Status: DISCONTINUED | OUTPATIENT
Start: 2025-03-02 | End: 2025-03-07

## 2025-03-02 RX ORDER — ACETAMINOPHEN 650 MG/1
650 SUPPOSITORY RECTAL EVERY 6 HOURS PRN
Status: DISCONTINUED | OUTPATIENT
Start: 2025-03-02 | End: 2025-03-07

## 2025-03-02 RX ORDER — ACETAMINOPHEN 325 MG/1
650 TABLET ORAL EVERY 6 HOURS PRN
Status: DISCONTINUED | OUTPATIENT
Start: 2025-03-02 | End: 2025-03-07

## 2025-03-02 RX ORDER — NOREPINEPHRINE BITARTRATE 0.06 MG/ML
.5-2 INJECTION, SOLUTION INTRAVENOUS CONTINUOUS
Status: DISPENSED | OUTPATIENT
Start: 2025-03-02 | End: 2025-03-03

## 2025-03-02 RX ORDER — SODIUM CHLORIDE 0.9 % (FLUSH) 0.9 %
5-40 SYRINGE (ML) INJECTION PRN
Status: DISCONTINUED | OUTPATIENT
Start: 2025-03-02 | End: 2025-03-07

## 2025-03-02 RX ORDER — CALCIUM CHLORIDE 100 MG/ML
INJECTION INTRAVENOUS; INTRAVENTRICULAR
Status: DISCONTINUED
Start: 2025-03-02 | End: 2025-03-02 | Stop reason: SDUPTHER

## 2025-03-02 RX ORDER — ONDANSETRON 2 MG/ML
4 INJECTION INTRAMUSCULAR; INTRAVENOUS ONCE
Status: COMPLETED | OUTPATIENT
Start: 2025-03-02 | End: 2025-03-02

## 2025-03-02 RX ORDER — ONDANSETRON 2 MG/ML
4 INJECTION INTRAMUSCULAR; INTRAVENOUS EVERY 6 HOURS PRN
Status: DISCONTINUED | OUTPATIENT
Start: 2025-03-02 | End: 2025-03-07

## 2025-03-02 RX ORDER — ONDANSETRON 4 MG/1
4 TABLET, ORALLY DISINTEGRATING ORAL EVERY 8 HOURS PRN
Status: DISCONTINUED | OUTPATIENT
Start: 2025-03-02 | End: 2025-03-07

## 2025-03-02 RX ORDER — HEPARIN SODIUM 5000 [USP'U]/ML
5000 INJECTION, SOLUTION INTRAVENOUS; SUBCUTANEOUS EVERY 8 HOURS SCHEDULED
Status: DISCONTINUED | OUTPATIENT
Start: 2025-03-02 | End: 2025-03-04

## 2025-03-02 RX ORDER — SODIUM CHLORIDE 9 MG/ML
INJECTION, SOLUTION INTRAVENOUS PRN
Status: DISCONTINUED | OUTPATIENT
Start: 2025-03-02 | End: 2025-03-07

## 2025-03-02 RX ORDER — POLYETHYLENE GLYCOL 3350 17 G/17G
17 POWDER, FOR SOLUTION ORAL DAILY PRN
Status: DISCONTINUED | OUTPATIENT
Start: 2025-03-02 | End: 2025-03-07

## 2025-03-02 RX ORDER — INSULIN LISPRO 100 [IU]/ML
0-4 INJECTION, SOLUTION INTRAVENOUS; SUBCUTANEOUS EVERY 6 HOURS SCHEDULED
Status: DISCONTINUED | OUTPATIENT
Start: 2025-03-02 | End: 2025-03-07

## 2025-03-02 RX ADMIN — DOBUTAMINE HYDROCHLORIDE 5 MCG/KG/MIN: 200 INJECTION INTRAVENOUS at 09:52

## 2025-03-02 RX ADMIN — EZETIMIBE 10 MG: 10 TABLET ORAL at 09:06

## 2025-03-02 RX ADMIN — BUMETANIDE 2 MG: 0.25 INJECTION INTRAMUSCULAR; INTRAVENOUS at 08:58

## 2025-03-02 RX ADMIN — TRIAMCINOLONE ACETONIDE: 1 CREAM TOPICAL at 09:09

## 2025-03-02 RX ADMIN — INSULIN LISPRO 1 UNITS: 100 INJECTION, SOLUTION INTRAVENOUS; SUBCUTANEOUS at 08:57

## 2025-03-02 RX ADMIN — PANTOPRAZOLE SODIUM 40 MG: 40 INJECTION, POWDER, LYOPHILIZED, FOR SOLUTION INTRAVENOUS at 19:07

## 2025-03-02 RX ADMIN — CALCIUM CHLORIDE 1000 MG: 100 INJECTION, SOLUTION INTRAVENOUS at 15:04

## 2025-03-02 RX ADMIN — WATER 2000 MG: 1 INJECTION INTRAMUSCULAR; INTRAVENOUS; SUBCUTANEOUS at 17:53

## 2025-03-02 RX ADMIN — Medication 1 UNITS: at 18:06

## 2025-03-02 RX ADMIN — PROCHLORPERAZINE EDISYLATE 10 MG: 5 INJECTION INTRAMUSCULAR; INTRAVENOUS at 18:06

## 2025-03-02 RX ADMIN — AMIODARONE HYDROCHLORIDE 1 MG/MIN: 50 INJECTION, SOLUTION INTRAVENOUS at 01:22

## 2025-03-02 RX ADMIN — PIPERACILLIN AND TAZOBACTAM 3375 MG: 3; .375 INJECTION, POWDER, LYOPHILIZED, FOR SOLUTION INTRAVENOUS at 08:56

## 2025-03-02 RX ADMIN — BUMETANIDE 1 MG/HR: 0.25 INJECTION INTRAMUSCULAR; INTRAVENOUS at 17:02

## 2025-03-02 RX ADMIN — SODIUM CHLORIDE, PRESERVATIVE FREE 10 ML: 5 INJECTION INTRAVENOUS at 22:25

## 2025-03-02 RX ADMIN — AMIODARONE HYDROCHLORIDE 1 MG/MIN: 50 INJECTION, SOLUTION INTRAVENOUS at 09:03

## 2025-03-02 RX ADMIN — Medication 6 MG: at 03:16

## 2025-03-02 RX ADMIN — BUMETANIDE 1 MG/HR: 0.25 INJECTION INTRAMUSCULAR; INTRAVENOUS at 22:34

## 2025-03-02 RX ADMIN — HEPARIN SODIUM 5000 UNITS: 5000 INJECTION INTRAVENOUS; SUBCUTANEOUS at 22:25

## 2025-03-02 RX ADMIN — ONDANSETRON 4 MG: 2 INJECTION INTRAMUSCULAR; INTRAVENOUS at 13:54

## 2025-03-02 RX ADMIN — SODIUM CHLORIDE, PRESERVATIVE FREE 10 ML: 5 INJECTION INTRAVENOUS at 08:07

## 2025-03-02 RX ADMIN — ASPIRIN 81 MG: 81 TABLET, CHEWABLE ORAL at 09:06

## 2025-03-02 RX ADMIN — CALCIUM CHLORIDE 1000 MG: 100 INJECTION INTRAVENOUS; INTRAVENTRICULAR at 15:04

## 2025-03-02 RX ADMIN — DOBUTAMINE HYDROCHLORIDE 5 MCG/KG/MIN: 200 INJECTION INTRAVENOUS at 14:59

## 2025-03-02 RX ADMIN — OXYMETAZOLINE HYDROCHLORIDE 2 SPRAY: 0.5 SPRAY NASAL at 09:10

## 2025-03-02 ASSESSMENT — PAIN SCALES - GENERAL
PAINLEVEL_OUTOF10: 0

## 2025-03-02 NOTE — PROGRESS NOTES
4 Eyes Skin Assessment     NAME:  Galdino Melissa  YOB: 1955  MEDICAL RECORD NUMBER:  [id number]    The patient is being assessed for  Admission    I agree that at least one RN has performed a thorough Head to Toe Skin Assessment on the patient. ALL assessment sites listed below have been assessed.      Areas assessed by both nurses:    Head, Face, Ears, Shoulders, Back, Chest, Arms, Elbows, Hands, Sacrum. Buttock, Coccyx, Ischium, Legs. Feet and Heels, and Under Medical Devices         Does the Patient have a Wound? No noted wound(s)       Jan Prevention initiated by RN: Yes  Wound Care Orders initiated by RN: No    Pressure Injury (Stage 3,4, Unstageable, DTI, NWPT, and Complex wounds) if present, place Wound referral order by RN under : No    New Ostomies, if present place, Ostomy referral order under : No     Nurse 1 eSignature: Electronically signed by INGRID RICKS RN on 3/2/25 at 3:30 PM EST    **SHARE this note so that the co-signing nurse can place an eSignature**    Nurse 2 eSignature: Electronically signed by Rita Benitez RN on 3/2/25 at 6:26 PM EST

## 2025-03-02 NOTE — H&P
CRITICAL CARE ADMISSION NOTE    Name: Galdino Melissa   : 1955   MRN: [id number]   Date: 3/2/2025      Diagnoses/problem list:     Shock: cardiogenic vs septic  CHF  EDWIGE on CKD  MSSA bacteremia       HPI   Galdino Melissa is a 70 y.o. with ischemic HFrEF (20-25%) s/p PCI to LAD and RCA, s/p ICD, T2DM, HTN who was admitted to [facility] on  with 2 weeks of dyspnea on exertion and lower extremity swelling. Admitted to general medicine and diuresis started with suboptimal output and worsening renal function. He was admitted to the ICU on  for hypoxic RF and hypotension. Lactate was 12. On norepi and dobutamine and diuresis continued with gradual improvement in shock. Norepi weaned off and continued on dobutamine. Blood cultures from  returned with MSSA. TTE without obvious vegetation on . Started to have \"spells\" of unresponsiveness, lasting around 10 seconds before self-terminating. EEG done on  without seizure activity. Attributed to syncopal episodes from poor perfusion on setting of shock state. Intensivist at [facility] reached out to Cardiology about if he would be a candidate for advanced therapies, decision made to transfer to [facility] for evaluation for this.     On arrival patient developed nausea and large volume emesis. In mild sinus tach. Mentating well. Radial a-line placed. MAPs ~60. Lactic elevated to 6. Norepi started. Seen be Advanced HF, bumex gtt started.     ROS negative except as otherwise documented.    Assessment and plan:     NEUROLOGICAL:    Episodes (10 seconds) of unresponsive  C/f syncope from poor perfusion in shock state. EEG neg on . Otherwise mental status and neuro exam are normal.   - Management of shock below   - Check BP during events now that a-line is in place   - Telemetry      PULMONOLOGY:   No active issues, on room air    CARDIOVASCULAR:   Shock  Likely cardiogenic with septic component from MSSA bacteremia.  - Continue dobutamine 5  - Start norepi,  MG tablet TAKE 1 TABLET BY MOUTH DAILY 3/27/24   Madi Anand MD   aspirin 81 MG chewable tablet Take 1 tablet by mouth daily 8/21/23   Madi Anand MD       Allergies/Social/Family History:     Allergies   Allergen Reactions    Glipizide Other (See Comments)     dizziness      Social History     Tobacco Use    Smoking status: Never    Smokeless tobacco: Never   Substance Use Topics    Alcohol use: Not Currently     Alcohol/week: 16.7 standard drinks of alcohol      Family History   Problem Relation Age of Onset    Cancer Father         unknown    Hypertension Mother          OBJECTIVE:     There were no vitals taken for this visit.  Physical Exam  Gen: Not in distress  HEENT: NC/AT  Cardiac: HR ~110, regular rhythm  Pulm: Clear to auscultation bilaterally  ABD: Soft, distended, nontender  Extremities: significant pitting edema in bilateral lower extremities   Neuro: A/O X 3, no focal deficits     Labs and Data: Reviewed 03/02/25  Medications: Reviewed 03/02/25  Imaging: Reviewed 03/02/25    Intake/Output:   No intake or output data in the 24 hours ending 03/02/25 1341    CRITICAL CARE DOCUMENTATION  I had a face to face encounter with the patient, reviewed and interpreted patient data including clinical events, labs, images, vital signs, I/O's, and examined patient.  I have discussed the case and the plan and management of the patient's care with the consulting services, the bedside nurses and the respiratory therapist.      NOTE OF PERSONAL INVOLVEMENT IN CARE   This patient has a high probability of imminent, clinically significant deterioration, which requires the highest level of preparedness to intervene urgently. I participated in the decision-making and personally managed or directed the management of the following life and organ supporting interventions that required my frequent assessment to treat or prevent imminent deterioration.    I personally spent 75 minutes of critical care time.  This is  time spent at this critically ill patient's bedside actively involved in patient care as well as the coordination of care.  This does not include any procedural time which has been billed separately.    Johan Arboleda MD  Critical Care Medicine  [facility]

## 2025-03-02 NOTE — PROGRESS NOTES
1110 TRANSFER - IN REPORT:    Verbal report received from Cee BAILEY on Galdino Melissa  being received from [facility] for urgent transfer      Report consisted of patient's Situation, Background, Assessment and Recommendations(SBAR). Information from the following report(s) Nurse Handoff Report was reviewed with the receiving nurse. Opportunity for questions and clarification was provided.    _________________________________    1327 Patient arrived to CVICU 33. Assessment completed upon patient's arrival to unit and care assumed. HF NP Jennifer informed- to see patient this afternoon.    Upon arrival to unit, patient vomited copious amounts. Unable to get axillary or oral temperature- rectal temp 96.4 F- massimo hugger applied. Patient is alert and oriented but poor historian.    Amio gtt @ 1mg, Dobutatmine @ 5mcg.     1335 While turning patient clean up, patient experienced 10-15 second episode of rigidity and unresponsiveness- Vitals stable. MD at bedside.     1436 L radial arterial line placed by MD. Labs obtained. MAP under 65- peripheral-dose levophed ordered.     1447 ABG 7.463/30.3/78.3/21.7/96.4%, iCal 1.03, . MD informed- IV calcium chloride ordered.     1509 EKG completed- ST w PVCs.    1630 Bumex gtt ordered by NP Jennifer after discussing with Dr Kirkland.     1800 Patient vomiting again- MD informed- IV compazine ordered.    1930 Bedside shift change report given to Kori BAILEY (oncoming nurse) by Jolene BAILEY (offgoing nurse). Report included the following information Nurse Handoff Report.

## 2025-03-02 NOTE — PROCEDURES
Procedure Note - Arterial Access:   Performed by Huber Arboleda MD.  Diagnosis: shock  Insertion Date: 03/02/25   Time: 6:05 PM   Obtained Consent? yes; informed   Procedure Location:  ICU.      Immediately prior to the procedure, the patient was reevaluated and found suitable for the planned procedure and any planned medications.  Immediately prior to the procedure a time out was called to verify the correct patient, procedure, equipment, staff, and marking as appropriate.  Collateral circulation confirmed with Natanael test.     Line Bundle:  Full sterile barrier precautions used.  5 mL 1% Lidocaine placed at insertion site.      Method: Seldinger technique.   Site Prep: ChloraPrep.   Procedure: Arterial Catheter Insertion in Left, Radial Artery   Catheter inserted into a new site.     Number of Attempts:  1  Indication: Monitoring and Blood Drawing.     There was bright red, pulsatile blood return.  Femoral Site? no. If Yes, reason femoral site was chosen: n/a  Catheter secured. Biopatch/CHG sterile bio-occlusive dressing  in place? No CHG dressings or Biopatches available in the unit. Simple sterile dressing applied and RN team will search hospital for CHG dressing to use.   Complication None.   The procedure was tolerated well.    Huber Arboleda MD   Critical Care Medicine  [facility]

## 2025-03-02 NOTE — PROGRESS NOTES
Day #1 of TBD  Indication:  MSSA bloodstream infection  Current regimen:  Cefazolin 1 g q 12 hours  Abx regimen: Cefazolin  Recent Labs     25  1749 25  0314 25  0607 25  0332 25  1438   WBC 17.4*  --   --  10.8 12.7*   CREATININE 4.43*   < > 4.58* 5.28* 5.83*   BUN 75*   < > 77* 80* 87*    < > = values in this interval not displayed.     Est CrCl: 14 ml/min; UO: - ml/kg/hr  Temp (24hrs), Av.6 °F (35.9 °C), Min:95.2 °F (35.1 °C), Max:97.6 °F (36.4 °C)    Cultures: : Bcx (x2) - S. Aureus both bottles both sites                 : Repeat Bcx(x2) - S. Aureus both bottles both sites                3/1: Bcx x 2 - NG1Day                 3/2 Bcx x2 - NG<1Day               BioFire PCR:  +Staphylococcus, +Staphylococcus aureus NO MecA/C gene noted    Plan: Change to Cefazolin 2 g q 12 hours given indication

## 2025-03-02 NOTE — PLAN OF CARE
Problem: Safety - Adult  Goal: Free from fall injury  Outcome: Progressing     Problem: Chronic Conditions and Co-morbidities  Goal: Patient's chronic conditions and co-morbidity symptoms are monitored and maintained or improved  Outcome: Progressing     Problem: Discharge Planning  Goal: Discharge to home or other facility with appropriate resources  Outcome: Progressing     Problem: Pain  Goal: Verbalizes/displays adequate comfort level or baseline comfort level  Outcome: Progressing     
  Problem: Safety - Adult  Goal: Free from fall injury  Outcome: Progressing     Problem: Chronic Conditions and Co-morbidities  Goal: Patient's chronic conditions and co-morbidity symptoms are monitored and maintained or improved  Outcome: Progressing     Problem: Discharge Planning  Goal: Discharge to home or other facility with appropriate resources  Outcome: Progressing     Problem: Pain  Goal: Verbalizes/displays adequate comfort level or baseline comfort level  Outcome: Progressing     Problem: Cardiovascular - Adult  Goal: Maintains optimal cardiac output and hemodynamic stability  Outcome: Progressing     Problem: Skin/Tissue Integrity - Adult  Goal: Skin integrity remains intact  Outcome: Progressing     Problem: Metabolic/Fluid and Electrolytes - Adult  Goal: Glucose maintained within prescribed range  Outcome: Progressing     
  Problem: Safety - Adult  Goal: Free from fall injury  Outcome: Progressing     Problem: Chronic Conditions and Co-morbidities  Goal: Patient's chronic conditions and co-morbidity symptoms are monitored and maintained or improved  Outcome: Progressing  Flowsheets (Taken 2/22/2025 2021)  Care Plan - Patient's Chronic Conditions and Co-Morbidity Symptoms are Monitored and Maintained or Improved: Monitor and assess patient's chronic conditions and comorbid symptoms for stability, deterioration, or improvement     Problem: Discharge Planning  Goal: Discharge to home or other facility with appropriate resources  Outcome: Progressing  Flowsheets (Taken 2/22/2025 2021)  Discharge to home or other facility with appropriate resources: Identify barriers to discharge with patient and caregiver     Problem: Pain  Goal: Verbalizes/displays adequate comfort level or baseline comfort level  Outcome: Progressing  Flowsheets (Taken 2/22/2025 2021)  Verbalizes/displays adequate comfort level or baseline comfort level:   Encourage patient to monitor pain and request assistance   Assess pain using appropriate pain scale   Administer analgesics based on type and severity of pain and evaluate response     Problem: Cardiovascular - Adult  Goal: Maintains optimal cardiac output and hemodynamic stability  Outcome: Progressing     Problem: Skin/Tissue Integrity - Adult  Goal: Skin integrity remains intact  Outcome: Progressing     Problem: Metabolic/Fluid and Electrolytes - Adult  Goal: Glucose maintained within prescribed range  Outcome: Progressing     
hemodynamic stability  Outcome: Progressing  Flowsheets  Taken 2/23/2025 1949 by Esperanza Steinberg RN  Maintains optimal cardiac output and hemodynamic stability: Monitor blood pressure and heart rate  Taken 2/23/2025 0901 by Anahi Fiore RN  Maintains optimal cardiac output and hemodynamic stability: Monitor blood pressure and heart rate     Problem: Skin/Tissue Integrity - Adult  Goal: Skin integrity remains intact  Outcome: Progressing  Flowsheets (Taken 2/23/2025 0901 by Anahi Fiore RN)  Skin Integrity Remains Intact:   Monitor for areas of redness and/or skin breakdown   Assess vascular access sites hourly     Problem: Metabolic/Fluid and Electrolytes - Adult  Goal: Glucose maintained within prescribed range  Outcome: Progressing  Flowsheets (Taken 2/23/2025 0901 by Anahi Fiore RN)  Glucose maintained within prescribed range:   Monitor blood glucose as ordered   Assess for signs and symptoms of hyperglycemia and hypoglycemia   Administer ordered medications to maintain glucose within target range   Assess barriers to adequate nutritional intake and initiate nutrition consult as needed   Instruct patient on self management of diabetes and initiate consult as needed     
0800)  Skin Integrity Remains Intact:   Monitor for areas of redness and/or skin breakdown   Assess vascular access sites hourly   Every 4-6 hours minimum: Change oxygen saturation probe site  3/2/2025 0527 by Damaris Huggins, RN  Outcome: Progressing  Flowsheets (Taken 3/1/2025 2000)  Skin Integrity Remains Intact: Monitor for areas of redness and/or skin breakdown     Problem: Nutrition Deficit:  Goal: Optimize nutritional status  3/2/2025 1011 by Cee Torrez RN  Outcome: Not Progressing  3/2/2025 0527 by Damaris Huggins, RN  Outcome: Not Progressing     Problem: Neurosensory - Adult  Goal: Achieves stable or improved neurological status  Outcome: Not Progressing  Goal: Absence of seizures  Outcome: Not Progressing  Goal: Remains free of injury related to seizures activity  Outcome: Not Progressing  Goal: Achieves maximal functionality and self care  Outcome: Not Progressing     Problem: Hematologic - Adult  Goal: Maintains hematologic stability  Outcome: Not Progressing

## 2025-03-02 NOTE — DISCHARGE SUMMARY
Discharge Summary    Galdino Melissa  :  1955  MRN:  [id number]    ADMIT DATE:  2025  DISCHARGE DATE:  3/2/2025    PRIMARY CARE PHYSICIAN:  Madi Anand MD    VISIT STATUS: Admission    CODE STATUS:  Full Code    DISCHARGE DIAGNOSES:  Principal Problem:    HFrEF (heart failure with reduced ejection fraction) (HCC)  Active Problems:    Type 2 diabetes mellitus with hyperglycemia, without long-term current use of insulin (HCC)    Convulsive syncope    Acute alteration in mental status  Resolved Problems:    * No resolved hospital problems. *      HOSPITAL COURSE:   70 y.o.  male with PMHx chf,htn, DM admitted on  for increasing shortness of breath. Has been dobutamine and amiodarone. 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 On iv heparin patient had large melanotic stool. Positive BC on  for staph on zosyn and received vanco. Now creatinine is getting worse and patient has near syncopal episodes even on minimal exertion or straining.  SIGNIFICANT DIAGNOSTIC STUDIES:  Ct abdomen  CONSULTANTS:  Cardiology, surgery  RECOMMENDED NEXT STEPS:   Transfer to [facility]     DISCHARGE MEDICATIONS:         Medication List        STOP taking these medications      isosorbide mononitrate 30 MG extended release tablet  Commonly known as: IMDUR            ASK your doctor about these medications      apixaban 5 MG Tabs tablet  Commonly known as: Eliquis  Take 1 tablet by mouth 2 times daily To prevent stroke.     aspirin 81 MG chewable tablet  Take 1 tablet by mouth daily     atorvastatin 80 MG tablet  Commonly known as: LIPITOR  TAKE 1 TABLET BY MOUTH DAILY     bumetanide 2 MG tablet  Commonly known as: BUMEX  Take 1 tablet by mouth 2 times daily For swelling in feet     ezetimibe 10 MG tablet  Commonly known as: ZETIA  TAKE 1 TABLET BY MOUTH DAILY     Farxiga 10 MG tablet  Generic drug: dapagliflozin  Take 1 tablet by mouth daily For diabetes and heart.

## 2025-03-02 NOTE — PROGRESS NOTES
[facility] EP/ ARRHYTHMIA/ CARDIOLOGY     EP Cardiology consult requested by Derrick Brantley MD for heart failure exacerbation  PCP:  Madi Anand MD    Primary cardiologist: Dr. Walker     Electrophysiology Service  3/1/2025     Assessment:   Acute on chronic HFrEF  Continue IV diuresis  Aldactone and ARB are being held due to EDWIGE  Ischemic cardiomyopathy  CAD  Status post PCI to LAD and RCA  DM2  CKD  Acute renal failure likely from cardiorenal syndrome  HTN  ICD in situ  History of SVT  Superior Mesenteric Artery Thrombosis    Plan 2/21:   Agree with IV diuresis  Can continue jardiance  Hold metformin, aldactone and losartan  Continue imdur.   May have to switch losartan to hydralazine if renal function dyspnea on exertion not improve.     See plan by date below:  2/22:  On room air, edema improved, cr stable, renal following, no changes today, cont to monitor cr.    2/23: On room air, edema improved, but still feels bloated, renal following, they will decide when to switch back to PO diuretic.    2/24:  Bumex PO today.  One dose vaptan.  ScR stable 2.8.  On RA.  VSS.  OK for dispo when ok with renal.  Has appt with me Friday.    2/25:  BP soft/low.  sCr worse.  Hydrate.  Hold ARB/aldactone.    2/26/25: creatinine 3.15 today, hyponatremic at 130. Required ICU transfer for hypotension. Holding nephrotoxic meds and diuresis. Nephrology on board. WBC up to 26 today. Lactate 5.7. Anemic 9.9 Afebrile. -800/24 hours. Weight changed 10 lbs from yesterday? On 4L NC    2/27/2025 update: Cardiomyopathy with LVEF of 10%. General Surgery cleared from abdominal source of infection. Vascular has seen for mesenteric thrombosis, no intervention. Will plan for LHC/RHC at 12:30 today to evaluate for ischemia. GDMT as allowed. More Hyponatremic. Lactate better but still high at 6.3. Off pressors. Creatinine 3.68. Will be careful with contrast.     Consent obtained for LHC/RHC, RN witnessed.    Attending 
                    [facility] EP/ ARRHYTHMIA/ CARDIOLOGY     EP Cardiology consult requested by Leia Krishnamurthy MD for heart failure exacerbation  PCP:  Madi Anand MD    Primary cardiologist: Dr. Walker     Electrophysiology Service  2/24/2025     Assessment:   Acute on chronic HFrEF  Continue IV diuresis  Aldactone and ARB are being held due to EDWIGE  Ischemic cardiomyopathy  CAD  Status post PCI to LAD and RCA  DM2  CKD  Acute renal failure likely from cardiorenal syndrome  HTN  ICD in situ  History of SVT    Plan:   Agree with IV diuresis  Can continue jardiance  Hold metformin, aldactone and losartan  Continue imdur.   May have to switch losartan to hydralazine if renal function dyspnea on exertion not improve.     2/22:  On room air, edema improved, cr stable, renal following, no changes today, cont to monitor cr.    2/23: On room air, edema improved, but still feels bloated, renal following, they will decide when to switch back to PO diuretic.    2/24:  Bumex PO today.  One dose vaptan.  ScR stable 2.8.  On RA.  VSS.  OK for dispo when ok with renal.  Has appt with me Friday.      []    High complexity decision making was performed  []    Patient is at high-risk of decompensation with multiple organ involvement       HPI:  Galdino Melissa is a 70 y.o. male with history of ischemic cardiomyopathy with EF around 20% status post PCI and ICD as well as CKD who is presenting with 2 weeks of progressive shortness of breath and dyspnea on exertion. His BNP was elevated to 8500 and he was given IV bumex in ED. At home he is on Jardiance, aldactone, losartan and Imdur. His Cr has traditionally been around 1.5 but now is up to 2.7. He is having a fair amount of lower etremity edema. He has not responded to the IV bumex yet.        Allergies   Allergen Reactions    Glipizide Other (See Comments)     dizziness     Past Medical History:   Diagnosis Date    Acute on chronic systolic congestive heart failure (HCC) 12/11/2024 
                 Critical Care Progress Note  Derrick Brantley MD          Date of Service:  2025  NAME:  Galdino Melissa  :  1955  MRN:  [id number]      Subjective/Hospital course:      : Patient reports feeling better than yesterday.  Currently on 4 L oxygen.  Off of vasopressors.  Denies abdominal pain or shortness of breath.   - Sitting comfortably on the bed, denies any complaints.        Active Problems Being Managed:      -Acute hypoxemic respiratory failure.  - Shock.  Undifferentiated.  - Acute renal failure on CKD stage III.  - Acute decompensated heart failure.  - History of hypertension.  - Hyperlipidemia.  - Hyponatremia.        Assessment/Plan:      Acute hypoxemic respiratory failure. improved.   -- Patient's respiratory distress was likely secondary to severe lactic acidosis.  Respiratory status has significantly improved.  Currently off of BiPAP and on 4 L oxygen.     Circulatory shock.  -- I believe this is combined septic and cardiogenic shock.  Off levophed gtt. On Dobutamine 5 mcg/min. Will de-escalate Dobutamine to 2.5 mcg/min. Lactic acid close to 2 now.   -- Blood culture growing gram positivi cocci in cluster (all 4 bottles). Likely Staph aureus.   -- Hold off on cath today, I spoke to Dr. Walker, he is in agreement. TTE did not show vegetation, he will need LISA at some point, I alerted Dr. Walker about potential LISA in near future.   -- ECHO  with EF 25%  -- Cont. Heparin gtt.  -- Cont. Vancomycin.     Suspected mesenteric ischemia  -- CT abdomen without contrast overnight showed possible SMA thrombus.  Consulted vascular surgery and general surgery.  Abdominal exam is benign.  - Continue anticoagulation for now.       Acute renal failure on CKD stage III.  - Renal following.     History of hypertension.  - Hold all the blood pressure medications.     Type 2 diabetes mellitus.  - Sliding scale insulin.     DVT prophylaxis: Eliquis  SUP: NA  Code status: Full code.    
                 Critical Care Progress Note  Joycelyn Toney MD          Date of Service:  2025  NAME:  Galdino Melissa  :  1955  MRN:  [id number]      Subjective/Hospital course:      : Patient reports feeling better than yesterday.  Currently on 4 L oxygen.  Off of vasopressors.  Denies abdominal pain or shortness of breath.       Active Problems Being Managed:      -Acute hypoxemic respiratory failure.  - Shock.  Undifferentiated.  - Acute renal failure on CKD stage III.  - Acute decompensated heart failure.  - History of hypertension.  - Hyperlipidemia.  - Hyponatremia.        Assessment/Plan:      Acute hypoxemic respiratory failure. improved.   -- Patient's respiratory distress was likely secondary to severe lactic acidosis.  Respiratory status has significantly improved.  Currently off of BiPAP and on 4 L oxygen.     Circulatory shock.  --In setting of possible bowel ischemia with severe lactic acidosis.  -- called General surgery this morning and also consulted vascular surgery for SMA thrombosis on Non contrast CT abd. Patient is asymptomatic and benign abdominal exam.  --Changed eliquis to heparin gtt. In [place] patient needs surgery.    Suspected mesenteric ischemia:  -- CT abdomen without contrast overnight showed possible SMA thrombus.  Consulted vascular surgery and general surgery.  Abdominal exam is benign.  - Continue anticoagulation for now.       Acute renal failure on CKD stage III.  - Patient was started on IV fluids for worsening renal failure.  Looks euvolemic to hypovolemic. Continue volume resuscitation as tolerated.  - Nephrology is following.  May need CRRT.     History of hypertension.  - Hold all the blood pressure medications.     Type 2 diabetes mellitus.  - Sliding scale insulin.        DVT prophylaxis: Eliquis  SUP: NA  Code status: Full code.      Next of kin: Loco Law (Niece/Nephew)  [phone] ([place])         I personally spent 40 minutes of critical care time. 
      Hospitalist Progress Note    NAME:   Galdino Melissa   : 1955   MRN: [id number]     Date/Time: 2025 12:09 PM  Patient PCP: Madi Anand MD    Estimated discharge date: -  Barriers: Adequate diuresis      Assessment / Plan:    Acute CHF exacerbation  HFrEF    Afib?  HTN   EDWIGE CKD stage III  EDWIGE likely secondary to cardiorenal syndrome.   Admitting sodium 127, troponin 43, creatinine 2.95 with baseline 1.5.   CXR  IMPRESSION:  No acute intrathoracic process.  Renal ultrasound pending  EKG on admission with sinus tachycardia, noted PCP recently started patient on Eliquis.         Cardiology consulted, patient follows outpatient with Dr. Walker-expertise appreciated from [facility]   Nephrology on board, expertise appreciated  I's and O's  Daily weights on standing scale  IV Bumex 2 mg twice daily  Continue to hold home nephrotoxic drugs: Spironolactone, losartan, and metformin  Continue home eliquis, metoprolol, and Imdur  Noted cardiology okay with starting hydralazine if needed  Daily labs    T2DM  Hemoglobin A1c 7.9  Sliding scale insulin/hypoglycemia protocol     Hyperlipidemia   Continue home Lipitor, aspirin, Zetia     Medical Decision Making:   I personally reviewed labs: CBC, BMP  I personally reviewed imaging:  I personally reviewed EKG:  Toxic drug monitoring: Monitor renal function for electrolyte dyscrasias and toxicity while on IV diuresis  Discussed case with:         Code Status: FULL  DVT Prophylaxis: Eliquis  GI Prophylaxis:    Subjective:     Chief Complaint / Reason for Physician Visit  Today patient reports he feels \"so-so\".  Otherwise continues to have some pitting edema in his lower extremities.  Discussed with RN events overnight.     Objective:     VITALS:   Last 24hrs VS reviewed since prior progress note. Most recent are:  Patient Vitals for the past 24 hrs:   BP Temp Temp src Pulse Resp SpO2 Height Weight   25 0755 123/85 97.3 °F (36.3 °C) Oral 90 16 95 % -- -- 
      Hospitalist Progress Note    NAME:   Galdino Melissa   : 1955   MRN: [id number]     Date/Time: 2025 2:35 PM  Patient PCP: Madi Anand MD    Estimated discharge date: -  Barriers: Adequate diuresis, Na increase      Assessment / Plan:    Acute CHF exacerbation  HFrEF    Afib?  HTN   EDWIGE CKD stage III  EDWIGE likely secondary to cardiorenal syndrome.   Admitting sodium 127, troponin 43, creatinine 2.95 with baseline 1.5.   CXR  IMPRESSION:  No acute intrathoracic process.  Renal ultrasound pending  EKG on admission with sinus tachycardia, noted PCP recently started patient on Eliquis.         Cardiology consulted, patient follows outpatient with Dr. Walker-expertise appreciated from [facility]   Nephrology on board, expertise appreciated  I's and O's  Daily weights on standing scale  Transition to p.o. Bumex  Continue to hold home nephrotoxic drugs: Spironolactone, losartan, and metformin  Continue home eliquis, metoprolol, and Imdur  Noted cardiology okay with starting hydralazine if needed  Daily labs    T2DM  Hemoglobin A1c 7.9  Sliding scale insulin/hypoglycemia protocol  Metformin may not be the best choice for patient's diabetes moving forward, consult diabetes management for insulin teaching in case this is needed prior to discharge     Hyperlipidemia   Continue home Lipitor, aspirin, Zetia     Medical Decision Making:   I personally reviewed labs: CBC, BMP  I personally reviewed imaging:  I personally reviewed EKG:  Toxic drug monitoring: Monitor renal function for electrolyte dyscrasias and toxicity while on IV diuresis  Discussed case with:         Code Status: FULL  DVT Prophylaxis: Eliquis  GI Prophylaxis:    Subjective:     Chief Complaint / Reason for Physician Visit  Patient again feels \" so-so\".  Advised of persistently low sodium.  Hopefully he will be able better when his sodium rises a little bit.  Patient has never used insulin, would feel better continuing to use pills/tablets 
  Physician Progress Note      PATIENT:               ARDEN DRAKE  CSN #:                  [id number]  :                       1955  ADMIT DATE:       2025 7:08 PM  DISCH DATE:  RESPONDING  PROVIDER #:        Derrick Brantley MD          QUERY TEXT:    Pt admitted with CHF. Pt noted to have septic shock documented . If   possible, please document in the progress notes and discharge summary if you   are evaluating and /or treating any of the following:    The medical record reflects the following:  Risk Factors: 70 y.o. male with history of ischemic cardiomyopathy with EF   around 20% status post PCI and ICD as well as CKD who is presenting with 2   weeks of progressive shortness of breath.  Clinical Indicators:  Per  CC:  I believe this is combined septic and   cardiogenic shock.  Off levophed gtt.  Blood culture growing gram positivi   cocci in cluster (all 4 bottles). Likely Staph aureus.  WBC  - 17.8,  - 25.9,  - 19.8  Lactic  - 12,  - 11.5  Treatment: Vancomycin    Thank you,  Candace Muñiz RN, CDI  [email or web address]  >  Options provided:  -- Sepsis with associated septic shock, developed following admission  -- Sepsis was ruled out  -- Other - I will add my own diagnosis  -- Disagree - Not applicable / Not valid  -- Disagree - Clinically unable to determine / Unknown  -- Refer to Clinical Documentation Reviewer    PROVIDER RESPONSE TEXT:    This patient has sepsis with associated septic shock that developed following   admission.    Query created by: Candace Muñiz on 2025 11:05 AM      Electronically signed by:  Derrick Brantley MD 2025 11:39 AM          
  Physician Progress Note      PATIENT:               ARDEN DRAKE  CSN #:                  [id number]  :                       1955  ADMIT DATE:       2025 7:08 PM  DISCH DATE:  RESPONDING  PROVIDER #:        Derrick Brantley MD          QUERY TEXT:    Pt admitted with CHF.  Noted documentation of ATN on  by nephrology   consultant.  If possible, please document in progress notes and discharge   summary:    The medical record reflects the following:  Risk Factors: 70 y.o. male with history of ischemic cardiomyopathy with EF   around 20% status post PCI and ICD as well as CKD who is presenting with 2   weeks of progressive shortness of breath and dyspnea on exertion. His BNP was   elevated to 8500 and he was given IV bumex in ED.  Clinical Indicators:  Neph - Acute kidney injury due to low blood   pressure, ATN and cardiorenal syndrome   Card - Required ICU transfer for hypotension. Holding nephrotoxic meds   and diuresis.   CC - Patient was being diuresed.  Patient did have worsening renal   failure, nephrology was consulted.  Patient was started on IV fluids.  Today   patient developed worsening hypoxemic respiratory failure and also now   hypotension.  Shock.  Undifferentiated. Could be cardiogenic shock in setting   of severe cardiomyopathy.  Cr  - 2.88,  - 3.08,  - 3.15  Treatment: Bumex held, Levophed    Thank you,  Candace Muñiz RN, CDI  [email or web address]  >  Options provided:  -- ATN confirmed present on admission  -- ATN ruled out  -- Defer to nephrology consultant documentation regarding ATN  -- Other - I will add my own diagnosis  -- Disagree - Not applicable / Not valid  -- Disagree - Clinically unable to determine / Unknown  -- Refer to Clinical Documentation Reviewer    PROVIDER RESPONSE TEXT:    The diagnosis of ATN was ruled out.    Query created by: Candace Muñiz on 2025 10:40 AM      Electronically signed by:  Derrick Brantley MD 2025 2:19 
  Physician Progress Note      PATIENT:               ARDEN DRAKE  CSN #:                  [id number]  :                       1955  ADMIT DATE:       2025 7:08 PM  DISCH DATE:  RESPONDING  PROVIDER #:        Leia Krishnamurthy MD          QUERY TEXT:    Pt admitted with CHF. Pt noted to also have Ischemic cardiomyopathy, CAD, HTN.   If possible, please document in progress notes and discharge summary the   etiology of CHF, if able to be determined.    The medical record reflects the following:  Risk Factors: 70 y.o. male with history of ischemic cardiomyopathy with EF   around 20% status post PCI and ICD as well as CKD who is presenting with 2   weeks of progressive shortness of breath and dyspnea on exertion. His BNP was   elevated to 8500 and he was given IV bumex in ED.  Clinical Indicators: Per  Card - Ischemic cardiomyopathy, CAD, HTN  Treatment: IV diuresis, jardiance, imdur; metformin, aldactone and losartan   held    Thank you,  Flor Muñiz RN, CDI  [email or web address]  >  Options provided:  -- CHF due to Hypertensive Heart Disease  -- CHF due to Hypertensive Heart Disease and CAD  -- CHF not due to Hypertension but due to CAD  -- CHF due to Hypertensive Heart Disease and ICMP  -- CHF not due to Hypertension but due to ICMP  -- Other - I will add my own diagnosis  -- Disagree - Not applicable / Not valid  -- Disagree - Clinically unable to determine / Unknown  -- Refer to Clinical Documentation Reviewer    PROVIDER RESPONSE TEXT:    This patient has CHF due to hypertensive heart disease and CAD.    Query created by: Flor Muñiz on 2025 12:05 PM      Electronically signed by:  Leia Krishnamurthy MD 2025 1:31 PM          
0700 - received patient from LYNN Ocampo.   0800 - patient assessment completed. He is alert and oriented x4 and following all commands. Patient's sclera is yellow with noted nose bleeds. Patient given scheduled afrin spray. Patient is currently on RA. Lung sounds are clear. Sinus tach on the monitor. Pulses are all palpable with +2 LE edema. Bowel sounds are active. ABD is soft and non tender. Patient received with x3 PIV's. Currently has dobutamine, AMIO, and LR running. See MAR for titrations.   0830 - Nephrology paged regarding patients' rising creatinine and falling Na levels. No response yet.   0915 - patient ate a Banana and immediately vomited.   1300 - RN updated nephrologist on patients condition. Plan to give 2 mg of bumex BID. Per Dr. Browne and nephrologist, okay to give pressors if needed.   1900 - report given to LYNN Ocampo.   
1515: Patient noted to have increased work of breathing, Respirations- 36, O2 saruation reading at 75%. Nonrebreather placed, CCL, MD, rapid response nurse, charge nurse, floor nurse, and RT at bedside. New orders per MD as follows: STAT chest x-ray, Lasix 40 mg IV, STAT ABG, transfer patient to PCU, discontinue continuous IV fluids.    1555: TRANSFER - OUT REPORT:    Verbal report given to LYNN Jaramillo on Galdino Melissa  being transferred to PCU for change in patient condition (Increased work of breathing, respiration 36, o2 saturation reading 75%)       Report consisted of patient's Situation, Background, Assessment and   Recommendations(SBAR).     Information from the following report(s) Nurse Handoff Report, Intake/Output, MAR, Recent Results, and Cardiac Rhythm NSR  was reviewed with the receiving nurse.  [place] Assessment: No data recorded  Lines:   Peripheral IV 02/20/25 Left Antecubital (Active)   Site Assessment Clean, dry & intact 02/25/25 0822   Line Status Capped;Flushed;Normal saline locked 02/25/25 0822   Line Care Connections checked and tightened 02/25/25 0822   Phlebitis Assessment No symptoms 02/25/25 0822   Infiltration Assessment 0 02/25/25 0822   Alcohol Cap Used Yes 02/25/25 0822   Dressing Status Clean, dry & intact 02/25/25 0822   Dressing Type Transparent 02/25/25 0822   Dressing Intervention New 02/20/25 1751        Opportunity for questions and clarification was provided.      Patient transported with:  Monitor and Registered Nurse                 
1555: Report received from LYNN Calvo   Patient arrived to PCU with RRT RN Anais. Patient assignment changed when ABG resulted. PCU RN, RRT RN, RT at beside. Patient placed on continuous BiPAP for continued increased WOB. Pt is AO4 able to answer all questions appropriately. RRT RN to stay with patient, while CCU bed cleaned.     Patient transferred to CVICU with RRT RN, and RT. Bedside report given to Prashanth CVICU RN.   
1805 Bedside shift change report given to LYNN Ocampo (oncoming nurse) by LYNN Jiménez (offgoing nurse). Report included the following information Nurse Handoff Report, Adult Overview, Intake/Output, MAR, Recent Results, and Cardiac Rhythm SR c PAC/PVC .     1900 Shift assessment completed, see flowsheets for details.     2300 Reassessment completed, no significant changes.    0300 Reassessment completed, no significant changes.    0343 Perfect[employer] message sent to YOLY Segura regarding low K and rising crt. No new orders at this time.    ~0740 Bedside shift change report given to LYNN Mishra (oncoming nurse) by LYNN Ocampo (offgoing nurse). Report included the following information Nurse Handoff Report, Adult Overview, Intake/Output, MAR, Recent Results, and Cardiac Rhythm SR c PAC/PVC .     
1900. End of Shift Note    Bedside shift change report given to Esperanza BAILEY (oncoming nurse) by Khurram Bonilla RN (offgoing nurse).  Report included the following information SBAR, Kardex, Intake/Output, MAR, Recent Results, and Cardiac Rhythm NSR    Shift worked:  [id number]     Shift summary and any significant changes:     No changes pt diuresing     Concerns for physician to address:       Zone phone for oncoming shift:          Activity:  Level of Assistance: Independent  Number times ambulated in hallways past shift: 0  Number of times OOB to chair past shift: 5    Cardiac:   Cardiac Monitoring: Yes      Cardiac Rhythm: Sinus rhythm    Access:  Current line(s): PIV     Genitourinary:   Urinary Status: Voiding    Respiratory:   O2 Device: None (Room air)  Chronic home O2 use?: NO  Incentive spirometer at bedside: YES    GI:     Current diet:  ADULT DIET; Regular; 4 carb choices (60 gm/meal); Low Fat/Low Chol/High Fiber/2 gm Na; 1800 ml  Passing flatus: YES    Pain Management:   Patient states pain is manageable on current regimen: YES    Skin:  Jan Scale Score: 22  Interventions: Wound Offloading (Prevention Methods): Bed, pressure reduction mattress    Patient Safety:  Fall Risk: Nursing Judgement-Fall Risk High(Add Comments): No  Fall Risk Interventions  Nursing Judgement-Fall Risk High(Add Comments): No  Toilet Every 2 Hours-In Advance of Need: Yes  Hourly Visual Checks: Awake  Fall Visual Posted: Socks, Armband  Room Door Open: Yes  Alarm On: Other (Comment) (refused bed alarm)  Patient Moved Closer to Nursing Station: No    Active Consults:   IP CONSULT TO HOSPITALIST  IP CONSULT TO NEPHROLOGY  IP CONSULT TO CARDIOLOGY  IP CONSULT TO CARDIAC REHAB    Length of Stay:  Expected LOS: 3  Actual LOS: 2    Khurram Bonilla, RN                            
1915 Bedside shift change report given to LYNN Ocampo (oncoming nurse) by LYNN Mishra (offgoing nurse). Report included the following information Nurse Handoff Report, Adult Overview, Intake/Output, MAR, Recent Results, and Cardiac Rhythm ST .     1930 Shift assessment completed, see flowsheets for details.    ~2055 [place] Headband: removed  Date/Time: 2/28/25 ~2055 (~3hr 10min)  Recorder: recording stopped  Skin: intact  Highest Seizure [place] Percentage past hour: 0% (small elevation ~1807 to ~10% seizure burden, otherwise has been 0%.)    2343 1 unit PRBC transfusion initiated at this time.    2345 Reassessment completed, see flowsheets for details. Nose bleeding R nostril at this time.    0123 Patient coughed up clot of blood at this time. YOLY Segura notified.    0151 Blood transfusion completed at this time.    0411 Reassessment completed, no significant changes.    0705 Bedside shift change report given to LYNN Florence (oncoming nurse) by LYNN Ocampo (offgoing nurse). Report included the following information Nurse Handoff Report, Adult Overview, Intake/Output, MAR, Recent Results, and Cardiac Rhythm ST .     
[id number] effective handoff at bedside with off going RN Damaris Huggins,  [id number] patient was assisted to the chair with assistance of two providers.Patient tolerated activity well.  050 Pharmacy contacted  and discussed current aPTT results and changes in Heparin  qtt..Will hold Heparin for 60 minutes,and decrease rate by 3 units/kg/hr.  1138 EKG performed for Irregular HR  1145 MD Newby notified about  patient's  HR (128b/min) DR Brantley ordered to stop Dobutamine qtt (/75) and contact Rickie Montgomery regarding patients   1152 DR Walker was paged via perfect serve..Awaiting for orders  1202:telephone order received to start Amiodarone I bolus and infusion  1438 Bladder scan revealed 218 ml. No symptoms or signs reported by patient  1700 Patient is in bed,.Noticed patient was incontinent of stool and urine.Patient cannot recall if he was incontinent. Dark colored stool noted  1720.patient had a seizure like activity.MD Agrawal notified.MD Agrawal at bedside.Perineal care provided.DR Agrawal recommended to sent stool for occult blood based on the color of stool  1730 Patient had second episode of seizure like activity..  1738 Patient had another episode seizure like activity.   1735 rapid response called  1749 BP 74/55(61)  1750 BP 83/61 (68) levophed started per verbal order of Dr Agrawal  Labs ordered and collect Levophed started ed  1750 Cerebell applied per MD order   1850 Critical lactic acid lab result reported to Dr Agrawal.  1854 Positive occult blood result reported to MD Agrawal.  1856 received a call from lab regarding high result for aPTT. Redraw recommended. Heparin qtt paused for 10 min  
Bedside and verbal report given to Damaris Huggins RN  
Bedside report given to LYNN Alford. Pt resting in bed, VSS, no complaints of pain. Pt lactic trended through the night, pt taken to CT, AM labs drawn, AM weight complete, meds given as ordered in chart. I&O as noted in chart. Pt now on 4L NC with O2 sat %.   
Bedside shift change report given to YLNN Mata (oncoming nurse) by LYNN Hu (offgoing nurse). Report included the following information Nurse Handoff Report, Intake/Output, MAR, Recent Results, and Cardiac Rhythm NSR .     TRANSFER - OUT REPORT:    Verbal report given to LYNN Mohan on Galdino Melissa  being transferred to Room 3238 for routine progression of patient care       Report consisted of patient's Situation, Background, Assessment and   Recommendations(SBAR).     Information from the following report(s) Nurse Handoff Report, Intake/Output, MAR, Recent Results, and Cardiac Rhythm NSR  was reviewed with the receiving nurse.           Lines:   Peripheral IV 02/20/25 Left Antecubital (Active)   Site Assessment Clean, dry & intact 02/23/25 1949   Line Status No blood return;Capped 02/23/25 1949   Line Care Connections checked and tightened 02/23/25 1949   Phlebitis Assessment No symptoms 02/23/25 1949   Infiltration Assessment 0 02/23/25 1949   Alcohol Cap Used Yes 02/23/25 1949   Dressing Status Clean, dry & intact 02/23/25 1949   Dressing Type Transparent 02/23/25 1949   Dressing Intervention New 02/20/25 1751        Opportunity for questions and clarification was provided.      Patient transported with:  Patient-specific medications from Pharmacy and myOrder (Kenalog Cream)       
Comprehensive Nutrition Assessment    Type and Reason for Visit:  Initial, LOS    Nutrition Recommendations/Plan:   Downgraded diet to clear liquids s/p vomiting after only consuming a banana at breakfast. Re-advance as tolerated.  Ensure clear BID  Please document % meals and supplements consumed in flowsheet I/O's under intake      Malnutrition Assessment:  Malnutrition Status:  At risk for malnutrition (03/01/25 1327)    Context:  Acute Illness     Findings of the 6 clinical characteristics of malnutrition:  Energy Intake:  75% or less of estimated energy requirements for 7 or more days  Weight Loss:  No weight loss     Body Fat Loss:  No body fat loss     Muscle Mass Loss:  No muscle mass loss    Fluid Accumulation:  Unable to assess (not nutritionally relevant) Extremities   Strength:  Not Performed    Nutrition Assessment:     Chart reviewed for LOS. Pt admitted with CHF, CKD3, T2DM, HTN, shock, fluid overload. Concern for mesenteric ischemia earlier in admission, but pt was asymptomatic - vomiting started today. Heading toward CRRT per Nephro. Pt reports a great appetite PTA, no issues with intake or weight loss. Weight trending up since admission 2/2 fluid retention. Diet order has been inconsistent throughout admission (see below). Will continue monitoring.     Patient Vitals for the past 120 hrs:   PO Meals Eaten (%)   02/28/25 1230 51 - 75%   02/27/25 1817 76 - 100%     Wt Readings from Last 5 Encounters:   03/01/25 101 kg (222 lb 10.6 oz)   02/20/25 92.5 kg (204 lb)   01/20/25 87.4 kg (192 lb 9.6 oz)   12/11/24 84.3 kg (185 lb 12.8 oz)   09/19/23 81.6 kg (180 lb)   ]    Start   Ordered   03/01/25 1330  ADULT DIET; Clear Liquid; Low Sodium (2 gm)  DIET EFFECTIVE NOW         03/01/25 1324   02/28/25 1015  ADULT DIET; Regular; Low Sodium (2 gm)  DIET EFFECTIVE NOW,   Status:  Canceled         02/28/25 1001   02/28/25 0015  Diet NPO  DIET EFFECTIVE MIDNIGHT,   Status:  Canceled         02/27/25 1638 
End of Shift Note    Bedside shift change report given to LYNN Hendrix (oncoming nurse) by Dacia Pulido RN (offgoing nurse).  Report included the following information SBAR, Intake/Output, and MAR    Shift worked:  7a-7p     Shift summary and any significant changes:     Pt tolerated care fairly well today. Meds given per MAR, no PRNs given. Pt had no complaints of pain today. Pt ambulated around room independently. Pt is resting in bed watching tv. Caring rounds completed.      Concerns for physician to address:       Zone phone for oncoming shift:          Activity:  Level of Assistance: Independent  Number times ambulated in hallways past shift: 0  Number of times OOB to chair past shift: 5    Cardiac:   Cardiac Monitoring: Yes      Cardiac Rhythm: Sinus rhythm, Multiform PVCs    Access:  Current line(s): PIV     Genitourinary:   Urinary Status: Voiding, Bathroom privileges    Respiratory:   O2 Device: None (Room air)  Chronic home O2 use?: NO  Incentive spirometer at bedside: NO    GI:  Last BM (including prior to admit): 02/24/25  Current diet:  ADULT DIET; Regular; 4 carb choices (60 gm/meal); Low Fat/Low Chol/High Fiber/2 gm Na; Low Phosphorus (Less than 1000 mg); 1800 ml  Passing flatus: YES    Pain Management:   Patient states pain is manageable on current regimen: YES    Skin:  Jan Scale Score: 21  Interventions: Wound Offloading (Prevention Methods): Bed, pressure reduction mattress, Pillows, Repositioning, Turning    Patient Safety:  Fall Risk: Nursing Judgement-Fall Risk High(Add Comments): No  Fall Risk Interventions  Nursing Judgement-Fall Risk High(Add Comments): No  Toilet Every 2 Hours-In Advance of Need: Yes  Hourly Visual Checks: Awake, In bed  Fall Visual Posted: Socks  Room Door Open: Deferred to promote rest  Alarm On: Other (Comment) (refused bed alarm)  Patient Moved Closer to Nursing Station: No    Active Consults:   IP CONSULT TO HOSPITALIST  IP CONSULT TO NEPHROLOGY  IP CONSULT 
End of Shift Report     Shift: 1900- 0700 am  Mental Status/Behavior: A&Ox4.Calm & cooperative  Activity: Independent  Pain: Denies  Significant Events:Pt voids well using urinal. See I&O's for the output. Refused bed alarm. Maintains on Fluid restriction of 1800ml as ordered. SB/NSR on tele;Afebrile, on Room Air. Kept bed on the lowest position, locked, and call bell within reach.  
Follow up exam.    Pt denies pain with clear liquid diet.  Abd soft, ND.  Follow lactate and exam.   
Interim ICU Progress Note:    Dobutamine started at 2.5 at 2200.  Increased to 5 at 0100.  Repeat Chuyita at 0300 estimates CO 4, CI 1.9. Repeat LA 12. BUN/Cr 76/3.85. K 4.9. Serum Co2 16. CBC pending.  Bicarb drip was started at 2300.  Will increase rate. I paged general surgery given that LA has worsening despite addition of dobutamine and estimated improvement in CO. Patient continues to have a benign abdominal exam. Given that abdominal exam is benign and that patient is on low dose pressors, we will continue to closely watch him and trend labs.    Karen Segura, NP      
Interim ICU Progress Note:    Patient with acute hypotension and acute respiratory distress.  Placed back on BiPAP. Levophed started to keep MAP >65.  Pulse pressures very narrow.  Bedside POCUS performed.  EF appears to be about 10%. No effusion.  Calculated Chuyita - CO 2.7, CI 1.3. Started dobutamine.  LA 11.5 preintervention.  Likely rising LA due to cardiogenic shock.  Continue heparin drip.  Plan discussed with Dr Wagner who also viewed POCUS.  I called family to the bedside.  I met with family (niece, sister and son).  Patient was able to participate in meeting.  I explained that patient's condition was deteriorating.  I explained he had a high chance of needing HD, intubation and may not survive this illness despite very aggressive care.  We discussed intubation/ventilation v comfort measures if he continued to worsen.  He and his family were in agreement together with full aggressive measures and full code status.    Plan:  - Dobutamine  - Levo for MAP >65  - May need central line; will follow  - BiPAP as needed  - Trend BMP; may require HD overnight  - High risk for needing intubation  - Patient and family understand acuity of condition; patient is a full code  - Trend LA  - Obtain 12-lead ECG now    Karen Segura NP  
Nephrology Progress Note  [facility] [place] [facility]  [address], [address]  [place] [address]  Phone - [phone]  Fax - [phone]                 Patient: Galdino Melissa                     YOB: 1955        Date- 2/24/2025                                     Admit Date: 2/20/2025   CC: Follow up for abel          IMPRESSION & PLAN:   ABEL (secondary to cardiorenal syndrome)  CKD stage IIIa(baseline creatinine 1.4)  Hyponatremia (from hypervolemia)  Chf -Pulmonary edema-Volume overload  Type 2 diabetes  Hypertension  Cardiomyopathy      PLAN-  No more samsca  Follow bmp in am  Restart bumex po  Hold metformin  Hold losartan     Subjective:  Interval History:   -  na improved 128  Cr stable  Good urine out put    Objective:   Vitals:    02/23/25 2203 02/24/25 0250 02/24/25 0600 02/24/25 0808   BP: (!) 118/97 99/79  109/70   Pulse: 85 83  88   Resp: 16 18  16   Temp: 98.2 °F (36.8 °C) 97.5 °F (36.4 °C)     TempSrc: Oral Oral     SpO2: 96% 97%     Weight:   89.5 kg (197 lb 5 oz)    Height:          I/O last 3 completed shifts:  In: 2380 [P.O.:2380]  Out: 2220 [Urine:2220]  No intake/output data recorded.      Physical exam:    GEN:  NAD  NECK:  Supple, no thyromegaly  RESP:  no  wheezing, decreased bs b/l  NEURO: non focal, normal speech  EXT: Edema +nt     PSYCH: Normal mood  SKIN: No Rash        Chart reviewed.         Pertinent Notes reviewed.     Data Review :  Lab Results   Component Value Date/Time     02/24/2025 04:39 AM    K 4.3 02/24/2025 04:39 AM    CL 95 02/24/2025 04:39 AM    CO2 21 02/24/2025 04:39 AM    BUN 63 02/24/2025 04:39 AM    CREATININE 2.88 02/24/2025 04:39 AM    GLUCOSE 122 02/24/2025 04:39 AM    CALCIUM 8.9 02/24/2025 04:39 AM       Lab Results   Component Value Date    WBC 5.7 02/24/2025    HGB 9.9 (L) 02/24/2025    HCT 31.2 (L) 02/24/2025    MCV 57.8 (L) 02/24/2025     02/24/2025      Recent 
Nephrology Progress Note  [facility] [place] [facility]  [address], [address]  [place] [address]  Phone - [phone]  Fax - [phone]                 Patient: Galdino Melissa                     YOB: 1955        Date- 2/28/2025                                     Admit Date: 2/20/2025   CC: Follow up for EDWIGE  IMPRESSION & PLAN:   EDWIGE due to low blood pressure, ATN and cardiorenal syndrome  Hypokalemia   CKD stage IIIa(baseline creatinine 1.4)  Lactic acidosis   hyponatremia  CHF  Type 2 diabetes  Hypertension  Cardiomyopathy  Mesenteric artery thrombosis      PLAN-  No dialysis indicated today   KCl 20 mEq p.o.   cont inotrope and pressor support  Change bicarb drip to IV lactated Ringer  hold metformin  Hold losartan  Daily labs and I/Os  Discussed with ICU nurse and Dr. Brantley     Subjective:  Interval History:   BP low, on dobutamine drip, creatinine worse, no shortness of breath, good urine output    Objective:   Vitals:    02/28/25 0730 02/28/25 1000 02/28/25 1005 02/28/25 1030   BP: 108/84 (!) 125/103 (!) 125/103 95/76   Pulse: 91 92  (!) 131   Resp: 16 13  22   Temp:       TempSrc:       SpO2: 92% 93%     Weight:       Height:          I/O last 3 completed shifts:  In: 5651.9 [P.O.:1400; I.V.:3774.7; IV Piggyback:477.2]  Out: 1280 [Urine:1280]  I/O this shift:  In: 10 [I.V.:10]  Out: -       Physical exam:    GEN:  NAD  NECK:  Supple, no thyromegaly  RESP:  no  wheezing, decreased bs b/l  NEURO: non focal, normal speech  EXT:no Edema +nt     PSYCH: Normal mood  SKIN: No Rash            Chart reviewed.         Pertinent Notes reviewed.     Data Review :  Lab Results   Component Value Date/Time     02/28/2025 02:03 AM    K 3.4 02/28/2025 02:03 AM    CL 89 02/28/2025 02:03 AM    CO2 29 02/28/2025 02:03 AM    BUN 80 02/28/2025 02:03 AM    CREATININE 4.04 02/28/2025 02:03 AM    GLUCOSE 123 02/28/2025 02:03 AM    CALCIUM 8.0 
Nephrology Progress Note  [facility] [place] [facility]  [address]  [place] [address]  Phone - [phone]  Fax - [phone]                 Patient: Galdino Melissa                     YOB: 1955        Date- 3/2/2025                                     Admit Date: 2/20/2025   CC: Follow up for EDWIGE  IMPRESSION & PLAN:   EDWIGE : suspect 2/2 ATN from sepsis/bacteremia vs CRS  Hypokalemia   MSSA bacteremia  CKD stage IIIa(baseline creatinine 1.4)  Lactic acidosis   hyponatremia  CHF  Type 2 diabetes  Hypertension  Cardiomyopathy  Mesenteric artery thrombosis      PLAN-  Cr rising despite inotropic support and diuretics  Tx being initiated to [facility] for HF team eval  Cont current supportive care with inotropes  May need bumex infusion + pressors added  Discussed impending RRT, likely CRRT  Will evaluate pt upon arrival at [facility]  No urgent need for RRT today but will likely need this tomorrow     Subjective:  Interval History:   Seen and examined.  Cr up some.  Poorly responsive to diuretics, inotropes.  On RA now.  Going to [facility] for AHF eval.  No cp or sob reported.    Objective:   Vitals:    03/02/25 1100 03/02/25 1115 03/02/25 1130 03/02/25 1145   BP:  (!) 84/58 (!) 87/66 95/70   Pulse: (!) 105 (!) 109 (!) 109 (!) 108   Resp: 13 28 24 (!) 35   Temp:       TempSrc:       SpO2: 100% 98% 100% 98%   Weight:       Height:          I/O last 3 completed shifts:  In: 3794.1 [P.O.:900; I.V.:2070.8; Blood:391.7; IV Piggyback:431.6]  Out: 865 [Urine:865]  I/O this shift:  In: 228.3 [I.V.:203.7; IV Piggyback:24.6]  Out: 0       Physical exam:    GEN:  NAD  NECK:  Supple, no thyromegaly  RESP:  no  wheezing, decreased bs b/l  NEURO: non focal, normal speech  EXT:no Edema +nt     PSYCH: Normal mood  SKIN: No Rash            Chart reviewed.         Pertinent Notes reviewed.     Data Review :  Lab Results   Component Value Date/Time     
Note:  He likely does not have SMA thrombosis.  It was a non-contrasted CT so unable to make that diagnosis on imaging alone.  Read says \"possible.\"  Clinically, this is not SMA thrombosis per both Dr. Daniels and myself.    
Patient has been on dobutamine and also being treated for spesis with abx. Source of staph not clear at this time  ? AICD  Repeat blood cultures today  Case d/w cardiology and [facility]'s team.  ? Candidate for LVAD when BC clear up  Transfer to [facility] CV ICU  Called Loco Law and updated her about patient's condition and about the transfer    
Pharmacy Antimicrobial Kinetic Dosing    Indication for Antimicrobials: sepsis     Current Regimen of Each Antimicrobial:  Vancomycin 2000 mg iv once then dose by level; Start Date ; Day # 3  Zosyn 3.375 g q 8 h (; day #2    Previous Antimicrobial Therapy:       Goal Level: Vancomycin random level < 20     Date Dose & Interval Measured (mcg/mL) Predicted AUC 24-48 Predicted AUC 24,ss    1800 2g X1 on  7.9                     Significant Cultures:       Labs:  Recent Labs     Units 25  0624 25  0621 25  0233 25  2331 25  2111 25  1114 25  0006   CREATININE MG/DL  --  3.68* 3.85* 3.84* 3.60*  --  3.15*   BUN MG/DL  --  77* 76* 74* 50*  --  71*   WBC K/uL 24.3*  --   --   --  25.9* 27.7* 26.0*   BANDS %  --   --   --   --   --   --  1     Temp (24hrs), Av.9 °F (36.6 °C), Min:97.4 °F (36.3 °C), Max:98.9 °F (37.2 °C)      Conditions for Dosing Consideration:     Creatinine Clearance (mL/min): Estimated Creatinine Clearance: 21 mL/min (A) (based on SCr of 3.68 mg/dL (H)).       Impression/Plan:   Vancomycin 500 mg q 36 h  Predicted [id number]  436, Predicted AUC24,ss 481  Continue with zosyn  Antimicrobial stop date 7 days     Pharmacy will follow daily and adjust medications as appropriate for renal function and/or serum levels.    Thank you,  Miguel A Olson, [facility]        
Pharmacy Antimicrobial Kinetic Dosing    Indication for Antimicrobials: sepsis     Current Regimen of Each Antimicrobial:  Vancomycin 2000 mg iv once then dose by level; Start Date ; Day # 5  Zosyn 3.375 g q 8 h (; day #4    Previous Antimicrobial Therapy:       Goal Level: Vancomycin random level < 20     Date Dose & Interval Measured (mcg/mL) Predicted AUC 24-48 Predicted AUC 24,ss    1800 2g X1 on  7.9                     Significant Cultures:       Labs:  Recent Labs     Units 25  0203 25  0624 25  0621 25  0233 25  2331 25  2111 25  1114 25  0006   CREATININE MG/DL 4.04*  --  3.68* 3.85*   < > 3.60*  --  3.15*   BUN MG/DL 80*  --  77* 76*   < > 50*  --  71*   WBC K/uL 19.8* 24.3*  --   --   --  25.9* 27.7* 26.0*   BANDS %  --   --   --   --   --   --   --  1    < > = values in this interval not displayed.     Temp (24hrs), Av.2 °F (36.2 °C), Min:96.6 °F (35.9 °C), Max:97.6 °F (36.4 °C)      Conditions for Dosing Consideration:     Creatinine Clearance (mL/min): Estimated Creatinine Clearance: 20 mL/min (A) (based on SCr of 4.04 mg/dL (H)).       Impression/Plan:   Vancomycin 500 mg q 36 h  Predicted [id number]  436, Predicted AUC24,ss 481  Vancomycin level before 3/1 dose  Continue with zosyn  Antimicrobial stop date 7 days     Pharmacy will follow daily and adjust medications as appropriate for renal function and/or serum levels.    Thank you,  Miguel A Olson [facility]      
Pharmacy Heparin Monitoring    Indication: DVT/PE (suspected)  -ischemic bowel    Factor Xa inhibitor/LMWH use within the past 72 hours? yes  If yes, date of last administration: 2/25 PM Apixaban  If yes, when can aPTT nomogram be changed to anti-Xa: 2/28 PM  Hypertriglyceridemia (> 414 mg/dL) or hyperbilirubinemia (> 37 mg/dL) present? NO  Recent Labs     Units 02/25/25  1832   BILITOT MG/DL 3.6*     Heparin to be monitored using aPTT until 72 hours following last factor Xa inhibitor/LMWH administration then anti-Xa    Goal: aPTT 58-77.9 sec    Initial dosing Weight: 93.7 kg    Labs:  Recent Labs     Units 02/28/25  0934 02/28/25  0203 02/27/25  1911   APTT sec 104.5* 71.0* 52.6*     Recent Labs     Units 02/28/25  0203 02/26/25  2111 02/26/25  1114   HGB g/dL 7.7*   < > 9.4*   PLT K/uL 144*   < > 167   INR    --   --  2.3*    < > = values in this interval not displayed.     Current rate: 13 unit/kg/hr    Impression/Plan:   New rate: 0 unit/kg/hr for 60 minutes then restart at 10 units/kg/hour  PRN bolus dose: no  Infusion rate, PRN bolus dose and anti-Xa/aPTT results were discussed with the nurse: yes Danica  Can change to Xa on 3/1     Thank you,  Miguel A Olson, [facility]      
Pharmacy Medication History Review    Medication history obtained by Claus Heredia while patient was in room 2408/01 and was completed based on information available during current patient encounter. Medication history was completed before home meds were reconciled by provider.    Pharmacist Admission Medication Reconciliation Recommendations:            PTA medication list was corrected to the following:   Prior to Admission Medications   Prescriptions Last Dose Informant   FARXIGA 10 MG tablet Unknown at 0900 Self   Sig: Take 1 tablet by mouth daily For diabetes and heart.   apixaban (ELIQUIS) 5 MG TABS tablet Unknown at 0900 Self   Sig: Take 1 tablet by mouth 2 times daily To prevent stroke.   aspirin 81 MG chewable tablet Unknown at 0900 Self   Sig: Take 1 tablet by mouth daily   atorvastatin (LIPITOR) 80 MG tablet Unknown at 0900 Self   Sig: TAKE 1 TABLET BY MOUTH DAILY   bumetanide (BUMEX) 2 MG tablet Unknown at 0900 Self   Sig: Take 1 tablet by mouth 2 times daily For swelling in feet   ezetimibe (ZETIA) 10 MG tablet Unknown at 0900 Self   Sig: TAKE 1 TABLET BY MOUTH DAILY   losartan (COZAAR) 50 MG tablet Unknown at 0900 Self   Sig: TAKE 1 TABLET BY MOUTH TWICE DAILY   metFORMIN (GLUCOPHAGE) 1000 MG tablet Unknown at 0900 Self   Sig: Take 1 tablet by mouth with breakfast and with evening meal   metoprolol succinate (TOPROL XL) 50 MG extended release tablet Unknown at 0900 Self   Sig: Take 1 tablet by mouth daily To slow heart rate down.   potassium chloride (KLOR-CON) 20 MEQ packet Unknown Self   Sig: Take 20 mEq by mouth daily   spironolactone (ALDACTONE) 25 MG tablet Unknown Self   Sig: Take 1 tablet by mouth daily   triamcinolone (KENALOG) 0.1 % cream Not Taking Self   Sig: Apply topically 2 times daily.   Patient not taking: Reported on 2/21/2025      Facility-Administered Medications: None         Pertinent Information:      The following medications have been added:   -Spironolactone 25 mg   - Potassium 
Pharmacy Review of Tolvaptan    Indication: CHF and sodium trending down on diuretic   Patient meets criteria for use: Yes    Labs:  Recent Labs     02/21/25  0659 02/22/25  0402 02/23/25  0441   * 128* 126*   K 4.4 4.9 4.4   BUN 49* 57* 60*       Impression/Plan:   One time dose of 15 mg ordered  Serum sodium q6h ordered  Baseline urine sodium, potassium, and osmoality order     Thanks,  BEVERLEY BERG [facility]     Tolvaptan Guidance    Nurse to call the physician if the serum sodium increases by more that 8-12 mmol/l at any time during the 24 hour period after the dose.      
Seen and examined.    Lactates have remained high but patient still denies abd pain, states that he's hungry.  Several non-bloody BM's last 24hrs.    Abd is soft, completely non-tender, midly distended versus protuberant, active bowel sounds.    Discussed with Dr. Luis.  Dr. Walker to see today regarding heart failure.    No indication of bowel ischemia on exam.    
Spiritual Health History and Assessment/Progress Note  [facility]    Initial Encounter,  , Adjustment to illness,      Name: Galdino Melissa MRN: [id number]    Age: 70 y.o.     Sex: male   Language: English   Mosque: Other   HFrEF (heart failure with reduced ejection fraction) ([facility])     Date: 2/27/2025            Total Time Calculated: 13 min              Spiritual Assessment began in [facility] CV[facility]        Referral/Consult From: Rounding   Encounter Overview/Reason: Initial Encounter  Service Provided For: Patient    Lorenza, Belief, Meaning:   Patient unable to assess at this time  Family/Friends No family/friends present      Importance and Influence:  Patient unable to assess at this time  Family/Friends No family/friends present    Community:  Patient Other: unable  Family/Friends No family/friends present    Assessment and Plan of Care:     Patient Interventions include: Explored spiritual coping/struggle/distress and Facilitated life review and/ or legacy  Family/Friends Interventions include: No family/friends present    Patient Plan of Care: Spiritual Care available upon further referral  Family/Friends Plan of Care: No family/friends present    Patient received visit warmly but did not share much about coping resources, when  inquired. He was advised of  continuous availability upon request.     Electronically signed by NELY Dickens on 2/27/2025 at 11:47 AM   
Still denies pain.  No n/v.  Abd soft.  Lactate not back yet.  Cont current orders.    
Upon doing bipap check, found bipap settings to be 8/6. Switched patient to 10/6.  
Wt down to 197 pounds   Sodium trending down   Will dc  IV bumex today  One dose off Tolvaptan today    PO bumex from tomorrow   Team will see in am tomorrow     
~[id number] bedside report from LYNN Thacker. Patient is on heparin gtt@ 12units/kg/hr, Levo @3 dobutamine @5.    ~1910-1226 head to toe assessment completed. DANIELLE Peacock at bedside, patient NPO for a cardiac cath this afternoon, consent signed.     ~6684-4740 patient has no urine output all day. He has no complaints of pain      ~1400 CHG bath completed, ines changed      ~4674-5996 bladder scan completed with 520 ML, Dr. Brantley notified. Straight cath completed per MD, urine output 600ml        
02/23/25  0441 02/23/25  1552 02/24/25  0439 02/24/25  1026 02/25/25  0417   *   < > 128* 128* 127*   K 4.4  --  4.3  --  4.4   CL 91*  --  95*  --  92*   CO2 24  --  21  --  21   GLUCOSE 104*  --  122*  --  70   BUN 60*  --  63*  --  65*   CREATININE 2.84*  --  2.88*  --  3.03*   CALCIUM 8.8  --  8.9  --  8.7    < > = values in this interval not displayed.       Recent Labs     02/23/25  0441 02/24/25  0439 02/25/25  0417   WBC 6.6 5.7 8.0   RBC 5.63 5.40 5.73*   HGB 10.0* 9.9* 10.3*   HCT 32.4* 31.2* 32.8*   MCV 57.5* 57.8* 57.2*   MCH 17.8* 18.3* 18.0*   MCHC 30.9 31.7 31.4   RDW 17.8* 16.6* 18.1*    187 202     Lab Results   Component Value Date/Time    IRON 65 07/14/2021 12:00 AM    TIBC 407 07/14/2021 12:00 AM     No results found for: \"PTH\"  Lab Results   Component Value Date/Time    LABA1C 7.9 (H) 02/21/2025 06:59 AM     Lab Results   Component Value Date/Time    COLORU YELLOW/STRAW 02/21/2025 08:36 PM    GLUCOSEU 250 (A) 02/21/2025 08:36 PM    BILIRUBINUR Negative 02/21/2025 08:36 PM    KETUA Negative 02/21/2025 08:36 PM    BLOODU Negative 02/21/2025 08:36 PM    PHUR 5.0 02/21/2025 08:36 PM    PROTEINU Negative 02/21/2025 08:36 PM    NITRU Negative 02/21/2025 08:36 PM    LEUKOCYTESUR Negative 02/21/2025 08:36 PM     US Results (most recent):  Medication list  reviewed  Current Facility-Administered Medications   Medication Dose Route Frequency    0.9 % sodium chloride infusion   IntraVENous Continuous    atorvastatin (LIPITOR) tablet 80 mg  80 mg Oral Nightly    apixaban (ELIQUIS) tablet 5 mg  5 mg Oral BID    aspirin chewable tablet 81 mg  81 mg Oral Daily    ezetimibe (ZETIA) tablet 10 mg  10 mg Oral Daily    empagliflozin (JARDIANCE) tablet 10 mg  10 mg Oral Daily    isosorbide mononitrate (IMDUR) extended release tablet 30 mg  30 mg Oral Daily    [Held by provider] losartan (COZAAR) tablet 50 mg  50 mg Oral BID    [Held by provider] metFORMIN (GLUCOPHAGE) tablet 1,000 mg  1,000 mg Oral BID 
5    Simeon Rebolledo RN                           
CONSULT TO HOSPITALIST  IP CONSULT TO NEPHROLOGY  IP CONSULT TO CARDIOLOGY  IP CONSULT TO CARDIAC REHAB    Length of Stay:  Expected LOS: 3  Actual LOS: 3    Esperanza Steinberg RN                            
Nghia, Intensivist, and Cyn, RN supervisor, signed EMTALA forms. [facility] at the bedside to transport patient.     1200: Reassessment completed. Pt repositioned in bed.     1228: Pt transported in stable condition with [facility] with all pt belongings and all paperwork. Pt is in no acute distress. Notified staff in [facility]ICU at [facility]. Notified praneeth Dickey, of pt transport.               
Stay:  Expected LOS: 3  Actual LOS: 4    Kimberlee Harvey RN                              
traditionally been around 1.5 but now is up to 2.7. He is having a fair amount of lower etremity edema. He has not responded to the IV bumex yet.        Allergies   Allergen Reactions    Glipizide Other (See Comments)     dizziness     Past Medical History:   Diagnosis Date    Acute on chronic systolic congestive heart failure ([facility]) 12/11/2024    AICD (automatic cardioverter/defibrillator) present 03/2021    put in at [facility].     Arrhythmia     CAD (coronary artery disease)     NSTEMI with PCI of LAD (2 stents), LCx, RCA    Congestive heart failure ([facility]) 10/2019    DM (diabetes mellitus) ([facility]) 3/30/2010    HTN (hypertension) 3/30/2010    Hypercholesterolemia      Past Surgical History:   Procedure Laterality Date    CARDIAC CATHETERIZATION  10/2019    4x stents    CARDIAC PROCEDURE N/A 9/19/2023    Left and right heart cath / coronary angiography performed by Rickie Walker III, DO at [facility] CARDIAC CATH LAB    COLONOSCOPY  1-11    diverticulosis- Dr. Londono    PACEMAKER      UPPER GI ENDOSCOPY,BIOPSY  10/27/2021          Family History   Problem Relation Age of Onset    Cancer Father         unknown    Hypertension Mother      Social History     Tobacco Use    Smoking status: Never    Smokeless tobacco: Never   Substance Use Topics    Alcohol use: Not Currently     Alcohol/week: 16.7 standard drinks of alcohol    Drug use: Not Currently         ROS:       10 point review of systems completed by myself and is negative or not relevant unless noted in the HPI.      Objective     OBJECTIVE:  BP 93/74   Pulse 94   Temp 97.5 °F (36.4 °C) (Oral)   Resp 19   Ht 1.727 m (5' 7.99\")   Wt 93.7 kg (206 lb 9.1 oz)   SpO2 95%   BMI 31.42 kg/m²      GEN:                Well developed, well nourished  NECK:     Jugular venous distension, no thyromegaly, no carotid bruit  CV:      RRR, no murmurs, No S3/S4  RESP:     Clear to ascultation  EXT:    2+ edema  NEURO/PSY: Alert, oriented, normal mood/affect    I/O last 3 
(36.3 °C) Oral 92 18 93 % -- --   02/21/25 1200 117/71 -- -- 87 18 97 % -- --     Wt Readings from Last 4 Encounters:   02/21/25 93.3 kg (205 lb 9.6 oz)   02/20/25 92.5 kg (204 lb)   01/20/25 87.4 kg (192 lb 9.6 oz)   12/11/24 84.3 kg (185 lb 12.8 oz)          Studies:     Labs:  Recent Labs     02/20/25  1744 02/21/25  0659 02/22/25  0402   BUN 49* 49* 57*   CREATININE 2.95* 2.72* 2.73*   CALCIUM 9.0 8.8 8.9   GLUCOSE 112* 99 93     Recent Labs     02/21/25  0659 02/22/25  0402   WBC 5.9 6.4   RBC 5.15 5.63   HGB 9.5* 10.2*   HCT 29.7* 32.6*   MCV 57.7* 57.9*   MCH 18.4* 18.1*   MCHC 32.0 31.3    252   MPV  --  10.2     No results found for: \"INR\"    Telemetry:   Sinus rhythm     EKG:   Reviewed. Normal sinus rhythm.        Echocardiogram:  This is a summary report. The complete report is available in the patient's medical record. If you cannot access the medical record, please contact the sending organization for a detailed fax or copy.     · LV: Estimated LVEF is 25 - 30%. Visually measured ejection fraction. Normal cavity size and wall thickness. Severely and globally reduced systolic function.  · LA: Mildly dilated left atrium. No spontaneous echo contrast Left atrial appendage velocity is normal (greater than 40 cm/sec).  · RV: Mildly reduced systolic function. Pacer/ICD present.  · MV: Mild mitral valve regurgitation is present.  · TV: Mild tricuspid valve regurgitation is present.  · Successsful overdrive pacing of a likely ATach to ST.              Current Medications:  Current Facility-Administered Medications   Medication Dose Route Frequency Provider Last Rate Last Admin    atorvastatin (LIPITOR) tablet 80 mg  80 mg Oral Nightly Leia Krishnamurthy MD   80 mg at 02/21/25 2030    bumetanide (BUMEX) injection 2 mg  2 mg IntraVENous BID Bashir Steinberg MD   2 mg at 02/22/25 0942    apixaban (ELIQUIS) tablet 5 mg  5 mg Oral BID Kristen Morales ACNP   5 mg at 02/22/25 0942    aspirin chewable 
126* 128* 128*   K 4.9 4.4  --   --  4.3  --    CL 93* 91*  --   --  95*  --    CO2 22 24  --   --  21  --    GLUCOSE 93 104*  --   --  122*  --    BUN 57* 60*  --   --  63*  --    CREATININE 2.73* 2.84*  --   --  2.88*  --    CALCIUM 8.9 8.8  --   --  8.9  --    PHOS 5.2* 5.5*  --   --  5.4*  --     < > = values in this interval not displayed.       Signed: Leia Krishnamurthy MD         
but were reviewed prior to creation of Plan.      LABS:  I reviewed today's most current labs and imaging studies.  Pertinent labs include:  Recent Labs     02/24/25 0439 02/25/25 0417 02/25/25  1638   WBC 5.7 8.0 17.8*   HGB 9.9* 10.3* 10.9*   HCT 31.2* 32.8* 36.5*    202 225     Recent Labs     02/23/25  0441 02/23/25  1552 02/24/25 0439 02/24/25  1026 02/25/25 0417 02/25/25  1638   *   < > 128* 128* 127*  --    K 4.4  --  4.3  --  4.4  --    CL 91*  --  95*  --  92*  --    CO2 24  --  21  --  21  --    GLUCOSE 104*  --  122*  --  70  --    BUN 60*  --  63*  --  65*  --    CREATININE 2.84*  --  2.88*  --  3.03*  --    CALCIUM 8.8  --  8.9  --  8.7  --    PHOS 5.5*  --  5.4*  --  5.1*  --    INR  --   --   --   --   --  2.1*    < > = values in this interval not displayed.       Signed: Leia Krishnamurthy MD         
jaundice    Reviewed most current lab test results and cultures  YES  Reviewed most current radiology test results   YES  Review and summation of old records today    NO  Reviewed patient's current orders and MAR    YES  PMH/SH reviewed - no change compared to H&P    Procedures: see electronic medical records for all procedures/Xrays and details which were not copied into this note but were reviewed prior to creation of Plan.      LABS:  I reviewed today's most current labs and imaging studies.  Pertinent labs include:  Recent Labs     02/20/25  1744 02/21/25  0659   WBC 7.5 5.9   HGB 10.6* 9.5*   HCT 33.8* 29.7*    215     Recent Labs     02/20/25  1744 02/21/25  0659   * 128*   K 5.2* 4.4   CL 92* 94*   CO2 23 22   GLUCOSE 112* 99   BUN 49* 49*   CREATININE 2.95* 2.72*   CALCIUM 9.0 8.8   BILITOT 3.1*  --    AST 30  --    ALT 24  --        Signed: Leia Krishnamurthy MD         
negative coverage needed. Defer to primary team.    Septic shock  -BP remains soft.  Vasopressor re-initiated overnight.    -Lactic acidosis labile   -Leukocytosis with some improvement.     Acute hypoxic respiratory failure   Decompensated heart failure with reduced ejection fraction  -EF 25-30% 7/2021   -Dobutamine drip   -Jardiance and Imdur  -Losartan, metoprolol, and Aldactone held for EDWIGE and hypotension   Presence of an automatic cardioverter/defibrillator  Anasarca  Hyperlipidemia   Cardiorenal syndrome   -Cardiology following      Acute kidney injury   Severe metabolic acidosis   -Sodium bicarbonate drip   Hyponatremia   Chronic renal disease stage IIIa  Anemia of chronic renal disease   -Stable   -Nephrology following      Elevated transaminases  Coagulopathy  -INR 2.1   History of alcohol use       Diabetes mellitis with hypoglycemia      VTE Prophylaxis: Heparin drip      Disposition: Condition serious       Electronically signed by Hanh CASPERP-BC    
oz 206 lbs 209 lbs 14 oz 206 lbs 9 oz 196 lbs 3 oz   BMI (Calculated)  33.9 kg/m2 33.3 kg/m2 31.4 kg/m2 32 kg/m2 31.5 kg/m2 29.9 kg/m2       Weight change: 7.559 kg (16 lb 10.6 oz)     Accuracy of I/O Report Reviewed: YES    Drips:   Amiodarone and Dobutamine      Relevant Lab Results:  Crt 5.28 , Na 126    Active Consults:   IP CONSULT TO HOSPITALIST  IP CONSULT TO NEPHROLOGY  IP CONSULT TO CARDIOLOGY  IP CONSULT TO CARDIAC REHAB  IP CONSULT TO DIABETES MANAGEMENT  IP CONSULT TO PALLIATIVE CARE  IP CONSULT TO PHARMACY  IP CONSULT TO VASCULAR SURGERY  IP CONSULT TO ADVANCED HEART FAILURE PROVIDER    Length of Stay:  Expected LOS: 13  Actual LOS: 9    Damaris Huggins RN                    
°F (36.4 °C) Oral 79 16 97 % --   02/24/25 2030 91/61 97.3 °F (36.3 °C) Oral 82 16 98 % --   02/24/25 1715 96/72 -- -- -- -- -- --   02/24/25 1632 (!) 85/67 97.5 °F (36.4 °C) Oral 87 16 98 % --     Wt Readings from Last 4 Encounters:   02/25/25 89 kg (196 lb 3.4 oz)   02/20/25 92.5 kg (204 lb)   01/20/25 87.4 kg (192 lb 9.6 oz)   12/11/24 84.3 kg (185 lb 12.8 oz)          Studies:     Labs:  Recent Labs     02/23/25  0441 02/24/25 0439 02/25/25  0417   BUN 60* 63* 65*   CREATININE 2.84* 2.88* 3.03*   CALCIUM 8.8 8.9 8.7   GLUCOSE 104* 122* 70     Recent Labs     02/24/25 0439 02/25/25  0417   WBC 5.7 8.0   RBC 5.40 5.73*   HGB 9.9* 10.3*   HCT 31.2* 32.8*   MCV 57.8* 57.2*   MCH 18.3* 18.0*   MCHC 31.7 31.4    202     No results found for: \"INR\"    Telemetry:   Sinus rhythm     EKG:   Reviewed. Normal sinus rhythm.        Echocardiogram:  This is a summary report. The complete report is available in the patient's medical record. If you cannot access the medical record, please contact the sending organization for a detailed fax or copy.     · LV: Estimated LVEF is 25 - 30%. Visually measured ejection fraction. Normal cavity size and wall thickness. Severely and globally reduced systolic function.  · LA: Mildly dilated left atrium. No spontaneous echo contrast Left atrial appendage velocity is normal (greater than 40 cm/sec).  · RV: Mildly reduced systolic function. Pacer/ICD present.  · MV: Mild mitral valve regurgitation is present.  · TV: Mild tricuspid valve regurgitation is present.  · Successsful overdrive pacing of a likely ATach to ST.              Current Medications:  Current Facility-Administered Medications   Medication Dose Route Frequency Provider Last Rate Last Admin    0.9 % sodium chloride infusion   IntraVENous Continuous Dragan Aden MD 75 mL/hr at 02/25/25 1131 New Bag at 02/25/25 1131    atorvastatin (LIPITOR) tablet 80 mg  80 mg Oral Nightly Leia Krishnamurthy MD   80 mg at 02/24/25 
   calcium carbonate (TUMS) chewable tablet 500 mg  500 mg Oral TID PRN    melatonin tablet 6 mg  6 mg Oral Nightly PRN    traMADol (ULTRAM) tablet 50 mg  50 mg Oral Q6H PRN        Dragan Aden MD  2/26/2025                                                                                                                 
   dextrose bolus 10% 250 mL  250 mL IntraVENous PRN    glucagon injection 1 mg  1 mg SubCUTAneous PRN    dextrose 10 % infusion   IntraVENous Continuous PRN    calcium carbonate (TUMS) chewable tablet 500 mg  500 mg Oral TID PRN    melatonin tablet 6 mg  6 mg Oral Nightly PRN    traMADol (ULTRAM) tablet 50 mg  50 mg Oral Q6H PRN     ______________________________________________________________________  EXPECTED LENGTH OF STAY: 7  ACTUAL LENGTH OF STAY:          7                 Derrick Brantley MD   Pulmonary/[facility]  [phone]  2/27/2025     
(BUMEX) tablet 2 mg  2 mg Oral BID Kristen Morales ACNP        ezetimibe (ZETIA) tablet 10 mg  10 mg Oral Daily Kristen Morales ACNP   10 mg at 02/23/25 0903    empagliflozin (JARDIANCE) tablet 10 mg  10 mg Oral Daily Magdaelno Sun MD   10 mg at 02/23/25 0903    isosorbide mononitrate (IMDUR) extended release tablet 30 mg  30 mg Oral Daily Kristen Morales ACNP   30 mg at 02/23/25 0904    [Held by provider] losartan (COZAAR) tablet 50 mg  50 mg Oral BID Kristen Morales ACNP        [Held by provider] metFORMIN (GLUCOPHAGE) tablet 1,000 mg  1,000 mg Oral BID with meals Kristen Morales ACNP        metoprolol succinate (TOPROL XL) extended release tablet 50 mg  50 mg Oral Daily Kristen Morales ACNP   50 mg at 02/23/25 0903    triamcinolone (KENALOG) 0.1 % cream   Topical BID Kristen Morales ACNP   Given at 02/23/25 0906    [Held by provider] spironolactone (ALDACTONE) tablet 25 mg  25 mg Oral Daily Kristen Morales ACNP        [Held by provider] potassium chloride (KLOR-CON M) extended release tablet 20 mEq  20 mEq Oral Daily Kristen Morales ACNP        sodium chloride flush 0.9 % injection 5-40 mL  5-40 mL IntraVENous 2 times per day Kristen Morales ACNP   10 mL at 02/23/25 0905    sodium chloride flush 0.9 % injection 5-40 mL  5-40 mL IntraVENous PRN Kristen Morales ACNP        0.9 % sodium chloride infusion   IntraVENous PRN Kristen Morales ACNP        ondansetron (ZOFRAN-ODT) disintegrating tablet 4 mg  4 mg Oral Q8H PRN Kristen Morales ACNP        Or    ondansetron (ZOFRAN) injection 4 mg  4 mg IntraVENous Q6H PRN Kristen Morales ACNP        polyethylene glycol (GLYCOLAX) packet 17 g  17 g Oral Daily PRN Kristen Morales ACNP        acetaminophen (TYLENOL) tablet 650 mg  650 mg Oral Q6H PRN Morales, Kristen A, ACNP        Or    acetaminophen (TYLENOL) suppository 650 mg  650 mg Rectal Q6H PRN Kristen Morales ACNP        glucose chewable tablet 16 g  4 tablet Oral PRN Krisetn Morales ACNP        dextrose bolus 10% 125 mL  
(ZOFRAN) injection 4 mg  4 mg IntraVENous Q6H PRN    polyethylene glycol (GLYCOLAX) packet 17 g  17 g Oral Daily PRN    acetaminophen (TYLENOL) tablet 650 mg  650 mg Oral Q6H PRN    Or    acetaminophen (TYLENOL) suppository 650 mg  650 mg Rectal Q6H PRN    glucose chewable tablet 16 g  4 tablet Oral PRN    dextrose bolus 10% 125 mL  125 mL IntraVENous PRN    Or    dextrose bolus 10% 250 mL  250 mL IntraVENous PRN    glucagon injection 1 mg  1 mg SubCUTAneous PRN    dextrose 10 % infusion   IntraVENous Continuous PRN    calcium carbonate (TUMS) chewable tablet 500 mg  500 mg Oral TID PRN    melatonin tablet 6 mg  6 mg Oral Nightly PRN    traMADol (ULTRAM) tablet 50 mg  50 mg Oral Q6H PRN        Rigo Kirkland MD  3/1/2025                                                                                                                 
250 mL  250 mL IntraVENous PRN    glucagon injection 1 mg  1 mg SubCUTAneous PRN    dextrose 10 % infusion   IntraVENous Continuous PRN    calcium carbonate (TUMS) chewable tablet 500 mg  500 mg Oral TID PRN    melatonin tablet 6 mg  6 mg Oral Nightly PRN    traMADol (ULTRAM) tablet 50 mg  50 mg Oral Q6H PRN        Rigo Kirkland MD  2/27/2025                                                                                                                 
calcium carbonate (TUMS) chewable tablet 500 mg  500 mg Oral TID PRN    melatonin tablet 6 mg  6 mg Oral Nightly PRN    traMADol (ULTRAM) tablet 50 mg  50 mg Oral Q6H PRN     ______________________________________________________________________  EXPECTED LENGTH OF STAY: 13  ACTUAL LENGTH OF STAY:          9                 Christiane Browne MD   Pulmonary/[facility]  [phone]  3/1/2025     
Performed by: Julian Bautista (PCT)    POCT Glucose    Collection Time: 02/22/25 11:57 AM   Result Value Ref Range    POC Glucose 114 65 - 117 mg/dL    Performed by: Julian Bautista (PCT)    POCT Glucose    Collection Time: 02/22/25  4:32 PM   Result Value Ref Range    POC Glucose 129 (H) 65 - 117 mg/dL    Performed by: Julian Bautista (PCT)            Total time spent with patient:  xxx   min.                               Care Plan discussed with:  Patient     Family      RN      Consulting Physician /Specialist        I have reviewed the flowsheets.  Chart and Pertinent Notes have been reviewed.   No change in PMH ,family and social history from Consult note.      Joycelyn Damian MD    
   dextrose 10 % infusion   IntraVENous Continuous PRN Kristen Morales ACNP        calcium carbonate (TUMS) chewable tablet 500 mg  500 mg Oral TID PRN Kristen Morales ACNP        melatonin tablet 6 mg  6 mg Oral Nightly PRN Kristen Morales ACNP        traMADol (ULTRAM) tablet 50 mg  50 mg Oral Q6H PRN Kristen Morales ACNP Alex Baher, MD  Clinical Cardiac Electrophysiology  [facility]  2/28/2025     
Oral Nightly PRN Kristen Morales ACNP        traMADol (ULTRAM) tablet 50 mg  50 mg Oral Q6H PRN Kristen Morales ACNP Alex Baher, MD  Clinical Cardiac Electrophysiology  [facility]  2/27/2025

## 2025-03-03 ENCOUNTER — APPOINTMENT (OUTPATIENT)
Facility: HOSPITAL | Age: 70
End: 2025-03-03
Attending: INTERNAL MEDICINE
Payer: MEDICARE

## 2025-03-03 PROBLEM — K55.069 SUPERIOR MESENTERIC ARTERY THROMBOSIS: Status: ACTIVE | Noted: 2025-03-03

## 2025-03-03 PROBLEM — R78.81 MSSA BACTEREMIA: Status: ACTIVE | Noted: 2025-03-03

## 2025-03-03 PROBLEM — N17.9 ACUTE RENAL FAILURE: Status: ACTIVE | Noted: 2025-03-03

## 2025-03-03 PROBLEM — D72.829 LEUKOCYTOSIS: Status: ACTIVE | Noted: 2025-03-03

## 2025-03-03 PROBLEM — R57.9 SHOCK (HCC): Status: ACTIVE | Noted: 2025-03-03

## 2025-03-03 PROBLEM — Z95.810 HISTORY OF IMPLANTABLE CARDIAC DEFIBRILLATOR (ICD): Status: ACTIVE | Noted: 2025-03-03

## 2025-03-03 PROBLEM — B95.61 MSSA BACTEREMIA: Status: ACTIVE | Noted: 2025-03-03

## 2025-03-03 PROBLEM — K92.2 GASTROINTESTINAL HEMORRHAGE: Status: ACTIVE | Noted: 2025-03-03

## 2025-03-03 LAB
ALBUMIN SERPL-MCNC: 2.4 G/DL (ref 3.5–5)
ALBUMIN SERPL-MCNC: 2.6 G/DL (ref 3.5–5)
ALBUMIN/GLOB SERPL: 0.7 (ref 1.1–2.2)
ALP SERPL-CCNC: 104 U/L (ref 45–117)
ALT SERPL-CCNC: 97 U/L (ref 12–78)
ANION GAP SERPL CALC-SCNC: 11 MMOL/L (ref 2–12)
ANION GAP SERPL CALC-SCNC: 13 MMOL/L (ref 2–12)
ANION GAP SERPL CALC-SCNC: 15 MMOL/L (ref 2–12)
ANION GAP SERPL CALC-SCNC: 17 MMOL/L (ref 2–12)
AST SERPL-CCNC: 81 U/L (ref 15–37)
BASOPHILS # BLD: 0 K/UL (ref 0–0.1)
BASOPHILS NFR BLD: 0 % (ref 0–1)
BDY SITE: ABNORMAL
BILIRUB SERPL-MCNC: 3.6 MG/DL (ref 0.2–1)
BUN SERPL-MCNC: 79 MG/DL (ref 6–20)
BUN SERPL-MCNC: 86 MG/DL (ref 6–20)
BUN SERPL-MCNC: 91 MG/DL (ref 6–20)
BUN SERPL-MCNC: 94 MG/DL (ref 6–20)
BUN/CREAT SERPL: 15 (ref 12–20)
BUN/CREAT SERPL: 16 (ref 12–20)
CALCIUM SERPL-MCNC: 8.7 MG/DL (ref 8.5–10.1)
CALCIUM SERPL-MCNC: 8.8 MG/DL (ref 8.5–10.1)
CALCIUM SERPL-MCNC: 8.9 MG/DL (ref 8.5–10.1)
CALCIUM SERPL-MCNC: 9.1 MG/DL (ref 8.5–10.1)
CHLORIDE SERPL-SCNC: 87 MMOL/L (ref 97–108)
CHLORIDE SERPL-SCNC: 88 MMOL/L (ref 97–108)
CHLORIDE SERPL-SCNC: 89 MMOL/L (ref 97–108)
CHLORIDE SERPL-SCNC: 91 MMOL/L (ref 97–108)
CO2 SERPL-SCNC: 22 MMOL/L (ref 21–32)
CO2 SERPL-SCNC: 23 MMOL/L (ref 21–32)
CO2 SERPL-SCNC: 24 MMOL/L (ref 21–32)
CO2 SERPL-SCNC: 25 MMOL/L (ref 21–32)
COHGB MFR BLD: 1.9 % (ref 1–2)
COMMENT:: NORMAL
CREAT SERPL-MCNC: 5.19 MG/DL (ref 0.7–1.3)
CREAT SERPL-MCNC: 5.77 MG/DL (ref 0.7–1.3)
CREAT SERPL-MCNC: 5.87 MG/DL (ref 0.7–1.3)
CREAT SERPL-MCNC: 6.11 MG/DL (ref 0.7–1.3)
CREAT UR-MCNC: 30 MG/DL
DIFFERENTIAL METHOD BLD: ABNORMAL
EKG ATRIAL RATE: 111 BPM
EKG ATRIAL RATE: 125 BPM
EKG DIAGNOSIS: NORMAL
EKG DIAGNOSIS: NORMAL
EKG P AXIS: 95 DEGREES
EKG P AXIS: 97 DEGREES
EKG P-R INTERVAL: 188 MS
EKG P-R INTERVAL: 192 MS
EKG Q-T INTERVAL: 302 MS
EKG Q-T INTERVAL: 330 MS
EKG QRS DURATION: 88 MS
EKG QRS DURATION: 90 MS
EKG QTC CALCULATION (BAZETT): 435 MS
EKG QTC CALCULATION (BAZETT): 448 MS
EKG R AXIS: -31 DEGREES
EKG R AXIS: -5 DEGREES
EKG T AXIS: 17 DEGREES
EKG T AXIS: 95 DEGREES
EKG VENTRICULAR RATE: 111 BPM
EKG VENTRICULAR RATE: 125 BPM
EOSINOPHIL # BLD: 0 K/UL (ref 0–0.4)
EOSINOPHIL NFR BLD: 0 % (ref 0–7)
ERYTHROCYTE [DISTWIDTH] IN BLOOD BY AUTOMATED COUNT: 20 % (ref 11.5–14.5)
ERYTHROCYTE [DISTWIDTH] IN BLOOD BY AUTOMATED COUNT: 20.3 % (ref 11.5–14.5)
EST. AVERAGE GLUCOSE BLD GHB EST-MCNC: 174 MG/DL
FIBRINOGEN PPP-MCNC: 184 MG/DL (ref 200–475)
GLOBULIN SER CALC-MCNC: 3.9 G/DL (ref 2–4)
GLUCOSE BLD STRIP.AUTO-MCNC: 141 MG/DL (ref 65–117)
GLUCOSE BLD STRIP.AUTO-MCNC: 154 MG/DL (ref 65–117)
GLUCOSE BLD STRIP.AUTO-MCNC: 178 MG/DL (ref 65–117)
GLUCOSE BLD STRIP.AUTO-MCNC: 182 MG/DL (ref 65–117)
GLUCOSE BLD STRIP.AUTO-MCNC: 186 MG/DL (ref 65–117)
GLUCOSE SERPL-MCNC: 137 MG/DL (ref 65–100)
GLUCOSE SERPL-MCNC: 148 MG/DL (ref 65–100)
GLUCOSE SERPL-MCNC: 168 MG/DL (ref 65–100)
GLUCOSE SERPL-MCNC: 169 MG/DL (ref 65–100)
HBA1C MFR BLD: 7.7 % (ref 4–5.6)
HBV SURFACE AB SER QL: NONREACTIVE
HBV SURFACE AB SER-ACNC: <3.1 MIU/ML
HBV SURFACE AG SER QL: <0.1 INDEX
HBV SURFACE AG SER QL: NEGATIVE
HCT VFR BLD AUTO: 27 % (ref 36.6–50.3)
HCT VFR BLD AUTO: 28.8 % (ref 36.6–50.3)
HGB BLD-MCNC: 8.8 G/DL (ref 12.1–17)
HGB BLD-MCNC: 9.4 G/DL (ref 12.1–17)
HIV 1+2 AB+HIV1 P24 AG SERPL QL IA: NONREACTIVE
HIV 1/2 RESULT COMMENT: NORMAL
IMM GRANULOCYTES # BLD AUTO: 0 K/UL
IMM GRANULOCYTES NFR BLD AUTO: 0 %
INR PPP: 1.4 (ref 0.9–1.1)
IRON SATN MFR SERPL: 6 % (ref 20–50)
IRON SERPL-MCNC: 19 UG/DL (ref 35–150)
LACTATE SERPL-SCNC: 1.5 MMOL/L (ref 0.4–2)
LACTATE SERPL-SCNC: 2.2 MMOL/L (ref 0.4–2)
LACTATE SERPL-SCNC: 2.3 MMOL/L (ref 0.4–2)
LDH SERPL L TO P-CCNC: 320 U/L (ref 85–241)
LYMPHOCYTES # BLD: 1.05 K/UL (ref 0.8–3.5)
LYMPHOCYTES NFR BLD: 8 % (ref 12–49)
MAGNESIUM SERPL-MCNC: 2.7 MG/DL (ref 1.6–2.4)
MCH RBC QN AUTO: 18.3 PG (ref 26–34)
MCH RBC QN AUTO: 18.4 PG (ref 26–34)
MCHC RBC AUTO-ENTMCNC: 32.6 G/DL (ref 30–36.5)
MCHC RBC AUTO-ENTMCNC: 32.6 G/DL (ref 30–36.5)
MCV RBC AUTO: 56 FL (ref 80–99)
MCV RBC AUTO: 56.6 FL (ref 80–99)
METHGB MFR BLD: 0.3 % (ref 0–1.4)
MICROALBUMIN UR-MCNC: 6.97 MG/DL
MICROALBUMIN/CREAT UR-RTO: 232 MG/G (ref 0–30)
MONOCYTES # BLD: 2.1 K/UL (ref 0–1)
MONOCYTES NFR BLD: 16 % (ref 5–13)
NEUTS BAND NFR BLD MANUAL: 1 % (ref 0–6)
NEUTS SEG # BLD: 9.95 K/UL (ref 1.8–8)
NEUTS SEG NFR BLD: 75 % (ref 32–75)
NRBC # BLD: 0.41 K/UL (ref 0–0.01)
NRBC # BLD: 0.5 K/UL (ref 0–0.01)
NRBC BLD-RTO: 3 PER 100 WBC
NRBC BLD-RTO: 3.8 PER 100 WBC
NT PRO BNP: ABNORMAL PG/ML
OXYHGB MFR BLD: 46.6 % (ref 94–97)
PHOSPHATE SERPL-MCNC: 5.7 MG/DL (ref 2.6–4.7)
PHOSPHATE SERPL-MCNC: 6.7 MG/DL (ref 2.6–4.7)
PLATELET # BLD AUTO: 125 K/UL (ref 150–400)
PLATELET # BLD AUTO: 147 K/UL (ref 150–400)
POTASSIUM SERPL-SCNC: 3.8 MMOL/L (ref 3.5–5.1)
POTASSIUM SERPL-SCNC: 3.9 MMOL/L (ref 3.5–5.1)
POTASSIUM SERPL-SCNC: 4.2 MMOL/L (ref 3.5–5.1)
POTASSIUM SERPL-SCNC: 4.2 MMOL/L (ref 3.5–5.1)
PREALB SERPL-MCNC: 9.6 MG/DL (ref 20–40)
PROT SERPL-MCNC: 6.5 G/DL (ref 6.4–8.2)
PROTHROMBIN TIME: 14.5 SEC (ref 9.2–11.2)
RBC # BLD AUTO: 4.77 M/UL (ref 4.1–5.7)
RBC # BLD AUTO: 5.14 M/UL (ref 4.1–5.7)
RBC MORPH BLD: ABNORMAL
RPR SER QL: NONREACTIVE
SAO2 % BLD: 48 % (ref 95–99)
SERVICE CMNT-IMP: ABNORMAL
SODIUM SERPL-SCNC: 126 MMOL/L (ref 136–145)
SODIUM SERPL-SCNC: 127 MMOL/L (ref 136–145)
SPECIMEN HOLD: NORMAL
SPECIMEN HOLD: NORMAL
SPECIMEN SITE: ABNORMAL
TIBC SERPL-MCNC: 324 UG/DL (ref 250–450)
TSH SERPL DL<=0.05 MIU/L-ACNC: 2.35 UIU/ML (ref 0.36–3.74)
WBC # BLD AUTO: 13.1 K/UL (ref 4.1–11.1)
WBC # BLD AUTO: 13.6 K/UL (ref 4.1–11.1)

## 2025-03-03 PROCEDURE — 90945 DIALYSIS ONE EVALUATION: CPT

## 2025-03-03 PROCEDURE — APPNB30 APP NON BILLABLE TIME 0-30 MINS

## 2025-03-03 PROCEDURE — 71045 X-RAY EXAM CHEST 1 VIEW: CPT

## 2025-03-03 PROCEDURE — 86235 NUCLEAR ANTIGEN ANTIBODY: CPT

## 2025-03-03 PROCEDURE — 84165 PROTEIN E-PHORESIS SERUM: CPT

## 2025-03-03 PROCEDURE — 6360000002 HC RX W HCPCS: Performed by: INTERNAL MEDICINE

## 2025-03-03 PROCEDURE — 85025 COMPLETE CBC W/AUTO DIFF WBC: CPT

## 2025-03-03 PROCEDURE — 83521 IG LIGHT CHAINS FREE EACH: CPT

## 2025-03-03 PROCEDURE — 86592 SYPHILIS TEST NON-TREP QUAL: CPT

## 2025-03-03 PROCEDURE — 87389 HIV-1 AG W/HIV-1&-2 AB AG IA: CPT

## 2025-03-03 PROCEDURE — 83880 ASSAY OF NATRIURETIC PEPTIDE: CPT

## 2025-03-03 PROCEDURE — 82375 ASSAY CARBOXYHB QUANT: CPT

## 2025-03-03 PROCEDURE — 93010 ELECTROCARDIOGRAM REPORT: CPT | Performed by: INTERNAL MEDICINE

## 2025-03-03 PROCEDURE — 3E043XZ INTRODUCTION OF VASOPRESSOR INTO CENTRAL VEIN, PERCUTANEOUS APPROACH: ICD-10-PCS | Performed by: STUDENT IN AN ORGANIZED HEALTH CARE EDUCATION/TRAINING PROGRAM

## 2025-03-03 PROCEDURE — 99223 1ST HOSP IP/OBS HIGH 75: CPT | Performed by: INTERNAL MEDICINE

## 2025-03-03 PROCEDURE — 82043 UR ALBUMIN QUANTITATIVE: CPT

## 2025-03-03 PROCEDURE — 83540 ASSAY OF IRON: CPT

## 2025-03-03 PROCEDURE — 86706 HEP B SURFACE ANTIBODY: CPT

## 2025-03-03 PROCEDURE — 2580000003 HC RX 258: Performed by: STUDENT IN AN ORGANIZED HEALTH CARE EDUCATION/TRAINING PROGRAM

## 2025-03-03 PROCEDURE — G0480 DRUG TEST DEF 1-7 CLASSES: HCPCS

## 2025-03-03 PROCEDURE — 85384 FIBRINOGEN ACTIVITY: CPT

## 2025-03-03 PROCEDURE — 2580000003 HC RX 258: Performed by: INTERNAL MEDICINE

## 2025-03-03 PROCEDURE — 2500000003 HC RX 250 WO HCPCS: Performed by: INTERNAL MEDICINE

## 2025-03-03 PROCEDURE — 83615 LACTATE (LD) (LDH) ENZYME: CPT

## 2025-03-03 PROCEDURE — 93005 ELECTROCARDIOGRAM TRACING: CPT | Performed by: INTERNAL MEDICINE

## 2025-03-03 PROCEDURE — 82784 ASSAY IGA/IGD/IGG/IGM EACH: CPT

## 2025-03-03 PROCEDURE — 86803 HEPATITIS C AB TEST: CPT

## 2025-03-03 PROCEDURE — 80053 COMPREHEN METABOLIC PANEL: CPT

## 2025-03-03 PROCEDURE — 83605 ASSAY OF LACTIC ACID: CPT

## 2025-03-03 PROCEDURE — 85027 COMPLETE CBC AUTOMATED: CPT

## 2025-03-03 PROCEDURE — 85610 PROTHROMBIN TIME: CPT

## 2025-03-03 PROCEDURE — 84134 ASSAY OF PREALBUMIN: CPT

## 2025-03-03 PROCEDURE — 82570 ASSAY OF URINE CREATININE: CPT

## 2025-03-03 PROCEDURE — 86225 DNA ANTIBODY NATIVE: CPT

## 2025-03-03 PROCEDURE — 06HY33Z INSERTION OF INFUSION DEVICE INTO LOWER VEIN, PERCUTANEOUS APPROACH: ICD-10-PCS | Performed by: INTERNAL MEDICINE

## 2025-03-03 PROCEDURE — 85670 THROMBIN TIME PLASMA: CPT

## 2025-03-03 PROCEDURE — 2010000000 HC RM ICU SURGICAL

## 2025-03-03 PROCEDURE — 86022 PLATELET ANTIBODIES: CPT

## 2025-03-03 PROCEDURE — 82962 GLUCOSE BLOOD TEST: CPT

## 2025-03-03 PROCEDURE — 93922 UPR/L XTREMITY ART 2 LEVELS: CPT

## 2025-03-03 PROCEDURE — 80048 BASIC METABOLIC PNL TOTAL CA: CPT

## 2025-03-03 PROCEDURE — 85613 RUSSELL VIPER VENOM DILUTED: CPT

## 2025-03-03 PROCEDURE — 93308 TTE F-UP OR LMTD: CPT

## 2025-03-03 PROCEDURE — 86334 IMMUNOFIX E-PHORESIS SERUM: CPT

## 2025-03-03 PROCEDURE — 83036 HEMOGLOBIN GLYCOSYLATED A1C: CPT

## 2025-03-03 PROCEDURE — 5A1D90Z PERFORMANCE OF URINARY FILTRATION, CONTINUOUS, GREATER THAN 18 HOURS PER DAY: ICD-10-PCS | Performed by: INTERNAL MEDICINE

## 2025-03-03 PROCEDURE — 82955 ASSAY OF G6PD ENZYME: CPT

## 2025-03-03 PROCEDURE — 85705 THROMBOPLASTIN INHIBITION: CPT

## 2025-03-03 PROCEDURE — 83050 HGB METHEMOGLOBIN QUAN: CPT

## 2025-03-03 PROCEDURE — 74018 RADEX ABDOMEN 1 VIEW: CPT

## 2025-03-03 PROCEDURE — 83550 IRON BINDING TEST: CPT

## 2025-03-03 PROCEDURE — 6360000002 HC RX W HCPCS: Performed by: STUDENT IN AN ORGANIZED HEALTH CARE EDUCATION/TRAINING PROGRAM

## 2025-03-03 PROCEDURE — 80307 DRUG TEST PRSMV CHEM ANLYZR: CPT

## 2025-03-03 PROCEDURE — 6370000000 HC RX 637 (ALT 250 FOR IP): Performed by: INTERNAL MEDICINE

## 2025-03-03 PROCEDURE — 85732 THROMBOPLASTIN TIME PARTIAL: CPT

## 2025-03-03 PROCEDURE — 84443 ASSAY THYROID STIM HORMONE: CPT

## 2025-03-03 PROCEDURE — 85041 AUTOMATED RBC COUNT: CPT

## 2025-03-03 PROCEDURE — 80069 RENAL FUNCTION PANEL: CPT

## 2025-03-03 PROCEDURE — 84100 ASSAY OF PHOSPHORUS: CPT

## 2025-03-03 PROCEDURE — 87340 HEPATITIS B SURFACE AG IA: CPT

## 2025-03-03 PROCEDURE — 83735 ASSAY OF MAGNESIUM: CPT

## 2025-03-03 PROCEDURE — 36415 COLL VENOUS BLD VENIPUNCTURE: CPT

## 2025-03-03 PROCEDURE — 02HV33Z INSERTION OF INFUSION DEVICE INTO SUPERIOR VENA CAVA, PERCUTANEOUS APPROACH: ICD-10-PCS | Performed by: INTERNAL MEDICINE

## 2025-03-03 PROCEDURE — 99222 1ST HOSP IP/OBS MODERATE 55: CPT | Performed by: STUDENT IN AN ORGANIZED HEALTH CARE EDUCATION/TRAINING PROGRAM

## 2025-03-03 RX ORDER — NOREPINEPHRINE BITARTRATE 0.06 MG/ML
1-100 INJECTION, SOLUTION INTRAVENOUS CONTINUOUS
Status: DISCONTINUED | OUTPATIENT
Start: 2025-03-03 | End: 2025-03-07

## 2025-03-03 RX ORDER — HEPARIN SODIUM 1000 [USP'U]/ML
INJECTION, SOLUTION INTRAVENOUS; SUBCUTANEOUS PRN
Status: DISCONTINUED | OUTPATIENT
Start: 2025-03-03 | End: 2025-03-07

## 2025-03-03 RX ADMIN — WATER 2000 MG: 1 INJECTION INTRAMUSCULAR; INTRAVENOUS; SUBCUTANEOUS at 22:15

## 2025-03-03 RX ADMIN — SODIUM CHLORIDE, PRESERVATIVE FREE 10 ML: 5 INJECTION INTRAVENOUS at 20:14

## 2025-03-03 RX ADMIN — SODIUM CHLORIDE, PRESERVATIVE FREE 10 ML: 5 INJECTION INTRAVENOUS at 09:42

## 2025-03-03 RX ADMIN — Medication: at 19:50

## 2025-03-03 RX ADMIN — Medication 1 UNITS: at 06:27

## 2025-03-03 RX ADMIN — Medication: at 20:09

## 2025-03-03 RX ADMIN — Medication: at 12:14

## 2025-03-03 RX ADMIN — WATER 2000 MG: 1 INJECTION INTRAMUSCULAR; INTRAVENOUS; SUBCUTANEOUS at 05:54

## 2025-03-03 RX ADMIN — DOBUTAMINE HYDROCHLORIDE 5 MCG/KG/MIN: 200 INJECTION INTRAVENOUS at 01:01

## 2025-03-03 RX ADMIN — WATER 2000 MG: 1 INJECTION INTRAMUSCULAR; INTRAVENOUS; SUBCUTANEOUS at 14:34

## 2025-03-03 RX ADMIN — Medication: at 12:15

## 2025-03-03 RX ADMIN — Medication: at 19:49

## 2025-03-03 RX ADMIN — PANTOPRAZOLE SODIUM 40 MG: 40 INJECTION, POWDER, LYOPHILIZED, FOR SOLUTION INTRAVENOUS at 20:09

## 2025-03-03 RX ADMIN — PANTOPRAZOLE SODIUM 40 MG: 40 INJECTION, POWDER, LYOPHILIZED, FOR SOLUTION INTRAVENOUS at 09:39

## 2025-03-03 RX ADMIN — NOREPINEPHRINE BITARTRATE 12 MCG/MIN: 64 SOLUTION INTRAVENOUS at 10:42

## 2025-03-03 RX ADMIN — Medication: at 12:13

## 2025-03-03 RX ADMIN — DOBUTAMINE HYDROCHLORIDE 5 MCG/KG/MIN: 200 INJECTION INTRAVENOUS at 10:44

## 2025-03-03 RX ADMIN — Medication 1 UNITS: at 01:00

## 2025-03-03 RX ADMIN — Medication: at 16:00

## 2025-03-03 NOTE — PROGRESS NOTES
0800 Bedside and Verbal shift change report given to Temitope RN (oncoming nurse) by Kori BAILEY (offgoing nurse). Report included the following information Nurse Handoff Report, Index, Adult Overview, Intake/Output, MAR, Recent Results, Cardiac Rhythm Sinus Tachy, and Neuro Assessment.     0900 L groin bleeding - Trialysis site.  Pressure held quick clot applied; hemostasis achieved    1030 CVC and swan placed    1100 R groin bleeding, pressure held, quick clot, hemostasis achieved    1130  CRRT started, levo uptitrated to 16mcg/min to maintain MAP>65    1518 Removed PIVs per ID    1700 Updated intensivist on increased pressors.  Orders to continue to pull CRRT factor 50ml/hr.    1951 Bedside and Verbal shift change report given to Koir BAILEY (oncoming nurse) by Temitope RN (offgoing nurse). Report included the following information Nurse Handoff Report, Adult Overview, Intake/Output, MAR, Recent Results, Cardiac Rhythm Sinus Tachy, and Neuro Assessment.

## 2025-03-03 NOTE — PROGRESS NOTES
2000: Bedside and Verbal shift change report given to LYNN Sheikh (oncoming nurse) by LYNN Fernández (offgoing nurse). Report included the following information Nurse Handoff Report, Index, ED Encounter Summary, Intake/Output, MAR, Recent Results, and Cardiac Rhythm sinus tachycardia .     2240: Dr. Wagner at bedside to place central line. Written consent obtained. Patient tolerated procedure well.     0800: Bedside and Verbal shift change report given to LYNN Kerr & LYNN Linn (oncoming nurse) by LYNN Sheikh (offgoing nurse). Report included the following information Nurse Handoff Report, Index, ED Encounter Summary, Intake/Output, MAR, Recent Results, and Cardiac Rhythm sinus tachycardia .

## 2025-03-03 NOTE — ACP (ADVANCE CARE PLANNING)
Advance Care Planning     Palliative Team Advance Care Planning (ACP) Conversation  {Vanishing Tip This is a Qualifying Goals of Care Conversation:[id number]}  Date of Conversation: 03/03/25    Individuals present for the conversation: {Present:64272}     ACP documents on file prior to discussion:  {Prior Docs:36671}    Previously completed document/s not on file: {Existing Documents:50786}    Healthcare Decision Maker:    Primary Decision Maker: Loco Law - Niece/Nephew - [phone]    Secondary Decision Maker: Jessa Caceres - Parent - [phone]     Conversation Summary:      Resuscitation Status:   Code Status: Full Code     Documentation Completed:  {New Docs Today:35842}    I spent *** minutes with the patient and/or surrogate decision maker discussing the patient's wishes and goals.      Marti Taylor LCSW

## 2025-03-03 NOTE — PROGRESS NOTES
ADVANCED HEART FAILURE CENTER  [facility] in [place], [place]        LVAD workup initiated per ELMO Denise request. Appropriate labs, imaging and consultsultation orders placed per ELMO Denise (verbal order read back).

## 2025-03-03 NOTE — CONSULTS
\[facility]\""   History and Physical    Subjective:      Galdino Melissa is a 70 y.o. male who was referred for cardiac evaluation by Dr. Huggins (Intensivist). He has a PMH of CAD with NSTEMI (PCI LAD, Lcx and RCA), HFrEF, Arrhythmia s/p AICD, Atrial flutter, HTN, HLD, DM II, CKD IIIa. Hospital Synopsis as noted below.     Mr. Melissa lives with his girlfriend and prior to this admission was independent of his ADLs. 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 He states he quit smoking after high school. He endorses ETOH use, states he drinks a 6 pack daily and smokes marijuana daily. His mother is alive and his father  of cancer. He denies any knowledge of cardiac history in his family.     Hospital Synopsis:  : Presented to [facility] ED with SOB and bilateral LE edema x 2 weeks. Admitted and increased diuretics with concern for acute HF exacerbation. Nephrology and Cardiology consulted.   : Noted worsening Cr, hypotension, IVF started. Increased work of breathing noted, SpO2 75%, RRT called, transferred to CCU, DC IVF. Norepi started. CT C/A/P ordered to eval for infection.   : WBC elevated, Cr up to 3.15, lactic 5.7. Off vasopressors. Blood cultures ordered, zosyn and Vanc started. 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 Heparin drip started. General surgery consulted with concern for bowel ischemia. Decompensated in the evening, bedside POCUS read as EF 10%, calculated Chuyita 1.3, started dobutamine (lactate 11.5).   : Irregular HR, amio bolus given. Dark stools noted by RN, +occult stool. Heparin gtt stopped with concerns for GI bleed. Multiple episodes of seizure like activity documented. EEG with no focal abnormality. Blood cultures +staph aureus. LHC/RHC cancelled by Cardiology, now with concerns of sepsis. TTE negative for vegetation.   3/1: Vancomycin discontinued. In atrial flutter, 2:1 conduction.  3/2: Decision made to tx to [facility] for AHF work-up.  compatible with anasarca with incidentals as above including  colonic diverticulosis and no bowel abnormality with possible superior  mesenteric artery thrombosis.    Electronically signed by Stefan Kirkland    EKG:        Assessment:     Principal Problem:    Heart failure (HCC)  Resolved Problems:    * No resolved hospital problems. *      Plan:     Treatment Plan:    CAD: s/p LCH 10/2019 with BRIGID x 2 to LAD, BRIGID to LCX and BRIGID to RCA; PTA ASA, Toprol, Imdur, Atorvastatin  - ASA held, assume secondary to concern for GI Bleed  - Statin held r/t elevated LFTs    Acute on Chronic HFrEF s/p AICD: PTA Bumex mg BID, Toprol XL 50mg, Farxiga 10mg, Losartan 50mg   - Advanced Heart Failure consulted  - PAC being floated this morning to better guide cardiogenic vs septic shock  - Repeat ECHO, last 2/27 was EF 20-25%  - Continue dobutamine gtt    Shock: Mixed, septic and cardiogenic  - Continue norepi for MAP > 65  - Lactic acid remains elevated but is downtrending, 2.2 this morning  - LFTs and renal labs remain elevated, starting CRRT today  - Continue ABX as discussed below     HTN:   - Currently Inactive    HLD: PTA Atorvastatin 80mg and Zetia 10mg  - Holding statin as discussed    Atrial Flutter s/p DCCV: PTA Eliquis 5mg BID  - ST in the 120s today  - Currently holding anticoagulation     EDWIGE on CKD, IIIa: Baseline Cr 1.4, elevated to 2.7 on admission   - Dialysis line placed 3/3, plan to start CRRT  - Cr 6.11, bumex gtt on since yesterday and made 300mL urine  - Nephrology consulted  - BMP per their guidance    Concern for SMA Thrombosis: noted on non contrast CT  - General Surgery and Vascular saw, do not think there is a SMA thrombosis  - Vascular signed off    Concern for GI Bleed:  - Noted melena, +occult stool while on heparin infusion, Hgb down to 7s, required blood transfusion  - Heparin infusion off, ASA held since 2/28  - GI consulted    Acute Transaminitis: r/t shock and potentially some RV dysfunction/fluid  overload  - AST, ALT, and Tbili elevated  - Avoid hepatotoxic medications  - Support CO with infusions as discussed  - Trend CMP    Diabetes Mellitus, Type II: PTA Metformin 1000mg BID, Farxiga 10mg daily  - A1c 7.9% on admission to [facility]  - Diabetes Management consulted, appreciate recommendations  - PO regimen currently held with renal dysfunction     MSSA Bacteremia:   - Blood culture 2/26 + MSSA  - Blood culture 3/2 NGTD   - Vancomycin (2/25-3/1)   - Zosyn (2/26-3/2)  - Transitioned to Ancef on 3/2  - CBC daily  - Will likely need LISA to rule out vegetation given unknown source  - Consult ID    Concern for Seizure: Staff reports multiple 10-30 second time periods of unresponsiveness  - EEG negative  - Consider Head CT    Disposition: Continue ICU level of care. Float PAC and repeat ECHO. Concern for cardiogenic versus sepsis as source of shock. Cardiac Surgery consulted to evaluate for Impella placement. I do not think he is a candidate for MCS due to his social history and concerns for GI bleed with anticoagulation. Ongoing conversation between [facility] and Dr. Hampton for final decisions.     Time Spent: 60 minutes    Signed By: DANIELLE FORREST - NP     March 3, 2025

## 2025-03-03 NOTE — PROGRESS NOTES
ADVANCED HEART FAILURE CENTER  [facility] in [place], [place]  Inpatient Progress Note      Patient name: Galdino Melissa  Patient : 1955  Patient MRN: [id number]  Consulting MD: Huber Huggins MD  Date of service: 25    REASON FOR REFERRAL:  Management of acute on chronic systolic heart failure    PLAN OF CARE:  Mixed cardiogenic and septic shock.  Unclear if patient will need advanced heart failure therapies. Will support with dobutamine as we determine need for and candidacy for advanced heart failure therapies.  Will discuss possible MCS with multidisciplinary team.    HPI:  70-year-old male with history of ICM, HFrEF 20-25% since 2019, CAD s/p PCI to LAD, LCx and RCA, severe MR & moderate TR, atrial flutter, CKD was admitted on 2025 with worsening SOB, [place] patient was found to have lactic acidosis.  He was diuresed and started on dobutamine.  There was concern for ischemic bowel.  Patient was started on heparin drip.  Then there was dark stools noted by RN and positive occult blood and heparin was stopped.  Patient was hypotensive and found to have MSSA bacteremia.  He required Levophed for blood pressure support and was transferred to the ICU. lactic acidosis with up to 11.5 now trending down to 2.2.  Given patient's complex medical condition he was transferred to [facility] for further workup and treatment.  Patient also found to have oliguric EDWIGE with creatinine of 6.11 from a baseline of 1.4 and hyponatremic with a sodium of 126, nephrology was consulted and patient being started on CRRT for ATN versus CRS.  He also has liver congestion with a peak T. bili of 4.2, AST 88, .  PA catheter placed at bedside: CVP 13, PA 44/22/31, MV 49%, CI 2.8 on dobutamine of 5 mcg/kg/min.  Patient lives with girlfriend, has adult children and niece is at bedside.  He denies tobacco use.  He does drink alcohol 2-3 drinks per day with a max of

## 2025-03-03 NOTE — CARE COORDINATION
RUR: 23%  Readmission? Yes, pt transferred from [facility] to [facility]  IM? Needs 1st IM   ? N/A     03/03/25 1251   Readmission Assessment   Number of Days since last admission? 1-7 days   Previous Disposition Other (comment)  (Pt transferred from [facility] to [facility] for continued care)   Who is being Interviewed Unable to Complete  (CM reviewed medical record)   What was the patient's/caregiver's perception as to why they think they needed to return back to the hospital? Other (Comment)  (N/A - patient transferred from [facility] to [facility] for further care)   Did you visit your Primary Care Physician after you left the hospital, before you returned this time? No   Why weren't you able to visit your PCP? Other (Comment)  (N/A - patient hospitalized)   Did you see a specialist, such as Cardiac, Pulmonary, Orthopedic Physician, etc. after you left the hospital? No  (N/A)   Who advised the patient to return to the hospital? Physician   Does the patient report anything that got in the way of taking their medications? No  (N/A)   In our efforts to provide the best possible care to you and others like you, can you think of anything that we could have done to help you after you left the hospital the first time, so that you might not have needed to return so soon? Other (Comment)  (N/A due to transfer)     Full CM assessment to follow.    Geneva Hudson LMSW,   [phone]

## 2025-03-03 NOTE — NURSE NAVIGATOR
HEART FAILURE NURSE NAVIGATOR NOTE  [facility]    Patient chart was reviewed by Heart Failure (HF) Nurse Navigators for compliance of prescribed treatment with guidelines directed medical therapy (GDMT) and HF database completed.     Please, review beneath recommendations for symptomatic patients with HF with Reduced Ejection Fraction <= 40% (HFrEF)* and patients whose LVEF improved > 40% (HFimpEF)* for your consideration when taking care of this patient.    Current Medical Therapy:    Name Galdino Melissa    1955   LVEF 20-25%   NYHA Functional Class Class IIIb on admission   ARNi/ACEi/ARB Contraindicated d/t cardiogenic shock; EDWIGE   Beta-blocker Contraindicated d/t cardiogenic shock; pt currently on dobutamine    Aldosterone Antagonist Contraindicated d/t cardiogenic shock; EDWIGE   SGLT2 inhibitor Contraindicated d/t EDWIGE   Hydralazine/Isosorbide Dinitrate Contraindicated d/t cardiogenic shock   Consulting Cardiologist: [facility]     Recommendations:    Please, add the following GDMT for HFrEF <= 40% [Class 1] or document in discharge summary/progress note why patient cannot take the medication:  ARNi/ACEi or ARB  Beta-blockers (carvedilol, sustained-release metoprolol succinate or bisoprolol)  Aldosterone antagonists GFR > 30 and K< 5  SGLT2 inhibitor  Hydralazine/Isosorbide dinitrate for  Americans with Class III/IV symptoms  Adjust diuretic dose at discharge if hospitalized for volume overload    Consider adding the following GDMT for HFrEF <= 40%, if appropriate [Class 2b]:  Ivabradine for patients on maximally tolerated beta-blocker dose in order to achieve HR 70-80bpm  Digoxin, goal level 0.5-0.9  Polyunsaturated fatty acids  Vericuguat  For patient with hyperkalemia while on RAASi > 5.5, consider adding potassium binders (patiromer, sodium zirconium cycosilicate)    Note: the following medications may be potentially harmful in heart failure [Class 3]:  Calcium channel  blockers (doxazosin, diltiazem, verapamil, nifedipine)  Antiarrhythmics (flecanide, disopyrimide, sotalol, dronedarone)  Diabetes medications (thiasolidinediones, saxagliptin, alogliptin)  NSAIDs and WELDON 2 inhibitors    Consider vaccinations for respiratory illnesses (flu, pneumonia, covid) [Class 2b]    For eligible patients with LVEF < 35% consider discharge with wearable defibrillation [Class 2b] and/or referral for ICD implantation [Class 1] for prevention of sudden cardiac death.    Due to severely reduced LVEF < 25% and/or recurrent hospitalizations this patient may benefit from referral to Advanced Heart Failure Program to assess suitability for advanced therapies, such as left-ventricular assist device, heart transplantation, palliative inotropes or palliation [Class 1]. To obtain in-patient consultation or refer to [facility] Advanced Heart Failure Center, call [phone].    Patient Education:     Heart failure education to be provided, including information about medical therapy, lifestyle modifications, diet and fluid restrictions, physical activity.  Educational resources to be provided: “AHA Discharge packet for patients diagnosed with Heart Failure\" booklet; Signs/Symptoms magnet; Weight Calendar; [facility] flyer.  Information to be provided about HF support group.  Learning about Self-Care for Heart failure on discharge instructions.  American Heart Association's Interactive Workbook \"Healthier Living with Heart Failure - Managing Symptoms and Reducing Risk\" (QR code for link to access added to Discharge Instructions.     Plan for Transitional Care:    Post discharge follow up phone call to be made within 48-72 hours of discharge.  Patient will follow-up with PCP.  Patient will follow-up with Primary Cardiologist.  Obstructive sleep apnea screening done and patient was referred to Sleep Medicine.  Referral/follow-up with Cardiac Rehabilitation.  Referral/follow-up with Advanced Heart Failure  Specialist.  Referral/follow-up with Palliative Care Specialist.      Heart Failure Nurse Navigator  Phone: [phone]  /  [phone]    *Recommendations listed above are based on 2022 AHA/ACC/HFSA Guideline for the Management of Heart Failure: A Report of the American College of Cardiology/American Heart Association Joint Committee on Clinical Practice Guidelines. Circulation 2022; e1032. and 2017 AHA/ACC/HRS guideline for management of patients with ventricular arrhythmias and the prevention of sudden cardiac death: A Report of the American College of Cardiology/American Heart Association Task Force on Clinical Practice Guidelines and the Heart Rhythm Society. Heart Rhythm, Vol 15, No 10, October 2018 *Class of Recommendation: Class 1 (strong), Class 2a (moderate), Class 2b (weak), Class 3 (not recommended, potentially harmful)

## 2025-03-03 NOTE — PROGRESS NOTES
Spiritual Health History and Assessment/Progress Note  [facility]    Initial Encounter,  ,  ,      Name: Galdino Melissa MRN: [id number]    Age: 70 y.o.     Sex: male   Language: English   Religious: Other   Heart failure (HCC)     Date: 3/3/2025            Total Time Calculated: 27 min              Spiritual Assessment began in [facility] 4 CV INTNSV CARE        Referral/Consult From: Rounding   Encounter Overview/Reason: Initial Encounter  Service Provided For: Patient and family together    Lorenza, Belief, Meaning:   Patient has beliefs or practices that help with coping during difficult times  Family/Friends have beliefs or practices that help with coping during difficult times      Importance and Influence:  Patient has spiritual/personal beliefs that influence decisions regarding their health  Family/Friends have spiritual/personal beliefs that influence decisions regarding the patient's health    Community:  Patient feels well-supported. Support system includes: Extended family  Family/Friends feel well-supported. Support system includes: Spouse/Partner and Extended family    Assessment and Plan of Care:   Assessment conducted with Galdino and his niece  Loco.  present during conference with doctor. Galdino was quiet and appeared anxious after the conversation. He shared he was willing to do whatever was need to help him get better. Loco shared that Galdino is single, has a girlfriend that he resides, and that he has 2 adult sons GARRET and Mati. She also shared that Galdino's mother is still living.  Intro spiritual health.    Patient Interventions include: Affirmed coping skills/support systems  Family/Friends Interventions include: Facilitated expression of thoughts and feelings and Affirmed coping skills/support systems    Patient Plan of Care: Spiritual Care available upon further referral  Family/Friends Plan of Care: Spiritual Care available upon further referral    Electronically signed by REV. BLANCO  NELY RAMOS on 3/3/2025 at 1:11 PM

## 2025-03-03 NOTE — CONSULTS
Patient seen and examined.  Please see full consult note from same day for further assessment and recommendations.

## 2025-03-03 NOTE — CARE COORDINATION
RUR: 23%  Readmission? Yes, transfer from [facility]  IM? Yes, 1st IM given on 3/3  ? N/A    CM met with pt at bedside to discuss discharge plan and complete initial case management assessment. Pt was transferred from [facility] to [facility] for further care. Pt confirmed that his niece can provide him with transportation home upon discharge from the hospital.    Pharmacy: [id number] [address] - P [phone] - F [phone] [[id number]]     Pt lives with his girlfriend at the address listed on his facesheet. Per chart, The patient lives with his significant other, Marcelina. He has 2 sons that reside locally and he has 1 niece that is very supportive. He has 2 brothers and 1 sister that all reside locally. Pt does not use any DME at home and reports being previously independent in all ADLs/IADLs. Pt does not have a hx of home O2, home health services, or a stay at a SNF/[facility] for rehab services. Pt is not a . Pt lives in a two-story home with three steps to enter. Pt does not have any questions or concerns for case management at this time.    Case management to follow for discharge planning.     03/03/25 6051   Service Assessment   Patient Orientation Alert and Oriented   Cognition Alert   History Provided By Patient   Primary Caregiver Self   Accompanied By/Relationship None   Support Systems Spouse/Significant Other;Family Members   Patient's Healthcare Decision Maker is: Legal Next of Kin   PCP Verified by CM Yes  (Madi Anand MD)   Last Visit to PCP Within last 3 months   Prior Functional Level Independent in ADLs/IADLs   Current Functional Level Assistance with the following:;Other (see comment)  (Pending medical work-up and progress, currently experiencing weakness)   Can patient return to prior living arrangement Yes   Ability to make needs known: Fair   Family able to assist with home care needs: Yes  (patient lives with his girlfriend)   Would you like for me to discuss  Voluntary ACP Conversation in minutes:  <16 minutes (Non-Billable)    Geneva Hudson LMSW,   [phone]

## 2025-03-03 NOTE — PROGRESS NOTES
Day #2 of cefazolin  Indication:  MSSA bacteremia  Current regimen:  2g IV Q12H  Abx regimen: cefazolin monotherapy  Recent Labs     25  1438 25  0003 25  0543 25  1325   WBC 12.7*  --  13.1* 13.6*   CREATININE 5.83* 5.87* 6.11* 5.77*   BUN 87* 91* 94* 86*     Est CrCl: CVVH  Temp (24hrs), Av.6 °F (36.4 °C), Min:96.3 °F (35.7 °C), Max:98.3 °F (36.8 °C)    Cultures:    blood x2 - MSSA   blood - MSSA  3/1 blood - NGTD  3/2 blood x2 - NGTD    Plan: Change to 2g IV Q8H

## 2025-03-03 NOTE — CONSULTS
Infectious Disease Consult      I agree with the infectious disease  note in its entirety as authored by and discussed in detail with the nurse practitioner.   I have reviewed pertinent laboratory studies, microbiology cultures, radiologic reports with review of the consultations and progress notes as appropriate.   I agree with today's subjective findings, physical examination, assessment and plan of care as described above and discussed extensively with the nurse practitioner.   Galdino Melissa is a 70 year old male with history of CHF EF 20-25, ICD present, DM2 who presented to ED with  SOB,B/L LE edema admitted with acute on chronic CHF, EDWIGE requiring HD, shock, possible mesenteric artery thrombosis.  Diagnosed with MSSA bacteremia. Pt seen in ICU. Awake & alert. Afebrile.  Blood cultures+ for MSSA on 2/26, 2/27.  Negative blood cultures on 3/1, 3/2.?  Source-line related.  Patient  admitted at [facility] 2/24.    Impression  High-grade MSSA bacteremia  AICD present  Shock-mixed- septic, cardiogenic    EDWIGE/acute renal failure  Likely ATN due to shock  Plan is for CRRT  Femoral line placed.    CHF  EF 20-25    DM2 A1c 7.7    GI bleed  On IV PPI  Possible SMA thrombosis      Plan  Continue cefazolin IV  Change out lines  Femoral line care  Patient will require LISA evaluate for vegetation on valves, ICD leads    ID service to follow       A total time of 50 minutes was spent on today's encounter.  Greater than 50% of the time was spent on the following:  Preparing for visit and chart review.  Obtaining and/or reviewing separately obtained history  Performing a medically appropriate exam and/or evaluation  Counseling and educating a patient/family/caregiver as noted above  Placing relevant orders  Referring and communicating with other professionals (not separately reported)  Independently interpreting results (not separately reported) and communicating results to the patient/family/caregiver  Care coordination (not  03/03/25 10:01 AM   Result Value Ref Range    Carboxyhgb, Arterial 1.9 1 - 2 %    Methemoglobin, Arterial 0.3 0 - 1.4 %    Oxyhemoglobin 46.6 (LL) 94 - 97 %    O2 Sat, Arterial 48 (L) 95 - 99 %    Site SG      Source VENOUS BLOOD          Microbiology:  blood cx MSSA    Studies:    CT Result (most recent):  CT CHEST ABDOMEN PELVIS WO CONTRAST 02/26/2025    Narrative  INDICATION: concern for infection; sepsis; persistent lactic acidosis    COMPARISON: None.    TECHNIQUE: Noncontrast helical CT of the chest, abdomen, and pelvis.  Coronal  and sagittal reconstructions were generated. CT dose reduction was achieved  through use of a standardized protocol tailored for this examination and  automatic exposure control for dose modulation. Absence of intravenous contrast  limits evaluation of the mediastinum, anisa, vasculature, and solid organs.    ORAL CONTRAST: NONE.    FINDINGS:    MEDIASTINUM: No mass or lymphadenopathy.  ANISA: No gross pathology.  THORACIC AORTA: Atherosclerotic calcification without aneurysm.  MAIN PULMONARY ARTERY: Normal in caliber.  TRACHEA/BRONCHI: Patent.  ESOPHAGUS: No wall thickening or dilatation.  HEART: Heart is enlarged with chamber dilation and pacemaker leads noted.  Coronary artery calcium: present.  PLEURA: Small to moderate posteriorly layering right pleural effusion and trace  posteriorly layering left pleural effusion. No pneumothorax.  LUNGS: Atelectasis overlies pleural effusions with aerated lungs clear.  LIVER: No mass or biliary dilatation.  GALLBLADDER: Intraluminal radiodense material likely reflecting vicarious  excretion of previously administered contrast. Otherwise unremarkable.  SPLEEN: No mass.  PANCREAS: No gross pathology.  ADRENALS: Unremarkable.  KIDNEYS: No nephrolithiasis or hydronephrosis.  STOMACH: Unremarkable.  SMALL BOWEL: No dilatation or wall thickening.  COLON: No dilation or wall thickening. Noninflamed appearing pancolonic  diverticula.  APPENDIX:

## 2025-03-03 NOTE — FLOWSHEET NOTE
CRRT Initiation:  Primary RN SBAR: Cirilo Meade RN  Incapacitated Nurse [facility]. provided: N/A  Patient Education provided: CRRT, CVC care  Preferred Education method and Primary language: Verbal, English  Hospital General Consent Verified: Acute CRRT consent on chart  Hospital associated wait time; reason: None    No results for Hep B Surface Ag or Hep B Surface Ab on file. Labs ordered and sent for processing.     03/03/25 1210   Observations & Evaluations   Level of Consciousness 0   Oriented X 3   Heart Rhythm Regular   Respiratory Quality/Effort Dyspnea with exertion   O2 Device None (Room air)   Skin Condition/Temp Dry;Warm   Abdomen Inspection Soft   Edema Generalized;Right upper extremity;Left upper extremity;Right lower extremity;Left lower extremity   Edema Generalized +1;+2   RUE Edema +1;+2   LUE Edema +1;+2   RLE Edema +1;+2   LLE Edema +1;+2   Vital Signs   BP (!) 95/56  (arterial)   Temp 97.4 °F (36.3 °C)   Pulse (!) 126   Respirations 20   SpO2 94 %   ICEBOAT   ICEBOAT I;C;E;B;O;A;T   Treatment   $CRRT $Yes   Machine #   ([id number])   Cartridge Lot #   ([id number])   Therapy Type CVVH   System Used   System Used Jojo   Pressures (Jojo)   Access (mmHg) (!) -45 mmHg   Return/Venous (mmHg) 74 mmHg   TMP (mmHg) 53 mmHg   Pressure Drop (mmHg) 64 mmHg   Filter (mmHg) 177 mmHg   Effluent (mmHg) 62 mmHg   Deaeration Chamber Check Yes   Flow Rates (Jojo)   Blood Flow (mL/min) 200 mL/min   Replacement Fluid Pre-Filter (mL/hr) 640 mL/hr   Replacement Filter Post-Filter (mL/hr) 630 mL/hr   Pre-Blood Pump (mL/hr) 1260 mL/hr   CRRT Activities   Intervention Initiated   Hemodialysis Central Access - Temporary Left Femoral vein   Placement Date/Time: 03/03/25 0005   Present on Admission/Arrival: No  Inserted by: Dr. Wagner  Insertion Practices: Chlorohexadine skin antisepsis;Hand hygiene;Maximal barrier precautions;Sterile ultrasound technique;Optimal catheter site selection  ...   Continued need for line? Yes   Blue  Lumen Status Brisk blood return;Flushed;Infusing   Red Lumen Status Brisk blood return;Flushed;Infusing   Line Care Connections checked and tightened;Ports disinfected   Dressing Type Bacteriocidal;Transparent;Gauze   Date of Last Dressing Change 03/03/25   Dressing Status Clean, dry & intact   Initiation of Therapy   Dialysis Nurse Intiation of CRRT Therapy Yes     Consents, patient, code status, labs and orders verified. New [id number] filter set-up, primed, tested and running well at this time. Left femoral CVC, dressing CDI dated 3/3/25. No signs of redness, drainage, or infection visualized. Each catheter limb disinfected for 60 seconds per limb with alcohol swabs. Caps removed, dialysis CVC hub scrubbed per Hospital P&P. +aspiration/+flush x 2 ports with no resistance. Lines visible and connections secure with blood warmer to return line at 37*C. Education & pre/post report with primary RN.

## 2025-03-03 NOTE — PROGRESS NOTES
CRITICAL CARE PROGRESS NOTE    Name: Galdino Melissa   : 1955   MRN: [id number]   Date: 3/3/2025      ICU Diagnosis      Mixed Septic and Cardiogenic Shock  Acute Renal Failure    Overnight Events   HD line placed. Increased vasopressor requirement.     ROS negative except as otherwise documented.     A/P   This is a 70 year old male with history of HTN, HLD, DM, PH and HFrEF (20%) who presented to OSH on  with SOB - admitted to the ICU for mixed septic and cardiogenic shock.     NEUROLOGICAL:    Mentation baseline. No focal deficits. Intermittent briefly unresponsive - likely due to episodic low perfusion. DDX seizure but reassured by normal EEG.   - tylenol PRN pain  - oxycodone PRN severe pain   - CTH when able  - consider neurology consult    PULMONOLOGY:   On RA. Last CXR without acute pathology.   - goal SpO2>=92, pH>=7.30  - O2 PRN; wean as tolerated  - IS  - OOB to chair     CARDIOVASCULAR:   Shock - likely mixed septic and cardiogenic.   - CHF consulted  - CTS consulted  - continue dobutamine  - continue levophed  - hold GDMT    GASTROINTESTINAL:   Reported GIB at OSH. CT with possible SMA thrombosis.   - IV PPI BID  - GI consult  - hold AC     RENAL/ELECTROLYTE:   Acute renal failure. Likely ATN due to shock.  - nephrology consult  - goal K>=4, Mg>=2, Phos >3  - daily BMP  - strict I/O's; daily weights  - avoid nephrotoxic medications    ENDOCRINE:   - SSI    ID/MICRO:   MSSA bacteremia. No clear source.   - ID consult  - continue cefazolin  - consider LISA    ICU DAILY CHECKLIST     Code Status: Full  DVT Prophylaxis: SCDs  T/L/D: PIV, trialysis  SUP: PI  Diet: NPO  ABCDEF Bundle/Checklist Completed:Yes  Disposition: Stay in ICU  Multidisciplinary Rounds Completed:  Yes  Patient/Family Updated: Yes    OBJECTIVE:     Blood pressure 100/79, pulse (!) 125, temperature 97.6 °F (36.4 °C), temperature source Oral, resp. rate 18, height 1.753 m (5' 9\"), weight 101.1 kg (222 lb 14.2 oz), SpO2  100%.  Physical Exam  Gen: NAD  HEENT: NC/AT, supple  Cardiac: RRR, no M/R/G  Pulm: CTA bilaterally  ABD: S/NT/ND, normal bowel sounds  Extremities: no edema  Skin: no rash  Neuro: A/O X 3, no focal deficits     I have personally reviewed and interpreted all relevant imaging and laboratory data.     CRITICAL CARE DOCUMENTATION  I had a face to face encounter with the patient, reviewed and interpreted patient data including clinical events, labs, images, vital signs, I/O's, and examined patient.  I have discussed the case and the plan and management of the patient's care with the consulting services, the bedside nurses and the respiratory therapist.      NOTE OF PERSONAL INVOLVEMENT IN CARE   This patient has a high probability of imminent, clinically significant deterioration, which requires the highest level of preparedness to intervene urgently. I participated in the decision-making and personally managed or directed the management of the following life and organ supporting interventions that required my frequent assessment to treat or prevent imminent deterioration.    I personally spent 45 minutes of critical care time.  This is time spent at this critically ill patient's bedside actively involved in patient care as well as the coordination of care.  This does not include any procedural time which has been billed separately.    Huber Huggins M.D.  [facility] Critical Care  3/3/2025

## 2025-03-03 NOTE — PROCEDURES
PROCEDURE NOTE  Date: 3/3/2025   Name: Galdino Melissa  YOB: 1955    CENTRAL LINE    Date/Time: 3/3/2025 1:29 AM    Performed by: Evaristo Wagner MD  Authorized by: Evaristo Wagner MD  Consent: Written consent obtained.  Risks and benefits: risks, benefits and alternatives were discussed  Consent given by: patient  Patient understanding: patient states understanding of the procedure being performed  Patient identity confirmed: verbally with patient  Time out: Immediately prior to procedure a \"time out\" was called to verify the correct patient, procedure, equipment, support staff and site/side marked as required.  Indications: vascular access    Anesthesia:  Local Anesthetic: lidocaine 1% without epinephrine    Sedation:  Patient sedated: no    Preparation: skin prepped with 2% chlorhexidine  Skin prep agent dried: skin prep agent completely dried prior to procedure  Sterile barriers: all five maximum sterile barriers used - cap, mask, sterile gown, sterile gloves, and large sterile sheet  Hand hygiene: hand hygiene performed prior to central venous catheter insertion  Location details: left femoral  Patient position: flat  Catheter type: triple lumen  Pre-procedure: landmarks identified  Ultrasound guidance: yes  Sterile ultrasound techniques: sterile gel and sterile probe covers were used  Number of attempts: 1  Successful placement: yes  Post-procedure: line sutured and dressing applied  Assessment: blood return through all ports and free fluid flow  Patient tolerance: patient tolerated the procedure well with no immediate complications

## 2025-03-03 NOTE — PROGRESS NOTES
[address], [place] [address]  Phone: [phone]   [phone]    [email or web address]     Nephrology Progress Note    Patient Name : Galdino Melissa      : 1955     MRN : [id number]  Date of Admission : 3/2/2025  Date of Servive : 25    CC:  Follow up for EDWIGE on CKD       Assessment and Plan   EDWIGE:  - 2/2 ATN from septic shock vs CRS  - US on admission neg  - rising Cr, oliguric despite bumex infusion  - plan to start CRRT  - femoral line in place  - pt consented, risks/benefits explained and he agrees to proceed  - factor 50/hr  - BID labs    HFrEF:  - EF 20%, AICD in place  - failing medical therapy  - plan RRT as above for volume removal  - AHF team following    MSSA bacteremia:  - on abx  - ? Source, AICD, vs valve    Shock:  - combo septic and cardiogenic  - on pressors and inotropes    DM2    Syncope    aflutter     Interval History:  Seen and examined.  Tx from [facility] yesterday for possible impella placement and AHF service eval.  Poorly response to bumex drip, Cr > 6.  Getting swan cath now, has rufina in .    Review of Systems: Pertinent items are noted in HPI.    Current Medications:   Current Facility-Administered Medications   Medication Dose Route Frequency    pantoprazole (PROTONIX) 40 mg in sodium chloride (PF) 0.9 % 10 mL injection  40 mg IntraVENous Q12H    sodium chloride flush 0.9 % injection 5-40 mL  5-40 mL IntraVENous 2 times per day    sodium chloride flush 0.9 % injection 5-40 mL  5-40 mL IntraVENous PRN    0.9 % sodium chloride infusion   IntraVENous PRN    ondansetron (ZOFRAN-ODT) disintegrating tablet 4 mg  4 mg Oral Q8H PRN    Or    ondansetron (ZOFRAN) injection 4 mg  4 mg IntraVENous Q6H PRN    polyethylene glycol (GLYCOLAX) packet 17 g  17 g Oral Daily PRN    acetaminophen (TYLENOL) tablet 650 mg  650 mg Oral Q6H PRN    Or    acetaminophen (TYLENOL) suppository 650 mg  650 mg Rectal Q6H PRN     involvement    Lab Data Personally Reviewed: I have reviewed all the pertinent labs, microbiology data and radiology studies during assessment.    Labs:  Recent Labs     03/02/25  1438 03/03/25  0003 03/03/25  0543   * 126* 126*   K 4.1 4.2 4.2   CL 86* 87* 88*   CO2 20* 22 23   GLUCOSE 158* 168* 169*   BUN 87* 91* 94*   CREATININE 5.83* 5.87* 6.11*   CALCIUM 8.6 8.9 9.1       Recent Labs     03/02/25  0332 03/02/25  1438 03/03/25  0543   WBC 10.8 12.7* 13.1*   RBC 4.90 5.00 5.14   HGB 9.0* 9.2* 9.4*   HCT 27.6* 29.0* 28.8*   MCV 56.3* 58.0* 56.0*   MCH 18.4* 18.4* 18.3*   MCHC 32.6 31.7 32.6   RDW 19.9* 20.0* 20.3*   * 134* 147*     Recent Labs     03/02/25  0332 03/02/25  1438 03/03/25  0543   GLOB 3.8 3.7 3.9     Recent Labs     02/28/25  1913 02/28/25  2205 03/01/25  0314 03/02/25  1438   INR  --   --   --  1.5*   APTT >130.0* 30.0 29.4  --       No results for input(s): \"CPK\", \"CKMB\", \"TROPONINI\" in the last 72 hours.    Invalid input(s): \"B-NP\"  Invalid input(s): \"PHI\", \"PCO2I\", \"PO2I\", \"FIO2I\"       Ventilator:       Microbiology:  No results found for: \"SDES\"  No components found for: \"CULT\"      I have reviewed the flowsheets.  Chart and Pertinent Notes have been reviewed.   No change in PMH ,family and social history from Consult note.      Rigo Kirkland MD  [facility]

## 2025-03-03 NOTE — CONSULTS
KIANNA GOMEZ [facility]  GARRICK Gonzalez  [phone]                    GASTROENTEROLOGY CONSULTATION NOTE              NAME:  Galdino Melissa   :   1955   MRN:   [id number]       Referring Physician:   Nkechi      Consult Date:   3/3/2025 2:24 PM    Chief Complaint:    SOB, Shock     History of Present Illness:    Patient is a 70 y.o. male with history of ICM, HFrEF 20-25% since 2019, CAD s/p PCI to LAD, LCx and RCA, severe MR & moderate TR, atrial flutter, CKD who was admitted on 2025 with worsening SOB, [place] patient was found to have lactic acidosis along with acute on chronic heart failure/exacerbation.    We are consulted for evaluation of reported melena and concern for possible SMA thrombosis.     He was initially at [facility] but was transferred to [facility] for advanced cardiac care.     Reportedly he was started on anti-coagulation previously during hospitalization but had drop in Hgb along with reports of melena. His CT suggested possible SMA thrombosis. However, given benign abdominal exam, surgery did not feel additional imaging with CTA was appropriate given his acute on chronic renal failure. His anticoagulation was stopped though and his Hgb has remained stable.    Today he has no acute complaints.      PMH:  Past Medical History:   Diagnosis Date    Acute on chronic systolic congestive heart failure (HCC) 2024    AICD (automatic cardioverter/defibrillator) present 2021    put in at [facility].     Arrhythmia     CAD (coronary artery disease)     NSTEMI with PCI of LAD (2 stents), LCx, RCA    Congestive heart failure (HCC) 10/2019    DM (diabetes mellitus) (HCC) 3/30/2010    HTN (hypertension) 3/30/2010    Hypercholesterolemia        PSH:  Past Surgical History:   Procedure Laterality Date    CARDIAC CATHETERIZATION  10/2019    4x stents    CARDIAC PROCEDURE N/A 2023    Left and right heart cath / coronary angiography performed by Rickie Walker III, DO at [facility] CARDIAC  CATH LAB    COLONOSCOPY  1-11    diverticulosis- Dr. Bry HERNANDEZ      UPPER GI ENDOSCOPY,BIOPSY  10/27/2021            Allergies:  Allergies   Allergen Reactions    Glipizide Other (See Comments)     dizziness       Home Medications:  Prior to Admission Medications   Prescriptions Last Dose Informant Patient Reported? Taking?   FARXIGA 10 MG tablet  Self No No   Sig: Take 1 tablet by mouth daily For diabetes and heart.   apixaban (ELIQUIS) 5 MG TABS tablet  Self No No   Sig: Take 1 tablet by mouth 2 times daily To prevent stroke.   aspirin 81 MG chewable tablet  Self No No   Sig: Take 1 tablet by mouth daily   atorvastatin (LIPITOR) 80 MG tablet  Self No No   Sig: TAKE 1 TABLET BY MOUTH DAILY   bumetanide (BUMEX) 2 MG tablet  Self No No   Sig: Take 1 tablet by mouth 2 times daily For swelling in feet   ezetimibe (ZETIA) 10 MG tablet  Self No No   Sig: TAKE 1 TABLET BY MOUTH DAILY   losartan (COZAAR) 50 MG tablet  Self No No   Sig: TAKE 1 TABLET BY MOUTH TWICE DAILY   metFORMIN (GLUCOPHAGE) 1000 MG tablet  Self No No   Sig: Take 1 tablet by mouth with breakfast and with evening meal   metoprolol succinate (TOPROL XL) 50 MG extended release tablet  Self No No   Sig: Take 1 tablet by mouth daily To slow heart rate down.   potassium chloride (KLOR-CON) 20 MEQ packet  Self Yes No   Sig: Take 20 mEq by mouth daily   spironolactone (ALDACTONE) 25 MG tablet  Self Yes No   Sig: Take 1 tablet by mouth daily   triamcinolone (KENALOG) 0.1 % cream  Self No No   Sig: Apply topically 2 times daily.   Patient not taking: Reported on 2/21/2025      Facility-Administered Medications: None       Hospital Medications:  Current Facility-Administered Medications   Medication Dose Route Frequency    pantoprazole (PROTONIX) 40 mg in sodium chloride (PF) 0.9 % 10 mL injection  40 mg IntraVENous Q12H    prismaSATE BGK 4/2.5 dialysis solution   Dialysis Continuous    heparin (porcine) injection 1,100-1,900 Units  1,100-1,900 Units  4.1   < > 4.2 3.9   CL 87* 86*   < > 88* 89*   CO2 26 20*   < > 23 24   BUN 80* 87*   < > 94* 86*   PHOS 5.6* 7.4*  --   --   --     < > = values in this interval not displayed.     Recent Labs     03/02/25  1438 03/03/25  0543   GLOB 3.7 3.9     Recent Labs     02/28/25  2205 03/01/25  0314 03/02/25  1438   INR  --   --  1.5*   APTT 30.0 29.4  --        Patient Active Problem List   Diagnosis    HTN (hypertension)    CHF (congestive heart failure) (HCC)    Type 2 diabetes mellitus with chronic kidney disease (HCC)    Chronic renal disease, stage III (HCC)    Athscl heart disease of native cor art w oth ang pctrs    HFrEF (heart failure with reduced ejection fraction) (HCC)    Poorly controlled type 2 diabetes mellitus (HCC)    Convulsive syncope    Acute alteration in mental status    Heart failure (HCC)    Shock (HCC)    EDWIGE (acute kidney injury)    MSSA bacteremia    ICD (implantable cardioverter-defibrillator) in place    Leukocytosis    Superior mesenteric artery thrombosis    Gastrointestinal hemorrhage       Assessment and Plan:  Anemia, melena, possible SMA thrombus in setting of CAD, advanced CHF, EDWIGE on CKD - no overt bleeding and stable Hgb. Certainly may have had GI bleeding previously with initiating anti-coagulation though appears to have resolved. Would not be surprised if he had an underlying GI tract AVM in the setting of CHF and ESRD.    His exam is not consistent with SMA thrombus and I agree with prior surgical/vascular evaluation - I do not feel CTA indicated/needed at this time given acutely worsened renal status. Concerned IV contrast load would drastically increase risk of advancing into chronic renal failure.    Plan:  - empiric PPI  - continue management of his CHF/CAD  - if he needs anticoagulation, ok from GI standpoint though monitor for bleeding - would favor use of heparin first before transitioning to long term AC/DOAC  - his elevated LFTs is very likely congestive hepatopathy in

## 2025-03-03 NOTE — PROCEDURES
Procedure Note - Central Venous Access:   Performed by Huber Arbloeda MD  Diagnosis: shock  Insertion Date: 03/03/25  Time:9:38 AM  Obtained Consent? yes; informed   Procedure Location:  ICU.      Immediately prior to the procedure, the patient was reevaluated and found suitable for the planned procedure and any planned medications.  Immediately prior to the procedure a time out was called to verify the correct patient, procedure, equipment, staff, and marking as appropriate.    Central line Bundle:  Full sterile barrier precautions used.  7-Step Sterility Protocol followed.  (cap, mask sterile gown, sterile gloves, large sterile sheet, hand hygiene, 2% chlorhexidine for cutaneous antisepsis)  5 mL 1% Lidocaine placed at insertion site.      Patient positioned in Trendelenburg?yes   The site was prepped with ChloraPrep.   Catheter inserted into a new site.     Using Seldinger technique a Arrow Cordis/Introducer, Pulmonary Artery Catheter was placed in the Right, Internal Jugular Vein via direct cannulation with 1 number of attempts for Monitoring, Blood Drawing, IV Access, and right heart pressures.   Ultrasound Guidance was utilized.    There was good dark, non-pulsatile blood return in all ports.   Femoral Site? no. If Yes, reason femoral site was chosen: n/a  Catheter secured. Biopatch/CHG bio-occlusive dressing in place? yes.  The following complications were encountered: None.  A follow-up chest x-ray was ordered post procedure.    The procedure was tolerated well.        Huber Arboleda MD  Critical Care Medicine  [facility]

## 2025-03-04 ENCOUNTER — ANESTHESIA EVENT (OUTPATIENT)
Facility: HOSPITAL | Age: 70
End: 2025-03-04
Payer: MEDICARE

## 2025-03-04 ENCOUNTER — ANESTHESIA (OUTPATIENT)
Facility: HOSPITAL | Age: 70
End: 2025-03-04
Payer: MEDICARE

## 2025-03-04 ENCOUNTER — APPOINTMENT (OUTPATIENT)
Facility: HOSPITAL | Age: 70
End: 2025-03-04
Attending: INTERNAL MEDICINE
Payer: MEDICARE

## 2025-03-04 ENCOUNTER — HOSPITAL ENCOUNTER (OUTPATIENT)
Facility: HOSPITAL | Age: 70
Discharge: HOME | End: 2025-03-06
Attending: THORACIC SURGERY (CARDIOTHORACIC VASCULAR SURGERY)

## 2025-03-04 ENCOUNTER — CLINICAL DOCUMENTATION (OUTPATIENT)
Age: 70
End: 2025-03-04

## 2025-03-04 PROBLEM — Z95.810 HISTORY OF IMPLANTABLE CARDIAC DEFIBRILLATOR (ICD): Status: ACTIVE | Noted: 2025-03-04

## 2025-03-04 PROBLEM — N17.9 ACUTE RENAL FAILURE: Status: ACTIVE | Noted: 2025-03-04

## 2025-03-04 LAB
ALBUMIN SERPL-MCNC: 2.8 G/DL (ref 3.5–5)
ALBUMIN/GLOB SERPL: 0.7 (ref 1.1–2.2)
ALP SERPL-CCNC: 99 U/L (ref 45–117)
ALT SERPL-CCNC: 55 U/L (ref 12–78)
AMPHETAMINES UR QL SCN: NEGATIVE NG/ML
ANION GAP BLD CALC-SCNC: 11 (ref 10–20)
ANION GAP BLD CALC-SCNC: 14 (ref 10–20)
ANION GAP SERPL CALC-SCNC: 13 MMOL/L (ref 2–12)
ANION GAP SERPL CALC-SCNC: 16 MMOL/L (ref 2–12)
APTT PPP: 43.8 SEC (ref 22.1–31)
APTT PPP: 44 SEC (ref 22.1–31)
APTT PPP: 46.7 SEC (ref 22.1–31)
APTT SCREEN TO CONFIRM RATIO: 0.95 RATIO (ref 0–1.34)
AST SERPL-CCNC: 74 U/L (ref 15–37)
BARBITURATES UR QL SCN: NEGATIVE NG/ML
BASE DEFICIT BLD-SCNC: 0.9 MMOL/L
BASE DEFICIT BLD-SCNC: 2.8 MMOL/L
BASE DEFICIT BLD-SCNC: 3.3 MMOL/L
BASE DEFICIT BLD-SCNC: 4.9 MMOL/L
BASE DEFICIT BLDV-SCNC: 1.3 MMOL/L
BASOPHILS # BLD: 0 K/UL (ref 0–0.1)
BASOPHILS NFR BLD: 0 % (ref 0–1)
BDY SITE: ABNORMAL
BDY SITE: ABNORMAL
BENZODIAZ UR QL: NEGATIVE NG/ML
BILIRUB SERPL-MCNC: 2.9 MG/DL (ref 0.2–1)
BUN SERPL-MCNC: 53 MG/DL (ref 6–20)
BUN SERPL-MCNC: 56 MG/DL (ref 6–20)
BUN/CREAT SERPL: 14 (ref 12–20)
BUN/CREAT SERPL: 14 (ref 12–20)
BZE UR QL: NEGATIVE NG/ML
CA-I BLD-MCNC: 0.99 MMOL/L (ref 1.15–1.33)
CA-I BLD-MCNC: 1.06 MMOL/L (ref 1.15–1.33)
CA-I BLD-SCNC: 1.07 MMOL/L (ref 1.12–1.32)
CALCIUM SERPL-MCNC: 8.3 MG/DL (ref 8.5–10.1)
CALCIUM SERPL-MCNC: 8.3 MG/DL (ref 8.5–10.1)
CANNABINOIDS UR QL SCN: NEGATIVE NG/ML
CHLORIDE BLD-SCNC: 101 MMOL/L (ref 100–111)
CHLORIDE BLD-SCNC: 97 MMOL/L (ref 100–111)
CHLORIDE SERPL-SCNC: 96 MMOL/L (ref 97–108)
CHLORIDE SERPL-SCNC: 97 MMOL/L (ref 97–108)
CO2 BLD-SCNC: 20 MMOL/L (ref 22–29)
CO2 BLD-SCNC: 21 MMOL/L (ref 22–29)
CO2 SERPL-SCNC: 19 MMOL/L (ref 21–32)
CO2 SERPL-SCNC: 21 MMOL/L (ref 21–32)
COHGB MFR BLD: 1.8 % (ref 1–2)
COMMENT:: NORMAL
CONFIRM APTT/NORMAL: 49.4 SEC (ref 0–47.6)
COTININE UR QL SCN: NEGATIVE NG/ML
CREAT SERPL-MCNC: 3.91 MG/DL (ref 0.7–1.3)
CREAT SERPL-MCNC: 4.05 MG/DL (ref 0.7–1.3)
CREAT UR-MCNC: 3.6 MG/DL (ref 0.6–1.3)
CREAT UR-MCNC: 3.9 MG/DL (ref 0.6–1.3)
DIFFERENTIAL METHOD BLD: ABNORMAL
DRVVT RATIO: 0.8 RATIO (ref 0.8–1.2)
ECHO AV AREA PEAK VELOCITY: 2.7 CM2
ECHO AV AREA VTI: 2.6 CM2
ECHO AV AREA/BSA PEAK VELOCITY: 1.3 CM2/M2
ECHO AV AREA/BSA VTI: 1.2 CM2/M2
ECHO AV MEAN GRADIENT: 2 MMHG
ECHO AV MEAN VELOCITY: 0.7 M/S
ECHO AV PEAK GRADIENT: 4 MMHG
ECHO AV PEAK VELOCITY: 1 M/S
ECHO AV VELOCITY RATIO: 0.9
ECHO AV VTI: 12.1 CM
ECHO BSA: 2.22 M2
ECHO EST RA PRESSURE: 15 MMHG
ECHO LV EDV A2C: 190 ML
ECHO LV EDV A4C: 200 ML
ECHO LV EDV BP: 198 ML (ref 67–155)
ECHO LV EDV INDEX A4C: 93 ML/M2
ECHO LV EDV INDEX BP: 92 ML/M2
ECHO LV EDV NDEX A2C: 88 ML/M2
ECHO LV EF PHYSICIAN: 15 %
ECHO LV EJECTION FRACTION A2C: 28 %
ECHO LV EJECTION FRACTION A4C: 24 %
ECHO LV EJECTION FRACTION BIPLANE: 27 % (ref 55–100)
ECHO LV ESV A2C: 138 ML
ECHO LV ESV A4C: 153 ML
ECHO LV ESV BP: 145 ML (ref 22–58)
ECHO LV ESV INDEX A2C: 64 ML/M2
ECHO LV ESV INDEX A4C: 71 ML/M2
ECHO LV ESV INDEX BP: 67 ML/M2
ECHO LV FRACTIONAL SHORTENING: 10 % (ref 28–44)
ECHO LV INTERNAL DIMENSION DIASTOLE INDEX: 2.78 CM/M2
ECHO LV INTERNAL DIMENSION DIASTOLIC: 6 CM (ref 4.2–5.9)
ECHO LV INTERNAL DIMENSION SYSTOLIC INDEX: 2.5 CM/M2
ECHO LV INTERNAL DIMENSION SYSTOLIC: 5.4 CM
ECHO LV IVSD: 0.8 CM (ref 0.6–1)
ECHO LV MASS 2D: 186.1 G (ref 88–224)
ECHO LV MASS INDEX 2D: 86.2 G/M2 (ref 49–115)
ECHO LV POSTERIOR WALL DIASTOLIC: 0.8 CM (ref 0.6–1)
ECHO LV RELATIVE WALL THICKNESS RATIO: 0.27
ECHO LVOT AREA: 3.1 CM2
ECHO LVOT AV VTI INDEX: 0.8
ECHO LVOT DIAM: 2 CM
ECHO LVOT MEAN GRADIENT: 2 MMHG
ECHO LVOT PEAK GRADIENT: 3 MMHG
ECHO LVOT PEAK VELOCITY: 0.9 M/S
ECHO LVOT STROKE VOLUME INDEX: 14.1 ML/M2
ECHO LVOT SV: 30.5 ML
ECHO LVOT VTI: 9.7 CM
ECHO MV EROA PISA: 0.2 CM2
ECHO MV REGURGITANT ALIASING (NYQUIST) VELOCITY: 34 CM/S
ECHO MV REGURGITANT RADIUS PISA: 0.57 CM
ECHO MV REGURGITANT VELOCITY PISA: 4.3 M/S
ECHO MV REGURGITANT VOLUME PISA: 14.26 ML
ECHO MV REGURGITANT VTIA: 88.4 CM
ECHO PULMONARY ARTERY SYSTOLIC PRESSURE (PASP): 50 MMHG
ECHO RIGHT VENTRICULAR SYSTOLIC PRESSURE (RVSP): 56 MMHG
ECHO TV REGURGITANT MAX VELOCITY: 3.22 M/S
ECHO TV REGURGITANT PEAK GRADIENT: 42 MMHG
EOSINOPHIL # BLD: 0 K/UL (ref 0–0.4)
EOSINOPHIL NFR BLD: 0 % (ref 0–7)
ERYTHROCYTE [DISTWIDTH] IN BLOOD BY AUTOMATED COUNT: 20.1 % (ref 11.5–14.5)
ERYTHROCYTE [DISTWIDTH] IN BLOOD BY AUTOMATED COUNT: 20.3 % (ref 11.5–14.5)
GLOBULIN SER CALC-MCNC: 3.9 G/DL (ref 2–4)
GLUCOSE BLD STRIP.AUTO-MCNC: 121 MG/DL (ref 74–99)
GLUCOSE BLD STRIP.AUTO-MCNC: 143 MG/DL (ref 74–99)
GLUCOSE BLD STRIP.AUTO-MCNC: 152 MG/DL (ref 65–117)
GLUCOSE BLD STRIP.AUTO-MCNC: 159 MG/DL (ref 65–117)
GLUCOSE BLD STRIP.AUTO-MCNC: 170 MG/DL (ref 65–117)
GLUCOSE BLD STRIP.AUTO-MCNC: 192 MG/DL (ref 65–117)
GLUCOSE SERPL-MCNC: 143 MG/DL (ref 65–100)
GLUCOSE SERPL-MCNC: 151 MG/DL (ref 65–100)
HCO3 BLD-SCNC: 21.2 MMOL/L (ref 21–28)
HCO3 BLD-SCNC: 21.3 MMOL/L (ref 21–28)
HCO3 BLDA-SCNC: 21 MMOL/L
HCO3 BLDA-SCNC: 22 MMOL/L
HCO3 BLDV-SCNC: 24.3 MMOL/L (ref 23–28)
HCT VFR BLD AUTO: 25.6 % (ref 36.6–50.3)
HCT VFR BLD AUTO: 26.9 % (ref 36.6–50.3)
HGB BLD-MCNC: 8 G/DL (ref 12.1–17)
HGB BLD-MCNC: 8.7 G/DL (ref 12.1–17)
HISTORY CHECK: NORMAL
IMM GRANULOCYTES # BLD AUTO: 0 K/UL
IMM GRANULOCYTES NFR BLD AUTO: 0 %
INR PPP: 1.4 (ref 0.9–1.1)
INR PPP: 1.6 (ref 0.9–1.1)
LA 2 SCREEN W REFLEX-IMP: ABNORMAL
LACTATE BLD-SCNC: 2.97 MMOL/L (ref 0.4–2)
LACTATE BLD-SCNC: 3.81 MMOL/L (ref 0.4–2)
LACTATE SERPL-SCNC: 2.2 MMOL/L (ref 0.4–2)
LACTATE SERPL-SCNC: 4.2 MMOL/L (ref 0.4–2)
LYMPHOCYTES # BLD: 0.91 K/UL (ref 0.8–3.5)
LYMPHOCYTES NFR BLD: 6 % (ref 12–49)
MAGNESIUM SERPL-MCNC: 2.5 MG/DL (ref 1.6–2.4)
MAGNESIUM SERPL-MCNC: 2.9 MG/DL (ref 1.6–2.4)
MCH RBC QN AUTO: 18.2 PG (ref 26–34)
MCH RBC QN AUTO: 18.5 PG (ref 26–34)
MCHC RBC AUTO-ENTMCNC: 31.3 G/DL (ref 30–36.5)
MCHC RBC AUTO-ENTMCNC: 32.3 G/DL (ref 30–36.5)
MCV RBC AUTO: 57.2 FL (ref 80–99)
MCV RBC AUTO: 58.3 FL (ref 80–99)
MDMA, URINE: NEGATIVE NG/ML
METHADONE UR QL SCN: NEGATIVE NG/ML
METHAQUALONE UR QL: NEGATIVE NG/ML
METHGB MFR BLD: 0 % (ref 0–1.4)
MIXING DRVVT: 40.6 SEC (ref 0–40.4)
MONOCYTES # BLD: 1.82 K/UL (ref 0–1)
MONOCYTES NFR BLD: 12 % (ref 5–13)
MYELOCYTES NFR BLD MANUAL: 1 %
NEUTS SEG # BLD: 12.31 K/UL (ref 1.8–8)
NEUTS SEG NFR BLD: 81 % (ref 32–75)
NRBC # BLD: 0.32 K/UL (ref 0–0.01)
NRBC # BLD: 0.45 K/UL (ref 0–0.01)
NRBC BLD-RTO: 2.4 PER 100 WBC
NRBC BLD-RTO: 3 PER 100 WBC
OPIATES UR QL: NEGATIVE NG/ML
OXYHGB MFR BLD: 43.7 % (ref 94–97)
PCO2 BLD: 25.6 MMHG (ref 35–48)
PCO2 BLD: 33.5 MMHG (ref 35–48)
PCO2 BLD: 36.7 MMHG (ref 35–48)
PCO2 BLD: 42.9 MMHG (ref 35–48)
PCO2 BLDV: 43.5 MMHG (ref 41–51)
PCP UR QL: NEGATIVE NG/ML
PF4 HEPARIN CMPLX AB SER-ACNC: 0.06 OD (ref 0–0.4)
PH BLD: 7.3 (ref 7.35–7.45)
PH BLD: 7.38 (ref 7.35–7.45)
PH BLD: 7.41 (ref 7.35–7.45)
PH BLD: 7.52 (ref 7.35–7.45)
PH BLDV: 7.36 (ref 7.32–7.42)
PHOSPHATE SERPL-MCNC: 5.6 MG/DL (ref 2.6–4.7)
PLATELET # BLD AUTO: 107 K/UL (ref 150–400)
PLATELET # BLD AUTO: 126 K/UL (ref 150–400)
PO2 BLD: 161 MMHG (ref 83–108)
PO2 BLD: 253 MMHG (ref 83–108)
PO2 BLD: 416 MMHG (ref 83–108)
PO2 BLD: <27 MMHG (ref 83–108)
PO2 BLDV: 30 MMHG (ref 25–40)
POTASSIUM BLD-SCNC: 4.5 MMOL/L (ref 3.5–5.5)
POTASSIUM BLD-SCNC: 4.5 MMOL/L (ref 3.5–5.5)
POTASSIUM SERPL-SCNC: 4.2 MMOL/L (ref 3.5–5.1)
POTASSIUM SERPL-SCNC: 4.4 MMOL/L (ref 3.5–5.1)
PROPOXYPH UR QL: NEGATIVE NG/ML
PROT SERPL-MCNC: 6.7 G/DL (ref 6.4–8.2)
PROTHROMBIN TIME: 14.6 SEC (ref 9.2–11.2)
PROTHROMBIN TIME: 16.7 SEC (ref 9.2–11.2)
RBC # BLD AUTO: 4.39 M/UL (ref 4.1–5.7)
RBC # BLD AUTO: 4.7 M/UL (ref 4.1–5.7)
RBC MORPH BLD: ABNORMAL
SAO2 % BLD: 100 % (ref 94–98)
SAO2 % BLD: 100 % (ref 94–98)
SAO2 % BLD: 45 % (ref 95–99)
SAO2 % BLD: 99.5 % (ref 92–97)
SAO2 % BLDV: 53.5 % (ref 65–88)
SCREEN APTT: 37 SEC (ref 0–43.5)
SCREEN DRVVT: 53.6 SEC (ref 0–47)
SERVICE CMNT-IMP: ABNORMAL
SODIUM BLD-SCNC: 131 MMOL/L (ref 136–145)
SODIUM BLD-SCNC: 133 MMOL/L (ref 136–145)
SODIUM SERPL-SCNC: 130 MMOL/L (ref 136–145)
SODIUM SERPL-SCNC: 132 MMOL/L (ref 136–145)
SPECIMEN SITE: ABNORMAL
SPECIMEN TYPE: ABNORMAL
THERAPEUTIC RANGE: ABNORMAL SECS (ref 58–77)
THROMBIN TIME: 20.4 SEC (ref 0–23)
VAS LEFT ABI: 1.17
VAS LEFT DORSALIS PEDIS BP: 120 MMHG
VAS RIGHT ABI: 1.69
VAS RIGHT ARM BP: 103 MMHG
VAS RIGHT DORSALIS PEDIS BP: 174 MMHG
WBC # BLD AUTO: 13.5 K/UL (ref 4.1–11.1)
WBC # BLD AUTO: 15.2 K/UL (ref 4.1–11.1)

## 2025-03-04 PROCEDURE — P9045 ALBUMIN (HUMAN), 5%, 250 ML: HCPCS | Performed by: ANESTHESIOLOGY

## 2025-03-04 PROCEDURE — 6360000002 HC RX W HCPCS: Performed by: STUDENT IN AN ORGANIZED HEALTH CARE EDUCATION/TRAINING PROGRAM

## 2025-03-04 PROCEDURE — 2580000003 HC RX 258: Performed by: INTERNAL MEDICINE

## 2025-03-04 PROCEDURE — 34716 OPN AX/SUBCLA ART EXPOS CNDT: CPT | Performed by: THORACIC SURGERY (CARDIOTHORACIC VASCULAR SURGERY)

## 2025-03-04 PROCEDURE — B24BZZ4 ULTRASONOGRAPHY OF HEART WITH AORTA, TRANSESOPHAGEAL: ICD-10-PCS | Performed by: INTERNAL MEDICINE

## 2025-03-04 PROCEDURE — 6360000002 HC RX W HCPCS: Performed by: INTERNAL MEDICINE

## 2025-03-04 PROCEDURE — 82962 GLUCOSE BLOOD TEST: CPT

## 2025-03-04 PROCEDURE — C1889 IMPLANT/INSERT DEVICE, NOC: HCPCS | Performed by: THORACIC SURGERY (CARDIOTHORACIC VASCULAR SURGERY)

## 2025-03-04 PROCEDURE — 36415 COLL VENOUS BLD VENIPUNCTURE: CPT

## 2025-03-04 PROCEDURE — 33990 INSJ PERQ VAD L HRT ARTERIAL: CPT | Performed by: THORACIC SURGERY (CARDIOTHORACIC VASCULAR SURGERY)

## 2025-03-04 PROCEDURE — C1769 GUIDE WIRE: HCPCS | Performed by: THORACIC SURGERY (CARDIOTHORACIC VASCULAR SURGERY)

## 2025-03-04 PROCEDURE — 85610 PROTHROMBIN TIME: CPT

## 2025-03-04 PROCEDURE — 71045 X-RAY EXAM CHEST 1 VIEW: CPT

## 2025-03-04 PROCEDURE — 94618 PULMONARY STRESS TESTING: CPT

## 2025-03-04 PROCEDURE — 76700 US EXAM ABDOM COMPLETE: CPT

## 2025-03-04 PROCEDURE — 3700000001 HC ADD 15 MINUTES (ANESTHESIA): Performed by: THORACIC SURGERY (CARDIOTHORACIC VASCULAR SURGERY)

## 2025-03-04 PROCEDURE — 2010000000 HC RM ICU SURGICAL

## 2025-03-04 PROCEDURE — 83735 ASSAY OF MAGNESIUM: CPT

## 2025-03-04 PROCEDURE — 2709999900 HC NON-CHARGEABLE SUPPLY: Performed by: THORACIC SURGERY (CARDIOTHORACIC VASCULAR SURGERY)

## 2025-03-04 PROCEDURE — 93880 EXTRACRANIAL BILAT STUDY: CPT

## 2025-03-04 PROCEDURE — 94002 VENT MGMT INPAT INIT DAY: CPT

## 2025-03-04 PROCEDURE — 2500000003 HC RX 250 WO HCPCS: Performed by: INTERNAL MEDICINE

## 2025-03-04 PROCEDURE — 84295 ASSAY OF SERUM SODIUM: CPT

## 2025-03-04 PROCEDURE — 85018 HEMOGLOBIN: CPT

## 2025-03-04 PROCEDURE — 6360000002 HC RX W HCPCS: Performed by: NURSE PRACTITIONER

## 2025-03-04 PROCEDURE — C1768 GRAFT, VASCULAR: HCPCS | Performed by: THORACIC SURGERY (CARDIOTHORACIC VASCULAR SURGERY)

## 2025-03-04 PROCEDURE — 3700000000 HC ANESTHESIA ATTENDED CARE: Performed by: THORACIC SURGERY (CARDIOTHORACIC VASCULAR SURGERY)

## 2025-03-04 PROCEDURE — 86923 COMPATIBILITY TEST ELECTRIC: CPT

## 2025-03-04 PROCEDURE — 2500000003 HC RX 250 WO HCPCS: Performed by: STUDENT IN AN ORGANIZED HEALTH CARE EDUCATION/TRAINING PROGRAM

## 2025-03-04 PROCEDURE — 90945 DIALYSIS ONE EVALUATION: CPT

## 2025-03-04 PROCEDURE — 82947 ASSAY GLUCOSE BLOOD QUANT: CPT

## 2025-03-04 PROCEDURE — X2HL0F9 INSERTION OF CONDUIT TO SHORT-TERM EXTERNAL HEART ASSIST SYSTEM INTO RIGHT AXILLARY ARTERY, OPEN APPROACH, NEW TECHNOLOGY GROUP 9: ICD-10-PCS | Performed by: THORACIC SURGERY (CARDIOTHORACIC VASCULAR SURGERY)

## 2025-03-04 PROCEDURE — 85025 COMPLETE CBC W/AUTO DIFF WBC: CPT

## 2025-03-04 PROCEDURE — 99232 SBSQ HOSP IP/OBS MODERATE 35: CPT | Performed by: STUDENT IN AN ORGANIZED HEALTH CARE EDUCATION/TRAINING PROGRAM

## 2025-03-04 PROCEDURE — 84100 ASSAY OF PHOSPHORUS: CPT

## 2025-03-04 PROCEDURE — 6370000000 HC RX 637 (ALT 250 FOR IP): Performed by: INTERNAL MEDICINE

## 2025-03-04 PROCEDURE — 2580000003 HC RX 258: Performed by: NURSE ANESTHETIST, CERTIFIED REGISTERED

## 2025-03-04 PROCEDURE — 84132 ASSAY OF SERUM POTASSIUM: CPT

## 2025-03-04 PROCEDURE — 02HA3RZ INSERTION OF SHORT-TERM EXTERNAL HEART ASSIST SYSTEM INTO HEART, PERCUTANEOUS APPROACH: ICD-10-PCS | Performed by: THORACIC SURGERY (CARDIOTHORACIC VASCULAR SURGERY)

## 2025-03-04 PROCEDURE — 86850 RBC ANTIBODY SCREEN: CPT

## 2025-03-04 PROCEDURE — 86900 BLOOD TYPING SEROLOGIC ABO: CPT

## 2025-03-04 PROCEDURE — 6370000000 HC RX 637 (ALT 250 FOR IP): Performed by: ANESTHESIOLOGY

## 2025-03-04 PROCEDURE — C1781 MESH (IMPLANTABLE): HCPCS | Performed by: THORACIC SURGERY (CARDIOTHORACIC VASCULAR SURGERY)

## 2025-03-04 PROCEDURE — 82803 BLOOD GASES ANY COMBINATION: CPT

## 2025-03-04 PROCEDURE — 2720000010 HC SURG SUPPLY STERILE: Performed by: THORACIC SURGERY (CARDIOTHORACIC VASCULAR SURGERY)

## 2025-03-04 PROCEDURE — 82330 ASSAY OF CALCIUM: CPT

## 2025-03-04 PROCEDURE — 85730 THROMBOPLASTIN TIME PARTIAL: CPT

## 2025-03-04 PROCEDURE — 2580000003 HC RX 258: Performed by: STUDENT IN AN ORGANIZED HEALTH CARE EDUCATION/TRAINING PROGRAM

## 2025-03-04 PROCEDURE — 5A0221D ASSISTANCE WITH CARDIAC OUTPUT USING IMPELLER PUMP, CONTINUOUS: ICD-10-PCS | Performed by: THORACIC SURGERY (CARDIOTHORACIC VASCULAR SURGERY)

## 2025-03-04 PROCEDURE — 93325 DOPPLER ECHO COLOR FLOW MAPG: CPT | Performed by: INTERNAL MEDICINE

## 2025-03-04 PROCEDURE — 6360000002 HC RX W HCPCS: Performed by: ANESTHESIOLOGY

## 2025-03-04 PROCEDURE — 2500000003 HC RX 250 WO HCPCS: Performed by: NURSE ANESTHETIST, CERTIFIED REGISTERED

## 2025-03-04 PROCEDURE — 99233 SBSQ HOSP IP/OBS HIGH 50: CPT | Performed by: INTERNAL MEDICINE

## 2025-03-04 PROCEDURE — 80053 COMPREHEN METABOLIC PANEL: CPT

## 2025-03-04 PROCEDURE — 82375 ASSAY CARBOXYHB QUANT: CPT

## 2025-03-04 PROCEDURE — 83050 HGB METHEMOGLOBIN QUAN: CPT

## 2025-03-04 PROCEDURE — 3600000008 HC SURGERY OHS BASE: Performed by: THORACIC SURGERY (CARDIOTHORACIC VASCULAR SURGERY)

## 2025-03-04 PROCEDURE — 6360000002 HC RX W HCPCS: Performed by: NURSE ANESTHETIST, CERTIFIED REGISTERED

## 2025-03-04 PROCEDURE — 83605 ASSAY OF LACTIC ACID: CPT

## 2025-03-04 PROCEDURE — 93321 DOPPLER ECHO F-UP/LMTD STD: CPT | Performed by: INTERNAL MEDICINE

## 2025-03-04 PROCEDURE — 86901 BLOOD TYPING SEROLOGIC RH(D): CPT

## 2025-03-04 PROCEDURE — 3600000018 HC SURGERY OHS ADDTL 15MIN: Performed by: THORACIC SURGERY (CARDIOTHORACIC VASCULAR SURGERY)

## 2025-03-04 PROCEDURE — 5A1945Z RESPIRATORY VENTILATION, 24-96 CONSECUTIVE HOURS: ICD-10-PCS | Performed by: STUDENT IN AN ORGANIZED HEALTH CARE EDUCATION/TRAINING PROGRAM

## 2025-03-04 PROCEDURE — 85027 COMPLETE CBC AUTOMATED: CPT

## 2025-03-04 PROCEDURE — 2580000003 HC RX 258: Performed by: NURSE PRACTITIONER

## 2025-03-04 PROCEDURE — 93308 TTE F-UP OR LMTD: CPT | Performed by: INTERNAL MEDICINE

## 2025-03-04 PROCEDURE — 0BH17EZ INSERTION OF ENDOTRACHEAL AIRWAY INTO TRACHEA, VIA NATURAL OR ARTIFICIAL OPENING: ICD-10-PCS | Performed by: STUDENT IN AN ORGANIZED HEALTH CARE EDUCATION/TRAINING PROGRAM

## 2025-03-04 PROCEDURE — APPNB30 APP NON BILLABLE TIME 0-30 MINS

## 2025-03-04 DEVICE — HEMASHIELD PLATINUM WOVEN STRAIGHT DOUBLE VELOUR VASCULAR GRAFT WITH GRAFT SIZER ACCESSORY
Type: IMPLANTABLE DEVICE | Site: CHEST | Status: FUNCTIONAL
Brand: HEMASHIELD

## 2025-03-04 RX ORDER — SODIUM CHLORIDE 9 MG/ML
INJECTION, SOLUTION INTRAVENOUS PRN
Status: DISCONTINUED | OUTPATIENT
Start: 2025-03-04 | End: 2025-03-07

## 2025-03-04 RX ORDER — AMIODARONE HYDROCHLORIDE 150 MG/3ML
INJECTION, SOLUTION INTRAVENOUS
Status: DISCONTINUED
Start: 2025-03-04 | End: 2025-03-05

## 2025-03-04 RX ORDER — HEPARIN SODIUM 1000 [USP'U]/ML
INJECTION, SOLUTION INTRAVENOUS; SUBCUTANEOUS
Status: DISCONTINUED | OUTPATIENT
Start: 2025-03-04 | End: 2025-03-04 | Stop reason: SDUPTHER

## 2025-03-04 RX ORDER — EPINEPHRINE 0.1 MG/ML
INJECTION INTRAVENOUS
Status: DISCONTINUED
Start: 2025-03-04 | End: 2025-03-05

## 2025-03-04 RX ORDER — HEPARIN SODIUM 10000 [USP'U]/ML
INJECTION, SOLUTION INTRAVENOUS; SUBCUTANEOUS PRN
Status: DISCONTINUED | OUTPATIENT
Start: 2025-03-04 | End: 2025-03-04 | Stop reason: HOSPADM

## 2025-03-04 RX ORDER — INDOMETHACIN 25 MG/1
CAPSULE ORAL
Status: DISCONTINUED
Start: 2025-03-04 | End: 2025-03-05

## 2025-03-04 RX ORDER — MAGNESIUM SULFATE HEPTAHYDRATE 40 MG/ML
INJECTION, SOLUTION INTRAVENOUS
Status: DISCONTINUED | OUTPATIENT
Start: 2025-03-04 | End: 2025-03-04 | Stop reason: SDUPTHER

## 2025-03-04 RX ORDER — ATROPINE SULFATE 0.1 MG/ML
INJECTION INTRAVENOUS
Status: DISCONTINUED
Start: 2025-03-04 | End: 2025-03-05

## 2025-03-04 RX ORDER — LIDOCAINE HYDROCHLORIDE 20 MG/ML
INJECTION, SOLUTION EPIDURAL; INFILTRATION; INTRACAUDAL; PERINEURAL
Status: DISCONTINUED | OUTPATIENT
Start: 2025-03-04 | End: 2025-03-04 | Stop reason: SDUPTHER

## 2025-03-04 RX ORDER — ROCURONIUM BROMIDE 10 MG/ML
INJECTION, SOLUTION INTRAVENOUS
Status: DISCONTINUED | OUTPATIENT
Start: 2025-03-04 | End: 2025-03-04 | Stop reason: SDUPTHER

## 2025-03-04 RX ORDER — ALBUMIN HUMAN 50 G/1000ML
12.5 SOLUTION INTRAVENOUS ONCE
Status: COMPLETED | OUTPATIENT
Start: 2025-03-04 | End: 2025-03-04

## 2025-03-04 RX ORDER — PHENYLEPHRINE HCL IN 0.9% NACL 0.4MG/10ML
SYRINGE (ML) INTRAVENOUS
Status: DISCONTINUED | OUTPATIENT
Start: 2025-03-04 | End: 2025-03-04 | Stop reason: SDUPTHER

## 2025-03-04 RX ORDER — SUCCINYLCHOLINE/SOD CL,ISO/PF 200MG/10ML
SYRINGE (ML) INTRAVENOUS
Status: DISCONTINUED | OUTPATIENT
Start: 2025-03-04 | End: 2025-03-04 | Stop reason: SDUPTHER

## 2025-03-04 RX ORDER — BUMETANIDE 0.25 MG/ML
1 INJECTION, SOLUTION INTRAMUSCULAR; INTRAVENOUS 2 TIMES DAILY
Status: DISCONTINUED | OUTPATIENT
Start: 2025-03-04 | End: 2025-03-07

## 2025-03-04 RX ORDER — FENTANYL CITRATE 50 UG/ML
50 INJECTION, SOLUTION INTRAMUSCULAR; INTRAVENOUS
Status: DISCONTINUED | OUTPATIENT
Start: 2025-03-04 | End: 2025-03-07

## 2025-03-04 RX ORDER — FENTANYL CITRATE 50 UG/ML
INJECTION, SOLUTION INTRAMUSCULAR; INTRAVENOUS
Status: DISCONTINUED | OUTPATIENT
Start: 2025-03-04 | End: 2025-03-04 | Stop reason: SDUPTHER

## 2025-03-04 RX ORDER — SODIUM CHLORIDE 9 MG/ML
INJECTION, SOLUTION INTRAVENOUS
Status: DISCONTINUED | OUTPATIENT
Start: 2025-03-04 | End: 2025-03-04 | Stop reason: SDUPTHER

## 2025-03-04 RX ORDER — LIDOCAINE HYDROCHLORIDE AND EPINEPHRINE 10; 10 MG/ML; UG/ML
20 INJECTION, SOLUTION INFILTRATION; PERINEURAL ONCE
Status: DISCONTINUED | OUTPATIENT
Start: 2025-03-04 | End: 2025-03-06

## 2025-03-04 RX ORDER — SODIUM CHLORIDE, SODIUM LACTATE, POTASSIUM CHLORIDE, CALCIUM CHLORIDE 600; 310; 30; 20 MG/100ML; MG/100ML; MG/100ML; MG/100ML
INJECTION, SOLUTION INTRAVENOUS
Status: DISCONTINUED | OUTPATIENT
Start: 2025-03-04 | End: 2025-03-04 | Stop reason: SDUPTHER

## 2025-03-04 RX ORDER — PROPOFOL 10 MG/ML
5-50 INJECTION, EMULSION INTRAVENOUS CONTINUOUS
Status: DISCONTINUED | OUTPATIENT
Start: 2025-03-04 | End: 2025-03-08 | Stop reason: HOSPADM

## 2025-03-04 RX ADMIN — EPINEPHRINE 3 MCG/MIN: 1 INJECTION INTRAMUSCULAR; INTRAVENOUS; SUBCUTANEOUS at 13:20

## 2025-03-04 RX ADMIN — Medication: at 04:02

## 2025-03-04 RX ADMIN — SODIUM CHLORIDE, PRESERVATIVE FREE 10 ML: 5 INJECTION INTRAVENOUS at 09:14

## 2025-03-04 RX ADMIN — Medication 240 MCG: at 13:39

## 2025-03-04 RX ADMIN — Medication: at 20:22

## 2025-03-04 RX ADMIN — PROPOFOL 50 MCG/KG/MIN: 10 INJECTION, EMULSION INTRAVENOUS at 18:42

## 2025-03-04 RX ADMIN — Medication 1 UNITS: at 18:37

## 2025-03-04 RX ADMIN — FENTANYL CITRATE 50 MCG: 0.05 INJECTION, SOLUTION INTRAMUSCULAR; INTRAVENOUS at 21:19

## 2025-03-04 RX ADMIN — MAGNESIUM SULFATE HEPTAHYDRATE 2000 MG: 40 INJECTION, SOLUTION INTRAVENOUS at 14:18

## 2025-03-04 RX ADMIN — WATER 2000 MG: 1 INJECTION INTRAMUSCULAR; INTRAVENOUS; SUBCUTANEOUS at 06:50

## 2025-03-04 RX ADMIN — Medication 120 MG: at 13:40

## 2025-03-04 RX ADMIN — PANTOPRAZOLE SODIUM 40 MG: 40 INJECTION, POWDER, LYOPHILIZED, FOR SOLUTION INTRAVENOUS at 21:00

## 2025-03-04 RX ADMIN — SODIUM CHLORIDE: 9 INJECTION, SOLUTION INTRAVENOUS at 13:39

## 2025-03-04 RX ADMIN — Medication: at 00:10

## 2025-03-04 RX ADMIN — PROPOFOL 50 MCG/KG/MIN: 10 INJECTION, EMULSION INTRAVENOUS at 21:43

## 2025-03-04 RX ADMIN — HEPARIN SODIUM 3000 UNITS: 1000 INJECTION INTRAVENOUS; SUBCUTANEOUS at 14:02

## 2025-03-04 RX ADMIN — FENTANYL CITRATE 100 MCG: 50 INJECTION INTRAMUSCULAR; INTRAVENOUS at 13:39

## 2025-03-04 RX ADMIN — SODIUM CHLORIDE, PRESERVATIVE FREE 10 ML: 5 INJECTION INTRAVENOUS at 20:23

## 2025-03-04 RX ADMIN — PANTOPRAZOLE SODIUM 40 MG: 40 INJECTION, POWDER, LYOPHILIZED, FOR SOLUTION INTRAVENOUS at 08:54

## 2025-03-04 RX ADMIN — ALBUMIN (HUMAN) 12.5 G: 12.5 INJECTION, SOLUTION INTRAVENOUS at 01:13

## 2025-03-04 RX ADMIN — PROPOFOL 100 MG: 10 INJECTION, EMULSION INTRAVENOUS at 13:39

## 2025-03-04 RX ADMIN — Medication: at 16:00

## 2025-03-04 RX ADMIN — WATER 2000 MG: 1 INJECTION INTRAMUSCULAR; INTRAVENOUS; SUBCUTANEOUS at 23:00

## 2025-03-04 RX ADMIN — ROCURONIUM BROMIDE 40 MG: 10 INJECTION, SOLUTION INTRAVENOUS at 13:45

## 2025-03-04 RX ADMIN — FENTANYL CITRATE 50 MCG: 0.05 INJECTION, SOLUTION INTRAMUSCULAR; INTRAVENOUS at 17:18

## 2025-03-04 RX ADMIN — NOREPINEPHRINE BITARTRATE 20 MCG/MIN: 64 SOLUTION INTRAVENOUS at 02:52

## 2025-03-04 RX ADMIN — SODIUM CHLORIDE, POTASSIUM CHLORIDE, SODIUM LACTATE AND CALCIUM CHLORIDE: 600; 310; 30; 20 INJECTION, SOLUTION INTRAVENOUS at 13:20

## 2025-03-04 RX ADMIN — PROPOFOL 20 MCG/KG/MIN: 10 INJECTION, EMULSION INTRAVENOUS at 14:35

## 2025-03-04 RX ADMIN — HEPARIN SODIUM 1400 UNITS: 1000 INJECTION INTRAVENOUS; SUBCUTANEOUS at 12:03

## 2025-03-04 RX ADMIN — LIDOCAINE HYDROCHLORIDE 100 MG: 20 INJECTION, SOLUTION EPIDURAL; INFILTRATION; INTRACAUDAL; PERINEURAL at 13:39

## 2025-03-04 RX ADMIN — DOBUTAMINE HYDROCHLORIDE 5 MCG/KG/MIN: 200 INJECTION INTRAVENOUS at 02:51

## 2025-03-04 RX ADMIN — VASOPRESSIN 0.04 UNITS/MIN: 20 INJECTION INTRAVENOUS at 17:23

## 2025-03-04 RX ADMIN — BUMETANIDE 1 MG: 0.25 INJECTION INTRAMUSCULAR; INTRAVENOUS at 17:18

## 2025-03-04 RX ADMIN — Medication: at 03:52

## 2025-03-04 RX ADMIN — ROCURONIUM BROMIDE 10 MG: 10 INJECTION, SOLUTION INTRAVENOUS at 13:39

## 2025-03-04 RX ADMIN — BIVALIRUDIN 0.07 MG/KG/HR: 250 INJECTION, POWDER, LYOPHILIZED, FOR SOLUTION INTRAVENOUS at 16:20

## 2025-03-04 RX ADMIN — Medication 6 MG: at 01:25

## 2025-03-04 RX ADMIN — Medication: at 08:26

## 2025-03-04 RX ADMIN — VASOPRESSIN 0.03 UNITS/MIN: 20 INJECTION INTRAVENOUS at 09:20

## 2025-03-04 RX ADMIN — Medication: at 04:15

## 2025-03-04 RX ADMIN — EPINEPHRINE 5 MCG/MIN: 1 INJECTION INTRAMUSCULAR; INTRAVENOUS; SUBCUTANEOUS at 16:09

## 2025-03-04 RX ADMIN — Medication 2530 ML/HR: at 16:08

## 2025-03-04 RX ADMIN — DOBUTAMINE HYDROCHLORIDE 5 MCG/KG/MIN: 200 INJECTION INTRAVENOUS at 20:38

## 2025-03-04 RX ADMIN — WATER 2000 MG: 1 INJECTION INTRAMUSCULAR; INTRAVENOUS; SUBCUTANEOUS at 13:30

## 2025-03-04 RX ADMIN — HEPARIN SODIUM 1400 UNITS: 1000 INJECTION INTRAVENOUS; SUBCUTANEOUS at 12:05

## 2025-03-04 RX ADMIN — SODIUM BICARBONATE: 84 INJECTION, SOLUTION INTRAVENOUS at 15:54

## 2025-03-04 RX ADMIN — Medication: at 16:10

## 2025-03-04 ASSESSMENT — PAIN SCALES - GENERAL
PAINLEVEL_OUTOF10: 0
PAINLEVEL_OUTOF10: 6
PAINLEVEL_OUTOF10: 6
PAINLEVEL_OUTOF10: 0
PAINLEVEL_OUTOF10: 0

## 2025-03-04 ASSESSMENT — PULMONARY FUNCTION TESTS
PIF_VALUE: 26
PIF_VALUE: 23
PIF_VALUE: 28
PIF_VALUE: 24

## 2025-03-04 NOTE — PROGRESS NOTES
98.3 °F (36.8 °C), resp. rate 24, height 1.753 m (5' 9\"), weight 99.2 kg (218 lb 11.1 oz), SpO2 100%.  Temp (24hrs), Av.9 °F (36.6 °C), Min:97.3 °F (36.3 °C), Max:98.5 °F (36.9 °C)      PA: 44-53/17-33  CVP: 10-19  CI: 1.6-2.1    Continuous Infusions:  Dobutamine 5 mcg/kg/min  Norepi 20 mcg/min    Oxygen Flow Rate: O2 Flow Rate (L/min): 2 L/min    Oxygen Delivery Method: O2 Device: Nasal cannula    EKG/Rhythm:  -130s    Extubation Date / Time: Not intubated     CXR:  Xray Result (most recent):  XR CHEST PORTABLE 2025    Narrative  INDICATION:    eval swan    EXAMINATION:  AP CHEST, PORTABLE    COMPARISON:     FINDINGS: Single AP portable view of the chest at 939 hours demonstrates  interval placement of a right IJ central venous catheter, with a Middleton-Mac  catheter terminating in the interlobar right pulmonary artery. There is no  pneumothorax. The cardiomediastinal silhouette is stable. There is chronic  cardiomegaly and increasing bibasilar atelectasis and small right pleural  effusion.    Impression  Right IJ approach Middleton-Mac catheter as described. No pneumothorax. Increasing  small right pleural effusion and mild pulmonary fluid overload, together with  mild bibasilar atelectasis.      Electronically signed by Mc Richardson MD      Admission Weight: Last Weight   Weight - Scale: 101.1 kg (222 lb 14.2 oz) Weight - Scale: 99.2 kg (218 lb 11.1 oz)     Intake / Output / Drain:  Current Shift:  0701 -  1900  In: 42.9 [I.V.:42.9]  Out: 55.3   Last 24 hrs.:   Intake/Output Summary (Last 24 hours) at 3/4/2025 0857  Last data filed at 3/4/2025 0800  Gross per 24 hour   Intake 2467.05 ml   Output 3291.7 ml   Net -824.65 ml       EXAM:  General:  No acute distress             Lungs:   Clear to auscultation bilaterally.   Incision:  No current incision   Heart:  Regular rate and rhythm, S1, S2 normal, no murmur, click, rub or gallop.   Abdomen:   Soft, non-tender. Bowel sounds  Continue norepi for MAP > 65; add vasopressin  - High norepi requirements overnight, could be contributing to tachycardia and elevated PA numbers at this level  - Lactic acid is down trending, monitor daily   - LFTs and renal labs remain elevated, but improving  - Continue ABX as discussed below     EDWIGE on CKD, IIIa: Baseline Cr 1.4, elevated to 2.7 on admission   - Dialysis line placed 3/3, CRRT started  - Continue gentle fluid removal  - Continue richmond catheter, UO improved last 24 hours  - Nephrology following, appreciate their assistance    Concern for GI Bleed:  - Noted melena, +occult stool while on heparin infusion, Hgb down to 7s, required blood transfusion  - Heparin infusion off, ASA held since 2/28  - GI consulted, ok to resume anticoagulation -- will plan to scope if re-bleeds with MCS    MSSA Bacteremia:   - Blood culture 2/26 + MSSA  - Blood culture 3/2 NGTD              - Vancomycin (2/25-3/1)              - Zosyn (2/26-3/2)  - Transitioned to Ancef on 3/2  - CBC daily  - Will likely need LISA to rule out vegetation given unknown source -- possibly complete today with Impella placement  - ID consulted, recommend LISA to rule out vegetation on valves, ICD leads     Concern for Seizure: Staff reports multiple 10-30 second time periods of unresponsiveness  - EEG negative  - Consider Head CT  - Could have been related to hypotension as patient did not have [place] during those periods and BP was low    Rest of care per primary team.      Signed By: DANIELLE FORREST - NP

## 2025-03-04 NOTE — PROGRESS NOTES
(not separately reported) as noted above  Documenting clinical information in the electronic health records (e.g. problem list, visit note) on the day of the encounter.    Ban Guzman MD FACP.                Impression/Plan     70 y.o. 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    High grade MSSA bacteremia in setting of ICD  Acute renal failure/Shock  Leukocytosis  + blood cx 2/26, 2/27  Blood cx 3/1, 3/2 NGTD    Unclear etiology for MSSA bacteremia, No apparent lines placed at [facility]. Lines have been replaced at [facility], only PIV remaining from [facility].    TTE negative for obvious vegetation but only fair quality. LISA with possible Impella today. D/w ICU team and HF team to eval for ICD lead infection and vegetation    Continue renally dosed cefazolin    Follow blood cultures    Follow WBC - stable    Nephrology following, on CRRT    D/w Dr. Guzman, pt, ICU team, HF team         Anti-infectives:   Cefazolin IV  S/p Vancomycin 2/25 - 2/28  s/p Zosyn 2/26 - 3/2    Subjective:   Vasopressor requirements worsening, added vasopressin to Levophed today but patient was also started on CRRT which is likely contributing.  Remains on dobutamine.  No complaints.           Today's Date:  March 4, 2025  Date of Admission: 3/2/2025   Referring Physician: Huber Huggins    Patient is a 70 y.o. 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 problem list, visit note) on the day of the encounter     Signed By: DANIELLE Velarde NP     March 4, 2025

## 2025-03-04 NOTE — PROGRESS NOTES
2000: Bedside and Verbal shift change report given to LYNN Sheikh (oncoming nurse) by LYNN Kerr & LYNN Linn (offgoing nurse). Report included the following information Nurse Handoff Report and Recent Results.     0100: Since start of shift, patient requiring increased pressor need to keep MAP > 65 while pulling factor of 50 on CRRT. Patient now on 20 mcg/min of levo. Discussed with Intensivist. Orders to give 250 albumin    0500: Technician at bedside for abdominal ultrasound.     0800: Bedside and Verbal shift change report given to LYNN Delgado (oncoming nurse) by LYNN Sheikh (offgoing nurse). Report included the following information Nurse Handoff Report, Index, ED Encounter Summary, ED SBAR, Intake/Output, MAR, Recent Results, and Cardiac Rhythm sinus tachycardia .

## 2025-03-04 NOTE — PROGRESS NOTES
0730: Bedside and Verbal shift change report given to LYNN Delgado (oncoming nurse) by LYNN Sheikh (offgoing nurse). Report included the following information Nurse Handoff Report, Index, Intake/Output, MAR, and Recent Results.     0849: Nephro at bedside, updated on pulling 25mls due to pressor needs. Plan to keep pulling 25 and increase to 50 as pt tolerates.     0920: Vaso started per YOLY Jordan with plan to decrease levo as pt tolerates.     0936: MD Joseluis at bedside, Updated about index of 1.8, SVO2 in low 40s, and high PA pressures. Plan for impella this afternoon.     1012: YOLY Rodriguez rounding. Updated on low index, low SVO2, high PA pressures, and pressor requirements.     1200: CRRT stopped for impella placement    1300: MD Joseluis updated about dark/black stool.     1315: Patient transferred out to OR. Report given to Anesthesia. Patient transferred with nurse and monitor.     1500: Patient transferred in from OR.  Patient assessed and vitals obtained upon arrival to unit.   Impella at P-6. Plan to keep intubated overnight  MD Joseluis at bedside obtained orders for labs    1535: ABG: PH 7.411 / CO 33.5 /  / HCO 21.3     1551: Dialysis RN called about needing to restart CRRT    1558: MD Joseluis decreased FIO2 to 40%, RT updated    1622: MD Joseluis updated about lactic of 4.2. RN to redraw in 4 hours.     1630: CRRT restarted factor 25. Per MD Joseluis RN to increase factor as patient tolerates.     1709: MD Joseluis increased impella to P-7    1717: Lidocaine-epinephrine injection ordered per MD Joseluis due to continued oozing from R groin from attempted line insertion yesterday.    1740: MD Joseluis updated about ionized padmini of 1.07. No orders to replace at this time    1815: MD Joseluis at bedside updated about increased CRRT factor to 150 due to MAPs in high 70s. RN to decrease epi if MAPs continue in 70s. Plan to hold off on lidocaine/epinephrine injection.    1930: Bedside and Verbal shift change report  given to LYNN Hernandez (oncoming nurse) by LYNN Delgado (offgoing nurse). Report included the following information Nurse Handoff Report, Index, Intake/Output, MAR, and Recent Results.

## 2025-03-04 NOTE — PROGRESS NOTES
ADVANCED HEART FAILURE CENTER  [facility] in [place], [place]  Inpatient Progress Note      Patient name: Galdino Melissa  Patient : 1955  Patient MRN: [id number]  Consulting MD: Huber Huggins MD  Date of service: 25    REASON FOR REFERRAL:  Management of acute on chronic systolic heart failure    PLAN OF CARE:  Mixed cardiogenic and septic shock.  tMCS placed today 5.5 impella 3/4/25.  Advanced heart failure therapies work up initiated for OHT/LVAD.    HPI:  70-year-old male with history of ICM, HFrEF 20-25% since 2019, CAD s/p PCI to LAD, LCx and RCA, severe MR & moderate TR, atrial flutter, CKD was admitted on 2025 with worsening SOB, CARRASQUILLO patient was found to have lactic acidosis.  He was diuresed and started on dobutamine.  There was concern for ischemic bowel.  Patient was started on heparin drip.  Then there was dark stools noted by RN and positive occult blood and heparin was stopped.  Patient was hypotensive and found to have MSSA bacteremia.  He required Levophed for blood pressure support and was transferred to the ICU. lactic acidosis with up to 11.5 now trending down to 2.2.  Given patient's complex medical condition he was transferred to [facility] for further workup and treatment.  Patient also found to have oliguric EDWIGE with creatinine of 6.11 from a baseline of 1.4 and hyponatremic with a sodium of 126, nephrology was consulted and patient being started on CRRT for ATN versus CRS.  He also has liver congestion with a peak T. bili of 4.2, AST 88, .  PA catheter placed at bedside: CVP 13, PA 44/22/, MV 49%, CI 2.8 on dobutamine of 5 mcg/kg/min.  Patient lives with girlfriend, has adult children and niece is at bedside.  He denies tobacco use.  He does drink alcohol 2-3 drinks per day with a max of 6/day.    Interval Hx:  Impella 5.5 placed in OR by Dr. Hampton. Pt continues with inotrope/pressor support currently on Levophed, Epi, Vaso, Dobutamine. CRT  Sedated  HEENT: Intubated and sedated   Neck: Supple. No evidence of thyroid enlargements or lymphadenopathy.  JVD: Cannot be appreciated   Lungs: Coarse BS bilaterally  Heart: impella hum present, Tachycardia but regular rhythm with normal S1 and S2. No murmurs, gallops or rubs.  Abdomen: Soft, nondistended, no mass or tenderness. No organomegaly or hernia. Bowel sounds present.  Genitourinary and rectal: deferred  Extremities: Trace LE edema.  No cyanosis, clubbing  Neurologic: No focal sensory or motor deficits are noted. Grossly intact.  Psychiatric: Sedated  Skin: Upper chest impella site CDI, Warm, dry and well perfused.     REVIEW OF SYSTEMS: limited as pt intubated and sedated   General: Denies fever.  Ear, nose and throat: Denies difficulty hearing, sinus problems, nosebleeds  Cardiovascular: see above in the interval history  Respiratory: Denies cough, wheezing, sputum production, hemoptysis.  Gastrointestinal: Denies heartburn, constipation, diarrhea, abdominal pain, nausea, blood in stool  Kidney and bladder: Denies painful urination, frequent urination  Musculoskeletal: Denies joint pain, muscle weakness  Skin and hair: Denies change in existing skin lesions    PAST MEDICAL HISTORY:  Past Medical History:   Diagnosis Date    Acute on chronic systolic congestive heart failure (HCC) 12/11/2024    AICD (automatic cardioverter/defibrillator) present 03/2021    put in at [facility].     Arrhythmia     CAD (coronary artery disease)     NSTEMI with PCI of LAD (2 stents), LCx, RCA    Congestive heart failure (HCC) 10/2019    DM (diabetes mellitus) (HCC) 3/30/2010    HTN (hypertension) 3/30/2010    Hypercholesterolemia        PAST SURGICAL HISTORY:  Past Surgical History:   Procedure Laterality Date    CARDIAC CATHETERIZATION  10/2019    4x stents    CARDIAC PROCEDURE N/A 9/19/2023    Left and right heart cath / coronary angiography performed by Rickie Walker III, DO at [facility] CARDIAC CATH LAB    COLONOSCOPY   1-11    diverticulosis- Dr. Londono    PACEMAKER      UPPER GI ENDOSCOPY,BIOPSY  10/27/2021            FAMILY HISTORY:  Family History   Problem Relation Age of Onset    Cancer Father         unknown    Hypertension Mother        SOCIAL HISTORY:  Social History     Socioeconomic History    Marital status:      Spouse name: None    Number of children: None    Years of education: None    Highest education level: None   Tobacco Use    Smoking status: Never    Smokeless tobacco: Never   Substance and Sexual Activity    Alcohol use: Not Currently     Alcohol/week: 16.7 standard drinks of alcohol    Drug use: Not Currently    Sexual activity: Yes     Partners: Female   Social History Narrative    Single, 2 children, works for [employer]. Retired 2012.     Social Determinants of Health     Financial Resource Strain: Medium Risk (12/11/2024)    Overall Financial Resource Strain (CARDIA)     Difficulty of Paying Living Expenses: Somewhat hard   Food Insecurity: No Food Insecurity (2/21/2025)    Hunger Vital Sign     Worried About Running Out of Food in the Last Year: Never true     Ran Out of Food in the Last Year: Never true   Transportation Needs: No Transportation Needs (2/21/2025)    PRAPARE - Transportation     Lack of Transportation (Medical): No     Lack of Transportation (Non-Medical): No   Recent Concern: Transportation Needs - Unmet Transportation Needs (12/11/2024)    PRAPARE - Transportation     Lack of Transportation (Non-Medical): Yes   Physical Activity: Inactive (1/20/2025)    Exercise Vital Sign     Days of Exercise per Week: 0 days     Minutes of Exercise per Session: 0 min   Stress: No Stress Concern Present (9/28/2023)    [employer] [place] [employer] - Occupational Stress Questionnaire     Feeling of Stress : Only a little   Social Connections: Moderately Isolated (9/28/2023)    Social Connection and Isolation Panel [NHANES]     Frequency of Communication with Friends

## 2025-03-04 NOTE — PROGRESS NOTES
ADVANCED HEART FAILURE CENTER  [facility] in [place], [place]       Attempted to see patient for LVAD education, per patient's nurse Sandy patient is off floor for Implella implant. Also stated patient is unable to ambulate at this time, unable to complete 6mw due to femoral CRRT line.      Per ELMO Denise unable to complete PYP at this time due to CRRT.      Maegan Hull RN

## 2025-03-04 NOTE — PROGRESS NOTES
LVAD workup initiated        Pt presents to [facility] as transfer from [facility] for further Management of acute on chronic systolic heart failure. Pt with extensive medical hx and complex medical conditions (see Cardiologist 3/3/25 Consult note). LVAD workup initiated on 3/3/25.     Plan: 1) SW will meet with pt and family, introduce role of AHF/ LVAD SW, and purpose of LVAD psychosocial assessment, financial assessment, and caregiver assessment.   2) Full psychosocial assessment, financial assessment, and caregiver assessment pending.       Pio Pozo, MSW, LCSW  Clinical   [facility]  Advanced Heart Failure  [phone]

## 2025-03-04 NOTE — PROGRESS NOTES
[address], [facility] [place] [address]  Phone: [phone]   [phone]    [email or web address]     Nephrology Progress Note    Patient Name : Galdino Melissa      : 1955     MRN : [id number]  Date of Admission : 3/2/2025  Date of Servive : 25    CC:  Follow up for EDWIGE on CKD       Assessment and Plan   EDWIGE:  - 2/2 ATN from septic shock vs CRS  - US on admission neg  - cont CVVH, factor as tolerated  - serial labs    HFrEF:  - EF 20%, AICD in place  - failing medical therapy  - plan RRT as above for volume removal  - AHF team following    MSSA bacteremia:  - on abx  - ? Source, AICD, vs valve    Shock:  - combo septic and cardiogenic  - on pressors and inotropes    DM2    Syncope    aflutter     Interval History:  Seen and examined on CRRT, factor of 25/hr. On dobutamine and levophed. Minimal UOP reported.  C/o fatigue. No cp or sob.    Review of Systems: Pertinent items are noted in HPI.    Current Medications:   Current Facility-Administered Medications   Medication Dose Route Frequency    melatonin tablet 6 mg  6 mg Oral Nightly PRN    VASOpressin (VASOSTRICT) 20 units in sodium chloride 0.9% 100 mL infusion  0.01-0.03 Units/min IntraVENous Continuous    pantoprazole (PROTONIX) 40 mg in sodium chloride (PF) 0.9 % 10 mL injection  40 mg IntraVENous Q12H    prismaSATE BGK 4/2.5 dialysis solution   Dialysis Continuous    heparin (porcine) injection 1,100-1,900 Units  1,100-1,900 Units IntraCATHeter PRN    And    heparin (porcine) injection 1,100-1,900 Units  1,100-1,900 Units IntraCATHeter PRN    ceFAZolin (ANCEF) 2,000 mg in sterile water 20 mL IV syringe  2,000 mg IntraVENous Q8H    norepinephrine (LEVOPHED) 16 mg in sodium chloride 0.9 % 250 mL infusion (premix)  1-100 mcg/min IntraVENous Continuous    sodium chloride flush 0.9 % injection 5-40 mL  5-40 mL IntraVENous 2 times per day    sodium chloride flush 0.9 % injection 5-40 mL  5-40 mL

## 2025-03-04 NOTE — CONSENT
Informed Consent for Blood Component Transfusion Note    I have discussed with the patient the rationale for blood component transfusion; its benefits in treating or preventing fatigue, organ damage, or death; and its risk which includes mild transfusion reactions, rare risk of blood borne infection, or more serious but rare reactions. I have discussed the alternatives to transfusion, including the risk and consequences of not receiving transfusion. The patient had an opportunity to ask questions and had agreed to proceed with transfusion of blood components.    Electronically signed by DANIELLE FORREST NP on 3/4/25 at 8:56 AM EST

## 2025-03-04 NOTE — PROGRESS NOTES
Cardiac Surgery Coordinator- Unable to locate the family of Galdino Melissa, placed update call to Ms Law. Provided update from the OR. She is without questions at this time. Will continue to follow.

## 2025-03-04 NOTE — PROGRESS NOTES
[facility] - [facility]  GARRICK Gonzalez  [phone]                    GASTROENTEROLOGY CONSULTATION NOTE              NAME:  Galdino Melissa   :   1955   MRN:   [id number]     Chief Complaint:    SOB, Shock     History of Present Illness:    Patient is a 70 y.o. male with history of ICM, HFrEF 20-25% since 2019, CAD s/p PCI to LAD, LCx and RCA, severe MR & moderate TR, atrial flutter, CKD who was admitted on 2025 with worsening SOB, CARRASQUILLO patient was found to have lactic acidosis along with acute on chronic heart failure/exacerbation.    We are consulted for evaluation of reported melena and concern for possible SMA thrombosis.     He was initially at [facility] but was transferred to [facility] for advanced cardiac care.     Reportedly he was started on anti-coagulation previously during hospitalization but had drop in Hgb along with reports of melena. His CT suggested possible SMA thrombosis. However, given benign abdominal exam, surgery did not feel additional imaging with CTA was appropriate given his acute on chronic renal failure. His anticoagulation was stopped though and his Hgb has remained stable.    3/4/25  No bleeding. Stable clinically.          PMH:  Past Medical History:   Diagnosis Date    Acute on chronic systolic congestive heart failure (HCC) 2024    AICD (automatic cardioverter/defibrillator) present 2021    put in at [facility].     Arrhythmia     CAD (coronary artery disease)     NSTEMI with PCI of LAD (2 stents), LCx, RCA    Congestive heart failure (HCC) 10/2019    DM (diabetes mellitus) (HCC) 3/30/2010    HTN (hypertension) 3/30/2010    Hypercholesterolemia        PSH:  Past Surgical History:   Procedure Laterality Date    CARDIAC CATHETERIZATION  10/2019    4x stents    CARDIAC PROCEDURE N/A 2023    Left and right heart cath / coronary angiography performed by Rickie Walker III, DO at [facility] CARDIAC CATH LAB    COLONOSCOPY  1-11    diverticulosis- Dr. Londono     PACEMAKER      UPPER GI ENDOSCOPY,BIOPSY  10/27/2021            Allergies:  Allergies   Allergen Reactions    Glipizide Other (See Comments)     dizziness       Home Medications:  Prior to Admission Medications   Prescriptions Last Dose Informant Patient Reported? Taking?   FARXIGA 10 MG tablet  Self No No   Sig: Take 1 tablet by mouth daily For diabetes and heart.   apixaban (ELIQUIS) 5 MG TABS tablet  Self No No   Sig: Take 1 tablet by mouth 2 times daily To prevent stroke.   aspirin 81 MG chewable tablet  Self No No   Sig: Take 1 tablet by mouth daily   atorvastatin (LIPITOR) 80 MG tablet  Self No No   Sig: TAKE 1 TABLET BY MOUTH DAILY   bumetanide (BUMEX) 2 MG tablet  Self No No   Sig: Take 1 tablet by mouth 2 times daily For swelling in feet   ezetimibe (ZETIA) 10 MG tablet  Self No No   Sig: TAKE 1 TABLET BY MOUTH DAILY   losartan (COZAAR) 50 MG tablet  Self No No   Sig: TAKE 1 TABLET BY MOUTH TWICE DAILY   metFORMIN (GLUCOPHAGE) 1000 MG tablet  Self No No   Sig: Take 1 tablet by mouth with breakfast and with evening meal   metoprolol succinate (TOPROL XL) 50 MG extended release tablet  Self No No   Sig: Take 1 tablet by mouth daily To slow heart rate down.   potassium chloride (KLOR-CON) 20 MEQ packet  Self Yes No   Sig: Take 20 mEq by mouth daily   spironolactone (ALDACTONE) 25 MG tablet  Self Yes No   Sig: Take 1 tablet by mouth daily   triamcinolone (KENALOG) 0.1 % cream  Self No No   Sig: Apply topically 2 times daily.   Patient not taking: Reported on 2/21/2025      Facility-Administered Medications: None       Hospital Medications:  Current Facility-Administered Medications   Medication Dose Route Frequency    melatonin tablet 6 mg  6 mg Oral Nightly PRN    VASOpressin (VASOSTRICT) 20 units in sodium chloride 0.9% 100 mL infusion  0.01-0.03 Units/min IntraVENous Continuous    atropine 1 MG/10ML injection        EPINEPHrine 1 MG/10ML IV injection (ACLS)        sodium bicarbonate 8.4 % injection         amiodarone (CORDARONE) 150 MG/3ML injection        0.9 % sodium chloride infusion   IntraVENous PRN    pantoprazole (PROTONIX) 40 mg in sodium chloride (PF) 0.9 % 10 mL injection  40 mg IntraVENous Q12H    prismaSATE BGK 4/2.5 dialysis solution   Dialysis Continuous    heparin (porcine) injection 1,100-1,900 Units  1,100-1,900 Units IntraCATHeter PRN    And    heparin (porcine) injection 1,100-1,900 Units  1,100-1,900 Units IntraCATHeter PRN    ceFAZolin (ANCEF) 2,000 mg in sterile water 20 mL IV syringe  2,000 mg IntraVENous Q8H    norepinephrine (LEVOPHED) 16 mg in sodium chloride 0.9 % 250 mL infusion (premix)  1-100 mcg/min IntraVENous Continuous    sodium chloride flush 0.9 % injection 5-40 mL  5-40 mL IntraVENous 2 times per day    sodium chloride flush 0.9 % injection 5-40 mL  5-40 mL IntraVENous PRN    0.9 % sodium chloride infusion   IntraVENous PRN    ondansetron (ZOFRAN-ODT) disintegrating tablet 4 mg  4 mg Oral Q8H PRN    Or    ondansetron (ZOFRAN) injection 4 mg  4 mg IntraVENous Q6H PRN    polyethylene glycol (GLYCOLAX) packet 17 g  17 g Oral Daily PRN    acetaminophen (TYLENOL) tablet 650 mg  650 mg Oral Q6H PRN    Or    acetaminophen (TYLENOL) suppository 650 mg  650 mg Rectal Q6H PRN    DOBUTamine (DOBUTREX) 500 mg in dextrose 5 % 250 mL infusion  2.5-10 mcg/kg/min IntraVENous Continuous    glucose chewable tablet 16 g  4 tablet Oral PRN    dextrose bolus 10% 125 mL  125 mL IntraVENous PRN    Or    dextrose bolus 10% 250 mL  250 mL IntraVENous PRN    glucagon injection 1 mg  1 mg SubCUTAneous PRN    dextrose 10 % infusion   IntraVENous Continuous PRN    insulin lispro (HUMALOG,ADMELOG) injection vial 0-4 Units  0-4 Units SubCUTAneous 4 times per day    prochlorperazine (COMPAZINE) injection 10 mg  10 mg IntraVENous Q6H PRN    heparin (porcine) injection 5,000 Units  5,000 Units SubCUTAneous 3 times per day     Facility-Administered Medications Ordered in Other Encounters   Medication Dose Route

## 2025-03-04 NOTE — ANESTHESIA PROCEDURE NOTES
Procedure Performed: LISA       Start Time:  3/4/2025 3:10 PM       End Time:      Anesthesia Information  Performed Personally  Anesthesiologist:  Tulio Chisholm MD        Preanesthesia Checklist:  Patient identified, IV assessed, risks and benefits discussed, monitors and equipment assessed, procedure being performed at surgeon's request and anesthesia consent obtained.    General Procedure Information  Diagnostic Indications for Echo:  assessment of ascending aorta, assessment of surgical repair, hemodynamic monitoring and assessment of valve function  Location performed:  OR  Intubated  Bite block placed  Heart visualized  Probe Insertion:  Easy  Probe Type:  Mulitplane and biplane  Modalities:  2D, 3D, color flow mapping, continuous wave Doppler, pulse wave Doppler and M-mode    Echocardiographic and Doppler Measurements    Ventricles    Ventricle  Cavity Size  Cavity          Dimension  Hypertrophy  Thrombus  Global FXN  EF    RV  dilated    No  No  severely impaired      LV  dilated    No  No  severely impaired (<30%)       Ventricular Findings Comments:  LVEF <10%, Severe RV dysfunction.    Ventricular Regional Function:  1- Basal Anteroseptal:  hypokinetic  2- Basal Anterior:  hypokinetic  3- Basal Anterolateral:  hypokinetic  4- Basal Inferolateral:  hypokinetic  5- Basal Inferior:  hypokinetic  6- Basal Inferoseptal:  hypokinetic  7- Mid Anteroseptal:  hypokinetic  8- Mid Anterior:  hypokinetic  9- Mid Anterolateral:  hypokinetic  10- Mid Inferolateral:  hypokinetic  11- Mid Inferior:  hypokinetic  12- Mid Inferoseptal:  hypokinetic  13- Apical Anterior:  hypokinetic  14- Apical Lateral:  hypokinetic  15- Apical Inferior:  hypokinetic  16- Apical Septal:  hypokinetic  17- [place]:  hypokinetic        Valves     Valves  Annulus  Stenosis Measurements   Regurg  Leaflet   Morph  Leaflet   Motion Valve Comments    Aortic  Stenosis none.            none   thickened normal Tri-leaflet AV with no stenosis

## 2025-03-04 NOTE — PROGRESS NOTES
CRITICAL CARE PROGRESS NOTE    Name: Galdino Melissa   : 1955   MRN: [id number]   Date: 3/4/2025      ICU Diagnosis      Mixed Septic and Cardiogenic Shock  Acute Renal Failure    Overnight Events   Continued increase in vasopressor requirements.     ROS negative except as otherwise documented.     A/P   This is a 70 year old male with history of HTN, HLD, DM, PH and HFrEF (20%) who presented to OSH on  with SOB - admitted to the ICU for mixed septic and cardiogenic shock.     NEUROLOGICAL:    Mentation baseline. No focal deficits. Intermittent briefly unresponsive - likely due to episodic low perfusion. DDX seizure but reassured by normal EEG.   - tylenol PRN pain  - oxycodone PRN severe pain   - CTH when able  - consider neurology consult    PULMONOLOGY:   2L O2. CXR with interstitial edema  - likely due to LV failure.   - goal SpO2>=92, pH>=7.30  - O2 PRN; wean as tolerated  - IS  - OOB to chair     CARDIOVASCULAR:   Shock - predominant cardiogenic. CI 1.8-2.1. Likely due to LV failure and severe MR. Note RV dysfunction as well.   - CHF consulted  - CTS consulted - discuss 5.5   - continue dobutamine  - continue levophed  - hold GDMT    GASTROINTESTINAL:   Reported GIB at OSH. CT with possible SMA thrombosis.   - IV PPI BID  - GI consulted - will consider scope if supported  - vascular consulted - doubt real SMA occlusion    RENAL/ELECTROLYTE:   Acute renal failure. Likely ATN due to shock.  - nephrology consult  - goal K>=4, Mg>=2, Phos >3  - daily BMP  - strict I/O's; daily weights  - avoid nephrotoxic medications    ENDOCRINE:   - SSI    ID/MICRO:   MSSA bacteremia. No clear source.   - ID consulted  - continue cefazolin  - consider LISA    ICU DAILY CHECKLIST     Code Status: Full  DVT Prophylaxis: SCDs  T/L/D: PIV, trialysis  SUP: PI  Diet: NPO  ABCDEF Bundle/Checklist Completed:Yes  Disposition: Stay in ICU  Multidisciplinary Rounds Completed:  Yes  Patient/Family Updated: Yes    OBJECTIVE:     Blood  pressure 105/80, pulse (!) 135, temperature 98.3 °F (36.8 °C), resp. rate 24, height 1.753 m (5' 9\"), weight 99.2 kg (218 lb 11.1 oz), SpO2 100%.  Physical Exam  Gen: NAD  HEENT: NC/AT, supple  Cardiac: RRR, no M/R/G  Pulm: CTA bilaterally  ABD: S/NT/ND, normal bowel sounds  Extremities: no edema  Skin: no rash  Neuro: A/O X 3, no focal deficits     I have personally reviewed and interpreted all relevant imaging and laboratory data.     CRITICAL CARE DOCUMENTATION  I had a face to face encounter with the patient, reviewed and interpreted patient data including clinical events, labs, images, vital signs, I/O's, and examined patient.  I have discussed the case and the plan and management of the patient's care with the consulting services, the bedside nurses and the respiratory therapist.      NOTE OF PERSONAL INVOLVEMENT IN CARE   This patient has a high probability of imminent, clinically significant deterioration, which requires the highest level of preparedness to intervene urgently. I participated in the decision-making and personally managed or directed the management of the following life and organ supporting interventions that required my frequent assessment to treat or prevent imminent deterioration.    I personally spent 45 minutes of critical care time.  This is time spent at this critically ill patient's bedside actively involved in patient care as well as the coordination of care.  This does not include any procedural time which has been billed separately.    Huber Huggins M.D.  [facility]  3/4/2025

## 2025-03-04 NOTE — ANESTHESIA PRE PROCEDURE
tablet 6 mg  6 mg Oral Nightly PRN Madi Rodriguez MD   6 mg at 03/04/25 0125    VASOpressin (VASOSTRICT) 20 units in sodium chloride 0.9% 100 mL infusion  0.01-0.03 Units/min IntraVENous Continuous Vivian Hester APRN - NP 9 mL/hr at 03/04/25 0920 0.03 Units/min at 03/04/25 0920    pantoprazole (PROTONIX) 40 mg in sodium chloride (PF) 0.9 % 10 mL injection  40 mg IntraVENous Q12H Huber Huggins MD   40 mg at 03/04/25 0854    prismaSATE BGK 4/2.5 dialysis solution   Dialysis Continuous Rigo Kirkland MD 2,530 mL/hr at 03/04/25 0826 New Bag at 03/04/25 0826    heparin (porcine) injection 1,100-1,900 Units  1,100-1,900 Units IntraCATHeter PRN Rigo Kirkland MD        And    heparin (porcine) injection 1,100-1,900 Units  1,100-1,900 Units IntraCATHeter PRN Rgio Kirkland MD        ceFAZolin (ANCEF) 2,000 mg in sterile water 20 mL IV syringe  2,000 mg IntraVENous Q8H Huber Arboleda MD   2,000 mg at 03/04/25 0650    norepinephrine (LEVOPHED) 16 mg in sodium chloride 0.9 % 250 mL infusion (premix)  1-100 mcg/min IntraVENous Continuous Huber Huggins MD 16.9 mL/hr at 03/04/25 0949 18 mcg/min at 03/04/25 0949    sodium chloride flush 0.9 % injection 5-40 mL  5-40 mL IntraVENous 2 times per day Huber Arboleda MD   10 mL at 03/04/25 0914    sodium chloride flush 0.9 % injection 5-40 mL  5-40 mL IntraVENous PRN Huber Arboleda MD        0.9 % sodium chloride infusion   IntraVENous PRN Huber Arboleda MD        ondansetron (ZOFRAN-ODT) disintegrating tablet 4 mg  4 mg Oral Q8H PRN Huber Arboleda MD        Or    ondansetron (ZOFRAN) injection 4 mg  4 mg IntraVENous Q6H PRN Huber Arboleda MD        polyethylene glycol (GLYCOLAX) packet 17 g  17 g Oral Daily PRN Huber Arboleda MD        acetaminophen (TYLENOL) tablet 650 mg  650 mg Oral Q6H PRN Huber Arboleda MD        Or

## 2025-03-04 NOTE — WOUND CARE
WOCN Note    Consult received for \"excoriation under scrotum\"  Patient in OR.  Will follow tomorrow.    MAYNOR Detn RN [facility] Inpatient Wound Care  Available on [facility]  Office [phone]

## 2025-03-04 NOTE — ANESTHESIA POSTPROCEDURE EVALUATION
Post-Anesthesia Evaluation and Assessment    Patient: Galdino Melissa MRN: [id number]  SSN: [id number]    YOB: 1955  Age: 70 y.o.  Sex: male      I have evaluated the patient and they are stable in the ICU.     Cardiovascular Function/Vital Signs  Visit Vitals  BP 96/78   Pulse (!) 129   Temp 98.4 °F (36.9 °C)   Resp 15   Ht 1.753 m (5' 9\")   Wt 99.2 kg (218 lb 11.1 oz)   SpO2 100%   BMI 32.30 kg/m²       Patient is status post General anesthesia for Procedure(s):  AXILLARY IMPELLA INSERTION/ CVI 33.    Nausea/Vomiting: None    Postoperative hydration reviewed and adequate.    Pain:  Managed    Neurological Status:   Intubated and sedated     Pulmonary Status:   Intubated with adequate ventilation and oxygenation     Complications related to anesthesia: None    Post-anesthesia assessment completed. No concerns    Signed By: Tulio Chisholm MD     March 4, 2025

## 2025-03-05 ENCOUNTER — APPOINTMENT (OUTPATIENT)
Facility: HOSPITAL | Age: 70
End: 2025-03-05
Attending: STUDENT IN AN ORGANIZED HEALTH CARE EDUCATION/TRAINING PROGRAM
Payer: MEDICARE

## 2025-03-05 ENCOUNTER — APPOINTMENT (OUTPATIENT)
Facility: HOSPITAL | Age: 70
End: 2025-03-05
Attending: INTERNAL MEDICINE
Payer: MEDICARE

## 2025-03-05 ENCOUNTER — ANESTHESIA EVENT (OUTPATIENT)
Facility: HOSPITAL | Age: 70
End: 2025-03-05
Payer: MEDICARE

## 2025-03-05 ENCOUNTER — ANESTHESIA (OUTPATIENT)
Facility: HOSPITAL | Age: 70
End: 2025-03-05
Payer: MEDICARE

## 2025-03-05 LAB
ALBUMIN SERPL-MCNC: 2.4 G/DL (ref 3.5–5)
ALBUMIN/GLOB SERPL: 0.7 (ref 1.1–2.2)
ALBUMIN/GLOB SERPL: 0.7 (ref 1.1–2.2)
ALP SERPL-CCNC: 101 U/L (ref 45–117)
ALP SERPL-CCNC: 108 U/L (ref 45–117)
ALT SERPL-CCNC: 24 U/L (ref 12–78)
ALT SERPL-CCNC: 35 U/L (ref 12–78)
ANION GAP SERPL CALC-SCNC: 12 MMOL/L (ref 2–12)
ANION GAP SERPL CALC-SCNC: 13 MMOL/L (ref 2–12)
ANION GAP SERPL CALC-SCNC: 15 MMOL/L (ref 2–12)
APTT PPP: 44.1 SEC (ref 22.1–31)
APTT PPP: 46.7 SEC (ref 22.1–31)
APTT PPP: 48.5 SEC (ref 22.1–31)
AST SERPL-CCNC: 110 U/L (ref 15–37)
AST SERPL-CCNC: 80 U/L (ref 15–37)
BASE EXCESS BLD CALC-SCNC: 0.6 MMOL/L
BASOPHILS # BLD: 0 K/UL (ref 0–0.1)
BASOPHILS NFR BLD: 0 % (ref 0–1)
BDY SITE: ABNORMAL
BDY SITE: ABNORMAL
BILIRUB SERPL-MCNC: 3.6 MG/DL (ref 0.2–1)
BILIRUB SERPL-MCNC: 4.3 MG/DL (ref 0.2–1)
BUN SERPL-MCNC: 31 MG/DL (ref 6–20)
BUN SERPL-MCNC: 38 MG/DL (ref 6–20)
BUN SERPL-MCNC: 39 MG/DL (ref 6–20)
BUN/CREAT SERPL: 12 (ref 12–20)
BUN/CREAT SERPL: 13 (ref 12–20)
BUN/CREAT SERPL: 13 (ref 12–20)
CALCIUM SERPL-MCNC: 8 MG/DL (ref 8.5–10.1)
CALCIUM SERPL-MCNC: 8 MG/DL (ref 8.5–10.1)
CALCIUM SERPL-MCNC: 8.1 MG/DL (ref 8.5–10.1)
CENTROMERE B AB SER-ACNC: <0.2 AI (ref 0–0.9)
CHLORIDE SERPL-SCNC: 100 MMOL/L (ref 97–108)
CHLORIDE SERPL-SCNC: 100 MMOL/L (ref 97–108)
CHLORIDE SERPL-SCNC: 99 MMOL/L (ref 97–108)
CHROMATIN AB SERPL-ACNC: <0.2 AI (ref 0–0.9)
CO2 SERPL-SCNC: 20 MMOL/L (ref 21–32)
CO2 SERPL-SCNC: 21 MMOL/L (ref 21–32)
CO2 SERPL-SCNC: 22 MMOL/L (ref 21–32)
COHGB MFR BLD: 1.6 % (ref 1–2)
CREAT SERPL-MCNC: 2.61 MG/DL (ref 0.7–1.3)
CREAT SERPL-MCNC: 2.95 MG/DL (ref 0.7–1.3)
CREAT SERPL-MCNC: 2.96 MG/DL (ref 0.7–1.3)
DIFFERENTIAL METHOD BLD: ABNORMAL
DSDNA AB SER-ACNC: <1 IU/ML (ref 0–9)
ECHO BSA: 2.22 M2
ECHO LA DIAMETER INDEX: 2.33 CM/M2
ECHO LA DIAMETER: 5 CM
ECHO LV EF PHYSICIAN: 15 %
ECHO LV FRACTIONAL SHORTENING: 7 % (ref 28–44)
ECHO LV INTERNAL DIMENSION DIASTOLE INDEX: 2.7 CM/M2
ECHO LV INTERNAL DIMENSION DIASTOLIC: 5.8 CM (ref 4.2–5.9)
ECHO LV INTERNAL DIMENSION SYSTOLIC INDEX: 2.51 CM/M2
ECHO LV INTERNAL DIMENSION SYSTOLIC: 5.4 CM
ECHO LV IVSD: 1 CM (ref 0.6–1)
ECHO LV MASS 2D: 218.1 G (ref 88–224)
ECHO LV MASS INDEX 2D: 101.4 G/M2 (ref 49–115)
ECHO LV POSTERIOR WALL DIASTOLIC: 0.9 CM (ref 0.6–1)
ECHO LV RELATIVE WALL THICKNESS RATIO: 0.31
ECHO LVOT AREA: 3.5 CM2
ECHO LVOT DIAM: 2.1 CM
ECHO PULMONARY ARTERY SYSTOLIC PRESSURE (PASP): 45 MMHG
ECHO RV INTERNAL DIMENSION: 4.1 CM
ECHO RV TAPSE: 1.2 CM (ref 1.7–?)
ECHO TV REGURGITANT MAX VELOCITY: 2.95 M/S
ECHO TV REGURGITANT PEAK GRADIENT: 35 MMHG
EKG ATRIAL RATE: 131 BPM
EKG DIAGNOSIS: NORMAL
EKG Q-T INTERVAL: 316 MS
EKG QRS DURATION: 84 MS
EKG QTC CALCULATION (BAZETT): 440 MS
EKG R AXIS: 29 DEGREES
EKG T AXIS: 260 DEGREES
EKG VENTRICULAR RATE: 117 BPM
ENA JO1 AB SER-ACNC: <0.2 AI (ref 0–0.9)
ENA RNP AB SER-ACNC: <0.2 AI (ref 0–0.9)
ENA SCL70 AB SER-ACNC: <0.2 AI (ref 0–0.9)
ENA SM AB SER-ACNC: <0.2 AI (ref 0–0.9)
ENA SS-A AB SER-ACNC: <0.2 AI (ref 0–0.9)
ENA SS-B AB SER-ACNC: <0.2 AI (ref 0–0.9)
EOSINOPHIL # BLD: 0.11 K/UL (ref 0–0.4)
EOSINOPHIL NFR BLD: 1 % (ref 0–7)
ERYTHROCYTE [DISTWIDTH] IN BLOOD BY AUTOMATED COUNT: 20.6 % (ref 11.5–14.5)
ERYTHROCYTE [DISTWIDTH] IN BLOOD BY AUTOMATED COUNT: 20.8 % (ref 11.5–14.5)
FIBRINOGEN PPP-MCNC: 146 MG/DL (ref 200–475)
G6PD BLD QN: 318 U/10E12 RBC (ref 127–427)
GLOBULIN SER CALC-MCNC: 3.3 G/DL (ref 2–4)
GLOBULIN SER CALC-MCNC: 3.3 G/DL (ref 2–4)
GLUCOSE BLD STRIP.AUTO-MCNC: 157 MG/DL (ref 65–117)
GLUCOSE BLD STRIP.AUTO-MCNC: 168 MG/DL (ref 65–117)
GLUCOSE BLD STRIP.AUTO-MCNC: 187 MG/DL (ref 65–117)
GLUCOSE BLD STRIP.AUTO-MCNC: 193 MG/DL (ref 65–117)
GLUCOSE SERPL-MCNC: 167 MG/DL (ref 65–100)
GLUCOSE SERPL-MCNC: 168 MG/DL (ref 65–100)
GLUCOSE SERPL-MCNC: 191 MG/DL (ref 65–100)
HCO3 BLD-SCNC: 22.7 MMOL/L (ref 21–28)
HCT VFR BLD AUTO: 22.5 % (ref 36.6–50.3)
HCT VFR BLD AUTO: 23 % (ref 36.6–50.3)
HCV AB SERPL QL IA: NORMAL
HCV IGG SERPL QL IA: NON REACTIVE S/CO RATIO
HGB BLD-MCNC: 7.3 G/DL (ref 12.1–17)
HGB BLD-MCNC: 7.4 G/DL (ref 12.1–17)
HGB BLD-MCNC: 8.1 G/DL (ref 12.1–17)
HGB BLD-MCNC: 8.7 G/DL (ref 12.1–17)
IMM GRANULOCYTES # BLD AUTO: 0 K/UL
IMM GRANULOCYTES NFR BLD AUTO: 0 %
INR PPP: 1.6 (ref 0.9–1.1)
INR PPP: 1.8 (ref 0.9–1.1)
LACTATE SERPL-SCNC: 1.1 MMOL/L (ref 0.4–2)
LACTATE SERPL-SCNC: 1.6 MMOL/L (ref 0.4–2)
LACTATE SERPL-SCNC: 1.6 MMOL/L (ref 0.4–2)
LDH SERPL L TO P-CCNC: 472 U/L (ref 85–241)
LYMPHOCYTES # BLD: 1.16 K/UL (ref 0.8–3.5)
LYMPHOCYTES NFR BLD: 11 % (ref 12–49)
Lab: NORMAL
MAGNESIUM SERPL-MCNC: 2.5 MG/DL (ref 1.6–2.4)
MAGNESIUM SERPL-MCNC: 2.5 MG/DL (ref 1.6–2.4)
MCH RBC QN AUTO: 18.6 PG (ref 26–34)
MCH RBC QN AUTO: 19 PG (ref 26–34)
MCHC RBC AUTO-ENTMCNC: 32.2 G/DL (ref 30–36.5)
MCHC RBC AUTO-ENTMCNC: 32.4 G/DL (ref 30–36.5)
MCV RBC AUTO: 57.4 FL (ref 80–99)
MCV RBC AUTO: 59 FL (ref 80–99)
METHGB MFR BLD: 0.3 % (ref 0–1.4)
MONOCYTES # BLD: 0.74 K/UL (ref 0–1)
MONOCYTES NFR BLD: 7 % (ref 5–13)
NEUTS SEG # BLD: 8.49 K/UL (ref 1.8–8)
NEUTS SEG NFR BLD: 81 % (ref 32–75)
NRBC # BLD: 0.12 K/UL (ref 0–0.01)
NRBC # BLD: 0.13 K/UL (ref 0–0.01)
NRBC BLD-RTO: 1.1 PER 100 WBC
NRBC BLD-RTO: 1.2 PER 100 WBC
OXYHGB MFR BLD: 54.2 % (ref 94–97)
PCO2 BLD: 26.1 MMHG (ref 35–48)
PH BLD: 7.55 (ref 7.35–7.45)
PHOSPHATE SERPL-MCNC: 2.9 MG/DL (ref 2.6–4.7)
PHOSPHATE SERPL-MCNC: 3.2 MG/DL (ref 2.6–4.7)
PLATELET # BLD AUTO: 81 K/UL (ref 150–400)
PLATELET # BLD AUTO: 91 K/UL (ref 150–400)
PO2 BLD: 87 MMHG (ref 83–108)
POTASSIUM SERPL-SCNC: 3.7 MMOL/L (ref 3.5–5.1)
POTASSIUM SERPL-SCNC: 3.8 MMOL/L (ref 3.5–5.1)
POTASSIUM SERPL-SCNC: 3.8 MMOL/L (ref 3.5–5.1)
PROT SERPL-MCNC: 5.7 G/DL (ref 6.4–8.2)
PROT SERPL-MCNC: 5.7 G/DL (ref 6.4–8.2)
PROTHROMBIN TIME: 16.8 SEC (ref 9.2–11.2)
PROTHROMBIN TIME: 18.2 SEC (ref 9.2–11.2)
RBC # BLD AUTO: 3.9 M/UL (ref 4.1–5.7)
RBC # BLD AUTO: 3.92 M/UL (ref 4.1–5.7)
RBC # BLD AUTO: 4.73 X10E6/UL (ref 4.14–5.8)
RBC MORPH BLD: ABNORMAL
SAO2 % BLD: 55 % (ref 95–99)
SAO2 % BLD: 97.9 % (ref 92–97)
SERVICE CMNT-IMP: ABNORMAL
SODIUM SERPL-SCNC: 134 MMOL/L (ref 136–145)
SPECIMEN SITE: ABNORMAL
SPECIMEN TYPE: ABNORMAL
THERAPEUTIC RANGE: ABNORMAL SECS (ref 58–77)
WBC # BLD AUTO: 10.5 K/UL (ref 4.1–11.1)
WBC # BLD AUTO: 10.8 K/UL (ref 4.1–11.1)

## 2025-03-05 PROCEDURE — 6370000000 HC RX 637 (ALT 250 FOR IP): Performed by: INTERNAL MEDICINE

## 2025-03-05 PROCEDURE — 80069 RENAL FUNCTION PANEL: CPT

## 2025-03-05 PROCEDURE — 2500000003 HC RX 250 WO HCPCS: Performed by: ANESTHESIOLOGY

## 2025-03-05 PROCEDURE — 0DJ08ZZ INSPECTION OF UPPER INTESTINAL TRACT, VIA NATURAL OR ARTIFICIAL OPENING ENDOSCOPIC: ICD-10-PCS | Performed by: INTERNAL MEDICINE

## 2025-03-05 PROCEDURE — 99233 SBSQ HOSP IP/OBS HIGH 50: CPT | Performed by: INTERNAL MEDICINE

## 2025-03-05 PROCEDURE — 85027 COMPLETE CBC AUTOMATED: CPT

## 2025-03-05 PROCEDURE — 3600007502: Performed by: INTERNAL MEDICINE

## 2025-03-05 PROCEDURE — 71045 X-RAY EXAM CHEST 1 VIEW: CPT

## 2025-03-05 PROCEDURE — 3600007512: Performed by: INTERNAL MEDICINE

## 2025-03-05 PROCEDURE — 94003 VENT MGMT INPAT SUBQ DAY: CPT

## 2025-03-05 PROCEDURE — 93005 ELECTROCARDIOGRAM TRACING: CPT | Performed by: STUDENT IN AN ORGANIZED HEALTH CARE EDUCATION/TRAINING PROGRAM

## 2025-03-05 PROCEDURE — APPNB30 APP NON BILLABLE TIME 0-30 MINS

## 2025-03-05 PROCEDURE — 85610 PROTHROMBIN TIME: CPT

## 2025-03-05 PROCEDURE — 85730 THROMBOPLASTIN TIME PARTIAL: CPT

## 2025-03-05 PROCEDURE — 36415 COLL VENOUS BLD VENIPUNCTURE: CPT

## 2025-03-05 PROCEDURE — 2500000003 HC RX 250 WO HCPCS: Performed by: INTERNAL MEDICINE

## 2025-03-05 PROCEDURE — 2500000003 HC RX 250 WO HCPCS: Performed by: STUDENT IN AN ORGANIZED HEALTH CARE EDUCATION/TRAINING PROGRAM

## 2025-03-05 PROCEDURE — 85025 COMPLETE CBC W/AUTO DIFF WBC: CPT

## 2025-03-05 PROCEDURE — 2580000003 HC RX 258: Performed by: NURSE PRACTITIONER

## 2025-03-05 PROCEDURE — 90945 DIALYSIS ONE EVALUATION: CPT

## 2025-03-05 PROCEDURE — 6360000002 HC RX W HCPCS: Performed by: STUDENT IN AN ORGANIZED HEALTH CARE EDUCATION/TRAINING PROGRAM

## 2025-03-05 PROCEDURE — 83615 LACTATE (LD) (LDH) ENZYME: CPT

## 2025-03-05 PROCEDURE — 83050 HGB METHEMOGLOBIN QUAN: CPT

## 2025-03-05 PROCEDURE — 82803 BLOOD GASES ANY COMBINATION: CPT

## 2025-03-05 PROCEDURE — 99232 SBSQ HOSP IP/OBS MODERATE 35: CPT | Performed by: STUDENT IN AN ORGANIZED HEALTH CARE EDUCATION/TRAINING PROGRAM

## 2025-03-05 PROCEDURE — 6360000002 HC RX W HCPCS: Performed by: INTERNAL MEDICINE

## 2025-03-05 PROCEDURE — 83605 ASSAY OF LACTIC ACID: CPT

## 2025-03-05 PROCEDURE — 80053 COMPREHEN METABOLIC PANEL: CPT

## 2025-03-05 PROCEDURE — 6360000002 HC RX W HCPCS: Performed by: NURSE PRACTITIONER

## 2025-03-05 PROCEDURE — 2580000003 HC RX 258: Performed by: INTERNAL MEDICINE

## 2025-03-05 PROCEDURE — 93306 TTE W/DOPPLER COMPLETE: CPT

## 2025-03-05 PROCEDURE — 82962 GLUCOSE BLOOD TEST: CPT

## 2025-03-05 PROCEDURE — 7100000010 HC PHASE II RECOVERY - FIRST 15 MIN: Performed by: INTERNAL MEDICINE

## 2025-03-05 PROCEDURE — 93306 TTE W/DOPPLER COMPLETE: CPT | Performed by: INTERNAL MEDICINE

## 2025-03-05 PROCEDURE — 2720000010 HC SURG SUPPLY STERILE: Performed by: INTERNAL MEDICINE

## 2025-03-05 PROCEDURE — 82375 ASSAY CARBOXYHB QUANT: CPT

## 2025-03-05 PROCEDURE — 85384 FIBRINOGEN ACTIVITY: CPT

## 2025-03-05 PROCEDURE — 2000000000 HC ICU R&B

## 2025-03-05 PROCEDURE — 93010 ELECTROCARDIOGRAM REPORT: CPT | Performed by: INTERNAL MEDICINE

## 2025-03-05 PROCEDURE — 83735 ASSAY OF MAGNESIUM: CPT

## 2025-03-05 PROCEDURE — 2580000003 HC RX 258: Performed by: STUDENT IN AN ORGANIZED HEALTH CARE EDUCATION/TRAINING PROGRAM

## 2025-03-05 PROCEDURE — 84100 ASSAY OF PHOSPHORUS: CPT

## 2025-03-05 RX ADMIN — Medication: at 00:16

## 2025-03-05 RX ADMIN — HEPARIN SODIUM 1400 UNITS: 1000 INJECTION INTRAVENOUS; SUBCUTANEOUS at 11:07

## 2025-03-05 RX ADMIN — Medication: at 18:10

## 2025-03-05 RX ADMIN — EPINEPHRINE 8 MCG/MIN: 1 INJECTION INTRAMUSCULAR; INTRAVENOUS; SUBCUTANEOUS at 15:43

## 2025-03-05 RX ADMIN — Medication: at 00:17

## 2025-03-05 RX ADMIN — Medication: at 23:13

## 2025-03-05 RX ADMIN — PROPOFOL 50 MCG/KG/MIN: 10 INJECTION, EMULSION INTRAVENOUS at 20:05

## 2025-03-05 RX ADMIN — PANTOPRAZOLE SODIUM 40 MG: 40 INJECTION, POWDER, LYOPHILIZED, FOR SOLUTION INTRAVENOUS at 21:00

## 2025-03-05 RX ADMIN — Medication: at 23:16

## 2025-03-05 RX ADMIN — Medication: at 19:20

## 2025-03-05 RX ADMIN — DOBUTAMINE HYDROCHLORIDE 5 MCG/KG/MIN: 200 INJECTION INTRAVENOUS at 13:38

## 2025-03-05 RX ADMIN — PROPOFOL 50 MCG/KG/MIN: 10 INJECTION, EMULSION INTRAVENOUS at 13:17

## 2025-03-05 RX ADMIN — PANTOPRAZOLE SODIUM 40 MG: 40 INJECTION, POWDER, LYOPHILIZED, FOR SOLUTION INTRAVENOUS at 08:57

## 2025-03-05 RX ADMIN — AMIODARONE HYDROCHLORIDE 150 MG: 1.5 INJECTION, SOLUTION INTRAVENOUS at 05:49

## 2025-03-05 RX ADMIN — WATER 2000 MG: 1 INJECTION INTRAMUSCULAR; INTRAVENOUS; SUBCUTANEOUS at 22:03

## 2025-03-05 RX ADMIN — BUMETANIDE 1 MG: 0.25 INJECTION INTRAMUSCULAR; INTRAVENOUS at 08:57

## 2025-03-05 RX ADMIN — FENTANYL CITRATE 50 MCG: 0.05 INJECTION, SOLUTION INTRAMUSCULAR; INTRAVENOUS at 06:41

## 2025-03-05 RX ADMIN — Medication: at 05:00

## 2025-03-05 RX ADMIN — WATER 2000 MG: 1 INJECTION INTRAMUSCULAR; INTRAVENOUS; SUBCUTANEOUS at 06:41

## 2025-03-05 RX ADMIN — SODIUM CHLORIDE, PRESERVATIVE FREE 10 ML: 5 INJECTION INTRAVENOUS at 21:00

## 2025-03-05 RX ADMIN — SODIUM BICARBONATE: 84 INJECTION, SOLUTION INTRAVENOUS at 23:33

## 2025-03-05 RX ADMIN — Medication: at 15:33

## 2025-03-05 RX ADMIN — PROPOFOL 50 MCG/KG/MIN: 10 INJECTION, EMULSION INTRAVENOUS at 08:24

## 2025-03-05 RX ADMIN — Medication: at 08:21

## 2025-03-05 RX ADMIN — HEPARIN SODIUM 1400 UNITS: 1000 INJECTION INTRAVENOUS; SUBCUTANEOUS at 11:05

## 2025-03-05 RX ADMIN — WATER 2000 MG: 1 INJECTION INTRAMUSCULAR; INTRAVENOUS; SUBCUTANEOUS at 13:34

## 2025-03-05 RX ADMIN — PROPOFOL 50 MCG/KG/MIN: 10 INJECTION, EMULSION INTRAVENOUS at 23:31

## 2025-03-05 RX ADMIN — VASOPRESSIN 0.03 UNITS/MIN: 20 INJECTION INTRAVENOUS at 16:08

## 2025-03-05 RX ADMIN — BUMETANIDE 1 MG: 0.25 INJECTION INTRAMUSCULAR; INTRAVENOUS at 19:10

## 2025-03-05 RX ADMIN — FENTANYL CITRATE 50 MCG: 0.05 INJECTION, SOLUTION INTRAMUSCULAR; INTRAVENOUS at 17:30

## 2025-03-05 RX ADMIN — Medication: at 08:23

## 2025-03-05 RX ADMIN — PROPOFOL 50 MCG/KG/MIN: 10 INJECTION, EMULSION INTRAVENOUS at 16:58

## 2025-03-05 RX ADMIN — Medication: at 00:18

## 2025-03-05 RX ADMIN — Medication 1 UNITS: at 13:29

## 2025-03-05 RX ADMIN — Medication 1 UNITS: at 07:05

## 2025-03-05 RX ADMIN — Medication: at 15:30

## 2025-03-05 RX ADMIN — PROPOFOL 50 MCG/KG/MIN: 10 INJECTION, EMULSION INTRAVENOUS at 01:24

## 2025-03-05 RX ADMIN — PROPOFOL 50 MCG/KG/MIN: 10 INJECTION, EMULSION INTRAVENOUS at 04:57

## 2025-03-05 RX ADMIN — BIVALIRUDIN 0.07 MG/KG/HR: 250 INJECTION, POWDER, LYOPHILIZED, FOR SOLUTION INTRAVENOUS at 08:02

## 2025-03-05 RX ADMIN — VASOPRESSIN 0.03 UNITS/MIN: 20 INJECTION INTRAVENOUS at 02:05

## 2025-03-05 RX ADMIN — FENTANYL CITRATE 50 MCG: 0.05 INJECTION, SOLUTION INTRAMUSCULAR; INTRAVENOUS at 08:56

## 2025-03-05 RX ADMIN — Medication: at 08:22

## 2025-03-05 ASSESSMENT — PAIN SCALES - GENERAL
PAINLEVEL_OUTOF10: 0
PAINLEVEL_OUTOF10: 6

## 2025-03-05 ASSESSMENT — PULMONARY FUNCTION TESTS
PIF_VALUE: 23
PIF_VALUE: 23
PIF_VALUE: 26
PIF_VALUE: 22
PIF_VALUE: 24
PIF_VALUE: 24
PIF_VALUE: 23
PIF_VALUE: 22

## 2025-03-05 ASSESSMENT — PAIN - FUNCTIONAL ASSESSMENT: PAIN_FUNCTIONAL_ASSESSMENT: CRITICAL CARE PAIN OBSERVATION TOOL (CPOT)

## 2025-03-05 NOTE — PROGRESS NOTES
KIANNA GOMEZ [facility]  GARRICK Gonzalez  [phone]                    GASTROENTEROLOGY CONSULTATION NOTE              NAME:  Galdino Melissa   :   1955   MRN:   [id number]     Chief Complaint:    SOB, Shock     History of Present Illness:    Patient is a 70 y.o. male with history of ICM, HFrEF 20-25% since 2019, CAD s/p PCI to LAD, LCx and RCA, severe MR & moderate TR, atrial flutter, CKD who was admitted on 2025 with worsening SOB, CARRASQUILLO patient was found to have lactic acidosis along with acute on chronic heart failure/exacerbation.    We are consulted for evaluation of reported melena and concern for possible SMA thrombosis.     He was initially at [facility] but was transferred to [place] for advanced cardiac care.     Reportedly he was started on anti-coagulation previously during hospitalization but had drop in Hgb along with reports of melena. His CT suggested possible SMA thrombosis. However, given benign abdominal exam, surgery did not feel additional imaging with CTA was appropriate given his acute on chronic renal failure. His anticoagulation was stopped though and his Hgb has remained stable.    3/5/25  S/p Impella placement. Now intubated. Reports of melena overnight with drop in Hgb. No bleeding this morning.        PMH:  Past Medical History:   Diagnosis Date    Acute on chronic systolic congestive heart failure (HCC) 2024    AICD (automatic cardioverter/defibrillator) present 2021    put in at [facility].     Arrhythmia     CAD (coronary artery disease)     NSTEMI with PCI of LAD (2 stents), LCx, RCA    Congestive heart failure (HCC) 10/2019    DM (diabetes mellitus) (HCC) 3/30/2010    HTN (hypertension) 3/30/2010    Hypercholesterolemia        PSH:  Past Surgical History:   Procedure Laterality Date    CARDIAC CATHETERIZATION  10/2019    4x stents    CARDIAC PROCEDURE N/A 2023    Left and right heart cath / coronary angiography performed by Rickie Walker III, DO at  [facility] CARDIAC CATH LAB    CARDIAC SURGERY N/A 3/4/2025    AXILLARY IMPELLA INSERTION/ CVI 33 performed by Felipe Hampton MD at [facility] MAIN OR    COLONOSCOPY  1-11    diverticulosis- Dr. Londono    PACEMAKER      UPPER GI ENDOSCOPY,BIOPSY  10/27/2021            Allergies:  Allergies   Allergen Reactions    Glipizide Other (See Comments)     dizziness       Home Medications:  Prior to Admission Medications   Prescriptions Last Dose Informant Patient Reported? Taking?   FARXIGA 10 MG tablet  Self No No   Sig: Take 1 tablet by mouth daily For diabetes and heart.   apixaban (ELIQUIS) 5 MG TABS tablet  Self No No   Sig: Take 1 tablet by mouth 2 times daily To prevent stroke.   aspirin 81 MG chewable tablet  Self No No   Sig: Take 1 tablet by mouth daily   atorvastatin (LIPITOR) 80 MG tablet  Self No No   Sig: TAKE 1 TABLET BY MOUTH DAILY   bumetanide (BUMEX) 2 MG tablet  Self No No   Sig: Take 1 tablet by mouth 2 times daily For swelling in feet   ezetimibe (ZETIA) 10 MG tablet  Self No No   Sig: TAKE 1 TABLET BY MOUTH DAILY   losartan (COZAAR) 50 MG tablet  Self No No   Sig: TAKE 1 TABLET BY MOUTH TWICE DAILY   metFORMIN (GLUCOPHAGE) 1000 MG tablet  Self No No   Sig: Take 1 tablet by mouth with breakfast and with evening meal   metoprolol succinate (TOPROL XL) 50 MG extended release tablet  Self No No   Sig: Take 1 tablet by mouth daily To slow heart rate down.   potassium chloride (KLOR-CON) 20 MEQ packet  Self Yes No   Sig: Take 20 mEq by mouth daily   spironolactone (ALDACTONE) 25 MG tablet  Self Yes No   Sig: Take 1 tablet by mouth daily   triamcinolone (KENALOG) 0.1 % cream  Self No No   Sig: Apply topically 2 times daily.   Patient not taking: Reported on 2/21/2025      Facility-Administered Medications: None       Hospital Medications:  Current Facility-Administered Medications   Medication Dose Route Frequency    bivalirudin trifluoroacetate (ANGIOMAX) 250 mg in sodium chloride 0.9 % 50 mL IVPB (addEASE)  0.02-2.5  melena, possible SMA thrombus in setting of CAD, advanced CHF, EDWIGE on CKD - no overt bleeding and stable Hgb. Certainly may have had GI bleeding previously with initiating anti-coagulation though appears to have resolved. Would not be surprised if he had an underlying GI tract AVM in the setting of CHF and ESRD.    His exam is not consistent with SMA thrombus and I agree with prior surgical/vascular evaluation - I do not feel CTA indicated/needed at this time given acutely worsened renal status. Concerned IV contrast load would drastically increase risk of advancing into chronic renal failure.    3/5/25  S/p Impella and now intubated. Melena overnight with drop in Hgb.     We will plan for EGD/push enteroscopy in cardiac ICU today while patient is intubated.     Thanks for allowing me to participate in the care of this patient.  Signed By: Khurram Brunner PA-C     3/5/2025  11:10 AM

## 2025-03-05 NOTE — ANESTHESIA PRE PROCEDURE
Department of Anesthesiology  Preprocedure Note       Name:  Galdino Melissa   Age:  70 y.o.  :  1955                                          MRN:  [id number]         Date:  3/5/2025      Surgeon: Surgeon(s):  Himanshu Stuart MD    Procedure: Procedure(s):  ESOPHAGOGASTRODUODENOSCOPY at bedside    Medications prior to admission:   Prior to Admission medications    Medication Sig Start Date End Date Taking? Authorizing Provider   potassium chloride (KLOR-CON) 20 MEQ packet Take 20 mEq by mouth daily    Daren Cano MD   spironolactone (ALDACTONE) 25 MG tablet Take 1 tablet by mouth daily    Daren Cano MD   bumetanide (BUMEX) 2 MG tablet Take 1 tablet by mouth 2 times daily For swelling in feet 25   Madi Anand MD   metoprolol succinate (TOPROL XL) 50 MG extended release tablet Take 1 tablet by mouth daily To slow heart rate down. 25   Madi Anand MD   apixaban (ELIQUIS) 5 MG TABS tablet Take 1 tablet by mouth 2 times daily To prevent stroke. 25   Madi Anand MD   metFORMIN (GLUCOPHAGE) 1000 MG tablet Take 1 tablet by mouth with breakfast and with evening meal 25   Madi Anand MD   FARXIGA 10 MG tablet Take 1 tablet by mouth daily For diabetes and heart. 25   Madi Anand MD   losartan (COZAAR) 50 MG tablet TAKE 1 TABLET BY MOUTH TWICE DAILY 24   Madi Anand MD   ezetimibe (ZETIA) 10 MG tablet TAKE 1 TABLET BY MOUTH DAILY 24   Madi Anand MD   triamcinolone (KENALOG) 0.1 % cream Apply topically 2 times daily.  Patient not taking: Reported on 24   Madi Anand MD   atorvastatin (LIPITOR) 80 MG tablet TAKE 1 TABLET BY MOUTH DAILY 3/27/24   Madi Anand MD   aspirin 81 MG chewable tablet Take 1 tablet by mouth daily 23   Madi Anand MD       Current medications:    Current Facility-Administered Medications   Medication Dose Route Frequency Provider Last Rate Last Admin   •

## 2025-03-05 NOTE — PROGRESS NOTES
ADVANCED [facility] FAILURE CENTER  [facility] in [place], [place]  Inpatient Progress Note      Patient name: Galdino Melissa  Patient : 1955  Patient MRN: [id number]  Consulting MD: Huber Huggins MD  Date of service: 25    REASON FOR REFERRAL:  Management of acute on chronic systolic heart failure    PLAN OF CARE:  Mixed cardiogenic and septic shock.  tMCS placed today 5.5 impella 3/4/25.  Advanced heart failure therapies work up initiated for OHT/LVAD.    HPI:  70-year-old male with history of ICM, HFrEF 20-25% since 2019, CAD s/p PCI to LAD, LCx and RCA, severe MR & moderate TR, atrial flutter, CKD was admitted on 2025 with worsening SOB, CARRASQUILLO patient was found to have lactic acidosis.  He was diuresed and started on dobutamine.  There was concern for ischemic bowel.  Patient was started on heparin drip.  Then there was dark stools noted by RN and positive occult blood and heparin was stopped.  Patient was hypotensive and found to have MSSA bacteremia.  He required Levophed for blood pressure support and was transferred to the ICU. lactic acidosis with up to 11.5 now trending down to 2.2.  Given patient's complex medical condition he was transferred to [facility] for further workup and treatment.  Patient also found to have oliguric EDWIGE with creatinine of 6.11 from a baseline of 1.4 and hyponatremic with a sodium of 126, nephrology was consulted and patient being started on CRRT for ATN versus CRS.  He also has liver congestion with a peak T. bili of 4.2, AST 88, .  PA catheter placed at bedside: CVP 13, PA 44/22/31, MV 49%, CI 2.8 on dobutamine of 5 mcg/kg/min.  Patient lives with girlfriend, has adult children and niece is at bedside.  He denies tobacco use.  He does drink alcohol 2-3 drinks per day with a max of 6/day.    Interval Hx:  Impella 5.5 placed yesterday.  PA catheter: CVP 14-21, PA 40-50s/20-26 (28-33), CI 2.3-2.5 on dobutamine 5, epi 3, Levophed 2, vaso

## 2025-03-05 NOTE — FLOWSHEET NOTE
CRRT Restarted @ 1305:   03/05/25 1305   Observations & Evaluations   Level of Consciousness 1   Heart Rhythm Irregular   Respiratory Quality/Effort Unlabored   O2 Device Ventilator   Skin Condition/Temp Dry;Warm   Abdomen Inspection Soft   Edema Generalized   Edema Generalized Pitting   Vital Signs   BP 95/62   Temp 97.7 °F (36.5 °C)   Pulse (!) 124   Respirations 17   SpO2 94 %   ICEBOAT   ICEBOAT I;C;E;B;O;A;T   Treatment   $CRRT $Yes   Machine #   ([id number])   Cartridge Lot #   ([id number])   Therapy Type CVVH   System Used   System Used Jojo   Pressures (Jojo)   Access (mmHg) (!) -39 mmHg   Return/Venous (mmHg) 55 mmHg   TMP (mmHg) 32 mmHg   Pressure Drop (mmHg) 47 mmHg   Filter (mmHg) 142 mmHg   Deaeration Chamber Check Yes   Flow Rates (Jojo)   Blood Flow (mL/min) 200 mL/min   Replacement Fluid Pre-Filter (mL/hr) 640 mL/hr   Replacement Filter Post-Filter (mL/hr) 630 mL/hr   Pre-Blood Pump (mL/hr) 1260 mL/hr   Jojo Calculation   (D) Physician Ordered Hourly Removal (mL) 150 ml   CRRT Activities   Temporary Disconnect Patient transport   Intervention Initiated   Ultrafiltrate Assessment   Ultrafiltrate Color Yellow/straw   Ultrafiltrate Appearance Clear   Hemodialysis Central Access - Temporary Left Femoral vein   Placement Date/Time: 03/03/25 0005   Present on Admission/Arrival: No  Inserted by: Dr. Wagner  Insertion Practices: Chlorohexadine skin antisepsis;Hand hygiene;Maximal barrier precautions;Sterile ultrasound technique;Optimal catheter site selection  ...   Continued need for line? Yes   Site Assessment Clean, dry & intact   CVC Lumen Status Infusing   Blue Lumen Status Brisk blood return;Flushed   Red Lumen Status Brisk blood return;Flushed   Line Care Ports disinfected;Connections checked and tightened   Dressing Type Antimicrobial;Transparent   Date of Last Dressing Change 03/04/25   Dressing Status Clean, dry & intact     Primary RN SBAR: KLAUDIA Mejia RN  Incapacitated Nurse [facility]. provided:

## 2025-03-05 NOTE — WOUND CARE
WO Note:     New consult placed for \"excoriation under scrotum\"    Assessed in 4333/01 with Cirilo BAILEY.  Family at the bedside.    Galdino Melissa is a 70 y.o. y/o male who presented for Heart failure (HCC) [I50.9]  Admitted on 3/2/2025; LOS: 3    Lab Results   Component Value Date/Time    WBC 10.5 03/05/2025 03:26 AM    LABA1C 7.7 (H) 03/03/2025 05:43 AM    POCGLU 193 (H) 03/05/2025 07:01 AM    POCGLU 170 (H) 03/04/2025 11:08 PM    HGB 7.3 (L) 03/05/2025 03:26 AM    HCT 22.5 (L) 03/05/2025 03:26 AM    PLT 91 (L) 03/05/2025 03:26 AM      Lab Results   Component Value Date/Time    WBC 10.5 03/05/2025 03:26 AM        Tobacco Use      Smoking status: Never      Smokeless tobacco: Never    Diet NPO     Assessment:   Patient is intubated, sedated and requires assist with repositioning.    Bed: low air loss  GI/: [place]  Restraints: wrist  Patient repositioned on left side with pillow.  Heels offloaded with pillows. Heels intact without erythema.    Sacrum intact without erythema.      Wound Assessment  POA scrotum, masd:  scattered pink denuded areas; no drainage or odor.  TX: applied triad wound cream      2.  Gluteal cleft, masd:  scattered red denuded areas; small serosang; no odor.  Tx:  applied triad wound cream      Wound, Pressure Prevention & Skin Care Recommendations:    Minimize layers of linen/pads under patient to optimize support surface.    2.  Turn/reposition approximately every 2 hours and offload heels.   3.  Manage moisture/ Keep skin folds clean and dry/minimize brief usage.  4.  Specialty bed: low air loss. Use only flat sheet and one incontinence pad.  5.  Gluteal cleft:  daily and as needed cleanse; apply triad and foam dressing.  6.  Scrotum:  daily and as needed cleanse; apply triad wound cream    Discussed above plan with patient & Cirilo BAILEY.    Transition of Care:   Plan to follow as needed while admitted to hospital.    SRINIVASAN DentN RN [facility] Inpatient Wound Care  Available on [employer]  Office [phone]

## 2025-03-05 NOTE — PROGRESS NOTES
1100: TRANSFER - IN REPORT:    Verbal report received from LYNN Kerr on Galdino Melissa  being received from CVICU 33 for urgent transfer      Report consisted of patient's Situation, Background, Assessment and   Recommendations(SBAR).     Information from the following report(s) Nurse Handoff Report, Index, ED Encounter Summary, ED SBAR, Adult Overview, Surgery Report, Intake/Output, MAR, Recent Results, Cardiac Rhythm ST/A-Fib c PVCs, Alarm Parameters, and Neuro Assessment was reviewed with the receiving nurse.    Opportunity for questions and clarification was provided.      Assessment completed upon patient's arrival to unit and care assumed.     See 4eyes note.     1130: Pt transferred into CCU 22    1135: Dr Huggins, Intensivist, and Loretta Diaz rep, both @ the bedside assessing pt.     1140: Pt increased ectopy on monitor x 2H; Impella decreased to P-5.  Levophed gtt off, titrate up on Epi gtt per MD    1245: Denae BAILEY, [facility], @ the bedside to replace CRRT filter and restart pt on therapy.     1305: CRRT restarted, continued c factor of 150.     1330: RT @ bedside to initiate Bruce @ 20 per MD order.     1400: Jennifer FREDERICK, F, assessing pt @ the bedside; no new orders.     1415: Dr Huggins @ bedside; Impella increased to P-6.    1445: RN @ the bedside to draw labs.         1745: Endo procedure complete.

## 2025-03-05 NOTE — PROGRESS NOTES
CRITICAL CARE PROGRESS NOTE    Name: Galdino Melissa   : 1955   MRN: [id number]   Date: 3/5/2025      ICU Diagnosis      Mixed Septic and Cardiogenic Shock  Acute Renal Failure    Overnight Events   Impella 5.5 placed. Patient kept intubated after the procedure.     ROS negative except as otherwise documented.     A/P   This is a 70 year old male with history of HTN, HLD, DM, PH and HFrEF (20%) who presented to OSH on  with SOB - admitted to the ICU for mixed septic and cardiogenic shock.     NEUROLOGICAL:    Mentation baseline. No focal deficits.   - propofol for sedation  - fentanyl PRN pain   - daily SAT    PULMONOLOGY:   Intubated for OR. Minimal vent settings. CXR with mild interstitial edema  - goal SpO2>=92, pH>=7.30  - lung protective ventilation  - daily SBT    CARDIOVASCULAR:   Shock - predominant cardiogenic. Improved post impella. However, continued vasopressor requirement - concern for concurrent RV failure. Hopeful for improved hemodynamics with fluid removal.  - CHF consulted  - CTS consulted - post 5.5  - continue dobutamine  - continue epi  - continue vaso  - bicarb purge  - bival gtt  - hold GDMT    GASTROINTESTINAL:   Reported GIB at OSH. CT with possible SMA thrombosis.   - IV PPI BID  - GI consulted - discussions re endoscopy   - vascular consulted - doubt real SMA occlusion    RENAL/ELECTROLYTE:   Acute renal failure. Likely ATN due to shock.  - nephrology consulted - continue CVVH  - goal K>=4, Mg>=2, Phos >3  - daily BMP  - strict I/O's; daily weights  - avoid nephrotoxic medications    ENDOCRINE:   - SSI    ID/MICRO:   MSSA bacteremia. No clear source. LISA without vegetation  - ID consulted  - continue cefazolin    ICU DAILY CHECKLIST     Code Status: Full  DVT Prophylaxis: SCDs  T/L/D: PIV, trialysis  SUP: PI  Diet: NPO  ABCDEF Bundle/Checklist Completed:Yes  Disposition: Stay in ICU  Multidisciplinary Rounds Completed:  Yes  Patient/Family Updated: Yes    OBJECTIVE:     Blood

## 2025-03-05 NOTE — PROGRESS NOTES
results (not separately reported) and communicating results to the patient/family/caregiver  Care coordination (not separately reported) as noted above  Documenting clinical information in the electronic health records (e.g. problem list, visit note) on the day of the encounter.    Ban Guzman MD FACP.      Impression/Plan     70 y.o. 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    High grade MSSA bacteremia in setting of ICD  Acute renal failure/Shock  CHF  GIB  Leukocytosis - resolved  + blood cx 2/26, 2/27  Blood cx 3/1, 3/2 NGTD    Unclear etiology for MSSA bacteremia, No apparent lines placed at [facility]. Lines have been replaced at [facility]. LISA negative for vegetation    Continue renally dosed cefazolin    Nephrology following    Plan for goal of care discussion. Poor prognosis    D/w Dr. Guzman, nursing, ICU team         Anti-infectives:   Cefazolin IV  S/p Vancomycin 2/25 - 2/28  s/p Zosyn 2/26 - 3/2    Subjective:   Impella placed yesterday. Intubated, sedated         Today's Date:  March 5, 2025  Date of Admission: 3/2/2025   Referring Physician: Huber Huggins    Patient is a 70 y.o. 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     Seen in the ED at [facility] treated for acute on chronic CHF. WBC increased from 5.7 to 17.8 on 2/25 with  g/dL    Globulin 3.3 2.0 - 4.0 g/dL    Albumin/Globulin Ratio 0.7 (L) 1.1 - 2.2     Lactic Acid    Collection Time: 03/05/25  2:44 PM   Result Value Ref Range    Lactic Acid, Plasma 1.1 0.4 - 2.0 MMOL/L   Phosphorus    Collection Time: 03/05/25  2:44 PM   Result Value Ref Range    Phosphorus 2.9 2.6 - 4.7 MG/DL   Magnesium    Collection Time: 03/05/25  2:44 PM   Result Value Ref Range    Magnesium 2.5 (H) 1.6 - 2.4 mg/dL        Microbiology:  blood cx MSSA    Studies:    CT Result (most recent):  CT CHEST ABDOMEN PELVIS WO CONTRAST 02/26/2025    Narrative  INDICATION: concern for infection; sepsis; persistent lactic acidosis    COMPARISON: None.    TECHNIQUE: Noncontrast helical CT of the chest, abdomen, and pelvis.  Coronal  and sagittal reconstructions were generated. CT dose reduction was achieved  through use of a standardized protocol tailored for this examination and  automatic exposure control for dose modulation. Absence of intravenous contrast  limits evaluation of the mediastinum, anisa, vasculature, and solid organs.    ORAL CONTRAST: NONE.    FINDINGS:    MEDIASTINUM: No mass or lymphadenopathy.  ANISA: No gross pathology.  THORACIC AORTA: Atherosclerotic calcification without aneurysm.  MAIN PULMONARY ARTERY: Normal in caliber.  TRACHEA/BRONCHI: Patent.  ESOPHAGUS: No wall thickening or dilatation.  HEART: Heart is enlarged with chamber dilation and pacemaker leads noted.  Coronary artery calcium: present.  PLEURA: Small to moderate posteriorly layering right pleural effusion and trace  posteriorly layering left pleural effusion. No pneumothorax.  LUNGS: Atelectasis overlies pleural effusions with aerated lungs clear.  LIVER: No mass or biliary dilatation.  GALLBLADDER: Intraluminal radiodense material likely reflecting vicarious  excretion of previously administered contrast. Otherwise unremarkable.  SPLEEN: No mass.  PANCREAS: No gross pathology.  ADRENALS: Unremarkable.  KIDNEYS: No nephrolithiasis or  patient/family/caregiver as noted above  Placing relevant orders  Referring and communicating with other professionals (not separately reported)  Independently interpreting results (not separately reported) and communicating results to the patient/family/caregiver  Care coordination (not separately reported) as noted above  Documenting clinical information in the electronic health records (e.g. problem list, visit note) on the day of the encounter     Signed By: DANIELLE Velarde NP     March 5, 2025

## 2025-03-05 NOTE — OP NOTE
[facility]  Himanshu Noguera M.D.  [phone]           3/5/2025                EGD Operative Report  Galdino Melissa  :  1955  [facility] Medical Record Number:  [id number]      Indication: Melena, acute blood loss anemia     : Himanshu Noguera MD    Referring Provider:  Madi Anand MD    Anesthesia/Sedation: General anesthesia per ICU team    Airway assessment: No airway problems anticipated    Pre-Procedural Exam:      Airway/lungs: Intubated on the ventilator, normal respiratory effort   heart: Tachycardic with irregularly irregular rhythm/ectopy  Abdomen: soft, nontender, nondistended, bowel sounds present  Mental Status: Sedated and unresponsive      Procedure Details     After infomed consent was obtained for the procedure, with all risks and benefits of procedure explained the patient was taken to the endoscopy suite and placed in the left lateral decubitus position.  Following sequential administration of sedation as per above, the endoscope was inserted into the mouth and advanced under direct vision to third portion of the duodenum.  A careful inspection was made as the gastroscope was withdrawn, including a retroflexed view of the proximal stomach; findings and interventions are described below.      Findings:   Esophagus: Small to moderate amount of red blood was noted in the entire esophagus.  In the mid and distal esophagus there is diffuse clusters of ulcerations with contact friability and friable mucosa concerning for severe erosive esophagitis versus ischemic esophagitis versus less likely infectious esophagitis.  There was slow spontaneous oozing of blood and contact bleeding from the esophagus but no dominant lesion to treat.  stomach: Diffuse contact friability with petechiae of the entire stomach.  Stomach was otherwise normal.  Duodenum: Duodenum was normal to the third portion.    Therapies: None    Specimens: None           Complications:    None; patient tolerated the procedure well.    EBL:  None.           Impression:     -Severe erosive esophagitis with scattered clusters of ulcerations in the mid and distal esophagus with a small amount of spontaneous oozing of blood and contact bleeding.  No dominant lesion was found to treat for hemostasis.  Etiology of the esophagitis is unclear but could represent ischemic versus severe erosive esophagitis versus less likely infectious esophagitis (unlikely to be traumatic as he reportedly did not have an NG tube to suction).  -Diffuse contact friability with petechiae of the entire stomach.      Recommendations:  -Continue pantoprazole 40 mg twice daily.  -Consider sucralfate slurry 4 times daily once he is able to swallow.  -Monitor hemoglobin closely and transfuse as necessary.  I suspect he is going to continue to ooze blood slowly from the esophagus given the need for anticoagulation.    ICU physician Dr. Huggins was present during the procedure and I reviewed my findings and recommendations with him.  I also called and reviewed the findings with the patient's niece Loco.      Himanshu Noguera MD

## 2025-03-05 NOTE — PROGRESS NOTES
[address], [place] [address]  Phone: [phone]   [phone]    [email or web address]     Nephrology Progress Note    Patient Name : Galdino Melissa      : 1955     MRN : [id number]  Date of Admission : 3/2/2025  Date of Servive : 25    CC:  Follow up for EDWIGE on CKD       Assessment and Plan   EDWIGE:  - 2/2 ATN from septic shock vs CRS  - US on admission neg  - cont CVVH  - increase factor to 150cc/hr  - serial labs    HFrEF:  - EF 20%, AICD in place  - failing medical therapy  - plan RRT as above for volume removal  - Impella placed 3/4    MSSA bacteremia:  - on abx  - ? Source, AICD, vs valve    Shock:  - combo septic and cardiogenic  - on pressors and inotropes    DM2    Syncope    aflutter     Interval History:  Seen and examined on CRRT, factor of 100/hr.  Impella placed yesterday.  On vaso, epi and dobutamine drips.  Making some urine.    Review of Systems: Pertinent items are noted in HPI.    Current Medications:   Current Facility-Administered Medications   Medication Dose Route Frequency    bivalirudin trifluoroacetate (ANGIOMAX) 250 mg in sodium chloride 0.9 % 50 mL IVPB (addEASE)  0.02-2.5 mg/kg/hr IntraVENous Continuous    melatonin tablet 6 mg  6 mg Oral Nightly PRN    VASOpressin (VASOSTRICT) 20 units in sodium chloride 0.9% 100 mL infusion  0.01-0.04 Units/min IntraVENous Continuous    0.9 % sodium chloride infusion   IntraVENous PRN    sodium bicarbonate 12.5 mEq in dextrose 5 % 500 mL infusion (FOR IMPELLA PURGE)   IntraCATHeter Continuous    propofol infusion  5-50 mcg/kg/min IntraVENous Continuous    EPINEPHrine 5 mg in sodium chloride 0.9 % 250 mL infusion  1-20 mcg/min IntraVENous Continuous    bumetanide (BUMEX) injection 1 mg  1 mg IntraVENous BID    fentaNYL (SUBLIMAZE) injection 50 mcg  50 mcg IntraVENous Q1H PRN    lidocaine-EPINEPHrine 1 %-1:225957 injection 20 mL  20 mL IntraDERmal Once    pantoprazole    No results for input(s): \"CPK\", \"CKMB\", \"TROPONINI\" in the last 72 hours.    Invalid input(s): \"B-NP\"  Invalid input(s): \"PHI\", \"PCO2I\", \"PO2I\", \"FIO2I\"       Ventilator:       Microbiology:  No results found for: \"SDES\"  No components found for: \"CULT\"      I have reviewed the flowsheets.  Chart and Pertinent Notes have been reviewed.   No change in PMH ,family and social history from Consult note.      Rigo Kirkland MD  [facility]

## 2025-03-05 NOTE — PROGRESS NOTES
Cardiac Surgery ICU Progress Note    Admit Date: 3/2/2025  POD:  1 Day Post-Op    Procedure:  Procedure(s):  AXILLARY IMPELLA 5.5 INSERTION/ CVI 33       Hospital Synopsis:  2/20: Presented to [facility] ED with SOB and bilateral LE edema x 2 weeks. Admitted and increased diuretics with concern for acute HF exacerbation. Nephrology and Cardiology consulted.   2/25: Noted worsening Cr, hypotension, IVF started. Increased work of breathing noted, SpO2 75%, RRT called, transferred to CCU, DC IVF. Norepi started. CT C/A/P ordered to eval for infection.   2/26: WBC elevated, Cr up to 3.15, lactic 5.7. Off vasopressors. Blood cultures ordered, zosyn and Vanc started. 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 Heparin drip started. General surgery consulted with concern for bowel ischemia. Decompensated in the evening, bedside POCUS read as EF 10%, calculated Chuyita 1.3, started dobutamine (lactate 11.5).   2/28: Irregular HR, amio bolus given. Dark stools noted by RN, +occult stool. Heparin gtt stopped with concerns for GI bleed. Multiple episodes of seizure like activity documented. EEG with no focal abnormality. Blood cultures +staph aureus. LHC/RHC cancelled by Cardiology, now with concerns of sepsis. TTE negative for vegetation.   3/1: Vancomycin discontinued. In atrial flutter, 2:1 conduction.  3/2: Decision made to tx to [facility] for AHF work-up.   3/3: PAC floated, dialysis line placed, start CRRT. Cardiac Surgery and AHF consulted. GI saw, ok to resume anticoagulation if we place MCS and will monitor.   3/4: Remains on dobutamine, Norepi up to 20 mcg/min. Add vaso. CI remain low 1.6-2.0. Tolerating low pull on CRRT. Impella 5.5  3/5 POD #1: multiple vasopressors. Intubated. Sedated. P7. Flow 4.5.  CI 2.4.  CRRT. Transfer to CCU.     Subjective:   Patient seen with Dr. Hampton  Impella  5.5  Power P7  Flow 4.2  Intubated/vented/sedated  Infusions;  Epi 3  Vaso 0.03  Dobutamine 5  Prop 50   Bival  CRRT    ICD  will plan to scope if re-bleeds with MCS bivalirudin     MSSA Bacteremia:   - Blood culture 2/26 + MSSA  - Blood culture 3/2 NGTD              - Vancomycin (2/25-3/1)              - Zosyn (2/26-3/2)  - Transitioned to Ancef on 3/2  - CBC daily  - Will likely need LISA to rule out vegetation given unknown source   - ID consulted, recommend LISA to rule out vegetation on valves, ICD leads     Concern for Seizure: Staff reports multiple 10-30 second time periods of unresponsiveness  - EEG negative  - Consider Head CT  - Could have been related to hypotension as patient did not have Sinai during those periods and BP was low     Transfer to CCU     Rest of care per primary team.     Signed By: DANIELLE Ramirez - NP

## 2025-03-05 NOTE — PROGRESS NOTES
TRANSFER - IN REPORT: @ 1549    Verbal report received from Therese BAILEY on Galdino Melissa  being received from [id number] for ordered procedure      Report consisted of patient's Situation, Background, Assessment and   Recommendations(SBAR).     Information from the following report(s) Nurse Handoff Report was reviewed with the receiving nurse.    Opportunity for questions and clarification was provided.      Assessment completed upon patient's arrival to unit and care assumed.

## 2025-03-05 NOTE — PROGRESS NOTES
0730  Bedside shift change report given to Cirilo BAILEY (oncoming nurse) by David BAILEY (offgoing nurse). Report included the following information Nurse Handoff Report.     0900 Continuous suction waveform on impella, intensivist and rep notified; Impella switched to P-6  Formal ECHO ordered    0930 Nephrology rounding, inc factor to 150 today    1100 ECHO at bedside, impella rep at bedside assessing position    Pt transferred to CCU, report to Therese BAILEY

## 2025-03-05 NOTE — PROGRESS NOTES
2000: Bedside shift change report given to keesha (oncoming nurse) by fareed (offgoing nurse). Report included the following information Nurse Handoff Report.   Drips dual verified, dual skin complete     Lactic resulted 2.2, will recheck per protocol, intensivist FATUMA Rodriguez MD aware     Gtts:   Vaso @ 0.04  Epi @4  Dobut @ 5    Prop @ 50  Bival gtt     Oxygen therapy: vent -AC/VC mode/ rate 14//peep 5/fio2 40%    2030: Q4 neuro assessment complete, pt drowsy but follows commands weakly in all four extremities         2138:  pt's ptt therapeutic x2 on bival gtt, called pharmacy to verify frequency of ptt checks-per pharmacist: recheck q6 x2 at this time    2350: medtronic ICD interrogated by this RN     0006: repeat lactic 1.6      0011: q4 neuro assessment complete , follows commands     0337: ABG completed: 7.55/26.1/87/22.7/97.9-intensivist made aware.    0340: intensivist at bedside: tidal volume on vent dec to 400, peep increased to 7.5 by MD     0455: mixed venous completed, SVO2 55%, swan re-calibrated     0500: pt appears to be in afib RVR, EKG completed to confirm-made intensivist aware-orders given to administer 1x amio bolus     0600: intensivist made aware of MAPs dropping post amio bolus from mid 70s to mid 60s         0800:Bedside and Verbal shift change report given to aiden (oncoming nurse) by keesha (offgoing nurse). Report included the following information Nurse Handoff Report. Dual skin complete

## 2025-03-05 NOTE — PROGRESS NOTES
Physical Therapy 3/5/2025     Orders acknowledged & chart reviewed up to date. Patient is currently intubated/sedated s/p Impella placement POD # 1. Per RN patient may be extubated later today. Will continue to follow & complete PT evaluation as medically appropriate.    Thank you,  Cee Atwood PT, DPT, NCS

## 2025-03-05 NOTE — PROGRESS NOTES
Report given to RN by Shanti BAILEY.     Verified patient name and date of birth, scheduled procedure, and informed consent. Spoke with patient's NOK to attained consent over the phone with second RN. Assessed patient. Patient sedated and intubated. Vital signs currently stable with gtts and additional support. Respiratory status - intubated. Abdomen distended. Skin dry, intact with exception of patient's lines and incisions.     Patient monitored during bedside procedure. Bedside RN bedside to keep patient comfortable and to provide additional pain management.     Endoscopy recovery  Patient returned to baseline status prior to procedure. Patient appears comfortable on the vent. VS at baseline.

## 2025-03-05 NOTE — PROGRESS NOTES
4 Eyes Skin Assessment     NAME:  Galdino Melissa  YOB: 1955  MEDICAL RECORD NUMBER:  [id number]    The patient is being assessed for  Admission    I agree that at least one RN has performed a thorough Head to Toe Skin Assessment on the patient. ALL assessment sites listed below have been assessed.      Areas assessed by both nurses:    Sacrum. Buttock, Coccyx, Ischium and Other scrotum        Does the Patient have a Wound? Yes wound(s) were present on assessment. LDA wound assessment was Initiated and completed by RN       Jan Prevention initiated by RN: Yes  Wound Care Orders initiated by RN: Yes    Pressure Injury (Stage 3,4, Unstageable, DTI, NWPT, and Complex wounds) if present, place Wound referral order by RN under : Yes see wound care RN eval note prior to transfer to [facility]    New Ostomies, if present place, Ostomy referral order under : No     Nurse 1 eSignature: Electronically signed by Vani Mejia RN on 3/5/25 at 4:46 PM EST    **SHARE this note so that the co-signing nurse can place an eSignature**    Nurse 2 eSignature: {Esignature:[id number]}

## 2025-03-06 ENCOUNTER — APPOINTMENT (OUTPATIENT)
Facility: HOSPITAL | Age: 70
End: 2025-03-06
Attending: INTERNAL MEDICINE
Payer: MEDICARE

## 2025-03-06 ENCOUNTER — CLINICAL DOCUMENTATION (OUTPATIENT)
Age: 70
End: 2025-03-06

## 2025-03-06 PROBLEM — K22.10 EROSIVE ESOPHAGITIS: Status: ACTIVE | Noted: 2025-03-06

## 2025-03-06 LAB
ALBUMIN SERPL ELPH-MCNC: 2.9 G/DL (ref 2.9–4.4)
ALBUMIN SERPL-MCNC: 2.6 G/DL (ref 3.5–5)
ALBUMIN SERPL-MCNC: 2.7 G/DL (ref 3.5–5)
ALBUMIN/GLOB SERPL: 0.7 (ref 1.1–2.2)
ALBUMIN/GLOB SERPL: 1 (ref 0.7–1.7)
ALP SERPL-CCNC: 102 U/L (ref 45–117)
ALPHA1 GLOB SERPL ELPH-MCNC: 0.3 G/DL (ref 0–0.4)
ALPHA2 GLOB SERPL ELPH-MCNC: 0.4 G/DL (ref 0.4–1)
ALT SERPL-CCNC: 15 U/L (ref 12–78)
ANION GAP BLD CALC-SCNC: 10 (ref 10–20)
ANION GAP SERPL CALC-SCNC: 10 MMOL/L (ref 2–12)
ANION GAP SERPL CALC-SCNC: 10 MMOL/L (ref 2–12)
APTT PPP: 43.3 SEC (ref 22.1–31)
APTT PPP: 45 SEC (ref 22.1–31)
AST SERPL-CCNC: 54 U/L (ref 15–37)
B-GLOBULIN SERPL ELPH-MCNC: 1.1 G/DL (ref 0.7–1.3)
BACTERIA SPEC CULT: NORMAL
BASE EXCESS BLD CALC-SCNC: 4.1 MMOL/L
BASOPHILS # BLD: 0.01 K/UL (ref 0–0.1)
BASOPHILS NFR BLD: 0.1 % (ref 0–1)
BILIRUB SERPL-MCNC: 3 MG/DL (ref 0.2–1)
BUN SERPL-MCNC: 17 MG/DL (ref 6–20)
BUN SERPL-MCNC: 22 MG/DL (ref 6–20)
BUN/CREAT SERPL: 10 (ref 12–20)
BUN/CREAT SERPL: 9 (ref 12–20)
CA-I BLD-MCNC: 1.13 MMOL/L (ref 1.15–1.33)
CALCIUM SERPL-MCNC: 8.1 MG/DL (ref 8.5–10.1)
CALCIUM SERPL-MCNC: 8.1 MG/DL (ref 8.5–10.1)
CHLORIDE BLD-SCNC: 102 MMOL/L (ref 100–111)
CHLORIDE SERPL-SCNC: 101 MMOL/L (ref 97–108)
CHLORIDE SERPL-SCNC: 102 MMOL/L (ref 97–108)
CO2 BLD-SCNC: 26 MMOL/L (ref 22–29)
CO2 SERPL-SCNC: 22 MMOL/L (ref 21–32)
CO2 SERPL-SCNC: 22 MMOL/L (ref 21–32)
CREAT SERPL-MCNC: 1.99 MG/DL (ref 0.7–1.3)
CREAT SERPL-MCNC: 2.13 MG/DL (ref 0.7–1.3)
CREAT UR-MCNC: 2.2 MG/DL (ref 0.6–1.3)
DIFFERENTIAL METHOD BLD: ABNORMAL
EKG ATRIAL RATE: 278 BPM
EKG DIAGNOSIS: NORMAL
EKG Q-T INTERVAL: 340 MS
EKG QRS DURATION: 86 MS
EKG QTC CALCULATION (BAZETT): 472 MS
EKG R AXIS: 22 DEGREES
EKG T AXIS: 55 DEGREES
EKG VENTRICULAR RATE: 116 BPM
EOSINOPHIL # BLD: 0.12 K/UL (ref 0–0.4)
EOSINOPHIL NFR BLD: 1.1 % (ref 0–7)
ERYTHROCYTE [DISTWIDTH] IN BLOOD BY AUTOMATED COUNT: 21.1 % (ref 11.5–14.5)
ERYTHROCYTE [DISTWIDTH] IN BLOOD BY AUTOMATED COUNT: 21.2 % (ref 11.5–14.5)
FIBRINOGEN PPP-MCNC: 206 MG/DL (ref 200–475)
GAMMA GLOB SERPL ELPH-MCNC: 1.3 G/DL (ref 0.4–1.8)
GLOBULIN SER CALC-MCNC: 3.7 G/DL (ref 2–4)
GLOBULIN SER-MCNC: 3.1 G/DL (ref 2.2–3.9)
GLUCOSE BLD STRIP.AUTO-MCNC: 158 MG/DL (ref 65–117)
GLUCOSE BLD STRIP.AUTO-MCNC: 176 MG/DL (ref 65–117)
GLUCOSE BLD STRIP.AUTO-MCNC: 177 MG/DL (ref 74–99)
GLUCOSE BLD STRIP.AUTO-MCNC: 188 MG/DL (ref 65–117)
GLUCOSE SERPL-MCNC: 175 MG/DL (ref 65–100)
GLUCOSE SERPL-MCNC: 182 MG/DL (ref 65–100)
HCO3 BLDA-SCNC: 27 MMOL/L
HCT VFR BLD AUTO: 22.3 % (ref 36.6–50.3)
HCT VFR BLD AUTO: 24 % (ref 36.6–50.3)
HGB BLD-MCNC: 7.4 G/DL (ref 12.1–17)
HGB BLD-MCNC: 7.7 G/DL (ref 12.1–17)
HISTORY CHECK: NORMAL
IGA SERPL-MCNC: 627 MG/DL (ref 61–437)
IGG SERPL-MCNC: 1326 MG/DL (ref 603–1613)
IGM SERPL-MCNC: 48 MG/DL (ref 20–172)
IMM GRANULOCYTES # BLD AUTO: 0.28 K/UL (ref 0–0.04)
IMM GRANULOCYTES NFR BLD AUTO: 2.5 % (ref 0–0.5)
INR PPP: 1.5 (ref 0.9–1.1)
INR PPP: 1.5 (ref 0.9–1.1)
INTERPRETATION SERPL IEP-IMP: ABNORMAL
KAPPA LC FREE SER-MCNC: 172.2 MG/L (ref 3.3–19.4)
KAPPA LC FREE/LAMBDA FREE SER: 2.29 (ref 0.26–1.65)
LACTATE BLD-SCNC: 1.11 MMOL/L (ref 0.4–2)
LACTATE SERPL-SCNC: 1.1 MMOL/L (ref 0.4–2)
LAMBDA LC FREE SERPL-MCNC: 75.1 MG/L (ref 5.7–26.3)
LDH SERPL L TO P-CCNC: 474 U/L (ref 85–241)
LYMPHOCYTES # BLD: 0.95 K/UL (ref 0.8–3.5)
LYMPHOCYTES NFR BLD: 8.4 % (ref 12–49)
M PROTEIN SERPL ELPH-MCNC: 0.5 G/DL
MAGNESIUM SERPL-MCNC: 2.5 MG/DL (ref 1.6–2.4)
MAGNESIUM SERPL-MCNC: 2.5 MG/DL (ref 1.6–2.4)
MCH RBC QN AUTO: 19.1 PG (ref 26–34)
MCH RBC QN AUTO: 19.6 PG (ref 26–34)
MCHC RBC AUTO-ENTMCNC: 32.1 G/DL (ref 30–36.5)
MCHC RBC AUTO-ENTMCNC: 33.2 G/DL (ref 30–36.5)
MCV RBC AUTO: 59 FL (ref 80–99)
MCV RBC AUTO: 59.4 FL (ref 80–99)
MONOCYTES # BLD: 1.41 K/UL (ref 0–1)
MONOCYTES NFR BLD: 12.5 % (ref 5–13)
NEUTS SEG # BLD: 8.53 K/UL (ref 1.8–8)
NEUTS SEG NFR BLD: 75.4 % (ref 32–75)
NRBC # BLD: 0.1 K/UL (ref 0–0.01)
NRBC # BLD: 0.12 K/UL (ref 0–0.01)
NRBC BLD-RTO: 0.9 PER 100 WBC
NRBC BLD-RTO: 1.1 PER 100 WBC
PCO2 BLD: 34 MMHG (ref 35–48)
PH BLD: 7.51 (ref 7.35–7.45)
PHOSPHATE SERPL-MCNC: 2.3 MG/DL (ref 2.6–4.7)
PHOSPHATE SERPL-MCNC: 2.6 MG/DL (ref 2.6–4.7)
PLATELET # BLD AUTO: 78 K/UL (ref 150–400)
PLATELET # BLD AUTO: 85 K/UL (ref 150–400)
PO2 BLD: 122 MMHG (ref 83–108)
POTASSIUM BLD-SCNC: 3.7 MMOL/L (ref 3.5–5.5)
POTASSIUM SERPL-SCNC: 3.8 MMOL/L (ref 3.5–5.1)
POTASSIUM SERPL-SCNC: 3.9 MMOL/L (ref 3.5–5.1)
PROT SERPL-MCNC: 6 G/DL (ref 6–8.5)
PROT SERPL-MCNC: 6.4 G/DL (ref 6.4–8.2)
PROTHROMBIN TIME: 15.1 SEC (ref 9.2–11.2)
PROTHROMBIN TIME: 15.4 SEC (ref 9.2–11.2)
RBC # BLD AUTO: 3.78 M/UL (ref 4.1–5.7)
RBC # BLD AUTO: 4.04 M/UL (ref 4.1–5.7)
RBC MORPH BLD: ABNORMAL
SAO2 % BLD: 99 % (ref 94–98)
SAO2 % BLDMV: 59 % (ref 65–88)
SERVICE CMNT-IMP: ABNORMAL
SERVICE CMNT-IMP: NORMAL
SODIUM BLD-SCNC: 138 MMOL/L (ref 136–145)
SODIUM SERPL-SCNC: 133 MMOL/L (ref 136–145)
SODIUM SERPL-SCNC: 134 MMOL/L (ref 136–145)
SPECIMEN SITE: ABNORMAL
THERAPEUTIC RANGE: ABNORMAL SECS (ref 58–77)
THERAPEUTIC RANGE: ABNORMAL SECS (ref 58–77)
WBC # BLD AUTO: 10.8 K/UL (ref 4.1–11.1)
WBC # BLD AUTO: 11.3 K/UL (ref 4.1–11.1)

## 2025-03-06 PROCEDURE — 82947 ASSAY GLUCOSE BLOOD QUANT: CPT

## 2025-03-06 PROCEDURE — 83615 LACTATE (LD) (LDH) ENZYME: CPT

## 2025-03-06 PROCEDURE — 82330 ASSAY OF CALCIUM: CPT

## 2025-03-06 PROCEDURE — 90945 DIALYSIS ONE EVALUATION: CPT

## 2025-03-06 PROCEDURE — 85027 COMPLETE CBC AUTOMATED: CPT

## 2025-03-06 PROCEDURE — 2580000003 HC RX 258: Performed by: NURSE PRACTITIONER

## 2025-03-06 PROCEDURE — 84100 ASSAY OF PHOSPHORUS: CPT

## 2025-03-06 PROCEDURE — P9045 ALBUMIN (HUMAN), 5%, 250 ML: HCPCS | Performed by: ANESTHESIOLOGY

## 2025-03-06 PROCEDURE — 82962 GLUCOSE BLOOD TEST: CPT

## 2025-03-06 PROCEDURE — 6360000002 HC RX W HCPCS: Performed by: INTERNAL MEDICINE

## 2025-03-06 PROCEDURE — 2500000003 HC RX 250 WO HCPCS: Performed by: INTERNAL MEDICINE

## 2025-03-06 PROCEDURE — 94003 VENT MGMT INPAT SUBQ DAY: CPT

## 2025-03-06 PROCEDURE — 6370000000 HC RX 637 (ALT 250 FOR IP): Performed by: INTERNAL MEDICINE

## 2025-03-06 PROCEDURE — 80053 COMPREHEN METABOLIC PANEL: CPT

## 2025-03-06 PROCEDURE — 6360000002 HC RX W HCPCS: Performed by: STUDENT IN AN ORGANIZED HEALTH CARE EDUCATION/TRAINING PROGRAM

## 2025-03-06 PROCEDURE — 2580000003 HC RX 258: Performed by: INTERNAL MEDICINE

## 2025-03-06 PROCEDURE — 85025 COMPLETE CBC W/AUTO DIFF WBC: CPT

## 2025-03-06 PROCEDURE — 85384 FIBRINOGEN ACTIVITY: CPT

## 2025-03-06 PROCEDURE — 99232 SBSQ HOSP IP/OBS MODERATE 35: CPT | Performed by: INTERNAL MEDICINE

## 2025-03-06 PROCEDURE — 2000000000 HC ICU R&B

## 2025-03-06 PROCEDURE — 83735 ASSAY OF MAGNESIUM: CPT

## 2025-03-06 PROCEDURE — 4100000000 HC INO THERAPY EACH HOUR

## 2025-03-06 PROCEDURE — 85730 THROMBOPLASTIN TIME PARTIAL: CPT

## 2025-03-06 PROCEDURE — 85610 PROTHROMBIN TIME: CPT

## 2025-03-06 PROCEDURE — 82803 BLOOD GASES ANY COMBINATION: CPT

## 2025-03-06 PROCEDURE — 2500000003 HC RX 250 WO HCPCS: Performed by: STUDENT IN AN ORGANIZED HEALTH CARE EDUCATION/TRAINING PROGRAM

## 2025-03-06 PROCEDURE — 2580000003 HC RX 258: Performed by: STUDENT IN AN ORGANIZED HEALTH CARE EDUCATION/TRAINING PROGRAM

## 2025-03-06 PROCEDURE — P9045 ALBUMIN (HUMAN), 5%, 250 ML: HCPCS | Performed by: STUDENT IN AN ORGANIZED HEALTH CARE EDUCATION/TRAINING PROGRAM

## 2025-03-06 PROCEDURE — 93010 ELECTROCARDIOGRAM REPORT: CPT | Performed by: INTERNAL MEDICINE

## 2025-03-06 PROCEDURE — 6360000002 HC RX W HCPCS: Performed by: ANESTHESIOLOGY

## 2025-03-06 PROCEDURE — 83605 ASSAY OF LACTIC ACID: CPT

## 2025-03-06 PROCEDURE — 30233N1 TRANSFUSION OF NONAUTOLOGOUS RED BLOOD CELLS INTO PERIPHERAL VEIN, PERCUTANEOUS APPROACH: ICD-10-PCS | Performed by: INTERNAL MEDICINE

## 2025-03-06 PROCEDURE — 71045 X-RAY EXAM CHEST 1 VIEW: CPT

## 2025-03-06 PROCEDURE — 2700000000 HC OXYGEN THERAPY PER DAY

## 2025-03-06 PROCEDURE — 6360000002 HC RX W HCPCS: Performed by: NURSE PRACTITIONER

## 2025-03-06 PROCEDURE — APPNB30 APP NON BILLABLE TIME 0-30 MINS

## 2025-03-06 PROCEDURE — 84295 ASSAY OF SERUM SODIUM: CPT

## 2025-03-06 PROCEDURE — 93005 ELECTROCARDIOGRAM TRACING: CPT | Performed by: STUDENT IN AN ORGANIZED HEALTH CARE EDUCATION/TRAINING PROGRAM

## 2025-03-06 PROCEDURE — 99232 SBSQ HOSP IP/OBS MODERATE 35: CPT | Performed by: STUDENT IN AN ORGANIZED HEALTH CARE EDUCATION/TRAINING PROGRAM

## 2025-03-06 PROCEDURE — P9016 RBC LEUKOCYTES REDUCED: HCPCS

## 2025-03-06 PROCEDURE — 80069 RENAL FUNCTION PANEL: CPT

## 2025-03-06 PROCEDURE — 36430 TRANSFUSION BLD/BLD COMPNT: CPT

## 2025-03-06 PROCEDURE — 84132 ASSAY OF SERUM POTASSIUM: CPT

## 2025-03-06 RX ORDER — ALBUMIN HUMAN 50 G/1000ML
25 SOLUTION INTRAVENOUS ONCE
Status: COMPLETED | OUTPATIENT
Start: 2025-03-06 | End: 2025-03-06

## 2025-03-06 RX ORDER — ALBUMIN HUMAN 50 G/1000ML
12.5 SOLUTION INTRAVENOUS ONCE
Status: COMPLETED | OUTPATIENT
Start: 2025-03-06 | End: 2025-03-06

## 2025-03-06 RX ORDER — SODIUM CHLORIDE 9 MG/ML
INJECTION, SOLUTION INTRAVENOUS PRN
Status: DISCONTINUED | OUTPATIENT
Start: 2025-03-06 | End: 2025-03-07

## 2025-03-06 RX ADMIN — PROPOFOL 50 MCG/KG/MIN: 10 INJECTION, EMULSION INTRAVENOUS at 08:57

## 2025-03-06 RX ADMIN — Medication: at 15:15

## 2025-03-06 RX ADMIN — Medication: at 18:35

## 2025-03-06 RX ADMIN — DOBUTAMINE HYDROCHLORIDE 5 MCG/KG/MIN: 200 INJECTION INTRAVENOUS at 18:41

## 2025-03-06 RX ADMIN — PROPOFOL 25 MCG/KG/MIN: 10 INJECTION, EMULSION INTRAVENOUS at 22:03

## 2025-03-06 RX ADMIN — Medication: at 19:13

## 2025-03-06 RX ADMIN — EPINEPHRINE 7 MCG/MIN: 1 INJECTION INTRAMUSCULAR; INTRAVENOUS; SUBCUTANEOUS at 18:30

## 2025-03-06 RX ADMIN — Medication: at 23:03

## 2025-03-06 RX ADMIN — AMIODARONE HYDROCHLORIDE 1 MG/MIN: 1.8 INJECTION, SOLUTION INTRAVENOUS at 11:56

## 2025-03-06 RX ADMIN — Medication: at 03:01

## 2025-03-06 RX ADMIN — Medication 1 UNITS: at 23:22

## 2025-03-06 RX ADMIN — DOBUTAMINE HYDROCHLORIDE 5 MCG/KG/MIN: 200 INJECTION INTRAVENOUS at 06:12

## 2025-03-06 RX ADMIN — ALBUMIN (HUMAN) 12.5 G: 12.5 INJECTION, SOLUTION INTRAVENOUS at 04:10

## 2025-03-06 RX ADMIN — WATER 2000 MG: 1 INJECTION INTRAMUSCULAR; INTRAVENOUS; SUBCUTANEOUS at 05:30

## 2025-03-06 RX ADMIN — PROPOFOL 50 MCG/KG/MIN: 10 INJECTION, EMULSION INTRAVENOUS at 02:04

## 2025-03-06 RX ADMIN — Medication: at 15:20

## 2025-03-06 RX ADMIN — Medication: at 07:06

## 2025-03-06 RX ADMIN — PROPOFOL 50 MCG/KG/MIN: 10 INJECTION, EMULSION INTRAVENOUS at 12:00

## 2025-03-06 RX ADMIN — PROPOFOL 25 MCG/KG/MIN: 10 INJECTION, EMULSION INTRAVENOUS at 15:16

## 2025-03-06 RX ADMIN — Medication: at 02:15

## 2025-03-06 RX ADMIN — BUMETANIDE 1 MG: 0.25 INJECTION INTRAMUSCULAR; INTRAVENOUS at 08:23

## 2025-03-06 RX ADMIN — SODIUM BICARBONATE: 84 INJECTION, SOLUTION INTRAVENOUS at 23:58

## 2025-03-06 RX ADMIN — PANTOPRAZOLE SODIUM 40 MG: 40 INJECTION, POWDER, LYOPHILIZED, FOR SOLUTION INTRAVENOUS at 22:10

## 2025-03-06 RX ADMIN — Medication: at 23:02

## 2025-03-06 RX ADMIN — VASOPRESSIN 0.03 UNITS/MIN: 20 INJECTION INTRAVENOUS at 01:12

## 2025-03-06 RX ADMIN — Medication: at 07:07

## 2025-03-06 RX ADMIN — Medication: at 10:17

## 2025-03-06 RX ADMIN — VASOPRESSIN 0.04 UNITS/MIN: 20 INJECTION INTRAVENOUS at 18:50

## 2025-03-06 RX ADMIN — NOREPINEPHRINE BITARTRATE 5 MCG/MIN: 64 SOLUTION INTRAVENOUS at 16:20

## 2025-03-06 RX ADMIN — SODIUM CHLORIDE, PRESERVATIVE FREE 10 ML: 5 INJECTION INTRAVENOUS at 08:27

## 2025-03-06 RX ADMIN — BIVALIRUDIN 0.07 MG/KG/HR: 250 INJECTION, POWDER, LYOPHILIZED, FOR SOLUTION INTRAVENOUS at 09:54

## 2025-03-06 RX ADMIN — WATER 2000 MG: 1 INJECTION INTRAMUSCULAR; INTRAVENOUS; SUBCUTANEOUS at 22:10

## 2025-03-06 RX ADMIN — VASOPRESSIN 0.04 UNITS/MIN: 20 INJECTION INTRAVENOUS at 12:38

## 2025-03-06 RX ADMIN — SODIUM CHLORIDE, PRESERVATIVE FREE 10 ML: 5 INJECTION INTRAVENOUS at 22:10

## 2025-03-06 RX ADMIN — AMIODARONE HYDROCHLORIDE 1 MG/MIN: 1.8 INJECTION, SOLUTION INTRAVENOUS at 23:02

## 2025-03-06 RX ADMIN — BUMETANIDE 1 MG: 0.25 INJECTION INTRAMUSCULAR; INTRAVENOUS at 18:25

## 2025-03-06 RX ADMIN — AMIODARONE HYDROCHLORIDE 1 MG/MIN: 1.8 INJECTION, SOLUTION INTRAVENOUS at 17:42

## 2025-03-06 RX ADMIN — ALBUMIN (HUMAN) 25 G: 12.5 INJECTION, SOLUTION INTRAVENOUS at 15:15

## 2025-03-06 RX ADMIN — SODIUM CHLORIDE, PRESERVATIVE FREE 10 ML: 5 INJECTION INTRAVENOUS at 08:12

## 2025-03-06 RX ADMIN — WATER 2000 MG: 1 INJECTION INTRAMUSCULAR; INTRAVENOUS; SUBCUTANEOUS at 14:59

## 2025-03-06 RX ADMIN — PROPOFOL 50 MCG/KG/MIN: 10 INJECTION, EMULSION INTRAVENOUS at 05:28

## 2025-03-06 RX ADMIN — PANTOPRAZOLE SODIUM 40 MG: 40 INJECTION, POWDER, LYOPHILIZED, FOR SOLUTION INTRAVENOUS at 08:23

## 2025-03-06 RX ADMIN — EPINEPHRINE 7 MCG/MIN: 1 INJECTION INTRAMUSCULAR; INTRAVENOUS; SUBCUTANEOUS at 04:03

## 2025-03-06 ASSESSMENT — PULMONARY FUNCTION TESTS
PIF_VALUE: 23
PIF_VALUE: 22
PIF_VALUE: 24

## 2025-03-06 ASSESSMENT — PAIN SCALES - GENERAL
PAINLEVEL_OUTOF10: 0

## 2025-03-06 NOTE — PROGRESS NOTES
Infectious Disease Progress Note         Impression  High-grade MSSA bacteremia  AICD present  LISA done IntraOp for Impella.  Negative for vegetation per report     Shock-mixed- septic, cardiogenic  CHF  EF 20-25  S/p Impella     Acute hypoxic respiratory failure  Intubated     A-fib with RVR, PVCs       EDWIGE/acute renal failure  Likely ATN due to shock  On CRRT   Via L/ Femoral access     CHF  EF 20-25     DM2 A1c 7.7     GI bleed  Erosive esophagitis on EGD?  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            Anti-infectives:   Cefazolin IV  S/p Vancomycin 2/25 - 2/28  s/p Zosyn 2/26 - 3/2    Subjective:   Intubated, sedated, Remains in high pressor requirement, On vasopressin, epinephrine, dobutamine, on nitric oxide  No response to pain stimulus         Today's Date:  March 6, 2025  Date of Admission: 3/2/2025   Referring Physician: Huber Huggins    Patient is a 70 y.o. 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 for cardiac surgery and is being seen for MSSA bacteremia.     Seen in the ED at [facility] treated for acute on chronic CHF. WBC increased from 5.7 to 17.8 on 2/25 with worsening renal failure. Started on vancomycin. Zosyn was added on 2/26 and collected blood cultures growing MSSA in 4/4 bottles. Blood cultures collected on 2/27 again growing MSSA. On set blood cultures from 3/1 and paired set from 3/2 NGTD. Currently on cefazolin. After chart review, appears patient has had only peripheral IVs at [facility]. Lines have been replaced at [facility]. Patient does have left chest ICD since 2020. Denies any recent trauma, cuts, fever, chills, recent procedures  Comment HYPOCHROMIA  4+        RBC Comment MICROCYTOSIS  3+        RBC Comment OVALOCYTES  1+        RBC Comment MARYANN CELLS  PRESENT        RBC Comment POLYCHROMASIA  PRESENT       Magnesium    Collection Time: 03/06/25  3:40 AM   Result Value Ref Range    Magnesium 2.5 (H) 1.6 - 2.4 mg/dL   Protime-INR    Collection Time: 03/06/25  3:40 AM   Result Value Ref Range    INR 1.5 (H) 0.9 - 1.1      Protime 15.4 (H) 9.2 - 11.2 sec   POC Blood Gas Mixed    Collection Time: 03/06/25  3:49 AM   Result Value Ref Range    O2 Sat, Mixed 59 (L) 65 - 88 %    Performed by: CORDELIA CLEMENT    POCT Glucose    Collection Time: 03/06/25  5:33 AM   Result Value Ref Range    POC Glucose 158 (H) 65 - 117 mg/dL    Performed by: JANIE Alfonso         Microbiology:  blood cx MSSA    Studies:    CT Result (most recent):  CT CHEST ABDOMEN PELVIS WO CONTRAST 02/26/2025    Narrative  INDICATION: concern for infection; sepsis; persistent lactic acidosis    COMPARISON: None.    TECHNIQUE: Noncontrast helical CT of the chest, abdomen, and pelvis.  Coronal  and sagittal reconstructions were generated. CT dose reduction was achieved  through use of a standardized protocol tailored for this examination and  automatic exposure control for dose modulation. Absence of intravenous contrast  limits evaluation of the mediastinum, danya, vasculature, and solid organs.    ORAL CONTRAST: NONE.    FINDINGS:    MEDIASTINUM: No mass or lymphadenopathy.  DANYA: No gross pathology.  THORACIC AORTA: Atherosclerotic calcification without aneurysm.  MAIN PULMONARY ARTERY: Normal in caliber.  TRACHEA/BRONCHI: Patent.  ESOPHAGUS: No wall thickening or dilatation.  HEART: Heart is enlarged with chamber dilation and pacemaker leads noted.  Coronary artery calcium: present.  PLEURA: Small to moderate posteriorly layering right pleural effusion and trace  posteriorly layering left pleural effusion. No pneumothorax.  LUNGS: Atelectasis overlies pleural effusions with aerated

## 2025-03-06 NOTE — PROGRESS NOTES
0730: Bedside shift change report given to LYNN Saleh (oncoming nurse) by LYNN Hoff (offgoing nurse). Report included the following information Nurse Handoff Report, Index, ED Encounter Summary, ED SBAR, Adult Overview, Surgery Report, Intake/Output, MAR, Recent Results, Cardiac Rhythm ST c PVCs/A-Fib, Alarm Parameters, and Neuro Assessment.     0745: Dr Huggins, Intensivist, assessing pt @ the bedside; Impella increased to P-6 by MD.     0825: Yamile FREDERICK, CTS, assessing pt @ the bedside; no new orders.     0835: Dr Kirkland, Nephrology, assessing pt @ the bedside; no new orders.     0920: Pt's mother and niece @ the bedside, Dr Huggins present c RN to provide clinical updates and discuss GOC.     0930: Joe, Impella Rep, assessing pt/Impella @ the bedside and receiving pt labs/updates.     0945: Dr Buto-Colletti, Jennifer NP and NP, AHF Team, assessing pt and d/w RN pt updates; no new orders.     1000: IDR held @ the bedside.     1115: Epi down to 5 per MD.             Vaso up to 0.04 per MD.     1130: 12-Lead EKG interpreted A-Flutter.  AB.51 / 34 / 122 / 27 / 99%      1155: Amio gtt started @ 1.     1205: Dr Huggins and RN in conference room to provide family update to include pt's sons, nephews, nieces, sisters, cousins and significant other; pts sons GARRET and Mati are primary decision makers    1515: ABP MAP 60s; MD placed order for Albumin 5%/25g 500cc, and CRRT factor decreased to 100; RN @ bedside for labs.     1600: Dobutamine down to 2.5 per MD.             Epi up to 7 per MD.     1620: Levo started @ 5 per MD.     1630: Dobutamine paused per MD.     1655: Dobutamine restarted @ 5 per DM.     1700: 1 PRBC transfusion started.     1715: Levo stopped per MD.     1800

## 2025-03-06 NOTE — PROGRESS NOTES
2000 - Bedside report received from LYNN Saleh.  0400 - BP 80s/50s, CVP 8-10, remains tachycardic. 250ml Albumin x1 given. AM labs drawn & sent. Carboxyhemoglobin drawn.   0500 - PAC recalibrated.     Bedside and Verbal shift change report given to Therese (oncoming nurse) by Kavya (offgoing nurse). Report included the following information Nurse Handoff Report, Adult Overview, Intake/Output, Recent Results, Cardiac Rhythm -ST/Afib, and Alarm Parameters.

## 2025-03-06 NOTE — PROGRESS NOTES
Cardiac Surgery ICU Progress Note    Admit Date: 3/2/2025  POD:  1 Day Post-Op    Procedure:  Procedure(s):  Impella 5.5 inserted 3/4   3/5 : ESOPHAGOGASTRODUODENOSCOPY at bedside     Hospital Synopsis:  2/20: Presented to [facility] ED with SOB and bilateral LE edema x 2 weeks. Admitted and increased diuretics with concern for acute HF exacerbation. Nephrology and Cardiology consulted.   2/25: Noted worsening Cr, hypotension, IVF started. Increased work of breathing noted, SpO2 75%, RRT called, transferred to CCU, DC IVF. Norepi started. CT C/A/P ordered to eval for infection.   2/26: WBC elevated, Cr up to 3.15, lactic 5.7. Off vasopressors. Blood cultures ordered, zosyn and Vanc started. 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 Heparin drip started. General surgery consulted with concern for bowel ischemia. Decompensated in the evening, bedside POCUS read as EF 10%, calculated Chuyita 1.3, started dobutamine (lactate 11.5).   2/28: Irregular HR, amio bolus given. Dark stools noted by RN, +occult stool. Heparin gtt stopped with concerns for GI bleed. Multiple episodes of seizure like activity documented. EEG with no focal abnormality. Blood cultures +staph aureus. LHC/RHC cancelled by Cardiology, now with concerns of sepsis. TTE negative for vegetation.   3/1: Vancomycin discontinued. In atrial flutter, 2:1 conduction.  3/2: Decision made to tx to [facility] for AHF work-up.   3/3: PAC floated, dialysis line placed, start CRRT. Cardiac Surgery and AHF consulted. GI saw, ok to resume anticoagulation if we place MCS and will monitor.   3/4: Remains on dobutamine, Norepi up to 20 mcg/min. Add vaso. CI remain low 1.6-2.0. Tolerating low pull on CRRT. Impella 5.5  3/5 POD #1: multiple vasopressors. Intubated. Sedated. P7. Flow 4.5.  CI 2.4.  CRRT. Transfer to CCU. EGD: erosive esophagitis with oozing ulcers. Petechiae all over stomach.   3/6: Vented. Vasopressors. Sedated. CRRT. Stop bival. Start bicarb purge. End of life

## 2025-03-06 NOTE — PROGRESS NOTES
CRITICAL CARE PROGRESS NOTE    Name: Galdino Melissa   : 1955   MRN: [id number]   Date: 3/6/2025      ICU Diagnosis      Mixed Septic and Cardiogenic Shock  Acute Renal Failure    Overnight Events   Continued high vasopressor requirements. Appears comfortable.     ROS negative except as otherwise documented.     A/P   This is a 70 year old male with history of HTN, HLD, DM, PH and HFrEF (20%) who presented to OSH on  with SOB - admitted to the ICU for mixed septic and cardiogenic shock.     NEUROLOGICAL:    Mentation baseline prior to intubation.   - propofol for sedation  - fentanyl PRN pain   - daily SAT    PULMONOLOGY:   Intubated for OR. Minimal vent settings. CXR with mild interstitial edema  - goal SpO2>=92, pH>=7.30  - lung protective ventilation  - daily SBT  - follow ABG and CXR    CARDIOVASCULAR:   Cardiogenic shock with biventricular failure. Hemodynamics suggest RV is current limiting factor. No improvement in PA pressures or CI with volume removal.  - CHF consulted  - CTS consulted - post 5.5  - continue dobutamine  - continue epi  - continue vaso  - continue inhaled nitric   - bicarb purge  - bival gtt  - hold GDMT    GASTROINTESTINAL:   Reported GIB at OSH. CT with possible SMA thrombosis. Scope with GI - severe erosive changes and active bleeding. Suspect ischemic due to low flow state.   - IV PPI BID  - sucralfate when able   - GI consulted   - vascular consulted - doubt real SMA occlusion    RENAL/ELECTROLYTE:   Acute renal failure. Likely ATN due to shock.  - nephrology consulted - continue CVVH  - goal K>=4, Mg>=2, Phos >3  - daily BMP  - strict I/O's; daily weights  - avoid nephrotoxic medications    ENDOCRINE:   - SSI    ID/MICRO:   MSSA bacteremia. No clear source. LISA without vegetation  - ID consulted  - continue cefazolin    ICU DAILY CHECKLIST     Code Status: Full  DVT Prophylaxis: SCDs  T/L/D: PIV, trialysis  SUP: PI  Diet: NPO  ABCDEF Bundle/Checklist Completed:Yes  Disposition:  Stay in ICU  Multidisciplinary Rounds Completed:  Yes  Patient/Family Updated: Yes    OBJECTIVE:     Blood pressure (!) 88/67, pulse (!) 127, temperature 98.8 °F (37.1 °C), temperature source Bladder, resp. rate 16, height 1.753 m (5' 9\"), weight 96.5 kg (212 lb 11.9 oz), SpO2 92%.  Physical Exam  Gen: NAD  HEENT: NC/AT, supple  Cardiac: RRR, no M/R/G  Pulm: CTA bilaterally  ABD: S/NT/ND, normal bowel sounds  Extremities: no edema  Skin: no rash  Neuro: A/O X 3, no focal deficits     I have personally reviewed and interpreted all relevant imaging and laboratory data.     CRITICAL CARE DOCUMENTATION  I had a face to face encounter with the patient, reviewed and interpreted patient data including clinical events, labs, images, vital signs, I/O's, and examined patient.  I have discussed the case and the plan and management of the patient's care with the consulting services, the bedside nurses and the respiratory therapist.      NOTE OF PERSONAL INVOLVEMENT IN CARE   This patient has a high probability of imminent, clinically significant deterioration, which requires the highest level of preparedness to intervene urgently. I participated in the decision-making and personally managed or directed the management of the following life and organ supporting interventions that required my frequent assessment to treat or prevent imminent deterioration.    I personally spent 45 minutes of critical care time.  This is time spent at this critically ill patient's bedside actively involved in patient care as well as the coordination of care.  This does not include any procedural time which has been billed separately.    Huber Huggins M.D.  [facility]  3/6/2025

## 2025-03-06 NOTE — PROGRESS NOTES
LVAD/transplant workup pending medical stability.     Pt now  s/p impella 3/4/25 and tMCS today. Pt remains intubated, sedated, and on vasopressor support.     Plan: SW will await further guidance from medical team guidance plan regarding advanced heart therapies workup.     Pio Pozo, MSW, LCSW  Clinical   [facility]  Advanced Heart Failure  [phone]

## 2025-03-06 NOTE — PROGRESS NOTES
[address], [address] [place] [address]  Phone: [phone]   [phone]    [email or web address]     Nephrology Progress Note    Patient Name : Galdino Melissa      : 1955     MRN : [id number]  Date of Admission : 3/2/2025  Date of Servive : 25    CC:  Follow up for EDWIGE on CKD       Assessment and Plan   EDWIGE:  - 2/2 ATN from septic shock vs CRS  - US on admission neg  - cont CVVH for now  - no further escalation of care at this time    HFrEF:  - EF 20%, AICD in place  - failing medical therapy  - plan RRT as above for volume removal  - Impella placed 3/4    MSSA bacteremia:  - on abx  - ? Source, AICD, vs valve    GI bleed:  - EGD 3/5 showing erosive esophagitis    Shock:  - combo septic and cardiogenic  - on pressors and inotropes    DM2    Syncope    Aflutter      Goals of care discussion with family today. Overall not doing well.  Would favor transition to comfort care     Interval History:  Seen and examined on CRRT, factor of 150/hr. On  multiple pressors.  Had EGD showing severe erosive esophagitis.      Review of Systems: Pertinent items are noted in HPI.    Current Medications:   Current Facility-Administered Medications   Medication Dose Route Frequency    bivalirudin trifluoroacetate (ANGIOMAX) 250 mg in sodium chloride 0.9 % 50 mL IVPB (addEASE)  0.02-2.5 mg/kg/hr IntraVENous Continuous    melatonin tablet 6 mg  6 mg Oral Nightly PRN    VASOpressin (VASOSTRICT) 20 units in sodium chloride 0.9% 100 mL infusion  0.01-0.04 Units/min IntraVENous Continuous    0.9 % sodium chloride infusion   IntraVENous PRN    sodium bicarbonate 12.5 mEq in dextrose 5 % 500 mL infusion (FOR IMPELLA PURGE)   IntraCATHeter Continuous    propofol infusion  5-50 mcg/kg/min IntraVENous Continuous    EPINEPHrine 5 mg in sodium chloride 0.9 % 250 mL infusion  1-20 mcg/min IntraVENous Continuous    bumetanide (BUMEX) injection 1 mg  1 mg IntraVENous BID     the last 72 hours.    Invalid input(s): \"B-NP\"  Invalid input(s): \"PHI\", \"PCO2I\", \"PO2I\", \"FIO2I\"       Ventilator:       Microbiology:  No results found for: \"SDES\"  No components found for: \"CULT\"      I have reviewed the flowsheets.  Chart and Pertinent Notes have been reviewed.   No change in PMH ,family and social history from Consult note.      Rigo Kirkland MD  [facility]

## 2025-03-06 NOTE — PROGRESS NOTES
Occupational Therapy:     Chart reviewed in prep for OT evaluation and tx. Discussed pt status with RN who confirms pt remains medically inappropriate for participation in skilled therapy services (intubated FiO2 40%, PEEP 7.5, sedated). RN also reporting plans for GOC discussion with family later today. Will hold and continue to follow as able and medically appropriate.     Thank you,   Patricia Evans, OTD, OTR/L

## 2025-03-06 NOTE — FLOWSHEET NOTE
03/06/25 1630   Observations & Evaluations   Level of Consciousness 2   Heart Rhythm Irregular   Respiratory Quality/Effort Unlabored   O2 Device Ventilator   Skin Condition/Temp Dry;Warm   Edema Generalized   ICEBOAT   ICEBOAT I;C;E;B;O;A;T   Treatment   $CRRT $Yes   Therapy Type CVVH   System Used   System Used Jojo   Pressures (Jojo)   Access (mmHg) (!) -37 mmHg   Return/Venous (mmHg) 58 mmHg   TMP (mmHg) 92 mmHg   Pressure Drop (mmHg) 49 mmHg   Filter (mmHg) 146 mmHg   Effluent (mmHg) (!) 0 mmHg   Deaeration Chamber Check Yes   Flow Rates (Jojo)   Blood Flow (mL/min) 200 mL/min   Replacement Fluid Pre-Filter (mL/hr) 640 mL/hr   Replacement Filter Post-Filter (mL/hr) 630 mL/hr   Pre-Blood Pump (mL/hr) 1260 mL/hr   CRRT Activities   Intervention Ongoing   Hemodialysis Central Access - Temporary Left Femoral vein   Placement Date/Time: 03/03/25 0005   Present on Admission/Arrival: No  Inserted by: Dr. Wagner  Insertion Practices: Chlorohexadine skin antisepsis;Hand hygiene;Maximal barrier precautions;Sterile ultrasound technique;Optimal catheter site selection  ...   Continued need for line? Yes   Site Assessment Clean, dry & intact   Blue Lumen Status Infusing   Red Lumen Status Infusing   Line Care Connections checked and tightened   Dressing Type Bacteriocidal;Transparent   Date of Last Dressing Change 03/06/25   Dressing Status Clean, dry & intact   Initiation of Therapy   Dialysis Nurse Intiation of CRRT Therapy Yes     Primary RN SBAR: Vani Mejia RN  Patient Education provided: pt intubated, sedated  Preferred Education method and Primary language: pt intubated/sedated  Hospital General Consent Verified: yes  Hospital associated wait time; reason: N/A    Rounding on CVVH, orders, labs, notes, code status and consent reviewed. EN1984 running well at this time, no indications for changes. Discussed with primary RN    Hepatitis B Surface Ag   Date/Time Value Ref Range Status   03/03/2025 10:40 AM

## 2025-03-06 NOTE — PROGRESS NOTES
ADVANCED HEART FAILURE CENTER  [facility] in [place], [place]  Inpatient Progress Note      Patient name: Galdino Melissa  Patient : 1955  Patient MRN: [id number]  Consulting MD: Huber Huggins MD  Date of service: 25    REASON FOR REFERRAL:  Management of acute on chronic systolic heart failure    PLAN OF CARE:  Mixed cardiogenic and septic shock.  tMCS placed today 5.5 impella 3/4/25.  Advanced heart failure therapies work up initiated for OHT/LVAD.    HPI:  70-year-old male with history of ICM, HFrEF 20-25% since 2019, CAD s/p PCI to LAD, LCx and RCA, severe MR & moderate TR, atrial flutter, CKD was admitted on 2025 with worsening SOB, [place] patient was found to have lactic acidosis.  He was diuresed and started on dobutamine.  There was concern for ischemic bowel.  Patient was started on heparin drip.  Then there was dark stools noted by RN and positive occult blood and heparin was stopped.  Patient was hypotensive and found to have MSSA bacteremia.  He required Levophed for blood pressure support and was transferred to the ICU. lactic acidosis with up to 11.5 now trending down to 2.2.  Given patient's complex medical condition he was transferred to [facility] for further workup and treatment.  Patient also found to have oliguric EDWIGE with creatinine of 6.11 from a baseline of 1.4 and hyponatremic with a sodium of 126, nephrology was consulted and patient being started on CRRT for ATN versus CRS.  He also has liver congestion with a peak T. bili of 4.2, AST 88, .  PA catheter placed at bedside: CVP 13, PA 44/22/, MV 49%, CI 2.8 on dobutamine of 5 mcg/kg/min.  Patient lives with girlfriend, has adult children and niece is at bedside.  He denies tobacco use.  He does drink alcohol 2-3 drinks per day with a max of 6/day.    Interval Hx:  Patient continues to be critically ill requiring inotropes, vasopressors and MCS.  UOP decreasing with a total of 849 mL in last 24  and RCA  Will need statin restarted when liver improves  Beta-blocker on hold due to cardiogenic shock  Will need aspirin restarted when no signs of bleeding.    Atrial flutter  Continue amiodarone  Continue anticoagulation.    Severe erosive esophagitis with ulcers  ?  SMA thrombosis however does not have clinically, evaluated by surgery at outside hospital  GI consulted, PPI twice daily and Sucralfate when able to swallow  Continue to monitor HgB    Shock liver/hepatic congestion  Continue to monitor AST, ALT and T. Bili  GI consulted, appreciate recommendations    EDWIGE on CKD stage III  baseline 1.4 however up to 2.7 on last admission  Continue CRRT  Continue Bumex and monitor UOP    DMII  A1c 7.7%, continue insulin sliding scale      Physical activity and education  PT OT when appropriate  Nutritionist consult  Heart failure education by RN navigator  Advanced care plan and document POA    Plans at or after hospital discharge  [facility] at discharge  Follow-up in AHF Clinic within 7 days from discharge    IMPRESSION:  Fatigue  Shortness of breath  Volume overload  Acute on Chronic systolic heart failure  Stage C-D, NYHA class IIIb symptoms  Non-ischemic cardiomyopathy, LVEF 20-25 %  Atrial flutter  EDWIGE on CKD  HTN  HL  Erosive gastritis with ulcers, UGIB  Lactic acidosis    LIFE GOALS:  Lifestyle goals reviewed with the patient.  The patient identifies the following as important for living well: Spending time with friends and family        CARDIAC IMAGING:  Encounter Date: 03/02/25   EKG 12 Lead   Result Value    Ventricular Rate 123    Atrial Rate 278    QRS Duration 82    Q-T Interval 332    QTc Calculation (Bazett) 475    R Axis 34    T Axis 71    Diagnosis      Atrial flutter with variable AV block with premature ventricular or   aberrantly conducted complexes  Low voltage QRS  Nonspecific T wave abnormality  When compared with ECG of 05-MAR-2025 04:42,  Atrial flutter has replaced Atrial  mg, IntraVENous, Q6H PRN, Huber Arboleda MD, 10 mg at 03/02/25 8367    PATIENT CARE TEAM:  Patient Care Team:  Madi Anand MD as PCP - General  Madi Anand MD as PCP - Empaneled Provider  Lamar Hernandez [facility] as Pharmacist (Pharmacist)  Felipe Hampton MD as Consulting Physician (Cardiothoracic Surgery)     Thank you for allowing me to participate in this patient's care.    Cassandra Buto-Colletti,    Advanced Heart Failure Center  [facility]  [address] [facility], [address]  Phone: [phone]    Total Patient Care Time: 40 minutes

## 2025-03-06 NOTE — PROGRESS NOTES
Physical Therapy 3/6/2025    Chart reviewed in prep for PT evaluation. Per chart review patient remains medically inappropriate for participation in skilled therapy services (intubated FiO2 40%, PEEP 7.5, sedated). Also noted plans for GOC discussion with family later today. Will hold and continue to follow as able and medically appropriate.      Thank you,   Cee Atwood, PT, DPT, NCS

## 2025-03-07 ENCOUNTER — APPOINTMENT (OUTPATIENT)
Facility: HOSPITAL | Age: 70
End: 2025-03-07
Attending: STUDENT IN AN ORGANIZED HEALTH CARE EDUCATION/TRAINING PROGRAM
Payer: MEDICARE

## 2025-03-07 ENCOUNTER — APPOINTMENT (OUTPATIENT)
Facility: HOSPITAL | Age: 70
End: 2025-03-07
Attending: INTERNAL MEDICINE
Payer: MEDICARE

## 2025-03-07 VITALS
OXYGEN SATURATION: 98 % | HEIGHT: 69 IN | SYSTOLIC BLOOD PRESSURE: 80 MMHG | WEIGHT: 209.22 LBS | BODY MASS INDEX: 30.99 KG/M2 | TEMPERATURE: 99.3 F | DIASTOLIC BLOOD PRESSURE: 75 MMHG | RESPIRATION RATE: 20 BRPM | HEART RATE: 108 BPM

## 2025-03-07 PROBLEM — R57.0 CARDIOGENIC SHOCK (HCC): Status: ACTIVE | Noted: 2025-03-07

## 2025-03-07 LAB
ABO + RH BLD: NORMAL
ALBUMIN SERPL-MCNC: 3 G/DL (ref 3.5–5)
ALBUMIN/GLOB SERPL: 0.8 (ref 1.1–2.2)
ALP SERPL-CCNC: 101 U/L (ref 45–117)
ALT SERPL-CCNC: 12 U/L (ref 12–78)
ANION GAP BLD CALC-SCNC: 12 (ref 10–20)
ANION GAP SERPL CALC-SCNC: 9 MMOL/L (ref 2–12)
APTT PPP: 45 SEC (ref 22.1–31)
AST SERPL-CCNC: 51 U/L (ref 15–37)
BACTERIA SPEC CULT: NORMAL
BACTERIA SPEC CULT: NORMAL
BASE DEFICIT BLD-SCNC: 1.2 MMOL/L
BASOPHILS # BLD: 0 K/UL (ref 0–0.1)
BASOPHILS NFR BLD: 0 % (ref 0–1)
BDY SITE: ABNORMAL
BILIRUB SERPL-MCNC: 3.6 MG/DL (ref 0.2–1)
BLD PROD TYP BPU: NORMAL
BLOOD BANK BLOOD PRODUCT EXPIRATION DATE: NORMAL
BLOOD BANK DISPENSE STATUS: NORMAL
BLOOD BANK ISBT PRODUCT BLOOD TYPE: 6200
BLOOD BANK PRODUCT CODE: NORMAL
BLOOD BANK UNIT TYPE AND RH: NORMAL
BLOOD GROUP ANTIBODIES SERPL: NORMAL
BPU ID: NORMAL
BUN SERPL-MCNC: 14 MG/DL (ref 6–20)
BUN/CREAT SERPL: 7 (ref 12–20)
CA-I BLD-MCNC: 1.13 MMOL/L (ref 1.15–1.33)
CALCIUM SERPL-MCNC: 8.5 MG/DL (ref 8.5–10.1)
CHLORIDE BLD-SCNC: 105 MMOL/L (ref 100–111)
CHLORIDE SERPL-SCNC: 103 MMOL/L (ref 97–108)
CO2 BLD-SCNC: 21 MMOL/L (ref 22–29)
CO2 SERPL-SCNC: 22 MMOL/L (ref 21–32)
CREAT SERPL-MCNC: 1.95 MG/DL (ref 0.7–1.3)
CREAT UR-MCNC: 1.7 MG/DL (ref 0.6–1.3)
CROSSMATCH RESULT: NORMAL
DIFFERENTIAL METHOD BLD: ABNORMAL
ECHO BSA: 2.22 M2
ECHO BSA: 2.22 M2
ECHO EST RA PRESSURE: 3 MMHG
ECHO LV EDV A2C: 174 ML
ECHO LV EDV A4C: 165 ML
ECHO LV EDV BP: 170 ML (ref 67–155)
ECHO LV EDV INDEX A4C: 78 ML/M2
ECHO LV EDV INDEX BP: 81 ML/M2
ECHO LV EDV NDEX A2C: 82 ML/M2
ECHO LV EF PHYSICIAN: 15 %
ECHO LV EJECTION FRACTION A2C: 13 %
ECHO LV EJECTION FRACTION A4C: 27 %
ECHO LV ESV A2C: 152 ML
ECHO LV ESV A4C: 120 ML
ECHO LV ESV BP: 136 ML (ref 22–58)
ECHO LV ESV INDEX A2C: 72 ML/M2
ECHO LV ESV INDEX A4C: 57 ML/M2
ECHO LV ESV INDEX BP: 64 ML/M2
ECHO MV EROA PISA: 0.2 CM2
ECHO MV REGURGITANT ALIASING (NYQUIST) VELOCITY: 35 CM/S
ECHO MV REGURGITANT RADIUS PISA: 0.61 CM
ECHO MV REGURGITANT VELOCITY PISA: 4.1 M/S
ECHO MV REGURGITANT VOLUME PISA: 17.79 ML
ECHO MV REGURGITANT VTIA: 89.2 CM
ECHO PULMONARY ARTERY SYSTOLIC PRESSURE (PASP): 40 MMHG
ECHO RIGHT VENTRICULAR SYSTOLIC PRESSURE (RVSP): 31 MMHG
ECHO TV REGURGITANT MAX VELOCITY: 2.65 M/S
ECHO TV REGURGITANT PEAK GRADIENT: 28 MMHG
EOSINOPHIL # BLD: 0 K/UL (ref 0–0.4)
EOSINOPHIL NFR BLD: 0 % (ref 0–7)
ERYTHROCYTE [DISTWIDTH] IN BLOOD BY AUTOMATED COUNT: 26.2 % (ref 11.5–14.5)
GLOBULIN SER CALC-MCNC: 3.8 G/DL (ref 2–4)
GLUCOSE BLD STRIP.AUTO-MCNC: 168 MG/DL (ref 65–117)
GLUCOSE BLD STRIP.AUTO-MCNC: 182 MG/DL (ref 65–117)
GLUCOSE BLD STRIP.AUTO-MCNC: 202 MG/DL (ref 74–99)
GLUCOSE SERPL-MCNC: 181 MG/DL (ref 65–100)
HCO3 BLDA-SCNC: 22 MMOL/L
HCT VFR BLD AUTO: 26.9 % (ref 36.6–50.3)
HGB BLD-MCNC: 8.5 G/DL (ref 12.1–17)
IMM GRANULOCYTES # BLD AUTO: 0 K/UL
IMM GRANULOCYTES NFR BLD AUTO: 0 %
LACTATE BLD-SCNC: 1.24 MMOL/L (ref 0.4–2)
LACTATE SERPL-SCNC: 1.4 MMOL/L (ref 0.4–2)
LDH SERPL L TO P-CCNC: 557 U/L (ref 85–241)
LYMPHOCYTES # BLD: 1.3 K/UL (ref 0.8–3.5)
LYMPHOCYTES NFR BLD: 10 % (ref 12–49)
MAGNESIUM SERPL-MCNC: 2.5 MG/DL (ref 1.6–2.4)
MCH RBC QN AUTO: 19.8 PG (ref 26–34)
MCHC RBC AUTO-ENTMCNC: 31.6 G/DL (ref 30–36.5)
MCV RBC AUTO: 62.7 FL (ref 80–99)
MONOCYTES # BLD: 0.39 K/UL (ref 0–1)
MONOCYTES NFR BLD: 3 % (ref 5–13)
MYELOCYTES NFR BLD MANUAL: 1 %
NEUTS SEG # BLD: 11.18 K/UL (ref 1.8–8)
NEUTS SEG NFR BLD: 86 % (ref 32–75)
NRBC # BLD: 0.08 K/UL (ref 0–0.01)
NRBC BLD-RTO: 0.6 PER 100 WBC
PCO2 BLD: 31.9 MMHG (ref 35–48)
PH BLD: 7.45 (ref 7.35–7.45)
PLATELET # BLD AUTO: 69 K/UL (ref 150–400)
PO2 BLD: 139 MMHG (ref 83–108)
POTASSIUM BLD-SCNC: 3.9 MMOL/L (ref 3.5–5.5)
POTASSIUM SERPL-SCNC: 4 MMOL/L (ref 3.5–5.1)
PROT SERPL-MCNC: 6.8 G/DL (ref 6.4–8.2)
RBC # BLD AUTO: 4.29 M/UL (ref 4.1–5.7)
RBC MORPH BLD: ABNORMAL
SAO2 % BLD: 99 % (ref 94–98)
SAO2 % BLDMV: 53 % (ref 65–88)
SERVICE CMNT-IMP: ABNORMAL
SERVICE CMNT-IMP: NORMAL
SERVICE CMNT-IMP: NORMAL
SODIUM BLD-SCNC: 138 MMOL/L (ref 136–145)
SODIUM SERPL-SCNC: 134 MMOL/L (ref 136–145)
SPECIMEN EXP DATE BLD: NORMAL
SPECIMEN SITE: ABNORMAL
THERAPEUTIC RANGE: ABNORMAL SECS (ref 58–77)
UNIT DIVISION: 0
UNIT ISSUE DATE/TIME: NORMAL
VAS LEFT CCA DIST EDV: 13.5 CM/S
VAS LEFT CCA DIST PSV: 32.4 CM/S
VAS LEFT CCA PROX EDV: 15.1 CM/S
VAS LEFT CCA PROX PSV: 35.9 CM/S
VAS LEFT ECA EDV: 35.5 CM/S
VAS LEFT ECA PSV: 88.2 CM/S
VAS LEFT ICA DIST EDV: 16.2 CM/S
VAS LEFT ICA DIST PSV: 31.7 CM/S
VAS LEFT ICA MID EDV: 14.8 CM/S
VAS LEFT ICA MID PSV: 31.9 CM/S
VAS LEFT ICA PROX EDV: 50.7 CM/S
VAS LEFT ICA PROX PSV: 109.7 CM/S
VAS LEFT ICA/CCA PSV: 3.4 NO UNITS
VAS LEFT VERTEBRAL EDV: 8.7 CM/S
VAS LEFT VERTEBRAL PSV: 14.4 CM/S
VAS RIGHT CCA DIST EDV: 18 CM/S
VAS RIGHT CCA DIST PSV: 39.4 CM/S
VAS RIGHT CCA PROX EDV: 15.2 CM/S
VAS RIGHT CCA PROX PSV: 37.2 CM/S
VAS RIGHT ECA EDV: 30.8 CM/S
VAS RIGHT ECA PSV: 76.2 CM/S
VAS RIGHT ICA DIST EDV: 31.5 CM/S
VAS RIGHT ICA DIST PSV: 53.6 CM/S
VAS RIGHT ICA MID EDV: 18 CM/S
VAS RIGHT ICA MID PSV: 30.8 CM/S
VAS RIGHT ICA PROX EDV: 84.6 CM/S
VAS RIGHT ICA PROX PSV: 177.7 CM/S
VAS RIGHT ICA/CCA PSV: 4.5 NO UNITS
VAS RIGHT VERTEBRAL EDV: 12.3 CM/S
VAS RIGHT VERTEBRAL PSV: 21.8 CM/S
WBC # BLD AUTO: 13 K/UL (ref 4.1–11.1)

## 2025-03-07 PROCEDURE — 82803 BLOOD GASES ANY COMBINATION: CPT

## 2025-03-07 PROCEDURE — 83735 ASSAY OF MAGNESIUM: CPT

## 2025-03-07 PROCEDURE — 85025 COMPLETE CBC W/AUTO DIFF WBC: CPT

## 2025-03-07 PROCEDURE — 80053 COMPREHEN METABOLIC PANEL: CPT

## 2025-03-07 PROCEDURE — 2580000003 HC RX 258: Performed by: STUDENT IN AN ORGANIZED HEALTH CARE EDUCATION/TRAINING PROGRAM

## 2025-03-07 PROCEDURE — 93308 TTE F-UP OR LMTD: CPT | Performed by: INTERNAL MEDICINE

## 2025-03-07 PROCEDURE — 84295 ASSAY OF SERUM SODIUM: CPT

## 2025-03-07 PROCEDURE — 82947 ASSAY GLUCOSE BLOOD QUANT: CPT

## 2025-03-07 PROCEDURE — 99223 1ST HOSP IP/OBS HIGH 75: CPT | Performed by: NURSE PRACTITIONER

## 2025-03-07 PROCEDURE — 71045 X-RAY EXAM CHEST 1 VIEW: CPT

## 2025-03-07 PROCEDURE — 93325 DOPPLER ECHO COLOR FLOW MAPG: CPT | Performed by: INTERNAL MEDICINE

## 2025-03-07 PROCEDURE — 99232 SBSQ HOSP IP/OBS MODERATE 35: CPT | Performed by: STUDENT IN AN ORGANIZED HEALTH CARE EDUCATION/TRAINING PROGRAM

## 2025-03-07 PROCEDURE — 2500000003 HC RX 250 WO HCPCS: Performed by: STUDENT IN AN ORGANIZED HEALTH CARE EDUCATION/TRAINING PROGRAM

## 2025-03-07 PROCEDURE — 93321 DOPPLER ECHO F-UP/LMTD STD: CPT | Performed by: INTERNAL MEDICINE

## 2025-03-07 PROCEDURE — 84132 ASSAY OF SERUM POTASSIUM: CPT

## 2025-03-07 PROCEDURE — 85730 THROMBOPLASTIN TIME PARTIAL: CPT

## 2025-03-07 PROCEDURE — 83605 ASSAY OF LACTIC ACID: CPT

## 2025-03-07 PROCEDURE — 83615 LACTATE (LD) (LDH) ENZYME: CPT

## 2025-03-07 PROCEDURE — 6360000002 HC RX W HCPCS: Performed by: INTERNAL MEDICINE

## 2025-03-07 PROCEDURE — 2580000003 HC RX 258: Performed by: INTERNAL MEDICINE

## 2025-03-07 PROCEDURE — 82330 ASSAY OF CALCIUM: CPT

## 2025-03-07 PROCEDURE — 82962 GLUCOSE BLOOD TEST: CPT

## 2025-03-07 PROCEDURE — 6360000002 HC RX W HCPCS: Performed by: STUDENT IN AN ORGANIZED HEALTH CARE EDUCATION/TRAINING PROGRAM

## 2025-03-07 PROCEDURE — 2000000000 HC ICU R&B

## 2025-03-07 PROCEDURE — 2500000003 HC RX 250 WO HCPCS: Performed by: INTERNAL MEDICINE

## 2025-03-07 PROCEDURE — 6370000000 HC RX 637 (ALT 250 FOR IP): Performed by: INTERNAL MEDICINE

## 2025-03-07 PROCEDURE — 94003 VENT MGMT INPAT SUBQ DAY: CPT

## 2025-03-07 PROCEDURE — 90945 DIALYSIS ONE EVALUATION: CPT

## 2025-03-07 PROCEDURE — 93308 TTE F-UP OR LMTD: CPT

## 2025-03-07 PROCEDURE — 2700000000 HC OXYGEN THERAPY PER DAY

## 2025-03-07 RX ORDER — ONDANSETRON 4 MG/1
4 TABLET, ORALLY DISINTEGRATING ORAL EVERY 6 HOURS PRN
Status: DISCONTINUED | OUTPATIENT
Start: 2025-03-07 | End: 2025-03-08 | Stop reason: HOSPADM

## 2025-03-07 RX ORDER — GLYCOPYRROLATE 0.2 MG/ML
0.2 INJECTION INTRAMUSCULAR; INTRAVENOUS EVERY 4 HOURS PRN
Status: DISCONTINUED | OUTPATIENT
Start: 2025-03-07 | End: 2025-03-08 | Stop reason: HOSPADM

## 2025-03-07 RX ORDER — POLYETHYLENE GLYCOL 3350 17 G/17G
17 POWDER, FOR SOLUTION ORAL DAILY PRN
Status: DISCONTINUED | OUTPATIENT
Start: 2025-03-07 | End: 2025-03-08 | Stop reason: HOSPADM

## 2025-03-07 RX ORDER — MIDAZOLAM HYDROCHLORIDE 2 MG/2ML
4 INJECTION, SOLUTION INTRAMUSCULAR; INTRAVENOUS
Status: DISCONTINUED | OUTPATIENT
Start: 2025-03-07 | End: 2025-03-08 | Stop reason: HOSPADM

## 2025-03-07 RX ADMIN — SODIUM CHLORIDE, PRESERVATIVE FREE 10 ML: 5 INJECTION INTRAVENOUS at 08:18

## 2025-03-07 RX ADMIN — MIDAZOLAM HYDROCHLORIDE 4 MG: 1 INJECTION, SOLUTION INTRAMUSCULAR; INTRAVENOUS at 20:30

## 2025-03-07 RX ADMIN — EPINEPHRINE 7 MCG/MIN: 1 INJECTION INTRAMUSCULAR; INTRAVENOUS; SUBCUTANEOUS at 18:56

## 2025-03-07 RX ADMIN — PROPOFOL 25 MCG/KG/MIN: 10 INJECTION, EMULSION INTRAVENOUS at 08:40

## 2025-03-07 RX ADMIN — Medication: at 10:28

## 2025-03-07 RX ADMIN — WATER 2000 MG: 1 INJECTION INTRAMUSCULAR; INTRAVENOUS; SUBCUTANEOUS at 14:35

## 2025-03-07 RX ADMIN — BIVALIRUDIN 0.07 MG/KG/HR: 250 INJECTION, POWDER, LYOPHILIZED, FOR SOLUTION INTRAVENOUS at 08:33

## 2025-03-07 RX ADMIN — AMIODARONE HYDROCHLORIDE 1 MG/MIN: 1.8 INJECTION, SOLUTION INTRAVENOUS at 11:08

## 2025-03-07 RX ADMIN — DOBUTAMINE HYDROCHLORIDE 5 MCG/KG/MIN: 200 INJECTION INTRAVENOUS at 09:21

## 2025-03-07 RX ADMIN — PANTOPRAZOLE SODIUM 40 MG: 40 INJECTION, POWDER, LYOPHILIZED, FOR SOLUTION INTRAVENOUS at 08:23

## 2025-03-07 RX ADMIN — Medication: at 15:15

## 2025-03-07 RX ADMIN — Medication: at 15:14

## 2025-03-07 RX ADMIN — PROPOFOL 25 MCG/KG/MIN: 10 INJECTION, EMULSION INTRAVENOUS at 14:26

## 2025-03-07 RX ADMIN — Medication: at 11:08

## 2025-03-07 RX ADMIN — Medication: at 02:58

## 2025-03-07 RX ADMIN — Medication 1 UNITS: at 05:24

## 2025-03-07 RX ADMIN — Medication: at 02:03

## 2025-03-07 RX ADMIN — EPINEPHRINE 7 MCG/MIN: 1 INJECTION INTRAMUSCULAR; INTRAVENOUS; SUBCUTANEOUS at 05:04

## 2025-03-07 RX ADMIN — VASOPRESSIN 0.04 UNITS/MIN: 20 INJECTION INTRAVENOUS at 04:05

## 2025-03-07 RX ADMIN — BUMETANIDE 1 MG: 0.25 INJECTION INTRAMUSCULAR; INTRAVENOUS at 08:25

## 2025-03-07 RX ADMIN — PROPOFOL 25 MCG/KG/MIN: 10 INJECTION, EMULSION INTRAVENOUS at 03:15

## 2025-03-07 RX ADMIN — HYDROMORPHONE HYDROCHLORIDE 0.5 MG/HR: 10 INJECTION, SOLUTION INTRAMUSCULAR; INTRAVENOUS; SUBCUTANEOUS at 17:52

## 2025-03-07 RX ADMIN — VASOPRESSIN 0.04 UNITS/MIN: 20 INJECTION INTRAVENOUS at 13:09

## 2025-03-07 RX ADMIN — WATER 2000 MG: 1 INJECTION INTRAMUSCULAR; INTRAVENOUS; SUBCUTANEOUS at 05:13

## 2025-03-07 RX ADMIN — AMIODARONE HYDROCHLORIDE 1 MG/MIN: 1.8 INJECTION, SOLUTION INTRAVENOUS at 05:04

## 2025-03-07 RX ADMIN — PROPOFOL 50 MCG/KG/MIN: 10 INJECTION, EMULSION INTRAVENOUS at 19:06

## 2025-03-07 ASSESSMENT — PAIN SCALES - GENERAL
PAINLEVEL_OUTOF10: 0

## 2025-03-07 ASSESSMENT — PULMONARY FUNCTION TESTS
PIF_VALUE: 30
PIF_VALUE: 28
PIF_VALUE: 27
PIF_VALUE: 24
PIF_VALUE: 28

## 2025-03-07 NOTE — PROGRESS NOTES
ID  Transition to comfort measures noted  ID service to sign off.  Please call with questions, concerns

## 2025-03-07 NOTE — PROGRESS NOTES
CRITICAL CARE PROGRESS NOTE    Name: Galdino Melissa   : 1955   MRN: [id number]   Date: 3/7/2025      ICU Diagnosis      Mixed Septic and Cardiogenic Shock  Acute Renal Failure    Overnight Events   No events. Appears comfortable this AM.     ROS negative except as otherwise documented.     A/P   This is a 70 year old male with history of HTN, HLD, DM, PH and HFrEF (20%) who presented to OSH on  with SOB - admitted to the ICU for mixed septic and cardiogenic shock.     NEUROLOGICAL:    Mentation baseline prior to intubation.   - propofol for sedation  - fentanyl PRN pain   - daily SAT    PULMONOLOGY:   Intubated for OR. Minimal vent settings. CXR with mild interstitial edema  - goal SpO2>=92, pH>=7.30  - lung protective ventilation  - daily SBT  - follow ABG and CXR    CARDIOVASCULAR:   Cardiogenic shock with biventricular failure. Impella at P8 in addition to vasoactives. No evidence for cardiac recovery.   - CHF consulted  - CTS consulted - post 5.5  - continue dobutamine  - continue epi  - continue vaso  - continue inhaled nitric   - bicarb purge  - bival gtt  - hold GDMT  - GO    GASTROINTESTINAL:   Reported GIB at OSH. CT with possible SMA thrombosis. Scope with GI - severe erosive changes and active bleeding. Suspect ischemic due to low flow state.   - IV PPI BID  - sucralfate when able   - GI consulted   - vascular consulted - doubt real SMA occlusion    RENAL/ELECTROLYTE:   Acute renal failure. Likely ATN due to shock.  - nephrology consulted - continue CVVH  - goal K>=4, Mg>=2, Phos >3  - daily BMP  - strict I/O's; daily weights  - avoid nephrotoxic medications    ENDOCRINE:   - SSI    ID/MICRO:   MSSA bacteremia. No clear source. LISA without vegetation  - ID consulted  - continue cefazolin    ICU DAILY CHECKLIST     Code Status: Full  DVT Prophylaxis: SCDs  T/L/D: PIV, trialysis  SUP: PI  Diet: NPO  ABCDEF Bundle/Checklist Completed:Yes  Disposition: Stay in ICU  Multidisciplinary Rounds  Completed:  Yes  Patient/Family Updated: Yes    OBJECTIVE:     Blood pressure (!) 78/74, pulse (!) 115, temperature 99.7 °F (37.6 °C), resp. rate 22, height 1.753 m (5' 9\"), weight 94.9 kg (209 lb 3.5 oz), SpO2 99%.  Physical Exam  Gen: ill appearing   HEENT: NC/AT, supple  Cardiac: RRR, no M/R/G  Pulm: CTA bilaterally  ABD: S/NT/ND, normal bowel sounds  Extremities: no edema  Skin: no rash  Neuro: sedated    I have personally reviewed and interpreted all relevant imaging and laboratory data.     CRITICAL CARE DOCUMENTATION  I had a face to face encounter with the patient, reviewed and interpreted patient data including clinical events, labs, images, vital signs, I/O's, and examined patient.  I have discussed the case and the plan and management of the patient's care with the consulting services, the bedside nurses and the respiratory therapist.      NOTE OF PERSONAL INVOLVEMENT IN CARE   This patient has a high probability of imminent, clinically significant deterioration, which requires the highest level of preparedness to intervene urgently. I participated in the decision-making and personally managed or directed the management of the following life and organ supporting interventions that required my frequent assessment to treat or prevent imminent deterioration.    I personally spent 45 minutes of critical care time.  This is time spent at this critically ill patient's bedside actively involved in patient care as well as the coordination of care.  This does not include any procedural time which has been billed separately.    Huber Huggins M.D.  [facility]  3/7/2025

## 2025-03-07 NOTE — PROGRESS NOTES
2000 - Bedside report received from LYNN Saleh. Blood transfusion complete. CO declining, CI <2, pulse pressure narrowed. MD aware, no changes or new orders. VSS, pt resting comfortably on vent. GOC dx tomorrow w/ family.   0400 - AM labs drawn & sent. Carboxyhemoglobin drawn. AM CXR completed.  0500 - PAC re-calibrated.     Bedside and Verbal shift change report given to Therese (oncoming nurse) by Kavya (offgoing nurse). Report included the following information Nurse Handoff Report, Adult Overview, Intake/Output, MAR, Recent Results, Cardiac Rhythm -A flutter/ST, Alarm Parameters, and Neuro Assessment.

## 2025-03-07 NOTE — PROGRESS NOTES
ADVANCED HEART FAILURE CENTER  [facility] in [place], [place]  Inpatient Progress Note      Patient name: Galdino Melissa  Patient : 1955  Patient MRN: [id number]  Consulting MD: Huber Huggins MD  Date of service: 25    REASON FOR REFERRAL:  Management of acute on chronic systolic heart failure    PLAN OF CARE:  Mixed cardiogenic and septic shock.  tMCS placed today 5.5 impella 3/4/25.  Advanced heart failure therapies work up initiated for OHT/LVAD.      HPI:  70-year-old male with history of ICM, HFrEF 20-25% since 2019, CAD s/p PCI to LAD, LCx and RCA, severe MR & moderate TR, atrial flutter, CKD was admitted on 2025 with worsening SOB, CARR[place]O patient was found to have lactic acidosis.  He was diuresed and started on dobutamine.  There was concern for ischemic bowel.  Patient was started on heparin drip.  Then there was dark stools noted by RN and positive occult blood and heparin was stopped.  Patient was hypotensive and found to have MSSA bacteremia.  He required Levophed for blood pressure support and was transferred to the ICU. lactic acidosis with up to 11.5 now trending down to 2.2.  Given patient's complex medical condition he was transferred to [facility] for further workup and treatment.  Patient also found to have oliguric EDWIGE with creatinine of 6.11 from a baseline of 1.4 and hyponatremic with a sodium of 126, nephrology was consulted and patient being started on CRRT for ATN versus CRS.  He also has liver congestion with a peak T. bili of 4.2, AST 88, .  PA catheter placed at bedside: CVP 13, PA 44/22/31, MV 49%, CI 2.8 on dobutamine of 5 mcg/kg/min.  Patient lives with girlfriend, has adult children and niece is at bedside.  He denies tobacco use.  He does drink alcohol 2-3 drinks per day with a max of 6/day.      3/4/2025: Impella 5.5 placed, LISA without vegetation on valves.  3/5/2025: EGD with severe erosive esophagistritis      Interval Hx  Patient  ([facility]) 12/11/2024    AICD (automatic cardioverter/defibrillator) present 03/2021    put in at [facility].     Arrhythmia     CAD (coronary artery disease)     NSTEMI with PCI of LAD (2 stents), LCx, RCA    Congestive heart failure ([facility]) 10/2019    DM (diabetes mellitus) ([facility]) 3/30/2010    HTN (hypertension) 3/30/2010    Hypercholesterolemia        PAST SURGICAL HISTORY:  Past Surgical History:   Procedure Laterality Date    CARDIAC CATHETERIZATION  10/2019    4x stents    CARDIAC PROCEDURE N/A 9/19/2023    Left and right heart cath / coronary angiography performed by Rickie Walker III, DO at [facility] CARDIAC CATH LAB    CARDIAC SURGERY N/A 3/4/2025    AXILLARY IMPELLA INSERTION/ CVI 33 performed by Felipe Hampton MD at [facility] MAIN OR    COLONOSCOPY  1-11    diverticulosis- Dr. Londono    PACEMAKER      UPPER GASTROINTESTINAL ENDOSCOPY N/A 3/5/2025    ESOPHAGOGASTRODUODENOSCOPY at bedside performed by Himanshu Stuart MD at [facility] ENDOSCOPY    UPPER GI ENDOSCOPY,BIOPSY  10/27/2021            FAMILY HISTORY:  Family History   Problem Relation Age of Onset    Cancer Father         unknown    Hypertension Mother        SOCIAL HISTORY:  Social History     Socioeconomic History    Marital status:      Spouse name: None    Number of children: None    Years of education: None    Highest education level: None   Tobacco Use    Smoking status: Never    Smokeless tobacco: Never   Substance and Sexual Activity    Alcohol use: Not Currently     Alcohol/week: 16.7 standard drinks of alcohol    Drug use: Not Currently    Sexual activity: Yes     Partners: Female   Social History Narrative    Single, 2 children, works for city [employer] as [employer]. Retired 2012.     Social Determinants of Health     Financial Resource Strain: Medium Risk (12/11/2024)    Overall Financial Resource Strain (CARDIA)     Difficulty of Paying Living Expenses: Somewhat hard   Food Insecurity: No Food Insecurity (3/5/2025)    Hunger Vital Sign      Nkechi ROSARIO Christopher Charles, MD, 10 mg at 03/02/25 3906    PATIENT CARE TEAM:  Patient Care Team:  Madi Anand MD as PCP - General  Madi Anand MD as PCP - Empaneled Provider  Lamar Hernandez [facility] as Pharmacist (Pharmacist)  Felipe Hampton MD as Consulting Physician (Cardiothoracic Surgery)     Thank you for allowing me to participate in this patient's care.    Cassandra Buto-Colletti,    Advanced Heart Failure Center  [facility]  [address], [address]  Phone: [phone]    Total Patient Care Time: 40 minutes

## 2025-03-07 NOTE — CONSULTS
Palliative Medicine  Patient Name: Galdino Melissa  YOB: 1955  MRN: [id number]  Age: 70 y.o.  Gender: male    Date of Initial Consult: March 7, 2025  Date of Service: 3/7/2025  Time: 1:07 PM  Provider: Brunilda Francisco NP  Hospital Day: 6  Admit Date: 3/2/2025  Referring Provider: Dr. Huber Huggins      Reasons for Consultation:  Goals of Care    HISTORY OF PRESENT ILLNESS (HPI):   Galdino Melissa is a 70 y.o. male  who was admitted on 3/2/2025 as a transfer for higher level of care from [facility] where he was admitted 2/20/25 for chf related issues which progressed now with LVAD consideration.  Presently with diagnosis of EDWIGE, CHF(20%), cardiogenic shock, acute hypoxemic respiratory failure, MSSA bacteremia in setting of ICD,possible mesenteric artery thrombosis, GI bleed (erosive esophagitis), presumed liver disease based on abd ultrasound, concern of cardiogenic hepatopathy.     Pt critically ill with multi organ failure and supported with impella (P6), mechanical ventilation, NO, inotropes, vasopressors. Now with worsening urine output on CRRT. Comfort care recommended. We have been asked to support with care goals. Family meeting held yesterday. Pt has 2 sons who are next of kin.    PMH: DM2, chf (20%), AICD, syncope, CKD3    Psychosocial: Galdino lives at home with his girlfriend Carmel.  He has 2 grown children but would prefer to be to be his medical power of .  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     PALLIATIVE DIAGNOSES:    Shortness of breath  Hypotension   Renal failure   Feeding difficulties  Palliative care encounter    Pertinent Hospital Diagnoses:  Principal Problem:    Heart failure (HCC)  Active Problems:    Poorly controlled type 2 diabetes mellitus (HCC)    Shock (HCC)    EDWIGE (acute kidney injury)    MSSA bacteremia    ICD (implantable cardioverter-defibrillator) in place    Leukocytosis    Superior mesenteric artery  thrombosis    Gastrointestinal hemorrhage    History of implantable cardiac defibrillator (ICD)    Acute renal failure    Erosive esophagitis  Resolved Problems:    * No resolved hospital problems. *      ASSESSMENT AND PLAN:   Pt seen without family present. He is unable to participate in discussions due to medical condition.   Chart reviewed.  Very complex medical picture with multiorgan failure and high morbidity despite aggressive efforts.   Family meeting held yesterday with icu team. No decisions made. Comfort care offered along with medical update.  Family to reconvene with the team today. Sons to come in today  We are available to meet along with the primary team and help with transition to comfort care with symptom management if family should decide to proceed.   Nurse Saleh will PS me once family arrives.      Will follow along with you  Please call with any palliative questions or concerns.  Palliative Care Team is available via perfect serve or via phone.  Initial consult note routed to primary continuity provider and/or primary health care team members    Plan upon discharge   [] Outpatient Palliative Clinic  [] Home Based Palliative Care  [] Primary Care at Home  [] Hospice at home  [] Hospice at the facility  [] Inpatient hospice ([facility])  [] Rehab (Skilled vs IPR)  [] Facility Long Term Care  [] Home with Home Health  [x] Undetermined at this time      ADVANCE CARE PLANNING:   [x] [facility] Interdisciplinary Team has updated the ACP Navigator with Health Care Decision Maker and Patient Capacity      Primary Decision Maker (Active): GARRET COMBS - Child - [phone]    Primary Decision Maker: Mati Thorne - Child - [phone]    Secondary Decision Maker: Jessa Caceres - Parent - [phone]  Confirm Advance Directive: None  Patient Would Like to Complete Advance Directive: No/refused    Current Code Status: Full Code     .     Goals of Care and Interventions  Patient/Health Care Proxy Stated

## 2025-03-07 NOTE — FLOWSHEET NOTE
03/07/25 1320   Observations & Evaluations   Level of Consciousness 2   Respiratory Quality/Effort Unlabored   O2 Device Ventilator   Skin Condition/Temp Dry;Warm   Edema Generalized   ICEBOAT   ICEBOAT I;C;E;B;O;A;T   Treatment   $CRRT $Yes   Therapy Type CVVH   System Used   System Used Jojo   Pressures (Jojo)   Access (mmHg) (!) -17 mmHg   Return/Venous (mmHg) 54 mmHg   TMP (mmHg) 143 mmHg   Pressure Drop (mmHg) 77 mmHg   Filter (mmHg) 169 mmHg   Effluent (mmHg) (!) -43 mmHg   Deaeration Chamber Check Yes   Flow Rates (Jojo)   Blood Flow (mL/min) 200 mL/min   Replacement Fluid Pre-Filter (mL/hr) 640 mL/hr   Replacement Filter Post-Filter (mL/hr) 630 mL/hr   Pre-Blood Pump (mL/hr) 1260 mL/hr   CRRT Activities   Intervention Ongoing   Hemodialysis Central Access - Temporary Left Femoral vein   Placement Date/Time: 03/03/25 0005   Present on Admission/Arrival: No  Inserted by: Dr. Wagner  Insertion Practices: Chlorohexadine skin antisepsis;Hand hygiene;Maximal barrier precautions;Sterile ultrasound technique;Optimal catheter site selection  ...   Continued need for line? Yes   Site Assessment Clean, dry & intact   Blue Lumen Status Infusing   Red Lumen Status Infusing   Line Care Connections checked and tightened   Dressing Type Bacteriocidal;Transparent   Date of Last Dressing Change 03/07/25   Dressing Status Clean, dry & intact     Primary RN SBAR: Vani Mejia RN  Patient Education provided: pt intubated/sedated  Preferred Education method and Primary language: pt intubated/sedated  Hospital General Consent Verified: yes  Hospital associated wait time; reason: N/A    Rounding on CVVH, labs, notes, orders reviewed, discussed with primary RN. [id number] running well at this time, no indications for changes    Hepatitis B Surface Ag   Date/Time Value Ref Range Status   03/03/2025 10:40 AM <0.10 Index Final     Hep B S Ag Interp   Date/Time Value Ref Range Status   03/03/2025 10:40 AM Negative NEG   Final      Hep B S Ab   Date/Time Value Ref Range Status   03/03/2025 10:40 AM <3.10 mIU/mL Final     Hep B S Ab Interp   Date/Time Value Ref Range Status   03/03/2025 10:40 AM NONREACTIVE NR   Final     Comment:     (NOTE)  The ADVIA Centaur Anti-HBs2 assay is traceable to the World Health   Organization (WHO) Hepatitis B Immunoglobulin 1st International   Reference Preparation (1977). Samples with a calculated value of 10   mIU/mL or greater are considered reactive (protective) in accordance   with the CDC guidelines. The accepted criteria for immunity to HBV is   anti-HBs activity greater than or equal to 10 mIU/mL, as defined by   the WHO International Reference Preparation.  Assay performance has not been established in pregnant women,   patients who are immunosuppressed or immunocompromised, nor have   performance characteristics been established in conjunction with   other 's assays for specific HBV serologic markers. This   assay does not differentiate between vaccine induced immune response   and a response due to infection with HBV. Passively acquired anti-HBs   may be identified following patient transfusion, receipt of   immunoglobulin products, etc.

## 2025-03-07 NOTE — PROGRESS NOTES
Occupational Therapy:    Chart reviewed up to date and conferred with RN. Patient remains intubated & sedated and not appropriate for skilled OT at this time. OT will sign off at this time as we have attempted evaluation 3 times and patient remains too medically unstable to participate. RN in agreement. Please re-consult if needs arise.    Thank you,  LUIS A Floyd, OTR/L

## 2025-03-07 NOTE — PLAN OF CARE
Brief GOC Note     I spoke to the family (including sons and Leondra). We discussed the patient's progressive mutiorgan failure. I presented options that included continuing current level of care. We also discussed a comfort focused approach. Family opted to transition to CMO. All in agreement.     Huber Huggins M.D.  Pulmonary and Critical Care Medicine   03/07/25

## 2025-03-07 NOTE — PROGRESS NOTES
Comprehensive Nutrition Assessment    Type and Reason for Visit: Initial, Consult    Nutrition Recommendations/Plan:   Place DHT and start tickle tube feeding with Nepro if aggressive care continues  Slowly advance to goal if well tolerated: Nepro @ 35 ml/hr with 3 packets Prosource daily and 50 ml water flush q 4 hr  Replace phosphorous per nephrology       Malnutrition Assessment:  Malnutrition Status:  At risk for malnutrition (03/01/25 1327)    Context:  Acute Illness     Findings of the 6 clinical characteristics of malnutrition:  Energy Intake:  75% or less of estimated energy requirements for 7 or more days  Weight Loss:  No weight loss     Body Fat Loss:  No body fat loss     Muscle Mass Loss:  No muscle mass loss    Fluid Accumulation:  Unable to assess (not nutritionally relevant) Extremities   Strength:  Not Performedristics of malnutrition:       Nutrition Assessment:    Pt transferred from [facility] with Heart Failure(?candidate for advanced therapies). PMHx: HFrEF (20-25%), DM 2, ICD, HTN, CAD. Cardiogenic and septic shock.  EDWIGE 2/2 ATN-started on CVVH  3/3 for volume removal. Intubated for Impella placement 3/5; remains on the vent. Severe erosive esophagitis per EGD. Poor prognosis-Palliative Care meeting this afternoon.     Patient NPO since 3/4. Discussed during IDR-plan to start trickle tube feeding today dependent on results of GOC discussion with family today. Malnutrition assessment completed prior to transfer to [facility]. Nutrient needs based on IBW d/t significant pitting edema/volume overload. Weight down 13# since admission to [facility].    Propofol @ current rate will provide ~400 lipid calories per day. Above EN goal will provide 770 ml, 1625 calories, 122 gm protein and 1040 ml free water (tube feeding/flush) per day to meet 84% estimated protein needs. Will need to adjust goal once off propofol.      3/1 @ MMR. Chart reviewed for LOS. Pt admitted with CHF, CKD3, T2DM, HTN, shock, fluid overload.

## 2025-03-07 NOTE — PROGRESS NOTES
Physical Therapy 3/7/2025     Chart reviewed up to date and conferred with RN. Patient remains intubated & sedated and not appropriate for skilled PT at this time. PT will sign off at this time as we have attempted evaluation 3 times and patient remains too medically unstable to participate. RN in agreement. Please re-consult if needs arise.    Thank you,  eCe Atwood PT, DPT, NCS

## 2025-03-07 NOTE — PROGRESS NOTES
Spiritual Health History and Assessment/Progress Note  [facility]    Follow-up, Family Care,  , Anticipatory Grief,      Name: Galdino Melissa MRN: [id number]    Age: 70 y.o.     Sex: male   Language: English   Adventism: Other   Heart failure (HCC)     Date: 3/7/2025            Total Time Calculated: 30 min              Spiritual Assessment continued in [facility] 4 CORONARY CARE        Referral/Consult From: Rounding   Encounter Overview/Reason: Follow-up, Family Care  Service Provided For: Significant other, Family    Lorenza, Belief, Meaning:   Patient unable to assess at this time  Family/Friends are connected with a lorenza tradition or spiritual practice      Importance and Influence:  Patient unable to assess at this time  Family/Friends have spiritual/personal beliefs that influence decisions regarding the patient's health    Community:  Patient Other: family gathered here to support Galdino and talk about plan of care.   Family/Friends feel well-supported. Support system includes: Spouse/Partner and Extended family    Assessment and Plan of Care:     Patient Interventions include: Other: Provided support for patient's family.  Mostly spoke with Galdino's Significant Other of 14 years. Family to gather at 4:30 to discuss next steps.   Family/Friends Interventions include: Facilitated expression of thoughts and feelings and Affirmed coping skills/support systems    Patient Plan of Care: Spiritual Care available upon further referral  Family/Friends Plan of Care: Spiritual Care available upon further referral    Electronically signed by NELY Degroot on 3/7/2025 at 4:09 PM

## 2025-03-07 NOTE — PROGRESS NOTES
0730: Bedside shift change report given to LYNN Saleh (oncoming nurse) by LYNN Hoff (offgoing nurse). Report included the following information Nurse Handoff Report, Index, ED Encounter Summary, ED SBAR, Adult Overview, Surgery Report, Intake/Output, MAR, Recent Results, Cardiac Rhythm A-Flutter, Alarm Parameters, and Neuro Assessment.     0735: Dr Huggins, Intensivist, assessing pt @ the bedside c RN x 2; obtain ABG per MD.   AB.45 / 32 / 139 / 22 / 99%     0800: SVR 1020    0930: Dr Buto-Colletti, [facility], assessing pt @ the bedside;  no new orders.     1030: Sheila FREDERICK, [facility], assessing pt @ the bedside; no new orders.     1100: Echo tech @ the bedside.     1700: Family meeting held with RN and Dr Huggins;  decision from GARRET and Mati (pt's sons) to transition to comfort care only/DNR/DNI.     1720: RN @ bedside to rinse blood back in preparation for comfort care.     1752: Dilaudid gtt started for CMO.    1800: Paging [employer] for AICD deactivation assistance.     1825: Dennis [employer] rep, to be @ the bedside for on site deactivation per MD request.     1930: Bedside shift change report given to LYNN Barry (oncoming nurse) by LYNN Saleh (offgoing nurse). Report included the following information Nurse Handoff Report, Index, ED Encounter Summary, ED SBAR, Adult Overview, Surgery Report, Intake/Output, MAR, Recent Results, Cardiac Rhythm A-Flutter, Alarm Parameters, and Neuro Assessment.

## 2025-03-07 NOTE — PROGRESS NOTES
provided ongoing emotional and spiritual support for patient's family as they are grieving the situation with Galdino.     Chaplain Misty, MDiv, [facility]  Spiritual Health Services  Paging service: [phone] (PRAY)

## 2025-03-07 NOTE — PROGRESS NOTES
Cardiac Surgery ICU Progress Note    Admit Date: 3/2/2025  POD:  3 days    Procedure:  Procedure(s):  Impella 5.5 inserted 3/4   3/5 : ESOPHAGOGASTRODUODENOSCOPY at bedside       Hospital Synopsis:  2/20: Presented to [facility] ED with SOB and bilateral LE edema x 2 weeks. Admitted and increased diuretics with concern for acute HF exacerbation. Nephrology and Cardiology consulted.   2/25: Noted worsening Cr, hypotension, IVF started. Increased work of breathing noted, SpO2 75%, RRT called, transferred to CCU, DC IVF. Norepi started. CT C/A/P ordered to eval for infection.   2/26: WBC elevated, Cr up to 3.15, lactic 5.7. Off vasopressors. Blood cultures ordered, zosyn and Vanc started. 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 Heparin drip started. General surgery consulted with concern for bowel ischemia. Decompensated in the evening, bedside POCUS read as EF 10%, calculated Chuyita 1.3, started dobutamine (lactate 11.5).   2/28: Irregular HR, amio bolus given. Dark stools noted by RN, +occult stool. Heparin gtt stopped with concerns for GI bleed. Multiple episodes of seizure like activity documented. EEG with no focal abnormality. Blood cultures +staph aureus. LHC/RHC cancelled by Cardiology, now with concerns of sepsis. TTE negative for vegetation.   3/1: Vancomycin discontinued. In atrial flutter, 2:1 conduction.  3/2: Decision made to tx to [facility] for AHF work-up.   3/3: PAC floated, dialysis line placed, start CRRT. Cardiac Surgery and AHF consulted. GI saw, ok to resume anticoagulation if we place MCS and will monitor.   3/4: Remains on dobutamine, Norepi up to 20 mcg/min. Add vaso. CI remain low 1.6-2.0. Tolerating low pull on CRRT. Impella 5.5  3/5 POD #1: multiple vasopressors. Intubated. Sedated. P7. Flow 4.5.  CI 2.4.  CRRT. Transfer to CCU. EGD: erosive esophagitis with oozing ulcers. Petechiae all over stomach.   3/6: Vented. Vasopressors. Sedated. CRRT. Stop bival. Start bicarb purge. End of life  discussion with family. Heroic measures futile.   3/7: Awaiting family for additional discussion on plan of care and next steps.     Subjective:     Patient seen with Dr. Hampton  Vented, sedated  CRRT  Impella at P 8.    Flow 3.8  CI ranges 1.5-2.2   Vasopressors:   Epi 7   Vaso 0.04  Dobutamine 5  EGD 3/5/25 showed esophageal ulcerations, oozing blood, petechiae entire stomach  Stop bivalirudin and use bicarb purge  Intensivist to discuss futility of heroic measures with family again today.    Objective:   Vitals:  Blood pressure (!) 78/74, pulse (!) 115, temperature 99.7 °F (37.6 °C), resp. rate 23, height 1.753 m (5' 9.02\"), weight 94.9 kg (209 lb 3.5 oz), SpO2 99%.  Temp (24hrs), Av.8 °F (37.7 °C), Min:99.1 °F (37.3 °C), Max:100 °F (37.8 °C)      Hemodynamics:    CO: CO (l/min): 3.6 l/min  CI: CI (l/min/m2): 1.7 l/min/m2  CVP:    SVR:   PAP Systolic: PAP (Systolic/Diastolic): 34/16  PAP Diastolic: PAP (Systolic/Diastolic): 34/16  PVR:    SV02: SVO2 (%): 54 %       Ventilator Settings:      Mode Rate TV Press PEEP FiO2 PIP Min. Vent   AC/VC 14 bpm  350 mL    5 40 %  28 cmH2O           Oxygen Flow Rate: O2 Flow Rate (L/min): 2 L/min    Oxygen Delivery Method: O2 Device: Ventilator      Webster-Mac  Webster-Mac  Core (Body) Temperature: 99.4 °F (37.4 °C)  MAP (Monitor): 60  PAP (Systolic/Diastolic): 34/16  PAP (Mean): 24 mmHg  PAP Wave Form: Appropriate wave forms, Rezeroed  CVP (Mean): 11 mmHg  SVO2 (%): 54 %  CO (l/min): 3.6 l/min  CI (l/min/m2): 1.7 l/min/m2  SVR (dyne*sec)/cm5: 1020 (dyne*sec)/cm5  SVRI (dyne*sec)/cm5/m2: 2193 (dyne*sec)/cm5  VALORIE: 1.64  : 0.59       EKG/Rhythm:  ST, Afib, multifocal PVCs and couplets    Extubation Date / Time: remains intubated    CXR:    Admission Weight: Last Weight   Weight - Scale: 101.1 kg (222 lb 14.2 oz) Weight - Scale: 94.9 kg (209 lb 3.5 oz)     Intake / Output / Drain:  Current Shift:  07 -  1900  In: 381.6 [I.V.:375.6]  Out: 856 [Urine:20]  Last 24

## 2025-03-07 NOTE — PROGRESS NOTES
[address], [place] [address]  Phone: [phone]   [phone]    [email or web address]     Nephrology Progress Note    Patient Name : Galdino Melissa      : 1955     MRN : [id number]  Date of Admission : 3/2/2025  Date of Servive : 25    CC:  Follow up for EDWIGE on CKD       Assessment and Plan   EDWIGE:  - 2/2 ATN from septic shock vs CRS  - US on admission neg  - cont CVVH w/ net  cc/ hr   - no further escalation of care at this time    HFrEF:  - EF 20%, AICD in place  - failing medical therapy  - plan RRT as above for volume removal  - Impella placed 3/4 - at P-8    MSSA bacteremia:  - on abx  - ? Source, AICD, vs valve    GI bleed:  - EGD 3/5 showing erosive esophagitis    Shock:  - combo septic and cardiogenic  - on pressors and inotropes    DM2    Syncope    Aflutter      Goals of care discussion with family today. Overall not doing well.  Would favor transition to comfort care     Interval History:  Seen and examined on CRRT, factor of 100/hr. In and out of A flutter. On quite a bit of hemodynamic support. Remains anuric    Review of Systems: Review of systems not obtained due to patient factors.    Current Medications:   Current Facility-Administered Medications   Medication Dose Route Frequency    amiodarone (NEXTERONE) 360 mg in dextrose 5% 200 ml  1 mg/min IntraVENous Continuous    0.9 % sodium chloride infusion   IntraVENous PRN    bivalirudin trifluoroacetate (ANGIOMAX) 250 mg in sodium chloride 0.9 % 50 mL IVPB (addEASE)  0.02-2.5 mg/kg/hr IntraVENous Continuous    melatonin tablet 6 mg  6 mg Oral Nightly PRN    VASOpressin (VASOSTRICT) 20 units in sodium chloride 0.9% 100 mL infusion  0.01-0.04 Units/min IntraVENous Continuous    0.9 % sodium chloride infusion   IntraVENous PRN    sodium bicarbonate 12.5 mEq in dextrose 5 % 500 mL infusion (FOR IMPELLA PURGE)   IntraCATHeter Continuous    propofol infusion  5-50    Allergen Reactions    Glipizide Other (See Comments)     dizziness       Objective:  Vitals:    Vitals:    03/07/25 0400 03/07/25 0500 03/07/25 0600 03/07/25 0700   BP:       Pulse: (!) 125 (!) 112 (!) 112 (!) 105   Resp: 22 15 23 23   Temp: 100 °F (37.8 °C) 100 °F (37.8 °C) 99.9 °F (37.7 °C) 99.7 °F (37.6 °C)   TempSrc:       SpO2: 93% 94% 90% 93%   Weight: 94.9 kg (209 lb 3.5 oz)      Height:           Intake and Output:  No intake/output data recorded.  03/05 1901 - 03/07 0700  In: 4303 [I.V.:3398.7]  Out: 8283 [Urine:700]    Physical Examination:  General: Sedated on the vent   HEENT:           ETT in place  Neck:  Supple, no mass  Resp:  Lungs CTA B/L, no wheezing , normal respiratory effort  CV:  RRR,  no murmur or rub, 1+ b/l LE edema  GI:  Soft, NT, + BS, no HS megaly  Neurologic:  sedated  Psych:             unable to assess  Skin:  No Rash  :  [place] +   Dialysis Access : L femoral rufina    []    High complexity decision making was performed  []    Patient is at high-risk of decompensation with multiple organ involvement    Lab Data Personally Reviewed: I have reviewed all the pertinent labs, microbiology data and radiology studies during assessment.    Labs:  Recent Labs     03/06/25  0340 03/06/25  1133 03/06/25  1516 03/07/25  0344   *  --  134* 134*   K 3.8  --  3.9 4.0     --  102 103   CO2 22  --  22 22   GLUCOSE 175*  --  182* 181*   BUN 22*  --  17 14   CREATININE 2.13* 2.2* 1.99* 1.95*   CALCIUM 8.1*  --  8.1* 8.5       Recent Labs     03/06/25  0340 03/06/25  1516 03/07/25  0344   WBC 11.3* 10.8 13.0*   RBC 4.04* 3.78* 4.29   HGB 7.7* 7.4* 8.5*   HCT 24.0* 22.3* 26.9*   MCV 59.4* 59.0* 62.7*   MCH 19.1* 19.6* 19.8*   MCHC 32.1 33.2 31.6   RDW 21.2* 21.1* 26.2*   PLT 85* 78* 69*     Recent Labs     03/05/25  1444 03/06/25  1516 03/07/25  0344   GLOB 3.3 3.7 3.8     Recent Labs     03/05/25  1444 03/06/25  0340 03/06/25  1516 03/07/25  0344   INR 1.6* 1.5* 1.5*  --    APTT 44.1*  43.3* 45.0* 45.0*      No results for input(s): \"CPK\", \"CKMB\", \"TROPONINI\" in the last 72 hours.    Invalid input(s): \"B-NP\"  Invalid input(s): \"PHI\", \"PCO2I\", \"PO2I\", \"FIO2I\"       Ventilator:       Microbiology:  No results found for: \"SDES\"  No components found for: \"CULT\"      I have reviewed the flowsheets.  Chart and Pertinent Notes have been reviewed.   No change in PMH ,family and social history from Consult note.      Manuel Bauman MD  [facility]

## 2025-03-08 LAB
PETH BLD QL SCN: NEGATIVE
PETH BLD-MCNC: NEGATIVE NG/ML

## 2025-03-08 NOTE — PROGRESS NOTES
1930: Bedside report received from Therese BAILEY    1945: [employer] rep at bedside to deactivate AICD, report faxed and placed on chart.     2000: MD and Family at bedside, plans to withdraw confirmed, family to aler RN when ready.      2030: Family ready for withdrawal of care, PRN versed given and gtts stopped.      2045: Impella dropped to p-2 and pt extubated to RA.      2159: Pt asystole, TOD 2159.

## 2025-03-08 NOTE — PROGRESS NOTES
Spiritual Health History and Assessment/Progress Note  [facility]    Follow-up, Family Care,  , Anticipatory Grief,      Name: Galdino Melissa MRN: [id number]    Age: 70 y.o.     Sex: male   Language: English   Buddhist: Other   Heart failure (HCC)     Date: 3/7/2025            Total Time Calculated: 10 min              Spiritual Assessment began in [facility] 4 CORONARY CARE        Referral/Consult From: Nurse   Encounter Overview/Reason: Follow-up, Family Care  Service Provided For: Family    Lorenza, Belief, Meaning:   Patient unable to assess at this time  Family/Friends No family/friends present      Importance and Influence:  Patient unable to assess at this time  Family/Friends No family/friends present    Community:  Patient Other: RN paged  to alert of patient's death.   Family/Friends No family/friends present- Per RN, Family had left at time of  arrival.     Assessment and Plan of Care:     Patient Interventions include: Other: Silent prayer offered for patient and family. Patient and family known to this  from earlier this evening when this  spent time providing emotional and spiritual support to family.   Family/Friends Interventions include: No family/friends present    Patient Plan of Care: No spiritual needs identified for follow-up  Family/Friends Plan of Care: No spiritual needs identified for follow-up    Electronically signed by NELY Degroot on 3/7/2025 at 11:06 PM

## 2025-03-08 NOTE — DEATH NOTES
Death Pronouncement Note  Patient's Name: Galdino Melissa   Patient's YOB: 1955  MRN Number: [id number]    Admitting Provider: Huber Arboleda MD  Attending Provider: Hubre Huggins MD    Patient was examined and the following were absent: Pulses, Blood Pressure, and Respiratory effort    I declared the patient dead on  3/7/25 at 21:59    Preliminary Cause of Death:   Cardiogenic shock    Electronically signed by Madi Rodriguez MD on 3/7/25 at 10:03 PM EST

## 2025-03-08 NOTE — DEATH NOTES
Patient death recorded on the [facility] internal restraint log on 3/7/2025 date at 2159 time.

## 2025-03-08 NOTE — DISCHARGE SUMMARY
Discharge Summary     Patient: Galdino Melissa       MRN: [id number]       YOB: 1955       Age: 70 y.o.     Date of admission:  3/2/2025    Date of discharge:  3/7/2025    Primary care provider:  Madi Anand MD     Admitting provider:  Huber Arboleda MD    Discharging provider(s): Madi Rodriguez MD - Staff Intensivist - [facility]     Consultations  IP CONSULT TO SPIRITUAL SERVICES  IP CONSULT TO INFECTIOUS DISEASES  IP CONSULT TO GI    Procedures  Impella  Intubation  CVC  Arterial line  EGD  LISA    Discharge Condition: .  Discharge Destination: [facility].   Pronounced: 3/7/25 at 21:59     Admission diagnosis  Heart failure (HCC) [I50.9]    Please refer to the admission history and physical for details on the presenting problem.     Final discharge diagnoses and brief hospital course    Cardiogenic shock  Acute on chronic heart failure  MSSA bacteremia  Severe erosive esophagitis  Shock liver  EDWIGE on CKD 3  DM    69 y/o male with significant PMH for HFrEF of 20-25%, CAD  s/p PCI, severe MR and moderate TR, CKD was admitted on 25 for SOB. Initial work up discovered mixed picture of cardiogenic and septic shock. During this admission an impella was inserted to support cardiac function. Pt continued to require significant hemodynamic support with no improvement in multiorgan failure. Patient's family made the decision to make patient Comfort Measures and patient diet in the presence of his family today on 3/7/25 at 21:259     Physical examination at discharge  Vitals:    25   BP: (!) 80/75   Pulse: (!) 108   Resp: 20   Temp: 99.3 °F (37.4 °C)   SpO2:           Recent Labs     25  0340 25  1516 25  0344   WBC 11.3* 10.8 13.0*   HGB 7.7* 7.4* 8.5*   HCT 24.0* 22.3* 26.9*   PLT 85* 78* 69*     Recent Labs     25  1444 25  0340 25  1516 25  0344   * 133* 134* 134*   K 3.7 3.8 3.9 4.0    101 102 103

## 2025-03-18 NOTE — OP NOTE
We then glued it, released the clamps, accessed the left ventricular cavity using AL2 catheter and J-wire, exchanged over the Impella wire and then brought the Impella in.  Vicryl suture closed the incision and staples.  The patient tolerated the procedure well.    I was present for the entire procedure.  ; PA-C assistance was needed due to difficulty of procedure, dissection, and identification of pertinent anatomy throughout the case         MD LORI BENDER/AQS  D:  03/18/2025 14:19:40  T:  03/18/2025 16:50:58  JOB #:  [id number]

## (undated) DEVICE — CATHETER ANGIO JL3.5 6 FRX 100 CM 2.5 CM PERFORMA

## (undated) DEVICE — GLOVE SURG SZ 7.5 L11.2IN THK9.8MIL STRW LTX POLYMER BEAD

## (undated) DEVICE — ROSEN CURVED WIRE GUIDE: Brand: ROSEN

## (undated) DEVICE — ELECTRODE,RADIOTRANSLUCENT,FOAM,5PK: Brand: MEDLINE

## (undated) DEVICE — TTL1LYR 16FR10ML 100%SIL TMPST TR: Brand: MEDLINE

## (undated) DEVICE — TREK CORONARY DILATATION CATHETER 2.25 MM X 15 MM / RAPID-EXCHANGE: Brand: TREK

## (undated) DEVICE — DEVICE ES L3M EDGE COAT HEX LOK BLDE ELECTRD HOLSTER FORC

## (undated) DEVICE — CATH IV AUTOGRD BC PNK 20GA 25 -- INSYTE

## (undated) DEVICE — Device

## (undated) DEVICE — CATHETER DIAG 5FR L100CM LUMN ID0.047IN JL3.5 CRV 0 SIDE H

## (undated) DEVICE — BASIN EMSIS 16OZ GRAPHITE PLAS KID SHP MOLD GRAD FOR ORAL

## (undated) DEVICE — COVER LT HNDL PLAS RIG 1 PER PK

## (undated) DEVICE — Z DISCONTINUED PER MEDLINE LINE GAS SAMPLING O2/CO2 LNG AD 13 FT NSL W/ TBNG FILTERLINE

## (undated) DEVICE — PROVE COVER: Brand: UNBRANDED

## (undated) DEVICE — SOLIDIFIER FLD 2OZ 1500CC N DISINF IN BTL DISP SAFESORB

## (undated) DEVICE — RUNTHROUGH NS EXTRA FLOPPY PTCA GUIDEWIRE: Brand: RUNTHROUGH

## (undated) DEVICE — BLADE,CARBON-STEEL,11,STRL,DISPOSABLE,TB: Brand: MEDLINE

## (undated) DEVICE — OPEN HEART A- RICHMOND: Brand: MEDLINE INDUSTRIES, INC.

## (undated) DEVICE — SUTURE PROL SZ 5-0 L30IN NONABSORBABLE BLU L13MM RB-2 1/2 8710H

## (undated) DEVICE — PUMP HEART IMPELLA 5.5 W/SMART ASSIST S2

## (undated) DEVICE — SOLUTION IRRIG 1000ML H2O PIC PLAS SHATTERPROOF CONTAINER

## (undated) DEVICE — SOLUTION IV 500 ML 0.9 NACL INJ EXCEL CONTAINER USP LF

## (undated) DEVICE — GLIDESHEATH SLENDER ACCESS KIT: Brand: GLIDESHEATH SLENDER

## (undated) DEVICE — COVER C ARM W36XL28IN BND BG SNAP KOVER

## (undated) DEVICE — TREK CORONARY DILATATION CATHETER 2.50 MM X 30 MM / RAPID-EXCHANGE: Brand: TREK

## (undated) DEVICE — SYRINGE ANGIO 10 CC BRL STD PRNT POLYCARB LT BLU MEDALLION

## (undated) DEVICE — CUSTOM KT PTCA INFL DEV K05 00053H

## (undated) DEVICE — CATHETER ANGIO JR4 0.054 INX6 FRX100 CM 1.9 CM PERFORMA

## (undated) DEVICE — CATH GUID COR EB35 6FR 100CM -- LAUNCHER

## (undated) DEVICE — ORISE PROKNIFE 3.0 MM ELECTRODE: Brand: ORISE™ PROKNIFE

## (undated) DEVICE — CONTAINER SPEC 20 ML LID NEUT BUFF FORMALIN 10 % POLYPR STS

## (undated) DEVICE — NC TREK CORONARY DILATATION CATHETER 4.0 MM X 12 MM / RAPID-EXCHANGE: Brand: NC TREK

## (undated) DEVICE — ANGIOGRAPHY KIT CUST [K0910930B] [MERIT MEDICAL SYSTEMS INC]

## (undated) DEVICE — SUTURE PERMA-HAND SZ 2 L60IN NONABSORBABLE BLK SILK BRAID SA8H

## (undated) DEVICE — BASIN ST MAJOR-NO CAUTERY: Brand: MEDLINE INDUSTRIES, INC.

## (undated) DEVICE — GOWN,SIRUS,NONRNF,SETINSLV,XL,20/CS: Brand: MEDLINE

## (undated) DEVICE — YANKAUER,TAPERED BULBOUS TIP,W/O VENT: Brand: MEDLINE

## (undated) DEVICE — FORCEPS BX L160CM DIA8MM GRSP DISECT CUP TIP NONLOCKING ROT

## (undated) DEVICE — GUIDEWIRE VASC L260CM DIA0.035IN RAD 3MM J TIP L7CM PTFE

## (undated) DEVICE — SYRINGE MED 30ML STD CLR PLAS LUERLOCK TIP N CTRL DISP

## (undated) DEVICE — BLOCK BITE ENDOSCP AD 21 MM W/ DIL BLU LF DISP

## (undated) DEVICE — 3M™ TEGADERM™ TRANSPARENT FILM DRESSING FRAME STYLE, 1626W, 4 IN X 4-3/4 IN (10 CM X 12 CM), 50/CT 4CT/CASE: Brand: 3M™ TEGADERM™

## (undated) DEVICE — CATHETER PRESSURE WEDGE BLLN 6FRX110CM

## (undated) DEVICE — COVER,LIGHT,CAMERA,HARD,1/PK,STRL: Brand: MEDLINE

## (undated) DEVICE — PACK PROCEDURE SURG HRT CATH

## (undated) DEVICE — SUTURE VICRYL + SZ 0 L18IN ABSRB VLT CT L40MM 1 2 CIR TAPR PNT

## (undated) DEVICE — PLEDGET SURG W3/16XL0.25IN THK1.65MM PTFE OVL FELT FOR THE

## (undated) DEVICE — CLIP LIG M BLU TI HRT SHP WIRE HORZ 24 CLIPS/PK 25PK/CA

## (undated) DEVICE — 1200 GUARD II KIT W/5MM TUBE W/O VAC TUBE: Brand: GUARDIAN

## (undated) DEVICE — FOGARTY - HYDRAGRIP SURGICAL - CLAMP INSERTS: Brand: FOGARTY SOFTJAW

## (undated) DEVICE — SYR 10ML LUER LOK 1/5ML GRAD --

## (undated) DEVICE — SPLINT WR POS F/ARTERIAL ACC -- BX/10

## (undated) DEVICE — CATH ANGIO 6FR .056IN AL2 100CM IMPULSE

## (undated) DEVICE — DRESSING FOAM W8.7XL9.1IN SAFETAC LAYR SELF ADH MEPILEX

## (undated) DEVICE — TUBING PRSS MON L6IN PVC M FEM CONN

## (undated) DEVICE — NEEDLE HYPO 18GA L1.5IN PNK S STL HUB POLYPR SHLD REG BVL

## (undated) DEVICE — X-RAY DETECTABLE SPONGES,16 PLY: Brand: VISTEC

## (undated) DEVICE — CATH GUID COR JR4.0 6FR 100CM -- LAUNCHER

## (undated) DEVICE — TR BAND RADIAL ARTERY COMPRESSION DEVICE: Brand: TR BAND

## (undated) DEVICE — SYRINGE MEDICAL 3ML CLEAR PLASTIC STANDARD NON CONTROL LUERLOCK TIP DISPOSABLE

## (undated) DEVICE — SOLUTION IV 1000ML PH 7.4 INJ NRMSOL R

## (undated) DEVICE — BLADE ASSEMB CLP HAIR FINE --

## (undated) DEVICE — SPLINT WR VELC FOAM NEUT POS DISP FOR RAD ART ACC SFT STRP

## (undated) DEVICE — SUTURE PERMA-HAND 0 L18IN NONABSORBABLE BLK CT-1 L36MM 1/2 C021D

## (undated) DEVICE — TUBING, SUCTION, 1/4" X 10', STRAIGHT: Brand: MEDLINE

## (undated) DEVICE — ELECTRODE PT RET AD L9FT HI MOIST COND ADH HYDRGEL CORDED

## (undated) DEVICE — APPLICATOR MEDICATED 26 CC SOLUTION HI LT ORNG CHLORAPREP

## (undated) DEVICE — ORISE PROKNIFE 1.5 MM ELECTRODE: Brand: ORISE™ PROKNIFE

## (undated) DEVICE — TOWEL 4 PLY TISS 19X30 SUE WHT

## (undated) DEVICE — SPONGE GZ W4XL4IN COT 12 PLY TYP VII WVN C FLD DSGN STERILE

## (undated) DEVICE — AIRLIFE™  ADULT CUSHION NASAL CANNULA WITH 7 FOOT (2.1 M) CRUSH-RESISTANT OXYGEN TUBING, AND U/CONNECT-IT ADAPTER: Brand: AIRLIFE™

## (undated) DEVICE — TIDISHIELD TRANSPORT, CONTAINMENT COVER FOR BACK TABLE 4'6" (1.37M) TO 8' (2.43M) IN LENGTH: Brand: TIDISHIELD

## (undated) DEVICE — SYR 3ML LL TIP 1/10ML GRAD --

## (undated) DEVICE — VALVE INSRT TOOL ADPT MTL 9FR -- ACCESS

## (undated) DEVICE — BAG SPEC BIOHZRD 10 X 10 IN --

## (undated) DEVICE — CATHETER COR DIAG TRANSFORMER 3.5 5FR L100CM 0 SIDE H

## (undated) DEVICE — 1000ML,PRESSURE INFUSER W/STOPCOCK: Brand: MEDLINE

## (undated) DEVICE — NEONATAL-ADULT SPO2 SENSOR: Brand: NELLCOR

## (undated) DEVICE — LOOP,VESSEL,MAXI,BLUE,2/PK,STERILE: Brand: MEDLINE

## (undated) DEVICE — SYRINGE MED 50ML LUERLOCK TIP

## (undated) DEVICE — SYSTEM TISS GLUE 5ML CONTAIN SYR PLUNG STD SYR TIP 12MM 5PKS/EA

## (undated) DEVICE — AGENT HEMSTAT W4XL4IN OXIDIZED REGENERATED CELOS ABSRB SFT

## (undated) DEVICE — SET ADMIN 16ML TBNG L100IN 2 Y INJ SITE IV PIGGY BK DISP

## (undated) DEVICE — MEDI-TRACE CADENCE ADULT, DEFIBRILLATION ELECTRODE -RTS  (10 PR/PK) - PHILIPS: Brand: MEDI-TRACE CADENCE